# Patient Record
Sex: MALE | Race: WHITE | NOT HISPANIC OR LATINO | Employment: PART TIME | URBAN - METROPOLITAN AREA
[De-identification: names, ages, dates, MRNs, and addresses within clinical notes are randomized per-mention and may not be internally consistent; named-entity substitution may affect disease eponyms.]

---

## 2017-01-06 ENCOUNTER — OFFICE VISIT (OUTPATIENT)
Dept: FAMILY MEDICINE CLINIC | Facility: CLINIC | Age: 67
End: 2017-01-06

## 2017-01-06 ENCOUNTER — TELEPHONE (OUTPATIENT)
Dept: FAMILY MEDICINE CLINIC | Facility: CLINIC | Age: 67
End: 2017-01-06

## 2017-01-06 ENCOUNTER — OFFICE VISIT (OUTPATIENT)
Dept: CARDIOLOGY | Facility: CLINIC | Age: 67
End: 2017-01-06

## 2017-01-06 VITALS
OXYGEN SATURATION: 98 % | HEART RATE: 66 BPM | HEIGHT: 76 IN | SYSTOLIC BLOOD PRESSURE: 132 MMHG | BODY MASS INDEX: 32.27 KG/M2 | DIASTOLIC BLOOD PRESSURE: 72 MMHG | TEMPERATURE: 97.5 F | WEIGHT: 265 LBS

## 2017-01-06 VITALS
BODY MASS INDEX: 31.53 KG/M2 | WEIGHT: 267 LBS | HEART RATE: 58 BPM | HEIGHT: 77 IN | SYSTOLIC BLOOD PRESSURE: 130 MMHG | DIASTOLIC BLOOD PRESSURE: 72 MMHG

## 2017-01-06 DIAGNOSIS — I11.9 HYPERTENSIVE HEART DISEASE WITHOUT HEART FAILURE: ICD-10-CM

## 2017-01-06 DIAGNOSIS — E05.00 GRAVES DISEASE: ICD-10-CM

## 2017-01-06 DIAGNOSIS — R79.89 ELEVATED TSH: ICD-10-CM

## 2017-01-06 DIAGNOSIS — R42 DIZZINESS: Primary | ICD-10-CM

## 2017-01-06 DIAGNOSIS — I47.1 SUPRAVENTRICULAR TACHYCARDIA (HCC): ICD-10-CM

## 2017-01-06 DIAGNOSIS — R19.7 DIARRHEA, UNSPECIFIED TYPE: ICD-10-CM

## 2017-01-06 DIAGNOSIS — I49.3 PVC (PREMATURE VENTRICULAR CONTRACTION): ICD-10-CM

## 2017-01-06 DIAGNOSIS — I10 ESSENTIAL HYPERTENSION: ICD-10-CM

## 2017-01-06 DIAGNOSIS — R53.83 FATIGUE, UNSPECIFIED TYPE: ICD-10-CM

## 2017-01-06 DIAGNOSIS — I49.9 IRREGULAR HEART BEAT: ICD-10-CM

## 2017-01-06 DIAGNOSIS — R79.89 ELEVATED LFTS: Primary | ICD-10-CM

## 2017-01-06 LAB
ALBUMIN SERPL-MCNC: 4.1 G/DL (ref 3.5–5.2)
ALBUMIN/GLOB SERPL: 1.2 G/DL
ALP SERPL-CCNC: 58 U/L (ref 39–117)
ALT SERPL W P-5'-P-CCNC: 42 U/L (ref 1–41)
ANION GAP SERPL CALCULATED.3IONS-SCNC: 12.3 MMOL/L
AST SERPL-CCNC: 31 U/L (ref 1–40)
BILIRUB SERPL-MCNC: 0.4 MG/DL (ref 0.1–1.2)
BUN BLD-MCNC: 20 MG/DL (ref 8–23)
BUN/CREAT SERPL: 17.9 (ref 7–25)
CALCIUM SPEC-SCNC: 9.9 MG/DL (ref 8.6–10.5)
CHLORIDE SERPL-SCNC: 101 MMOL/L (ref 98–107)
CO2 SERPL-SCNC: 23.7 MMOL/L (ref 22–29)
CREAT BLD-MCNC: 1.12 MG/DL (ref 0.76–1.27)
ERYTHROCYTE [DISTWIDTH] IN BLOOD BY AUTOMATED COUNT: 14.4 % (ref 4.5–15)
GFR SERPL CREATININE-BSD FRML MDRD: 66 ML/MIN/1.73
GLOBULIN UR ELPH-MCNC: 3.5 GM/DL
GLUCOSE BLD-MCNC: 85 MG/DL (ref 65–99)
HCT VFR BLD AUTO: 52.9 % (ref 35–60)
HGB BLD-MCNC: 16.6 G/DL (ref 13.5–18)
LYMPHOCYTES # BLD AUTO: 1.3 10*3/MM3 (ref 1.2–3.4)
LYMPHOCYTES NFR BLD AUTO: 18.7 % (ref 21–51)
MCH RBC QN AUTO: 26.7 PG (ref 26.1–33.1)
MCHC RBC AUTO-ENTMCNC: 31.4 G/DL (ref 33–37)
MCV RBC AUTO: 85.1 FL (ref 80–99)
MONOCYTES # BLD AUTO: 0.3 10*3/MM3 (ref 0.1–0.6)
MONOCYTES NFR BLD AUTO: 4.5 % (ref 2–9)
NEUTROPHILS # BLD AUTO: 5.3 10*3/MM3 (ref 1.4–6.5)
NEUTROPHILS NFR BLD AUTO: 76.8 % (ref 42–75)
PLATELET # BLD AUTO: 197 10*3/MM3 (ref 150–450)
PMV BLD AUTO: 7.9 FL (ref 7.1–10.5)
POTASSIUM BLD-SCNC: 5.4 MMOL/L (ref 3.5–5.2)
PROT SERPL-MCNC: 7.6 G/DL (ref 6–8.5)
RBC # BLD AUTO: 6.21 10*6/MM3 (ref 4–6)
SODIUM BLD-SCNC: 137 MMOL/L (ref 136–145)
WBC NRBC COR # BLD: 6.9 10*3/MM3 (ref 4.5–10)

## 2017-01-06 PROCEDURE — 80053 COMPREHEN METABOLIC PANEL: CPT | Performed by: NURSE PRACTITIONER

## 2017-01-06 PROCEDURE — 99214 OFFICE O/P EST MOD 30 MIN: CPT | Performed by: INTERNAL MEDICINE

## 2017-01-06 PROCEDURE — 93000 ELECTROCARDIOGRAM COMPLETE: CPT | Performed by: INTERNAL MEDICINE

## 2017-01-06 PROCEDURE — 99213 OFFICE O/P EST LOW 20 MIN: CPT | Performed by: NURSE PRACTITIONER

## 2017-01-06 PROCEDURE — 85025 COMPLETE CBC W/AUTO DIFF WBC: CPT | Performed by: NURSE PRACTITIONER

## 2017-01-06 RX ORDER — LEVOTHYROXINE SODIUM 0.07 MG/1
75 TABLET ORAL DAILY
Qty: 30 TABLET | Refills: 1 | Status: SHIPPED | OUTPATIENT
Start: 2017-01-06 | End: 2017-02-14

## 2017-01-06 NOTE — PROGRESS NOTES
"Subjective   Joaquin Peña is a 66 y.o. male.     History of Present Illness   C/o dizzy and fatigue, fluctuations in his pulse rate, mid 50s-90s. He visited his cardiologist Dr. Muna Haque this morning. He is dizzy all the time and fatigued.    He was seen on 12/22 for sinusitis and placed on amoxil 875mg BID for 7 days.  He states his sinuses are since resolved.  He states he has been dizzy since he changed his [id number] of levothyroxine, he was supposed to increase his dose to 88mcg per Ciera oquendo's note on 9/22/16 but he states this made him dizzy, per note in 12/22/16.  He was on 75mcg of synthroid.  He was supposed to have his TSH rechecked after the increase dose change 6 weeks later in 9/16 but did not f/u because he did not increase his dose. He states after that visit he increased his dose back up to 88mcg per Dr. Erwin Escobar's order on 12/22/16 and has been taking that dose since.  He has a history of Grave's disease.  He denies palpitations, CP.  He states he has SOA on occasion, not necessarily with exertion.  He states his cardiologist did an EKG today.  She also ordered a 24 hour holter monitor. Dr. Haque told him to come here today and made this appt for him. He feels dizzy and lightheaded currently.    He states he had a headache 2 days ago but has since resolved.  He states he slipped and fell in Feb 2014.  He states he tore his scalp open, and he had a CT with some \"bleeding on the brain.\" his CT head from 02/14 showed a subdural hematoma. He states he was monitored for 4 days, he states he had headaches at that time.  He denies any recent confusion.    He states his cardiologist made the appt today because she did not think these symptoms were heart related and wanted his to f/u with his pcp today.  He denies syncope. He states he has had diarrhea that started yesterday morning. He states it is watery diarrhea.  He denies melena and hematochezia, he denies any change in diet.  He denies abd " "pain, N/V.  He still has diarrhea today.  He states his last colonoscopy was last year and normal, Dr. Schulte. He is unsure about diverticulosis dx. He does not see an endocrinologist but he states he was exposed to radioactive iodine in 1990s and \"it shrunk his thyroid.\"  The following portions of the patient's history were reviewed and updated as appropriate: allergies, current medications, past family history, past medical history, past social history, past surgical history and problem list.    Review of Systems   Constitutional: Positive for fatigue. Negative for appetite change, chills and fever.   HENT: Negative for congestion, ear discharge, ear pain and tinnitus.    Eyes: Negative for photophobia, pain and visual disturbance.   Respiratory: Positive for shortness of breath (intermittent). Negative for cough, chest tightness and wheezing.    Cardiovascular: Negative for chest pain, palpitations and leg swelling.   Gastrointestinal: Positive for diarrhea. Negative for abdominal distention, abdominal pain, blood in stool, constipation, nausea, rectal pain and vomiting.   Musculoskeletal: Negative for arthralgias, myalgias, neck pain and neck stiffness.   Skin: Negative for pallor.   Allergic/Immunologic: Positive for environmental allergies.   Neurological: Positive for dizziness and light-headedness. Negative for syncope, weakness and headaches.   Psychiatric/Behavioral: Negative for agitation, confusion and sleep disturbance. The patient is not nervous/anxious.    All other systems reviewed and are negative.      Objective   Physical Exam   Constitutional: He is oriented to person, place, and time. He appears well-developed and well-nourished.   HENT:   Head: Normocephalic.   Right Ear: Hearing, tympanic membrane and ear canal normal.   Left Ear: Hearing, tympanic membrane and ear canal normal.   Nose: Mucosal edema present. Right sinus exhibits no maxillary sinus tenderness and no frontal sinus " tenderness. Left sinus exhibits no maxillary sinus tenderness and no frontal sinus tenderness.   Mouth/Throat: Uvula is midline, oropharynx is clear and moist and mucous membranes are normal.   Eyes: EOM are normal. Pupils are equal, round, and reactive to light.   Neck: Trachea normal and normal range of motion. Neck supple. Carotid bruit is not present. No thyromegaly present.   Cardiovascular: Normal rate, regular rhythm, normal heart sounds and intact distal pulses.    Pulmonary/Chest: Effort normal and breath sounds normal. No respiratory distress.   Abdominal: Soft. Bowel sounds are normal. He exhibits no distension. There is no hepatosplenomegaly. There is no tenderness.   Musculoskeletal: Normal range of motion. He exhibits no edema.   Lymphadenopathy:     He has no cervical adenopathy.   Neurological: He is alert and oriented to person, place, and time. He has normal strength. No cranial nerve deficit.   Skin: Skin is warm and dry.   Psychiatric: His behavior is normal. Judgment and thought content normal. His affect is blunt.   Flat affect.   Nursing note and vitals reviewed.      Assessment/Plan   Joaquin was seen today for follow-up.    Diagnoses and all orders for this visit:    Dizziness  -     CBC & Differential  -     Comprehensive Metabolic Panel  -     Cancel: Ambulatory Referral to Endocrinology  -     Ambulatory Referral to Endocrinology  -     CBC Auto Differential    Fatigue, unspecified type  -     CBC & Differential  -     Comprehensive Metabolic Panel  -     Cancel: Ambulatory Referral to Endocrinology  -     Ambulatory Referral to Endocrinology  -     CBC Auto Differential    Elevated TSH  -     Cancel: Ambulatory Referral to Endocrinology  -     Ambulatory Referral to Endocrinology    Other orders  -     levothyroxine (SYNTHROID) 75 MCG tablet; Take 1 tablet by mouth Daily.    Cbc, Cmp today, will call with results.  Changed dose synthroid back to 75mcg daily.   Refer to endocrinologist  for elevated TSH, and med dose change not tolerated, will call with referral.   Cont scheduled f/u with cardiologist and for holter monitor.  Encourage low CHOL diet.  If diarrhea is not resolved, within 2 days call office.   Increase fluid intake, get plenty of rest.   If dizziness, CP, palpitations, change in mental status, or any worsening symptoms patient informed to go to ER.  F/u in office with Dr. Escobar in about 3 weeks.   Patient agrees with plan of care and understands instructions. Call if worsening symptoms or any problems or concerns.

## 2017-01-06 NOTE — PROGRESS NOTES
Date of Office Visit: 2017  Encounter Provider: Muna Haque MD  Place of Service: [facility] CARDIOLOGY  Patient Name: Joaquin Peña  :1950    Chief complaint  Follow-up of paroxysmal supraventricular tachycardia, hypertension    History of Present Illness  Patient is a 66-year-old gentleman with Graves' disease, dyslipidemia, renal calculi who in  developed brief episodes of supraventricular tachycardia in the setting of hypertension.  This was treated medically.  In 2014 had a stress echocardiogram that showed normal left ventricular size and function with ejection fraction 59% grade 1 diastolic dysfunction mild left ventricular hypertrophy with no significant valvular heart disease and no ischemia in May 2015 he had mild bradycardia in the 30s.  A follow-up Holter revealed frequent PVCs and brief bursts of atrial tachycardia for which metoprolol was increased and he has not had any recurrent bradycardia arrhythmia.      Since last visit he denies any palpitations or chest pain.  Has dyspnea on exertions unchanged.  Blood pressures been as it is today.  He was suffering from an upper respiratory infection with pharyngitis and cough.  He was treated with antibiotics.  At this time he had some mild dizziness that quite sporadic without other focal motor sensory deficits        Past Medical History   Diagnosis Date   • Acquired spondylolisthesis    • Acute low back pain    • Acute right lumbar radiculopathy    • Bulging lumbar disc    • Concussion with no loss of consciousness    • DDD (degenerative disc disease)    • Fatigue    • Graves disease    • H/O disorder of nervous system and sense organs    • Headache    • Hyperlipidemia    • Hypertension    • Hypertensive heart disease    • Insomnia    • Irregular heart beat    • Lumbar radiculopathy    • Nephrolithiasis    • PVC (premature ventricular contraction)    • Severe head trauma    • SOB (shortness of  breath)    • Spondylolisthesis    • Subdural hematoma    • Supraventricular tachycardia        Past Surgical History   Procedure Laterality Date   • Knee surgery       2008 and 2009   • Lithotripsy     • Other surgical history       ear surgery       Current Outpatient Prescriptions on File Prior to Visit   Medication Sig Dispense Refill   • ketotifen (ZADITOR) 0.025 % ophthalmic solution Apply  to eye As Needed.     • levothyroxine (SYNTHROID, LEVOTHROID) 88 MCG tablet Take 88 mcg by mouth Daily.     • metoprolol succinate XL (TOPROL-XL) 25 MG 24 hr tablet Take 1/2 tablet po bid (Patient taking differently: 25 mg 2 (two) times a day. Take 1/2 tablet po bid) 30 tablet 11   • naproxen (NAPROSYN) 500 MG tablet Take 1 tablet by mouth 2 (Two) Times a Day With Meals. 60 tablet 3   • valsartan (DIOVAN) 40 MG tablet Take 1 tablet by mouth 2 (two) times a day. 30 tablet 11   • vardenafil (LEVITRA) 20 MG tablet Take 1 tablet by mouth Daily As Needed for erectile dysfunction. 10 tablet 3     No current facility-administered medications on file prior to visit.      Allergies as of 01/06/2017 - William as Reviewed 01/06/2017   Allergen Reaction Noted   • Latex Rash 03/15/2016     Social History     Social History   • Marital status:      Spouse name: N/A   • Number of children: N/A   • Years of education: N/A     Occupational History   • Not on file.     Social History Main Topics   • Smoking status: Former Smoker   • Smokeless tobacco: Not on file   • Alcohol use Yes      Comment: social use   • Drug use: No   • Sexual activity: Not on file     Other Topics Concern   • Not on file     Social History Narrative     Family History   Problem Relation Age of Onset   • Cancer Father      malignant neoplasm   • No Known Problems Mother    • No Known Problems Sister    • No Known Problems Brother    • No Known Problems Sister    • No Known Problems Brother    • No Known Problems Brother      Review of Systems   Constitution:  "Negative for fever, malaise/fatigue, weight gain and weight loss.   HENT: Negative for ear pain, hearing loss, nosebleeds and sore throat.    Eyes: Negative for double vision, pain, vision loss in left eye and vision loss in right eye.   Cardiovascular:        See history of present illness.   Respiratory: Positive for shortness of breath. Negative for cough, sleep disturbances due to breathing, snoring and wheezing.    Endocrine: Negative for cold intolerance, heat intolerance and polyuria.   Skin: Negative for itching, poor wound healing and rash.   Musculoskeletal: Negative for joint pain, joint swelling and myalgias.   Gastrointestinal: Negative for abdominal pain, diarrhea, hematochezia, nausea and vomiting.   Genitourinary: Negative for hematuria and hesitancy.   Neurological: Positive for excessive daytime sleepiness. Negative for numbness, paresthesias and seizures.   Psychiatric/Behavioral: Negative for depression. The patient is not nervous/anxious.      Objective:     Vitals:    01/06/17 0803   BP: 130/72   Pulse: 58   Weight: 267 lb (121 kg)   Height: 77\" (195.6 cm)     Body mass index is 31.66 kg/(m^2).    Physical Exam   Constitutional: He is oriented to person, place, and time. He appears well-developed and well-nourished.   Overweight   HENT:   Head: Normocephalic.   Nose: Nose normal.   Mouth/Throat: Oropharynx is clear and moist.   Eyes: Conjunctivae and EOM are normal. Pupils are equal, round, and reactive to light. Right eye exhibits no discharge. No scleral icterus.   Neck: Normal range of motion. Neck supple. No JVD present. No thyromegaly present.   Cardiovascular: Normal rate, regular rhythm, normal heart sounds and intact distal pulses.  Exam reveals no gallop and no friction rub.    No murmur heard.  Pulses:       Carotid pulses are 2+ on the right side, and 2+ on the left side.       Radial pulses are 2+ on the right side, and 2+ on the left side.        Femoral pulses are 2+ on the right " side, and 2+ on the left side.       Popliteal pulses are 2+ on the right side, and 2+ on the left side.        Dorsalis pedis pulses are 2+ on the right side, and 2+ on the left side.        Posterior tibial pulses are 2+ on the right side, and 2+ on the left side.   Pulmonary/Chest: Effort normal and breath sounds normal. No respiratory distress. He has no wheezes. He has no rales.   Abdominal: Soft. Bowel sounds are normal. He exhibits no distension. There is no hepatosplenomegaly. There is no tenderness. There is no rebound.   Musculoskeletal: Normal range of motion. He exhibits no edema or tenderness.   Neurological: He is alert and oriented to person, place, and time.   Skin: Skin is warm and dry. No rash noted. No erythema.   Psychiatric: He has a normal mood and affect. His behavior is normal. Judgment and thought content normal.   Vitals reviewed.    Lab Review:     ECG 12 Lead  Date/Time: 1/6/2017 11:16 PM  Performed by: JOHNY HART  Authorized by: JOHNY HART   Comparison: compared with previous ECG   Similar to previous ECG  Rhythm: sinus rhythm  Conduction: 1st degree and non-specific intraventricular conduction delay  Conduction comments: QTc = 413 msec  Clinical impression: abnormal ECG          Assessment:       Diagnosis Plan   1. Dizziness  Holter Monitor - 24 Hour   2. Supraventricular tachycardia     3. Hypertensive heart disease without heart failure     4. PVC (premature ventricular contraction)     5. Essential hypertension     6. Irregular heart beat  Holter Monitor - 24 Hour     Plan:       1.  Dizziness, we'll check a 24-hour Holter.  He will also see Dr. Escobar later today to reassess for otitis  1.  Paroxysmal supraventricular tachycardia.   2.  Hypertension.    3.  Dyslipidemia.  4.  Graves' disease.  Managed by Dr. Escobar and recently dose increased  5.  Recent upper respiratory infection  6.  Chronic dyspnea on exertion     Joaquin Peña   Home Medication Instructions MINDI:     Printed on:01/09/17 5492   Medication Information                      diclofenac (VOLTAREN) 3 % gel gel  Apply  topically 2 (Two) Times a Day. for 60 days.             diphenoxylate-atropine (LOMOTIL) 2.5-0.025 MG per tablet  Take 2 tablets by mouth 4 (Four) Times a Day As Needed for diarrhea.             ketotifen (ZADITOR) 0.025 % ophthalmic solution  Apply  to eye As Needed.             levothyroxine (SYNTHROID) 75 MCG tablet  Take 1 tablet by mouth Daily.             metoprolol succinate XL (TOPROL-XL) 25 MG 24 hr tablet  Take 1/2 tablet po bid             naproxen (NAPROSYN) 500 MG tablet  Take 1 tablet by mouth 2 (Two) Times a Day With Meals.             valsartan (DIOVAN) 40 MG tablet  Take 1 tablet by mouth 2 (two) times a day.             vardenafil (LEVITRA) 20 MG tablet  Take 1 tablet by mouth Daily As Needed for erectile dysfunction.               EMR Dragon/Transcription disclaimer:   Much of this encounter note is an electronic transcription/translation of spoken language to printed text. The electronic translation of spoken language may permit erroneous, or at times, nonsensical words or phrases to be inadvertently transcribed; Although I have reviewed the note for such errors, some may still exist.

## 2017-01-06 NOTE — MR AVS SNAPSHOT
Joaquin Peña   1/6/2017 8:20 AM   Office Visit    Dept Phone:  [phone]   Encounter #:  [id number]    Provider:  Muna Haque MD   Department:  [facility] CARDIOLOGY                Your Full Care Plan              Today's Medication Changes          These changes are accurate as of: 1/6/17  9:20 AM.  If you have any questions, ask your nurse or doctor.               Stop taking medication(s)listed here:     fish oil 1200 MG capsule capsule   Stopped by:  Muna Haque MD           fluticasone 50 MCG/ACT nasal spray   Commonly known as:  FLONASE   Stopped by:  Muna Haque MD           MULTIVITAMINS PO   Stopped by:  Muna Haque MD                      Your Updated Medication List          This list is accurate as of: 1/6/17  9:20 AM.  Always use your most recent med list.                diclofenac 3 % gel gel   Commonly known as:  VOLTAREN       levothyroxine 88 MCG tablet   Commonly known as:  SYNTHROID, LEVOTHROID       metoprolol succinate XL 25 MG 24 hr tablet   Commonly known as:  TOPROL-XL   Take 1/2 tablet po bid       naproxen 500 MG tablet   Commonly known as:  NAPROSYN   Take 1 tablet by mouth 2 (Two) Times a Day With Meals.       valsartan 40 MG tablet   Commonly known as:  DIOVAN   Take 1 tablet by mouth 2 (two) times a day.       vardenafil 20 MG tablet   Commonly known as:  LEVITRA   Take 1 tablet by mouth Daily As Needed for erectile dysfunction.       ZADITOR 0.025 % ophthalmic solution   Generic drug:  ketotifen               You Were Diagnosed With        Codes Comments    Dizziness    -  Primary ICD-10-CM: R42  ICD-9-CM: 780.4     Supraventricular tachycardia     ICD-10-CM: I47.1  ICD-9-CM: 427.89     Hypertensive heart disease without heart failure     ICD-10-CM: I11.9  ICD-9-CM: 402.90     PVC (premature ventricular contraction)     ICD-10-CM: I49.3  ICD-9-CM: 427.69     Essential hypertension     ICD-10-CM: I10  ICD-9-CM: 401.9     Irregular heart beat     ICD-10-CM: I49.9  ICD-9-CM: 427.9       Instructions     None    Patient Instructions History      Upcoming Appointments     Visit Type Date Time Department    FOLLOW UP 1/6/2017  8:20 AM [facility] [place]    OFFICE VISIT 1/6/2017 11:40 AM [facility] [place]     WESLEY HOLTER MONITOR 24 HOUR VT 1/13/2017  9:00 AM [facility] [place]    FOLLOW UP 3/20/2017  9:00 AM [facility] [place]    FOLLOW UP 7/6/2017  8:00 AM [facility] [place]      MyChart Signup     Our records indicate that you have an active [employer] account.    You can view your After Visit Summary by going to Generate and logging in with your [employer] username and password.  If you don't have a [employer] username and password but a parent or guardian has access to your record, the parent or guardian should login with their own [employer] username and password and access your record to view the After Visit Summary.    If you have questions, you can email [email or web address] or call [phone] to talk to our [employer] staff.  Remember, [employer] is NOT to be used for urgent needs.  For medical emergencies, dial 911.               Other Info from Your Visit           Your Appointments     Jan 06, 2017 11:40 AM EST   Office Visit with QIAN Smith   [facility] (--)    [address] [facility] [address] [phone]           Arrive 15 minutes prior to appointment.            Jan 13, 2017  9:00 AM EST   HOLTER MONITOR - 24 HOUR VISIT with WESLEY ESTEEG HOLTER   [facility]IOLOG[facility] ([facility])    [address] [facility] [address]               Mar 20, 2017  9:00 AM EDT   Follow Up with Muna Haque MD   [facility] CARDIOLOG[facility] (--)    [address]  [facility] [address] [phone]           Arrive 15 minutes prior to appointment.            Jul 06, 2017  8:00 AM EDT   Follow Up with  "Muna Haque MD   [facility] CARDIOLOGY (--)    [address]  [facility] [address] [phone]           Arrive 15 minutes prior to appointment.              Allergies     Latex Allergy Rash      Reason for Visit     Rapid Heart Rate early followup    Hypertension     PVC     Dizziness           Vital Signs     Blood Pressure Pulse Height Weight Body Mass Index Smoking Status    130/72 58 77\" (195.6 cm) 267 lb (121 kg) 31.66 kg/m2 Former Smoker      Problems and Diagnoses Noted     Dizziness    High blood pressure    Heart disease due to high blood pressure    Irregular heartbeat    Irregular heartbeat    Irregular heartbeat        "

## 2017-01-06 NOTE — PATIENT INSTRUCTIONS
Cbc, Cmp today, will call with results.  Changed dose synthroid back to 75mcg daily.   Refer to endocrinologist for elevated TSH, and med dose change not tolerated, will call with referral.   Cont scheduled f/u with cardiologist and for holter monitor.  Encourage low CHOL diet.  If diarrhea is not resolved, within 2 days call office.   Increase fluid intake, get plenty of rest.   If dizziness, CP, palpitations, change in mental status, or any worsening symptoms patient informed to go to ER.  F/u in office with Dr. Escobar in about 3 weeks.   Patient agrees with plan of care and understands instructions. Call if worsening symptoms or any problems or concerns.     Food Choices to Help Relieve Diarrhea, Adult  When you have diarrhea, the foods you eat and your eating habits are very important. Choosing the right foods and drinks can help relieve diarrhea. Also, because diarrhea can last up to 7 days, you need to replace lost fluids and electrolytes (such as sodium, potassium, and chloride) in order to help prevent dehydration.   WHAT GENERAL GUIDELINES DO I NEED TO FOLLOW?  · Slowly drink 1 cup (8 oz) of fluid for each episode of diarrhea. If you are getting enough fluid, your urine will be clear or pale yellow.  · Eat starchy foods. Some good choices include white rice, white toast, pasta, low-fiber cereal, baked potatoes (without the skin), saltine crackers, and bagels.  · Avoid large servings of any cooked vegetables.  · Limit fruit to two servings per day. A serving is ½ cup or 1 small piece.  · Choose foods with less than 2 g of fiber per serving.  · Limit fats to less than 8 tsp (38 g) per day.  · Avoid fried foods.  · Eat foods that have probiotics in them. Probiotics can be found in certain dairy products.  · Avoid foods and beverages that may increase the speed at which food moves through the stomach and intestines (gastrointestinal tract). Things to avoid include:  ¨ High-fiber foods, such as dried fruit, raw  fruits and vegetables, nuts, seeds, and whole grain foods.  ¨ Spicy foods and high-fat foods.  ¨ Foods and beverages sweetened with high-fructose corn syrup, honey, or sugar alcohols such as xylitol, sorbitol, and mannitol.  WHAT FOODS ARE RECOMMENDED?  Grains  White rice. White, French, or jose breads (fresh or toasted), including plain rolls, buns, or bagels. White pasta. Saltine, soda, or rae crackers. Pretzels. Low-fiber cereal. Cooked cereals made with water (such as cornmeal, farina, or cream cereals). Plain muffins. Matzo. [place] toast. Zwieback.   Vegetables  Potatoes (without the skin). Strained tomato and vegetable juices. Most well-cooked and canned vegetables without seeds. Tender lettuce.  Fruits  Cooked or canned applesauce, apricots, cherries, fruit cocktail, grapefruit, peaches, pears, or plums. Fresh bananas, apples without skin, cherries, grapes, cantaloupe, grapefruit, peaches, oranges, or plums.   Meat and Other Protein Products  Baked or boiled chicken. Eggs. Tofu. Fish. Seafood. Smooth peanut butter. Ground or well-cooked tender beef, ham, veal, lamb, pork, or poultry.   Dairy  Plain yogurt, kefir, and unsweetened liquid yogurt. Lactose-free milk, buttermilk, or soy milk. Plain hard cheese.  Beverages  Sport drinks. Clear broths. Diluted fruit juices (except prune). Regular, caffeine-free sodas such as ginger ale. Water. Decaffeinated teas. Oral rehydration solutions. Sugar-free beverages not sweetened with sugar alcohols.  Other  Bouillon, broth, or soups made from recommended foods.   The items listed above may not be a complete list of recommended foods or beverages. Contact your dietitian for more options.  WHAT FOODS ARE NOT RECOMMENDED?  Grains  Whole grain, whole wheat, bran, or rye breads, rolls, pastas, crackers, and cereals. Wild or brown rice. Cereals that contain more than 2 g of fiber per serving. Corn tortillas or taco shells. Cooked or dry oatmeal. Granola.  Popcorn.  Vegetables  Raw vegetables. Cabbage, broccoli, [place] sprouts, artichokes, baked beans, beet greens, corn, kale, legumes, peas, sweet potatoes, and yams. Potato skins. Cooked spinach and cabbage.  Fruits  Dried fruit, including raisins and dates. Raw fruits. Stewed or dried prunes. Fresh apples with skin, apricots, mangoes, pears, raspberries, and strawberries.   Meat and Other Protein Products  [place] peanut butter. Nuts and seeds. Beans and lentils. Duarte.   Dairy  High-fat cheeses. Milk, chocolate milk, and beverages made with milk, such as milk shakes. Cream. Ice cream.  Sweets and Desserts  Sweet rolls, doughnuts, and sweet breads. Pancakes and waffles.  Fats and Oils  Butter. Cream sauces. Margarine. Salad oils. Plain salad dressings. Olives. Avocados.   Beverages  Caffeinated beverages (such as coffee, tea, soda, or energy drinks). Alcoholic beverages. Fruit juices with pulp. Prune juice. Soft drinks sweetened with high-fructose corn syrup or sugar alcohols.  Other  Coconut. Hot sauce. Chili powder. Mayonnaise. Gravy. Cream-based or milk-based soups.   The items listed above may not be a complete list of foods and beverages to avoid. Contact your dietitian for more information.  WHAT SHOULD I DO IF I BECOME DEHYDRATED?  Diarrhea can sometimes lead to dehydration. Signs of dehydration include dark urine and dry mouth and skin. If you think you are dehydrated, you should rehydrate with an oral rehydration solution. These solutions can be purchased at pharmacies, retail stores, or online.   Drink ½-1 cup (120-240 mL) of oral rehydration solution each time you have an episode of diarrhea. If drinking this amount makes your diarrhea worse, try drinking smaller amounts more often. For example, drink 1-3 tsp (5-15 mL) every 5-10 minutes.   A general rule for staying hydrated is to drink 1½-2 L of fluid per day. Talk to your health care provider about the specific amount you should be drinking each day.  Drink enough fluids to keep your urine clear or pale yellow.     This information is not intended to replace advice given to you by your health care provider. Make sure you discuss any questions you have with your health care provider.     Document Released: 03/09/2005 Document Revised: 01/08/2016 Document Reviewed: 11/10/2014  Elsevier Interactive Patient Education ©2016 Elsevier Inc.

## 2017-01-06 NOTE — MR AVS SNAPSHOT
Joaquin Peña   1/6/2017 11:40 AM   Office Visit    Dept Phone:  [phone]   Encounter #:  [id number]    Provider:  QIAN Smith   Department:  [facility]                Your Full Care Plan              Today's Medication Changes          These changes are accurate as of: 1/6/17  1:03 PM.  If you have any questions, ask your nurse or doctor.               Medication(s)that have changed:     levothyroxine 75 MCG tablet   Commonly known as:  SYNTHROID   Take 1 tablet by mouth Daily.   What changed:    - medication strength  - how much to take   Changed by:  QIAN Smith         Stop taking medication(s)listed here:     fish oil 1200 MG capsule capsule   Stopped by:  Muna Haque MD           fluticasone 50 MCG/ACT nasal spray   Commonly known as:  FLONASE   Stopped by:  Muna Haque MD           MULTIVITAMINS PO   Stopped by:  Muna Haque MD                Where to Get Your Medications      These medications were sent to [facility] #[facility] [address] [place] [address] [phone] [place] [id number] [id number]     Phone:  [phone]     levothyroxine 75 MCG tablet                  Your Updated Medication List          This list is accurate as of: 1/6/17  1:03 PM.  Always use your most recent med list.                diclofenac 3 % gel gel   Commonly known as:  VOLTAREN       levothyroxine 75 MCG tablet   Commonly known as:  SYNTHROID   Take 1 tablet by mouth Daily.       metoprolol succinate XL 25 MG 24 hr tablet   Commonly known as:  TOPROL-XL   Take 1/2 tablet po bid       naproxen 500 MG tablet   Commonly known as:  NAPROSYN   Take 1 tablet by mouth 2 (Two) Times a Day With Meals.       valsartan 40 MG tablet   Commonly known as:  DIOVAN   Take 1 tablet by mouth 2 (two) times a day.       vardenafil 20 MG tablet   Commonly known as:  LEVITRA   Take 1 tablet by mouth Daily As Needed for  erectile dysfunction.       ZADITOR 0.025 % ophthalmic solution   Generic drug:  ketotifen               We Performed the Following     Ambulatory Referral to Endocrinology     CBC & Differential     CBC Auto Differential     Comprehensive Metabolic Panel       You Were Diagnosed With        Codes Comments    Dizziness    -  Primary ICD-10-CM: R42  ICD-9-CM: 780.4     Fatigue, unspecified type     ICD-10-CM: R53.83  ICD-9-CM: 780.79     Elevated TSH     ICD-10-CM: R94.6  ICD-9-CM: 794.5     Diarrhea, unspecified type     ICD-10-CM: R19.7  ICD-9-CM: 787.91     Graves disease     ICD-10-CM: E05.00  ICD-9-CM: 242.00       Instructions    Cbc, Cmp today, will call with results.  Changed dose synthroid back to 75mcg daily.   Refer to endocrinologist for elevated TSH, and med dose change not tolerated, will call with referral.   Cont scheduled f/u with cardiologist and for holter monitor.  Encourage low CHOL diet.  If diarrhea is not resolved, within 2 days call office.   Increase fluid intake, get plenty of rest.   If dizziness, CP, palpitations, change in mental status, or any worsening symptoms patient informed to go to ER.  F/u in office with Dr. Escobar in about 3 weeks.   Patient agrees with plan of care and understands instructions. Call if worsening symptoms or any problems or concerns.     Food Choices to Help Relieve Diarrhea, Adult  When you have diarrhea, the foods you eat and your eating habits are very important. Choosing the right foods and drinks can help relieve diarrhea. Also, because diarrhea can last up to 7 days, you need to replace lost fluids and electrolytes (such as sodium, potassium, and chloride) in order to help prevent dehydration.   WHAT GENERAL GUIDELINES DO I NEED TO FOLLOW?  · Slowly drink 1 cup (8 oz) of fluid for each episode of diarrhea. If you are getting enough fluid, your urine will be clear or pale yellow.  · Eat starchy foods. Some good choices include white rice, white toast,  pasta, low-fiber cereal, baked potatoes (without the skin), saltine crackers, and bagels.  · Avoid large servings of any cooked vegetables.  · Limit fruit to two servings per day. A serving is ½ cup or 1 small piece.  · Choose foods with less than 2 g of fiber per serving.  · Limit fats to less than 8 tsp (38 g) per day.  · Avoid fried foods.  · Eat foods that have probiotics in them. Probiotics can be found in certain dairy products.  · Avoid foods and beverages that may increase the speed at which food moves through the stomach and intestines (gastrointestinal tract). Things to avoid include:  ¨ High-fiber foods, such as dried fruit, raw fruits and vegetables, nuts, seeds, and whole grain foods.  ¨ Spicy foods and high-fat foods.  ¨ Foods and beverages sweetened with high-fructose corn syrup, honey, or sugar alcohols such as xylitol, sorbitol, and mannitol.  WHAT FOODS ARE RECOMMENDED?  Grains  White rice. White, [place], or jose breads (fresh or toasted), including plain rolls, buns, or bagels. White pasta. Saltine, soda, or rae crackers. Pretzels. Low-fiber cereal. Cooked cereals made with water (such as cornmeal, farina, or cream cereals). Plain muffins. Matzo. [place] toast. Zwieback.   Vegetables  Potatoes (without the skin). Strained tomato and vegetable juices. Most well-cooked and canned vegetables without seeds. Tender lettuce.  Fruits  Cooked or canned applesauce, apricots, cherries, fruit cocktail, grapefruit, peaches, pears, or plums. Fresh bananas, apples without skin, cherries, grapes, cantaloupe, grapefruit, peaches, oranges, or plums.   Meat and Other Protein Products  Baked or boiled chicken. Eggs. Tofu. Fish. Seafood. Smooth peanut butter. Ground or well-cooked tender beef, ham, veal, lamb, pork, or poultry.   Dairy  Plain yogurt, kefir, and unsweetened liquid yogurt. Lactose-free milk, buttermilk, or soy milk. Plain hard cheese.  Beverages  Sport drinks. Clear broths. Diluted fruit juices  (except prune). Regular, caffeine-free sodas such as ginger ale. Water. Decaffeinated teas. Oral rehydration solutions. Sugar-free beverages not sweetened with sugar alcohols.  Other  Bouillon, broth, or soups made from recommended foods.   The items listed above may not be a complete list of recommended foods or beverages. Contact your dietitian for more options.  WHAT FOODS ARE NOT RECOMMENDED?  Grains  Whole grain, whole wheat, bran, or rye breads, rolls, pastas, crackers, and cereals. Wild or brown rice. Cereals that contain more than 2 g of fiber per serving. Corn tortillas or taco shells. Cooked or dry oatmeal. Granola. Popcorn.  Vegetables  Raw vegetables. Cabbage, broccoli, [place] sprouts, artichokes, baked beans, beet greens, corn, kale, legumes, peas, sweet potatoes, and yams. Potato skins. Cooked spinach and cabbage.  Fruits  Dried fruit, including raisins and dates. Raw fruits. Stewed or dried prunes. Fresh apples with skin, apricots, mangoes, pears, raspberries, and strawberries.   Meat and Other Protein Products  [place] peanut butter. Nuts and seeds. Beans and lentils. Duarte.   Dairy  High-fat cheeses. Milk, chocolate milk, and beverages made with milk, such as milk shakes. Cream. Ice cream.  Sweets and Desserts  Sweet rolls, doughnuts, and sweet breads. Pancakes and waffles.  Fats and Oils  Butter. Cream sauces. Margarine. Salad oils. Plain salad dressings. Olives. Avocados.   Beverages  Caffeinated beverages (such as coffee, tea, soda, or energy drinks). Alcoholic beverages. Fruit juices with pulp. Prune juice. Soft drinks sweetened with high-fructose corn syrup or sugar alcohols.  Other  Coconut. Hot sauce. Chili powder. Mayonnaise. Gravy. Cream-based or milk-based soups.   The items listed above may not be a complete list of foods and beverages to avoid. Contact your dietitian for more information.  WHAT SHOULD I DO IF I BECOME DEHYDRATED?  Diarrhea can sometimes lead to dehydration. Signs of  dehydration include dark urine and dry mouth and skin. If you think you are dehydrated, you should rehydrate with an oral rehydration solution. These solutions can be purchased at pharmacies, retail stores, or online.   Drink ½-1 cup (120-240 mL) of oral rehydration solution each time you have an episode of diarrhea. If drinking this amount makes your diarrhea worse, try drinking smaller amounts more often. For example, drink 1-3 tsp (5-15 mL) every 5-10 minutes.   A general rule for staying hydrated is to drink 1½-2 L of fluid per day. Talk to your health care provider about the specific amount you should be drinking each day. Drink enough fluids to keep your urine clear or pale yellow.     This information is not intended to replace advice given to you by your health care provider. Make sure you discuss any questions you have with your health care provider.     Document Released: 03/09/2005 Document Revised: 01/08/2016 Document Reviewed: 11/10/2014  [employer] Interactive Patient Education ©2016 Elsevier Inc.       Patient Instructions History      Upcoming Appointments     Visit Type Date Time Department    FOLLOW UP 1/6/2017  8:20 AM [facility]    OFFICE VISIT 1/6/2017 11:40 AM [facility]     WESLEY HOLTER MONITOR 24 HOUR VT 1/6/2017 12:00 PM [facility]    FOLLOW UP 3/20/2017  9:00 AM [facility]    FOLLOW UP 7/6/2017  8:00 AM [facility]      MyChart Signup     Our records indicate that you have an active Myze account.    You can view your After Visit Summary by going to OurHouse and logging in with your [employer] username and password.  If you don't have a [employer] username and password but a parent or guardian has access to your record, the parent or guardian should login with their own [employer] username and password and access your record to view the After Visit Summary.    If you have questions, you can email Good [email or web address] or  "call [phone] to talk to our MyChart staff.  Remember, [employer] is NOT to be used for urgent needs.  For medical emergencies, dial 911.               Other Info from Your Visit           Your Appointments     Mar 20, 2017  9:00 AM EDT   Follow Up with Muna Haque MD   [facility] CARDIOLOGY (--)    [address]  [facility] [address] [phone]           Arrive 15 minutes prior to appointment.            Jul 06, 2017  8:00 AM EDT   Follow Up with Muna Haque MD   [facility] CARDIOLOGY (--)    [address]  [facility] [address] [phone]           Arrive 15 minutes prior to appointment.              Allergies     Latex Allergy Rash      Reason for Visit     Follow-up cardiologist, dizzy, fatigue      Vital Signs     Blood Pressure Pulse Temperature Height Weight Oxygen Saturation    132/72 (BP Location: Right arm, Patient Position: Sitting, Cuff Size: Large Adult) 66 97.5 °F (36.4 °C) (Oral) 76\" (193 cm) 265 lb (120 kg) 98%    Body Mass Index Smoking Status                32.26 kg/m2 Former Smoker          Problems and Diagnoses Noted     Dizziness    Graves disease    Tiredness        TSH excess        Diarrhea          Results     CBC & Differential           CBC Auto Differential      Component Value Standard Range & Units    WBC 6.90 4.50 - 10.00 10*3/mm3    RBC 6.21 4.00 - 6.00 10*6/mm3    Hemoglobin 16.6 13.5 - 18.0 g/dL    Hematocrit 52.9 35.0 - 60.0 %    RDW 14.4 4.5 - 15.0 %    MCV 85.1 80.0 - 99.0 fL    MCH 26.7 26.1 - 33.1 pg    MCHC 31.4 33.0 - 37.0 g/dL    MPV 7.9 7.1 - 10.5 fL    Platelets 197 150 - 450 10*3/mm3    Neutrophil % 76.8 42.0 - 75.0 %    Lymphocyte % 18.7 21.0 - 51.0 %    Monocyte % 4.5 2.0 - 9.0 %    Neutrophils, Absolute 5.30 1.40 - 6.50 10*3/mm3    Lymphocytes, Absolute 1.30 1.20 - 3.40 10*3/mm3    Monocytes, Absolute 0.30 0.10 - 0.60 10*3/mm3                    "

## 2017-01-08 ENCOUNTER — HOSPITAL ENCOUNTER (EMERGENCY)
Facility: HOSPITAL | Age: 67
Discharge: HOME | End: 2017-01-08
Attending: EMERGENCY MEDICINE | Admitting: EMERGENCY MEDICINE

## 2017-01-08 VITALS
HEART RATE: 58 BPM | HEIGHT: 76 IN | SYSTOLIC BLOOD PRESSURE: 129 MMHG | OXYGEN SATURATION: 96 % | RESPIRATION RATE: 18 BRPM | WEIGHT: 260 LBS | DIASTOLIC BLOOD PRESSURE: 92 MMHG | BODY MASS INDEX: 31.66 KG/M2 | TEMPERATURE: 97 F

## 2017-01-08 DIAGNOSIS — R42 DIZZINESS: ICD-10-CM

## 2017-01-08 DIAGNOSIS — R19.7 DIARRHEA, UNSPECIFIED TYPE: Primary | ICD-10-CM

## 2017-01-08 LAB
ALBUMIN SERPL-MCNC: 3.9 G/DL (ref 3.5–5.2)
ALBUMIN/GLOB SERPL: 1.2 G/DL
ALP SERPL-CCNC: 55 U/L (ref 39–117)
ALT SERPL W P-5'-P-CCNC: 62 U/L (ref 1–41)
ANION GAP SERPL CALCULATED.3IONS-SCNC: 11.9 MMOL/L
AST SERPL-CCNC: 37 U/L (ref 1–40)
BASOPHILS # BLD AUTO: 0.01 10*3/MM3 (ref 0–0.2)
BASOPHILS NFR BLD AUTO: 0.3 % (ref 0–1.5)
BILIRUB SERPL-MCNC: 0.5 MG/DL (ref 0.1–1.2)
BUN BLD-MCNC: 18 MG/DL (ref 8–23)
BUN/CREAT SERPL: 15.3 (ref 7–25)
CALCIUM SPEC-SCNC: 9.3 MG/DL (ref 8.6–10.5)
CHLORIDE SERPL-SCNC: 103 MMOL/L (ref 98–107)
CO2 SERPL-SCNC: 23.1 MMOL/L (ref 22–29)
CREAT BLD-MCNC: 1.18 MG/DL (ref 0.76–1.27)
DEPRECATED RDW RBC AUTO: 47.3 FL (ref 37–54)
EOSINOPHIL # BLD AUTO: 0.16 10*3/MM3 (ref 0–0.7)
EOSINOPHIL NFR BLD AUTO: 4.1 % (ref 0.3–6.2)
ERYTHROCYTE [DISTWIDTH] IN BLOOD BY AUTOMATED COUNT: 14.7 % (ref 11.5–14.5)
GFR SERPL CREATININE-BSD FRML MDRD: 62 ML/MIN/1.73
GLOBULIN UR ELPH-MCNC: 3.2 GM/DL
GLUCOSE BLD-MCNC: 103 MG/DL (ref 65–99)
HCT VFR BLD AUTO: 47.9 % (ref 40.4–52.2)
HGB BLD-MCNC: 15.9 G/DL (ref 13.7–17.6)
IMM GRANULOCYTES # BLD: 0 10*3/MM3 (ref 0–0.03)
IMM GRANULOCYTES NFR BLD: 0 % (ref 0–0.5)
LYMPHOCYTES # BLD AUTO: 1.42 10*3/MM3 (ref 0.9–4.8)
LYMPHOCYTES NFR BLD AUTO: 35.9 % (ref 19.6–45.3)
MCH RBC QN AUTO: 29 PG (ref 27–32.7)
MCHC RBC AUTO-ENTMCNC: 33.2 G/DL (ref 32.6–36.4)
MCV RBC AUTO: 87.2 FL (ref 79.8–96.2)
MONOCYTES # BLD AUTO: 0.71 10*3/MM3 (ref 0.2–1.2)
MONOCYTES NFR BLD AUTO: 18 % (ref 5–12)
NEUTROPHILS # BLD AUTO: 1.65 10*3/MM3 (ref 1.9–8.1)
NEUTROPHILS NFR BLD AUTO: 41.7 % (ref 42.7–76)
PLATELET # BLD AUTO: 185 10*3/MM3 (ref 140–500)
PMV BLD AUTO: 10.6 FL (ref 6–12)
POTASSIUM BLD-SCNC: 4.4 MMOL/L (ref 3.5–5.2)
PROT SERPL-MCNC: 7.1 G/DL (ref 6–8.5)
RBC # BLD AUTO: 5.49 10*6/MM3 (ref 4.6–6)
SODIUM BLD-SCNC: 138 MMOL/L (ref 136–145)
T4 FREE SERPL-MCNC: 0.98 NG/DL (ref 0.93–1.7)
TSH SERPL DL<=0.05 MIU/L-ACNC: 6.55 MIU/ML (ref 0.27–4.2)
WBC NRBC COR # BLD: 3.95 10*3/MM3 (ref 4.5–10.7)

## 2017-01-08 PROCEDURE — 80053 COMPREHEN METABOLIC PANEL: CPT | Performed by: EMERGENCY MEDICINE

## 2017-01-08 PROCEDURE — 84439 ASSAY OF FREE THYROXINE: CPT | Performed by: EMERGENCY MEDICINE

## 2017-01-08 PROCEDURE — 85025 COMPLETE CBC W/AUTO DIFF WBC: CPT | Performed by: EMERGENCY MEDICINE

## 2017-01-08 PROCEDURE — 96361 HYDRATE IV INFUSION ADD-ON: CPT

## 2017-01-08 PROCEDURE — 84443 ASSAY THYROID STIM HORMONE: CPT | Performed by: EMERGENCY MEDICINE

## 2017-01-08 PROCEDURE — 96360 HYDRATION IV INFUSION INIT: CPT

## 2017-01-08 PROCEDURE — 99283 EMERGENCY DEPT VISIT LOW MDM: CPT

## 2017-01-08 RX ORDER — DIPHENOXYLATE HYDROCHLORIDE AND ATROPINE SULFATE 2.5; .025 MG/1; MG/1
2 TABLET ORAL 4 TIMES DAILY PRN
Qty: 16 TABLET | Refills: 0 | Status: SHIPPED | OUTPATIENT
Start: 2017-01-08 | End: 2017-02-02

## 2017-01-08 RX ORDER — SODIUM CHLORIDE 0.9 % (FLUSH) 0.9 %
10 SYRINGE (ML) INJECTION AS NEEDED
Status: DISCONTINUED | OUTPATIENT
Start: 2017-01-08 | End: 2017-01-08 | Stop reason: HOSPADM

## 2017-01-08 RX ORDER — SODIUM CHLORIDE 9 MG/ML
125 INJECTION, SOLUTION INTRAVENOUS CONTINUOUS
Status: DISCONTINUED | OUTPATIENT
Start: 2017-01-08 | End: 2017-01-08 | Stop reason: HOSPADM

## 2017-01-08 RX ADMIN — SODIUM CHLORIDE 125 ML/HR: 9 INJECTION, SOLUTION INTRAVENOUS at 09:51

## 2017-01-08 RX ADMIN — SODIUM CHLORIDE 1000 ML: 9 INJECTION, SOLUTION INTRAVENOUS at 09:51

## 2017-01-08 NOTE — DISCHARGE INSTRUCTIONS
Follow-up with your doctor later this week if symptoms have not resolved.  Take medication as prescribed.  Drink plenty of fluids.  Return to the emergency department for abdominal pain, fever, blood in your stool, worsening symptoms, or other concern.

## 2017-01-08 NOTE — ED NOTES
Patient was seen at PCP last week, patient states lab work abnormal at that time, pt states dizziness also an issue     Toshia Kellogg RN  01/08/17 0954

## 2017-01-08 NOTE — ED PROVIDER NOTES
EMERGENCY DEPARTMENT ENCOUNTER    CHIEF COMPLAINT  Chief Complaint: Diarrhea  History given by: Patient  History limited by: Nothing  Room Number: 31/31  PMD: Erwin Escobar MD      HPI:  Pt is a 66 y.o. male who presents complaining of diarrhea onset 2 days ago. The pt states he took Imodium and it improved his diarrhea yesterday. The pt states  The pt describes the stool as watery. Pt denies nausea and vomiting. Pt reports abd cramping and lightheadedness. The pt states that nothing makes the lightheadedness better or worse. Pt states he saw his PCP 2 days ago for dizziness and lightheadedness. The pt states his PCP is referring him to an endocrinologist, but he does not have an appointment yet.    Duration: 2 days  Onset: Gradual  Timing: Constant  Radiation: None  Quality: Watery  Intensity/Severity: Moderate  Progression: Improving  Associated Symptoms: Abd cramping and lightheadedness  Aggravating Factors: Nothing  Alleviating Factors: Imodium  Previous Episodes: None  Treatment before arrival: Imodium    PAST MEDICAL HISTORY  Active Ambulatory Problems     Diagnosis Date Noted   • Graves disease    • Supraventricular tachycardia 03/15/2016   • Hypertensive heart disease 03/15/2016   • PVC (premature ventricular contraction) 03/15/2016   • Hypertension 03/15/2016   • Hyperlipidemia 03/15/2016   • Acute bilateral thoracic back pain 11/08/2016   • Dizziness 01/06/2017   • Irregular heart beat 01/06/2017     Resolved Ambulatory Problems     Diagnosis Date Noted   • No Resolved Ambulatory Problems     Past Medical History   Diagnosis Date   • Acquired spondylolisthesis    • Acute low back pain    • Acute right lumbar radiculopathy    • Bulging lumbar disc    • Concussion with no loss of consciousness    • DDD (degenerative disc disease)    • Fatigue    • H/O disorder of nervous system and sense organs    • Headache    • Insomnia    • Lumbar radiculopathy    • Nephrolithiasis    • Severe head trauma    • SOB  (shortness of breath)    • Spondylolisthesis    • Subdural hematoma        PAST SURGICAL HISTORY  Past Surgical History   Procedure Laterality Date   • Knee surgery       2008 and 2009   • Lithotripsy     • Other surgical history       ear surgery       FAMILY HISTORY  Family History   Problem Relation Age of Onset   • Cancer Father      malignant neoplasm   • No Known Problems Mother    • No Known Problems Sister    • No Known Problems Brother    • No Known Problems Sister    • No Known Problems Brother    • No Known Problems Brother        SOCIAL HISTORY  Social History     Social History   • Marital status:      Spouse name: N/A   • Number of children: N/A   • Years of education: N/A     Occupational History   • Not on file.     Social History Main Topics   • Smoking status: Former Smoker   • Smokeless tobacco: Not on file   • Alcohol use Yes      Comment: social use   • Drug use: No   • Sexual activity: Not on file     Other Topics Concern   • Not on file     Social History Narrative       ALLERGIES  Latex    REVIEW OF SYSTEMS  Review of Systems   Constitutional: Negative for chills and fever.   HENT: Negative for sore throat and trouble swallowing.    Eyes: Negative for visual disturbance.   Respiratory: Negative for cough and shortness of breath.    Cardiovascular: Negative for chest pain and leg swelling.   Gastrointestinal: Positive for abdominal pain (Cramping) and diarrhea (Improving). Negative for vomiting.   Endocrine: Negative.    Genitourinary: Negative for decreased urine volume and frequency.   Musculoskeletal: Negative for neck pain.   Skin: Negative for rash.   Allergic/Immunologic: Negative.    Neurological: Positive for light-headedness. Negative for weakness and numbness.   Hematological: Negative.    Psychiatric/Behavioral: Negative.    All other systems reviewed and are negative.      PHYSICAL EXAM  ED Triage Vitals   Temp Heart Rate Resp BP SpO2   01/08/17 0918 01/08/17 0918 01/08/17  0918 01/08/17 0927 01/08/17 0918   96.6 °F (35.9 °C) 63 18 130/77 97 %      Temp src Heart Rate Source Patient Position BP Location FiO2 (%)   01/08/17 0918 01/08/17 0918 01/08/17 0927 01/08/17 0927 --   Tympanic Monitor Sitting Right arm        Physical Exam   Constitutional: He is oriented to person, place, and time and well-developed, well-nourished, and in no distress.   HENT:   Head: Normocephalic and atraumatic.   Eyes: EOM are normal. Pupils are equal, round, and reactive to light.   The pt has no nystagmus.   Neck: Normal range of motion. Neck supple.   Cardiovascular: Normal rate, regular rhythm and normal heart sounds.    Pulmonary/Chest: Effort normal and breath sounds normal. No respiratory distress.   Abdominal: Soft. There is no tenderness. There is no rebound and no guarding.   Musculoskeletal: Normal range of motion. He exhibits no edema.   Neurological: He is alert and oriented to person, place, and time. He has normal sensation and normal strength. No cranial nerve deficit. Coordination normal.   Skin: Skin is warm and dry.   Psychiatric: Mood and affect normal.   Nursing note and vitals reviewed.      LAB RESULTS  Lab Results (last 24 hours)     Procedure Component Value Units Date/Time    CBC & Differential [[id number]] Collected:  01/08/17 0950    Specimen:  Blood Updated:  01/08/17 1018    Narrative:       The following orders were created for panel order CBC & Differential.  Procedure                               Abnormality         Status                     ---------                               -----------         ------                     CBC Auto Differential[[id number]]         Abnormal            Final result                 Please view results for these tests on the individual orders.    Comprehensive Metabolic Panel [[id number]]  (Abnormal) Collected:  01/08/17 0950    Specimen:  Blood Updated:  01/08/17 1027     Glucose 103 (H) mg/dL      BUN 18 mg/dL      Creatinine 1.18 mg/dL       Sodium 138 mmol/L      Potassium 4.4 mmol/L      Chloride 103 mmol/L      CO2 23.1 mmol/L      Calcium 9.3 mg/dL      Total Protein 7.1 g/dL      Albumin 3.90 g/dL      ALT (SGPT) 62 (H) U/L      AST (SGOT) 37 U/L      Alkaline Phosphatase 55 U/L      Total Bilirubin 0.5 mg/dL      eGFR Non African Amer 62 mL/min/1.73      Globulin 3.2 gm/dL      A/G Ratio 1.2 g/dL      BUN/Creatinine Ratio 15.3      Anion Gap 11.9 mmol/L     TSH [[id number]]  (Abnormal) Collected:  01/08/17 0950    Specimen:  Blood Updated:  01/08/17 1033     TSH 6.550 (H) mIU/mL     T4, Free [[id number]]  (Normal) Collected:  01/08/17 0950    Specimen:  Blood Updated:  01/08/17 1034     Free T4 0.98 ng/dL     CBC Auto Differential [[id number]]  (Abnormal) Collected:  01/08/17 0950    Specimen:  Blood Updated:  01/08/17 1018     WBC 3.95 (L) 10*3/mm3      RBC 5.49 10*6/mm3      Hemoglobin 15.9 g/dL      Hematocrit 47.9 %      MCV 87.2 fL      MCH 29.0 pg      MCHC 33.2 g/dL      RDW 14.7 (H) %      RDW-SD 47.3 fl      MPV 10.6 fL      Platelets 185 10*3/mm3      Neutrophil % 41.7 (L) %      Lymphocyte % 35.9 %      Monocyte % 18.0 (H) %      Eosinophil % 4.1 %      Basophil % 0.3 %      Immature Grans % 0.0 %      Neutrophils, Absolute 1.65 (L) 10*3/mm3      Lymphocytes, Absolute 1.42 10*3/mm3      Monocytes, Absolute 0.71 10*3/mm3      Eosinophils, Absolute 0.16 10*3/mm3      Basophils, Absolute 0.01 10*3/mm3      Immature Grans, Absolute 0.00 10*3/mm3           I ordered the above labs and reviewed the results    RADIOLOGY  No orders to display        I ordered the above noted radiological studies. Interpreted by radiologist. Reviewed by me in PACS.       PROCEDURES  Procedures      PROGRESS AND CONSULTS  ED Course     0942  Labs ordered for further evaluation. IVFs ordered.    1137  Rechecked pt, he is resting comfortably. The pt has had no episodes of diarrhea in the ED. Discussed the negative lab results with the pt. Discussed the plan to discharge  the pt with medication for his diarrhea. I advised the pt to drink plenty of fluids. I advised the pt to f/u with his PCP for further evaluation. The pt understands and agrees with the plan.    MEDICAL DECISION MAKING  Results were reviewed/discussed with the patient and they were also made aware of online access. Pt also made aware that some labs, such as cultures, will not be resulted during ER visit and follow up with PMD is necessary.     MDM  Number of Diagnoses or Management Options  Diarrhea, unspecified type:   Dizziness:   Diagnosis management comments: Patient presented to the ER complaining of diarrhea for the past 2 days.  He also reports having some abdominal cramping and lightheadedness.  He denies fever or vomiting.  On exam, his abdomen was soft and nontender.  Patient's TSH was elevated but his free T4 was low end of normal.  Electrolytes were unremarkable.  Patient was given IV fluids.  He was recently on antibiotics but I do not suspect he has C. difficile colitis as he is afebrile with a normal white blood cell count and a nontender abdomen.  He states his diarrhea has actually improved today.  Patient will be discharged with a prescription for Lomotil.  His primary care doctor has referred him to an endocrinologist.       Amount and/or Complexity of Data Reviewed  Clinical lab tests: ordered and reviewed  Decide to obtain previous medical records or to obtain history from someone other than the patient: yes  Review and summarize past medical records: yes (The pt saw Dr. Haque 2 days ago for a f/u for PST and HTN. The pt had a holter placed. The pt had a normal CBC. The CMP was normal except for a potassium of 5.4 and an ALT of 42.     The pt was treated for sinusitis over 2 weeks ago with Amoxil.     The pt saw his PCP Friday complaining of dizziness and fatigue. )    Patient Progress  Patient progress: stable         DIAGNOSIS  Final diagnoses:   Diarrhea, unspecified type   Dizziness        DISPOSITION  DISCHARGE    Patient discharged in stable condition.    Reviewed implications of results, diagnosis, meds, responsibility to follow up, warning signs and symptoms of possible worsening, potential complications and reasons to return to ER, including increased diarrhea or onset of fever.    Patient/Family voiced understanding of above instructions.    Discussed plan for discharge, as there is no emergent indication for admission.  Pt/family is agreeable and understands need for follow up and repeat testing.  Pt is aware that discharge does not mean that nothing is wrong but it indicates no emergency is present that requires admission and they must continue care with follow-up as given below or physician of their choice.     FOLLOW-UP  Erwin Escobar MD  [address] [id number] [phone]    In 1 week           Medication List      New Prescriptions          diphenoxylate-atropine 2.5-0.025 MG per tablet   Commonly known as:  LOMOTIL   Take 2 tablets by mouth 4 (Four) Times a Day As Needed for diarrhea.         Changed          metoprolol succinate XL 25 MG 24 hr tablet   Commonly known as:  TOPROL-XL   Take 1/2 tablet po bid   What changed:    - how much to take  - when to take this  - additional instructions               Latest Documented Vital Signs:  As of 1:40 PM  BP- 129/92 HR- 58 Temp- 97 °F (36.1 °C) (Tympanic) O2 sat- 96%    --  Documentation assistance provided by thomas Jean for Dr. Barriga.  Information recorded by the scrline was done at my direction and has been verified and validated by me.       Isiah Jean  01/08/17 1884       Joaquin Barriga MD  01/08/17 4931

## 2017-01-08 NOTE — ED NOTES
Patient states diarrhea for several day; abd pain, gas and bloating last night     Toshia Kellogg RN  01/08/17 0931

## 2017-01-10 ENCOUNTER — TELEPHONE (OUTPATIENT)
Dept: SOCIAL WORK | Facility: HOSPITAL | Age: 67
End: 2017-01-10

## 2017-01-17 ENCOUNTER — TELEPHONE (OUTPATIENT)
Dept: CARDIOLOGY | Facility: CLINIC | Age: 67
End: 2017-01-17

## 2017-01-17 ENCOUNTER — TELEPHONE (OUTPATIENT)
Dept: FAMILY MEDICINE CLINIC | Facility: CLINIC | Age: 67
End: 2017-01-17

## 2017-01-17 RX ORDER — VARDENAFIL HYDROCHLORIDE 20 MG/1
20 TABLET ORAL DAILY PRN
Qty: 10 TABLET | Refills: 3 | Status: SHIPPED | OUTPATIENT
Start: 2017-01-17 | End: 2017-05-03

## 2017-01-17 NOTE — TELEPHONE ENCOUNTER
Left message meds sent to pharmacy 1-17      ----- Message from Erwin Escobar MD sent at 1/17/2017  5:11 PM EST -----  Contact: Patient [phone]  Meds sent to pharmacy  ----- Message -----     From: Anali Ramesh MA     Sent: 1/17/2017   4:07 PM       To: Erwin Escobar MD        ----- Message -----     From: Muna Vivian     Sent: 1/17/2017   3:33 PM       To: Jordana Weathers MA    Last time he was seen Dr. Escobar gave him a written script for Levitra 20mg, he lost it and would like it called in to his pharmacy.  [facility]  Patient had an appt for 1-25-17 but Dr. Escobar has taken the day off.  I have cancelled this appt and the patient states it was for a followup to the flu, but he is better.  He does not believe he has to be seen before getting this script so I did not reschedule him.  Please advise.

## 2017-01-18 ENCOUNTER — TELEPHONE (OUTPATIENT)
Dept: FAMILY MEDICINE CLINIC | Facility: CLINIC | Age: 67
End: 2017-01-18

## 2017-01-18 NOTE — TELEPHONE ENCOUNTER
I talked to patient regarding test results.  He does not have very many symptoms for sleep apnea.  He states his dizziness is resolved after his diarrheal episode has terminated.  He is also to get his thyroid assessed.  We'll continue his current regimen and observe for further episodes of palpitations or dizziness.. adrienne

## 2017-01-19 ENCOUNTER — TELEPHONE (OUTPATIENT)
Dept: FAMILY MEDICINE CLINIC | Facility: CLINIC | Age: 67
End: 2017-01-19

## 2017-01-19 NOTE — TELEPHONE ENCOUNTER
----- Message from Erwin Escobar MD sent at 1/18/2017  6:18 PM EST -----  Contact: Patient #[phone]  Below medication okay okay  ----- Message -----     From: Jordana Weathers MA     Sent: 1/18/2017   3:46 PM       To: Erwin Escobar MD    Can we give him samples of stendra ?  ----- Message -----     From: Erwin Escobar MD     Sent: 1/18/2017   2:15 PM       To: Jordana Weathers MA    They're all about the same  ----- Message -----     From: Jordana Weathers MA     Sent: 1/18/2017   2:11 PM       To: Erwin Escobar MD        ----- Message -----     From: Muna Park     Sent: 1/18/2017   1:30 PM       To: Jordana Weathers MA    Went to  the script for the Levitra 20mg, it is now $150 dollars when it used to be about $35, is there anything else he can take or anything that will make it cheaper?  Please advise.  Holli thompson [facility] states will try samples not on any nitro or prostate meds.

## 2017-02-02 ENCOUNTER — OFFICE VISIT (OUTPATIENT)
Dept: FAMILY MEDICINE CLINIC | Facility: CLINIC | Age: 67
End: 2017-02-02

## 2017-02-02 VITALS
RESPIRATION RATE: 20 BRPM | SYSTOLIC BLOOD PRESSURE: 146 MMHG | TEMPERATURE: 98.1 F | BODY MASS INDEX: 32.54 KG/M2 | OXYGEN SATURATION: 99 % | HEIGHT: 76 IN | HEART RATE: 67 BPM | WEIGHT: 267.2 LBS | DIASTOLIC BLOOD PRESSURE: 80 MMHG

## 2017-02-02 DIAGNOSIS — I10 ESSENTIAL HYPERTENSION: ICD-10-CM

## 2017-02-02 DIAGNOSIS — E78.5 HYPERLIPIDEMIA, UNSPECIFIED HYPERLIPIDEMIA TYPE: ICD-10-CM

## 2017-02-02 DIAGNOSIS — J06.9 ACUTE URI: Primary | ICD-10-CM

## 2017-02-02 PROBLEM — J30.1 SEASONAL ALLERGIC RHINITIS DUE TO POLLEN: Status: ACTIVE | Noted: 2017-02-02

## 2017-02-02 PROCEDURE — 99213 OFFICE O/P EST LOW 20 MIN: CPT | Performed by: INTERNAL MEDICINE

## 2017-02-02 RX ORDER — AZITHROMYCIN 250 MG/1
TABLET, FILM COATED ORAL
Qty: 6 TABLET | Refills: 0 | Status: SHIPPED | OUTPATIENT
Start: 2017-02-02 | End: 2017-02-07

## 2017-02-02 RX ORDER — MULTIVITAMIN
1 TABLET ORAL
COMMUNITY
End: 2017-06-09

## 2017-02-02 NOTE — PROGRESS NOTES
Subjective   Joaquin Peña is a 66 y.o. male.     History of Present Illness   Patient being seen with a yellow productive cough.  He does not have any wheezing and is seeing his endocrinologist for thyroid medication adjustment.    Much of this encounter note is an electronic transcription/translation of spoken language to printed text.  The electronic translation of spoken language may permit erroneous, or at times, nonsensical words or phrases to be inadvertently transcribed.  Although I have reviewed the note for such errors, some may still exist.  The following portions of the patient's history were reviewed and updated as appropriate: allergies, current medications, past family history, past medical history, past social history, past surgical history and problem list.    Review of Systems   HENT: Positive for sinus pressure.        Objective   Physical Exam   Constitutional: He is oriented to person, place, and time. He appears well-developed and well-nourished.   HENT:   Head: Normocephalic.   Bilateral maxillary tenderness   Eyes: Conjunctivae are normal. Pupils are equal, round, and reactive to light.   Neck: Normal range of motion. Neck supple.   Cardiovascular: Normal rate, regular rhythm and normal heart sounds.    Pulmonary/Chest: Effort normal and breath sounds normal.   Abdominal: Soft. Bowel sounds are normal.   Musculoskeletal: Normal range of motion.   Neurological: He is alert and oriented to person, place, and time.   Skin: Skin is warm and dry.   Psychiatric: He has a normal mood and affect. His behavior is normal. Judgment and thought content normal.   Nursing note and vitals reviewed.      Assessment/Plan   Problems Addressed this Visit        Cardiovascular and Mediastinum    Hypertension    Relevant Orders    CBC & Differential    Comprehensive Metabolic Panel    Lipid Panel    Vitamin D 25 Hydroxy    PSA    Hyperlipidemia    Relevant Orders    CBC & Differential    Comprehensive Metabolic  Panel    Lipid Panel    Vitamin D 25 Hydroxy    PSA      Other Visit Diagnoses     Acute URI    -  Primary    Relevant Medications    azithromycin (ZITHROMAX Z-KATHRINE) 250 MG tablet    Other Relevant Orders    CBC & Differential    Comprehensive Metabolic Panel    Lipid Panel    Vitamin D 25 Hydroxy    PSA

## 2017-02-07 ENCOUNTER — OFFICE VISIT (OUTPATIENT)
Dept: ENDOCRINOLOGY | Age: 67
End: 2017-02-07

## 2017-02-07 VITALS
BODY MASS INDEX: 32.44 KG/M2 | DIASTOLIC BLOOD PRESSURE: 84 MMHG | HEIGHT: 76 IN | SYSTOLIC BLOOD PRESSURE: 122 MMHG | WEIGHT: 266.4 LBS | RESPIRATION RATE: 16 BRPM

## 2017-02-07 DIAGNOSIS — R73.9 HYPERGLYCEMIA: ICD-10-CM

## 2017-02-07 DIAGNOSIS — E78.5 HYPERLIPIDEMIA, UNSPECIFIED HYPERLIPIDEMIA TYPE: ICD-10-CM

## 2017-02-07 DIAGNOSIS — E05.00 GRAVES DISEASE: Primary | ICD-10-CM

## 2017-02-07 DIAGNOSIS — N52.9 ERECTILE DYSFUNCTION, UNSPECIFIED ERECTILE DYSFUNCTION TYPE: ICD-10-CM

## 2017-02-07 DIAGNOSIS — E55.9 VITAMIN D DEFICIENCY: Primary | ICD-10-CM

## 2017-02-07 DIAGNOSIS — I10 ESSENTIAL HYPERTENSION: ICD-10-CM

## 2017-02-07 DIAGNOSIS — E89.0 POSTABLATIVE HYPOTHYROIDISM: ICD-10-CM

## 2017-02-07 DIAGNOSIS — E55.9 VITAMIN D DEFICIENCY: ICD-10-CM

## 2017-02-07 PROCEDURE — 99204 OFFICE O/P NEW MOD 45 MIN: CPT | Performed by: INTERNAL MEDICINE

## 2017-02-07 NOTE — PROGRESS NOTES
Subjective   Joaquin Peña is a 66 y.o. male seen as a new patient for elevated TSH. He is having fatigue but does not know if this is related to thyroid or another problem.     History of Present Illness's is a 66-year-old gentleman  who works for [employer] in [facility] and known patient with history of the Graves' disease in the 1980s, is status post radioactive iodine therapy as well as hypertension and dyslipidemia and erectile dysfunction.  He is a latest laboratory evaluation of 01/08/2016 showed that despite the fact that he is taking Synthroid 75 µg daily his sed TSH is is still elevated.  Therefore he received a call and his dose was raised to 88 µg daily.  He just happened and to have an upper respiratory infection and was not feeling good and therefore taking 88 µg not on the did not make him feel better but also made him feel bad to the point of quitting that dose and resuming Synthroid 75 µg daily.    The following portions of the patient's history were reviewed and updated as appropriate: allergies, current medications, past family history, past medical history, past social history, past surgical history and problem list.    Review of Systems   Constitutional: Positive for fatigue.   HENT: Negative.    Eyes: Negative.    Respiratory: Negative.    Cardiovascular: Negative.    Gastrointestinal: Negative.    Endocrine: Negative.    Genitourinary: Negative.    Musculoskeletal: Negative.    Skin: Negative.    Allergic/Immunologic: Negative.    Neurological: Negative.    Hematological: Negative.    Psychiatric/Behavioral: Positive for sleep disturbance.       Objective   Physical Exam   Constitutional: He is oriented to person, place, and time. He appears well-developed and well-nourished. No distress.   HENT:   Head: Normocephalic and atraumatic.   Right Ear: External ear normal.   Left Ear: External ear normal.   Nose: Nose normal.   Mouth/Throat: Oropharynx is clear and moist.    Eyes: Conjunctivae and EOM are normal. Pupils are equal, round, and reactive to light. Right eye exhibits no discharge. Left eye exhibits no discharge. No scleral icterus.   Neck: Normal range of motion. Neck supple. No JVD present. No tracheal deviation present. No thyromegaly present.   Cardiovascular: Normal rate, regular rhythm, normal heart sounds and intact distal pulses.  Exam reveals no gallop and no friction rub.    No murmur heard.  Pulmonary/Chest: Effort normal and breath sounds normal. No stridor. No respiratory distress. He has no wheezes. He has no rales. He exhibits no tenderness.   Abdominal: Soft. Bowel sounds are normal. He exhibits no distension and no mass. There is no tenderness. There is no rebound and no guarding. No hernia.   Musculoskeletal: Normal range of motion. He exhibits no edema, tenderness or deformity.   Lymphadenopathy:     He has no cervical adenopathy.   Neurological: He is alert and oriented to person, place, and time. He has normal reflexes. He displays normal reflexes. No cranial nerve deficit. He exhibits normal muscle tone. Coordination normal.   Skin: Skin is warm and dry. No rash noted. No erythema. No pallor.   Psychiatric: He has a normal mood and affect. His behavior is normal. Judgment and thought content normal.   Nursing note and vitals reviewed.        Assessment/Plan   Diagnoses and all orders for this visit:    Graves disease  -     T3, Free  -     T4 & TSH ([facility])  -     T4, Free  -     Thyroglobulin With Anti-TG  -     Uric Acid  -     Vitamin D 25 Hydroxy  -     TestT+TestF+SHBG  -     Comprehensive Metabolic Panel  -     C-Peptide  -     Hemoglobin A1c  -     Lipid Panel  -     PSA  -     Thyroid Stimulating Immunoglobulin  -     Hemoglobin & Hematocrit, Blood    Postablative hypothyroidism  -     T3, Free  -     T4 & TSH ([facility])  -     T4, Free  -     Thyroglobulin With Anti-TG  -     Uric Acid  -     Vitamin D 25 Hydroxy  -     TestT+TestF+SHBG  -      Comprehensive Metabolic Panel  -     C-Peptide  -     Hemoglobin A1c  -     Lipid Panel  -     PSA  -     Thyroid Stimulating Immunoglobulin  -     Hemoglobin & Hematocrit, Blood    Hyperlipidemia, unspecified hyperlipidemia type  -     T3, Free  -     T4 & TSH ([facility])  -     T4, Free  -     Thyroglobulin With Anti-TG  -     Uric Acid  -     Vitamin D 25 Hydroxy  -     TestT+TestF+SHBG  -     Comprehensive Metabolic Panel  -     C-Peptide  -     Hemoglobin A1c  -     Lipid Panel  -     PSA  -     Thyroid Stimulating Immunoglobulin  -     Hemoglobin & Hematocrit, Blood    Essential hypertension  -     T3, Free  -     T4 & TSH ([facility])  -     T4, Free  -     Thyroglobulin With Anti-TG  -     Uric Acid  -     Vitamin D 25 Hydroxy  -     TestT+TestF+SHBG  -     Comprehensive Metabolic Panel  -     C-Peptide  -     Hemoglobin A1c  -     Lipid Panel  -     PSA  -     Thyroid Stimulating Immunoglobulin  -     Hemoglobin & Hematocrit, Blood    Erectile dysfunction, unspecified erectile dysfunction type  -     T3, Free  -     T4 & TSH ([facility])  -     T4, Free  -     Thyroglobulin With Anti-TG  -     Uric Acid  -     Vitamin D 25 Hydroxy  -     TestT+TestF+SHBG  -     Comprehensive Metabolic Panel  -     C-Peptide  -     Hemoglobin A1c  -     Lipid Panel  -     PSA  -     Thyroid Stimulating Immunoglobulin  -     Hemoglobin & Hematocrit, Blood    Hyperglycemia  -     T3, Free  -     T4 & TSH ([facility])  -     T4, Free  -     Thyroglobulin With Anti-TG  -     Uric Acid  -     Vitamin D 25 Hydroxy  -     TestT+TestF+SHBG  -     Comprehensive Metabolic Panel  -     C-Peptide  -     Hemoglobin A1c  -     Lipid Panel  -     PSA  -     Thyroid Stimulating Immunoglobulin  -     Hemoglobin & Hematocrit, Blood               His summary I saw and examined this the 66-year-old gentleman for above-mentioned problems.  I reviewed his laboratory evaluation of 01/06/2017 as well as 01/08/2017 and provided him with a hard copy of it.  At  this point in order to have a full evaluation of his metabolic system on going to go ahead and order a more extensive laboratory evaluation and once the results come back we will go ahead and call for any possible modifications.  He will see Ms. Aimee Shane in 4 months and myself in 8 months or sooner if needed with laboratory evaluation prior to each office visit.

## 2017-02-08 LAB — 25(OH)D3+25(OH)D2 SERPL-MCNC: 22.8 NG/ML (ref 30–100)

## 2017-02-08 RX ORDER — ERGOCALCIFEROL 1.25 MG/1
50000 CAPSULE ORAL WEEKLY
Qty: 13 CAPSULE | Refills: 3 | Status: SHIPPED | OUTPATIENT
Start: 2017-02-08 | End: 2018-02-08 | Stop reason: SDUPTHER

## 2017-02-14 LAB
ALBUMIN SERPL-MCNC: 4.4 G/DL (ref 3.5–5.2)
ALBUMIN/GLOB SERPL: 1.6 G/DL
ALP SERPL-CCNC: 57 U/L (ref 39–117)
ALT SERPL-CCNC: 26 U/L (ref 1–41)
AST SERPL-CCNC: 21 U/L (ref 1–40)
BILIRUB SERPL-MCNC: 0.5 MG/DL (ref 0.1–1.2)
BUN SERPL-MCNC: 17 MG/DL (ref 8–23)
BUN/CREAT SERPL: 15.7 (ref 7–25)
C PEPTIDE SERPL-MCNC: 4.1 NG/ML (ref 1.1–4.4)
CALCIUM SERPL-MCNC: 10.1 MG/DL (ref 8.6–10.5)
CHLORIDE SERPL-SCNC: 103 MMOL/L (ref 98–107)
CHOLEST SERPL-MCNC: 182 MG/DL (ref 0–200)
CO2 SERPL-SCNC: 26 MMOL/L (ref 22–29)
CONV COMMENT: ABNORMAL
CREAT SERPL-MCNC: 1.08 MG/DL (ref 0.76–1.27)
GLOBULIN SER CALC-MCNC: 2.8 GM/DL
GLUCOSE SERPL-MCNC: 95 MG/DL (ref 65–99)
HBA1C MFR BLD: 5.57 % (ref 4.8–5.6)
HCT VFR BLD AUTO: 51 % (ref 40.4–52.2)
HDLC SERPL-MCNC: 30 MG/DL (ref 40–60)
HGB BLD-MCNC: 16.4 G/DL (ref 13.7–17.6)
LDLC SERPL CALC-MCNC: 111 MG/DL (ref 0–100)
POTASSIUM SERPL-SCNC: 5.1 MMOL/L (ref 3.5–5.2)
PROT SERPL-MCNC: 7.2 G/DL (ref 6–8.5)
PSA SERPL-MCNC: 0.74 NG/ML (ref 0–4)
SHBG SERPL-SCNC: 22.4 NMOL/L (ref 19.3–76.4)
SODIUM SERPL-SCNC: 141 MMOL/L (ref 136–145)
T3FREE SERPL-MCNC: 2.5 PG/ML (ref 2–4.4)
T4 FREE SERPL-MCNC: 0.94 NG/DL (ref 0.93–1.7)
T4 SERPL-MCNC: 5.57 MCG/DL (ref 4.5–11.7)
TESTOST FREE SERPL-MCNC: 6 PG/ML (ref 6.6–18.1)
TESTOST SERPL-MCNC: 232 NG/DL (ref 348–1197)
THYROGLOB AB SERPL-ACNC: 5.9 IU/ML (ref 0–0.9)
THYROGLOB SERPL-MCNC: 31 NG/ML
TRIGL SERPL-MCNC: 203 MG/DL (ref 0–150)
TSH SERPL DL<=0.005 MIU/L-ACNC: 5.06 MIU/ML (ref 0.27–4.2)
TSI ACT/NOR SER: 57 % (ref 0–139)
URATE SERPL-MCNC: 6.8 MG/DL (ref 3.4–7)
VLDLC SERPL CALC-MCNC: 40.6 MG/DL (ref 5–40)

## 2017-02-14 RX ORDER — LEVOTHYROXINE SODIUM 112 UG/1
112 TABLET ORAL DAILY
Qty: 30 TABLET | Refills: 11 | Status: SHIPPED | OUTPATIENT
Start: 2017-02-14 | End: 2017-06-07

## 2017-02-14 RX ORDER — ATORVASTATIN CALCIUM 10 MG/1
10 TABLET, FILM COATED ORAL DAILY
Qty: 30 TABLET | Refills: 11 | Status: SHIPPED | OUTPATIENT
Start: 2017-02-14 | End: 2018-01-23 | Stop reason: SDUPTHER

## 2017-02-14 RX ORDER — TESTOSTERONE 16.2 MG/G
GEL TRANSDERMAL
Qty: 75 G | Refills: 5
Start: 2017-02-14 | End: 2017-05-03

## 2017-02-23 ENCOUNTER — TELEPHONE (OUTPATIENT)
Dept: CARDIOLOGY | Facility: CLINIC | Age: 67
End: 2017-02-23

## 2017-02-23 NOTE — TELEPHONE ENCOUNTER
Recently visited Dr. Charlene Smith for thyroid issues.  He has increased my levothyroxine to 112 mcg daily.  He has also decided to put me on atorvastatin 10 mg tablets and testosterone 20.25 mg/1.62% gel.  Saw a report on [employer] yesterday concerning the risks vs the benefits of testosterone for older men and decided to stop using it.  Am also concerned about the atorvastatin, but am presently still taking it.  Do you have any concerns with the newly prescribed medications?     Pt sent this from his [employer] account, please advise.

## 2017-02-27 NOTE — TELEPHONE ENCOUNTER
I am ok with the levothyroxine and atrovastatin. I have some concern re the testosterone as it may cause/aggravate hypertension and rarely cause CP. adrienne

## 2017-03-17 RX ORDER — VALSARTAN 40 MG/1
40 TABLET ORAL 2 TIMES DAILY
Qty: 60 TABLET | Refills: 5 | Status: SHIPPED | OUTPATIENT
Start: 2017-03-17 | End: 2017-06-09 | Stop reason: SDUPTHER

## 2017-03-17 RX ORDER — METOPROLOL SUCCINATE 25 MG/1
TABLET, EXTENDED RELEASE ORAL
Qty: 30 TABLET | Refills: 5 | Status: SHIPPED | OUTPATIENT
Start: 2017-03-17 | End: 2017-06-09 | Stop reason: SDUPTHER

## 2017-04-04 RX ORDER — NAPROXEN 500 MG/1
TABLET ORAL
Qty: 60 TABLET | Refills: 1 | Status: SHIPPED | OUTPATIENT
Start: 2017-04-04 | End: 2019-01-17 | Stop reason: SDUPTHER

## 2017-05-03 ENCOUNTER — OFFICE VISIT (OUTPATIENT)
Dept: FAMILY MEDICINE CLINIC | Facility: CLINIC | Age: 67
End: 2017-05-03

## 2017-05-03 VITALS
BODY MASS INDEX: 32 KG/M2 | WEIGHT: 262.8 LBS | HEART RATE: 76 BPM | DIASTOLIC BLOOD PRESSURE: 76 MMHG | HEIGHT: 76 IN | SYSTOLIC BLOOD PRESSURE: 112 MMHG | TEMPERATURE: 97.7 F | OXYGEN SATURATION: 97 %

## 2017-05-03 DIAGNOSIS — I10 ESSENTIAL HYPERTENSION: ICD-10-CM

## 2017-05-03 DIAGNOSIS — M54.2 NECK PAIN: Primary | ICD-10-CM

## 2017-05-03 DIAGNOSIS — M19.90 ARTHRITIS: ICD-10-CM

## 2017-05-03 DIAGNOSIS — M79.645 FINGER PAIN, LEFT: ICD-10-CM

## 2017-05-03 DIAGNOSIS — E78.2 MIXED HYPERLIPIDEMIA: ICD-10-CM

## 2017-05-03 PROCEDURE — 99214 OFFICE O/P EST MOD 30 MIN: CPT | Performed by: INTERNAL MEDICINE

## 2017-05-03 PROCEDURE — 73120 X-RAY EXAM OF HAND: CPT | Performed by: INTERNAL MEDICINE

## 2017-05-03 PROCEDURE — 72050 X-RAY EXAM NECK SPINE 4/5VWS: CPT | Performed by: INTERNAL MEDICINE

## 2017-05-03 RX ORDER — SILDENAFIL 100 MG/1
100 TABLET, FILM COATED ORAL DAILY PRN
Qty: 10 TABLET | Refills: 3 | Status: SHIPPED | OUTPATIENT
Start: 2017-05-03 | End: 2017-06-07 | Stop reason: ALTCHOICE

## 2017-05-09 ENCOUNTER — LAB (OUTPATIENT)
Dept: FAMILY MEDICINE CLINIC | Facility: CLINIC | Age: 67
End: 2017-05-09

## 2017-05-09 DIAGNOSIS — M19.90 ARTHRITIS: ICD-10-CM

## 2017-05-09 DIAGNOSIS — M54.2 NECK PAIN: ICD-10-CM

## 2017-05-09 DIAGNOSIS — I10 ESSENTIAL HYPERTENSION: ICD-10-CM

## 2017-05-09 DIAGNOSIS — E78.2 MIXED HYPERLIPIDEMIA: ICD-10-CM

## 2017-05-09 LAB
ALBUMIN SERPL-MCNC: 3.8 G/DL (ref 3.5–5.2)
ALBUMIN/GLOB SERPL: 1.1 G/DL
ALP SERPL-CCNC: 56 U/L (ref 39–117)
ALT SERPL W P-5'-P-CCNC: 25 U/L (ref 1–41)
ANION GAP SERPL CALCULATED.3IONS-SCNC: 9.6 MMOL/L
AST SERPL-CCNC: 22 U/L (ref 1–40)
BILIRUB SERPL-MCNC: 0.6 MG/DL (ref 0.1–1.2)
BUN BLD-MCNC: 18 MG/DL (ref 8–23)
BUN/CREAT SERPL: 17.3 (ref 7–25)
CALCIUM SPEC-SCNC: 10.1 MG/DL (ref 8.6–10.5)
CHLORIDE SERPL-SCNC: 105 MMOL/L (ref 98–107)
CHOLEST SERPL-MCNC: 132 MG/DL (ref 0–200)
CHROMATIN AB SERPL-ACNC: <10 IU/ML (ref 0–14)
CO2 SERPL-SCNC: 25.4 MMOL/L (ref 22–29)
CREAT BLD-MCNC: 1.04 MG/DL (ref 0.76–1.27)
CRP SERPL-MCNC: 0.06 MG/DL (ref 0–0.5)
ERYTHROCYTE [DISTWIDTH] IN BLOOD BY AUTOMATED COUNT: 14.6 % (ref 4.5–15)
GFR SERPL CREATININE-BSD FRML MDRD: 72 ML/MIN/1.73
GLOBULIN UR ELPH-MCNC: 3.6 GM/DL
GLUCOSE BLD-MCNC: 95 MG/DL (ref 65–99)
HBA1C MFR BLD: 5.4 % (ref 4.8–5.6)
HCT VFR BLD AUTO: 47.3 % (ref 35–60)
HDLC SERPL-MCNC: 32 MG/DL (ref 40–60)
HGB BLD-MCNC: 15.7 G/DL (ref 13.5–18)
LDLC SERPL CALC-MCNC: 73 MG/DL (ref 0–100)
LDLC/HDLC SERPL: 2.28 {RATIO}
LYMPHOCYTES # BLD AUTO: 2.1 10*3/MM3 (ref 1.2–3.4)
LYMPHOCYTES NFR BLD AUTO: 29.9 % (ref 21–51)
MCH RBC QN AUTO: 28.1 PG (ref 26.1–33.1)
MCHC RBC AUTO-ENTMCNC: 33.1 G/DL (ref 33–37)
MCV RBC AUTO: 84.8 FL (ref 80–99)
MONOCYTES # BLD AUTO: 0.3 10*3/MM3 (ref 0.1–0.6)
MONOCYTES NFR BLD AUTO: 4.7 % (ref 2–9)
NEUTROPHILS # BLD AUTO: 4.6 10*3/MM3 (ref 1.4–6.5)
NEUTROPHILS NFR BLD AUTO: 65.4 % (ref 42–75)
PLATELET # BLD AUTO: 216 10*3/MM3 (ref 150–450)
PMV BLD AUTO: 8.1 FL (ref 7.1–10.5)
POTASSIUM BLD-SCNC: 4.8 MMOL/L (ref 3.5–5.2)
PROT SERPL-MCNC: 7.4 G/DL (ref 6–8.5)
RBC # BLD AUTO: 5.59 10*6/MM3 (ref 4–6)
SODIUM BLD-SCNC: 140 MMOL/L (ref 136–145)
T-UPTAKE NFR SERPL: 1.09 TBI (ref 0.8–1.3)
T4 SERPL-MCNC: 6.58 MCG/DL (ref 4.5–11.7)
TRIGL SERPL-MCNC: 136 MG/DL (ref 0–150)
TSH SERPL DL<=0.05 MIU/L-ACNC: 2.81 MIU/ML (ref 0.27–4.2)
VLDLC SERPL-MCNC: 27.2 MG/DL (ref 5–40)
WBC NRBC COR # BLD: 7.1 10*3/MM3 (ref 4.5–10)

## 2017-05-09 PROCEDURE — 80061 LIPID PANEL: CPT | Performed by: INTERNAL MEDICINE

## 2017-05-09 PROCEDURE — 84479 ASSAY OF THYROID (T3 OR T4): CPT | Performed by: INTERNAL MEDICINE

## 2017-05-09 PROCEDURE — 86038 ANTINUCLEAR ANTIBODIES: CPT | Performed by: INTERNAL MEDICINE

## 2017-05-09 PROCEDURE — 83036 HEMOGLOBIN GLYCOSYLATED A1C: CPT | Performed by: INTERNAL MEDICINE

## 2017-05-09 PROCEDURE — 90471 IMMUNIZATION ADMIN: CPT | Performed by: INTERNAL MEDICINE

## 2017-05-09 PROCEDURE — 85025 COMPLETE CBC W/AUTO DIFF WBC: CPT | Performed by: INTERNAL MEDICINE

## 2017-05-09 PROCEDURE — 84443 ASSAY THYROID STIM HORMONE: CPT | Performed by: INTERNAL MEDICINE

## 2017-05-09 PROCEDURE — 84436 ASSAY OF TOTAL THYROXINE: CPT | Performed by: INTERNAL MEDICINE

## 2017-05-09 PROCEDURE — 90670 PCV13 VACCINE IM: CPT | Performed by: INTERNAL MEDICINE

## 2017-05-09 PROCEDURE — 86140 C-REACTIVE PROTEIN: CPT | Performed by: INTERNAL MEDICINE

## 2017-05-09 PROCEDURE — 80053 COMPREHEN METABOLIC PANEL: CPT | Performed by: INTERNAL MEDICINE

## 2017-05-09 PROCEDURE — 86431 RHEUMATOID FACTOR QUANT: CPT | Performed by: INTERNAL MEDICINE

## 2017-05-10 LAB — ANA SER QL: NEGATIVE

## 2017-05-31 ENCOUNTER — LAB (OUTPATIENT)
Dept: ENDOCRINOLOGY | Age: 67
End: 2017-05-31

## 2017-05-31 DIAGNOSIS — I10 ESSENTIAL HYPERTENSION: Primary | ICD-10-CM

## 2017-05-31 DIAGNOSIS — R42 DIZZINESS: ICD-10-CM

## 2017-05-31 DIAGNOSIS — I10 ESSENTIAL HYPERTENSION: ICD-10-CM

## 2017-05-31 DIAGNOSIS — E78.2 MIXED HYPERLIPIDEMIA: ICD-10-CM

## 2017-05-31 DIAGNOSIS — E89.0 POSTABLATIVE HYPOTHYROIDISM: ICD-10-CM

## 2017-05-31 DIAGNOSIS — E05.00 GRAVES DISEASE: ICD-10-CM

## 2017-05-31 DIAGNOSIS — R73.9 HYPERGLYCEMIA: ICD-10-CM

## 2017-06-01 ENCOUNTER — TELEPHONE (OUTPATIENT)
Dept: CARDIOLOGY | Facility: CLINIC | Age: 67
End: 2017-06-01

## 2017-06-01 LAB — 25(OH)D3+25(OH)D2 SERPL-MCNC: 37.7 NG/ML (ref 30–100)

## 2017-06-01 NOTE — TELEPHONE ENCOUNTER
SOLITARIOI:     VINICIUS re: pt should call us with these kinds of problems.  Left message for him to call and go over some further sx (CP, SOA, HA, Weakness/Numbness, Palpitations, ect)  Told him to ask for Triage nurse or me.

## 2017-06-01 NOTE — TELEPHONE ENCOUNTER
"----- Message from Joaquin Peña sent at 6/1/2017  2:03 PM EDT -----  Regarding: Non-Urgent Medical Question  Contact: [phone]  Awoke last night around midnight and wasn't feeling \"normal\". Checked my BP and it was 156/88. Decided to take an extra dose of 40mg valsartan. No matter what I did, I couldn't return to sleep. Got back up after 45 minutes to and hour and checked BP again. This time it was more elevated.  178/102. Was getting concerned and not certain that my anxiety over the matter was causing some of the problems. It's now nearing 4 AM, a time when I normally take my morning medication. I took the levothyroxine, the metoprolol, and another dose of the valsartan. I waited 45 minutes and checked BP again. 135/85. Not certain of the accuracy of this BP cuff. have appointment on July 6th at 8 AM.  Should I wait until then to see you?  "

## 2017-06-02 NOTE — TELEPHONE ENCOUNTER
Pt is feeling fine.  He said it was anxiety.  He said he woke up out of better almost gasping for air.  He said he has had a sleep study and does not have sleep apnea.  No CP, No SOA, No HA, No Weakness, No palpitations.  Pt thinks he may have also ate too much salty food that could have got his BP up.  Pt took his BP today and it was 116/70.  Do you want me to move his appt up?

## 2017-06-05 LAB
ALBUMIN SERPL-MCNC: 4.1 G/DL (ref 3.5–5.2)
ALBUMIN/GLOB SERPL: 1.3 G/DL
ALP SERPL-CCNC: 61 U/L (ref 39–117)
ALT SERPL-CCNC: 26 U/L (ref 1–41)
AST SERPL-CCNC: 20 U/L (ref 1–40)
BILIRUB SERPL-MCNC: 0.4 MG/DL (ref 0.1–1.2)
BUN SERPL-MCNC: 19 MG/DL (ref 8–23)
BUN/CREAT SERPL: 17 (ref 7–25)
C PEPTIDE SERPL-MCNC: 4.6 NG/ML (ref 1.1–4.4)
CALCIUM SERPL-MCNC: 9.8 MG/DL (ref 8.6–10.5)
CHLORIDE SERPL-SCNC: 102 MMOL/L (ref 98–107)
CHOLEST SERPL-MCNC: 147 MG/DL (ref 0–200)
CO2 SERPL-SCNC: 22.9 MMOL/L (ref 22–29)
CREAT SERPL-MCNC: 1.12 MG/DL (ref 0.76–1.27)
FT4I SERPL CALC-MCNC: 1.8 (ref 1.2–4.9)
GLOBULIN SER CALC-MCNC: 3.2 GM/DL
GLUCOSE SERPL-MCNC: 90 MG/DL (ref 65–99)
HBA1C MFR BLD: 5.77 % (ref 4.8–5.6)
HDLC SERPL-MCNC: 34 MG/DL (ref 40–60)
LDLC SERPL CALC-MCNC: 85 MG/DL (ref 0–100)
POTASSIUM SERPL-SCNC: 5 MMOL/L (ref 3.5–5.2)
PROT SERPL-MCNC: 7.3 G/DL (ref 6–8.5)
SODIUM SERPL-SCNC: 139 MMOL/L (ref 136–145)
T3FREE SERPL-MCNC: 2.5 PG/ML (ref 2–4.4)
T3RU NFR SERPL: 26 % (ref 24–39)
T4 FREE SERPL-MCNC: 1.1 NG/DL (ref 0.93–1.7)
T4 SERPL-MCNC: 7 UG/DL (ref 4.5–12)
THYROGLOB AB SERPL-ACNC: <1 IU/ML (ref 0–0.9)
THYROGLOB SERPL-MCNC: 26 NG/ML
TRIGL SERPL-MCNC: 139 MG/DL (ref 0–150)
TSH SERPL DL<=0.005 MIU/L-ACNC: 3.94 UIU/ML (ref 0.45–4.5)
VLDLC SERPL CALC-MCNC: 27.8 MG/DL (ref 5–40)

## 2017-06-07 ENCOUNTER — OFFICE VISIT (OUTPATIENT)
Dept: ENDOCRINOLOGY | Age: 67
End: 2017-06-07

## 2017-06-07 VITALS
WEIGHT: 264 LBS | SYSTOLIC BLOOD PRESSURE: 120 MMHG | DIASTOLIC BLOOD PRESSURE: 78 MMHG | BODY MASS INDEX: 32.15 KG/M2 | HEIGHT: 76 IN

## 2017-06-07 DIAGNOSIS — E88.81 INSULIN RESISTANCE: ICD-10-CM

## 2017-06-07 DIAGNOSIS — E89.0 POSTABLATIVE HYPOTHYROIDISM: Primary | ICD-10-CM

## 2017-06-07 PROBLEM — E88.819 INSULIN RESISTANCE: Status: ACTIVE | Noted: 2017-06-07

## 2017-06-07 PROCEDURE — 99214 OFFICE O/P EST MOD 30 MIN: CPT | Performed by: NURSE PRACTITIONER

## 2017-06-07 RX ORDER — LEVOTHYROXINE SODIUM 125 MCG
125 TABLET ORAL DAILY
Qty: 30 TABLET | Refills: 3 | Status: SHIPPED | OUTPATIENT
Start: 2017-06-07 | End: 2017-10-18 | Stop reason: SDUPTHER

## 2017-06-07 RX ORDER — VARDENAFIL HYDROCHLORIDE 10 MG/1
10 TABLET ORAL AS NEEDED
COMMUNITY
End: 2019-08-14 | Stop reason: ALTCHOICE

## 2017-06-07 NOTE — PROGRESS NOTES
Yonatan Peña is a 67 y.o. male is here today for follow-up.    Hypothyroidism   This is a chronic problem. The current episode started more than 1 month ago. Pertinent negatives include no coughing, fatigue, headaches, myalgias or rash. Nothing aggravates the symptoms. He has tried nothing for the symptoms. The treatment provided moderate relief.       The following portions of the patient's history were reviewed and updated as appropriate: current medications, past family history, past medical history, past social history, past surgical history and problem list.       Current Outpatient Prescriptions:   •  atorvastatin (LIPITOR) 10 MG tablet, Take 1 tablet by mouth Daily., Disp: 30 tablet, Rfl: 11  •  Cyanocobalamin (B-12 PO), Take  by mouth., Disp: , Rfl:   •  diclofenac (VOLTAREN) 3 % gel gel, Apply  topically 2 (Two) Times a Day. for 60 days., Disp: , Rfl:   •  ergocalciferol (DRISDOL) 71414 UNITS capsule, Take 1 capsule by mouth 1 (One) Time Per Week., Disp: 13 capsule, Rfl: 3  •  ketotifen (ZADITOR) 0.025 % ophthalmic solution, Apply  to eye As Needed., Disp: , Rfl:   •  metoprolol succinate XL (TOPROL-XL) 25 MG 24 hr tablet, Take 1/2 tablet po bid, Disp: 30 tablet, Rfl: 5  •  naproxen (NAPROSYN) 500 MG tablet, TAKE 1 TABLET BY MOUTH TWO TIMES A DAY , Disp: 60 tablet, Rfl: 1  •  valsartan (DIOVAN) 40 MG tablet, Take 1 tablet by mouth 2 (Two) Times a Day., Disp: 60 tablet, Rfl: 5  •  vardenafil (LEVITRA) 10 MG tablet, Take 10 mg by mouth As Needed for erectile dysfunction., Disp: , Rfl:   •  Multiple Vitamin (MULTIVITAMIN) tablet, Take 1 tablet by mouth., Disp: , Rfl:   •  SYNTHROID 125 MCG tablet, Take 1 tablet by mouth Daily., Disp: 30 tablet, Rfl: 3     Patient Active Problem List   Diagnosis   • Graves disease   • Supraventricular tachycardia   • PVC (premature ventricular contraction)   • Hypertension   • Hyperlipidemia   • Acute bilateral thoracic back pain   • Dizziness   • Irregular  heart beat   • Seasonal allergic rhinitis due to pollen   • Hyperglycemia   • Erectile dysfunction   • Postablative hypothyroidism   • Insulin resistance       Review of Systems   Constitutional: Negative for fatigue.        Does report feeling more tired at the end of the day, not classified as fatigue   HENT: Negative for trouble swallowing.    Eyes: Negative for visual disturbance.   Respiratory: Negative for cough.    Cardiovascular: Negative for palpitations.   Gastrointestinal: Negative for constipation.   Endocrine: Negative for cold intolerance.   Genitourinary: Negative for difficulty urinating.   Musculoskeletal: Negative for myalgias.   Skin: Negative for rash.   Allergic/Immunologic: Negative.    Neurological: Negative for headaches.   Hematological: Does not bruise/bleed easily.   Psychiatric/Behavioral: The patient is not nervous/anxious.      Lab on 05/31/2017   Component Date Value Ref Range Status   • 25 Hydroxy, Vitamin D 05/31/2017 37.7  30.0 - 100.0 ng/mL Final    Comment: Vitamin D deficiency has been defined by the [place] [employer] and an Endocrine Society practice guideline as a  level of serum 25-OH vitamin D less than 20 ng/mL (1,2).  The Endocrine Society went on to further define vitamin D  insufficiency as a level between 21 and 29 ng/mL (2).  1. IOM ([place] [employer]). 2010. Dietary reference     intakes for calcium and D. [place]: The     National Academies Press.  2. Martine MF, Timoteo BRYAN, Reina LUTZ, et al.     Evaluation, treatment, and prevention of vitamin D     deficiency: an Endocrine Society clinical practice     guideline. JCEM. 2011 Jul; 96(7):1911-30.     • Free T4 05/31/2017 1.10  0.93 - 1.70 ng/dL Final   • T3, Free 05/31/2017 2.5  2.0 - 4.4 pg/mL Final   • TSH 05/31/2017 3.940  0.450 - 4.500 uIU/mL Final   • T4, Total 05/31/2017 7.0  4.5 - 12.0 ug/dL Final   • T3 Uptake 05/31/2017 26  24 - 39 % Final   • Free Thyroxine Index 05/31/2017 1.8  1.2 -  4.9 Final   • Thyroglobulin (TG-CARMENCITA) 05/31/2017 26  ng/mL Final    Comment: Reference Range:  Pubertal Children  and Adults: <40  According to the National Academy of Clinical Biochemistry,  the reference interval for Thyroglobulin (TG) should be  related to euthyroid patients and not for patients who  underwent thyroidectomy.  TG reference intervals for these  patients depend on the residual mass of the thyroid tissue  left after surgery.  Establishing a post-operative baseline  is recommended.  The assay quantitation limit is 2.0 ng/mL.     • Thyroglobulin Ab 05/31/2017 <1.0  0.0 - 0.9 IU/mL Final    Thyroglobulin Antibody measured by [facility] Methodology   • Total Cholesterol 05/31/2017 147  0 - 200 mg/dL Final   • Triglycerides 05/31/2017 139  0 - 150 mg/dL Final   • HDL Cholesterol 05/31/2017 34* 40 - 60 mg/dL Final   • VLDL Cholesterol 05/31/2017 27.8  5 - 40 mg/dL Final   • LDL Cholesterol  05/31/2017 85  0 - 100 mg/dL Final   • Glucose 05/31/2017 90  65 - 99 mg/dL Final   • BUN 05/31/2017 19  8 - 23 mg/dL Final   • Creatinine 05/31/2017 1.12  0.76 - 1.27 mg/dL Final   • eGFR Non  Am 05/31/2017 66  >60 mL/min/1.73 Final   • eGFR African Am 05/31/2017 80  >60 mL/min/1.73 Final   • BUN/Creatinine Ratio 05/31/2017 17.0  7.0 - 25.0 Final   • Sodium 05/31/2017 139  136 - 145 mmol/L Final   • Potassium 05/31/2017 5.0  3.5 - 5.2 mmol/L Final   • Chloride 05/31/2017 102  98 - 107 mmol/L Final   • Total CO2 05/31/2017 22.9  22.0 - 29.0 mmol/L Final   • Calcium 05/31/2017 9.8  8.6 - 10.5 mg/dL Final   • Total Protein 05/31/2017 7.3  6.0 - 8.5 g/dL Final   • Albumin 05/31/2017 4.10  3.50 - 5.20 g/dL Final   • Globulin 05/31/2017 3.2  gm/dL Final   • A/G Ratio 05/31/2017 1.3  g/dL Final   • Total Bilirubin 05/31/2017 0.4  0.1 - 1.2 mg/dL Final   • Alkaline Phosphatase 05/31/2017 61  39 - 117 U/L Final   • AST (SGOT) 05/31/2017 20  1 - 40 U/L Final   • ALT (SGPT) 05/31/2017 26  1 - 41 U/L Final   •  Hemoglobin A1C 05/31/2017 5.77* 4.80 - 5.60 % Final    Comment: Hemoglobin A1C Ranges:  Increased Risk for Diabetes  5.7% to 6.4%  Diabetes                     >= 6.5%  Diabetic Goal                < 7.0%     • C-Peptide 05/31/2017 4.6* 1.1 - 4.4 ng/mL Final    C-Peptide reference interval is for fasting patients.       Wt Readings from Last 3 Encounters:   06/07/17 264 lb (120 kg)   05/03/17 262 lb 12.8 oz (119 kg)   02/07/17 266 lb 6.4 oz (121 kg)     Temp Readings from Last 3 Encounters:   05/03/17 97.7 °F (36.5 °C)   02/02/17 98.1 °F (36.7 °C)   01/08/17 97 °F (36.1 °C) (Tympanic)     BP Readings from Last 3 Encounters:   06/07/17 120/78   05/03/17 112/76   02/07/17 122/84     Pulse Readings from Last 3 Encounters:   05/03/17 76   02/02/17 67   01/08/17 58     Objective   Physical Exam   Constitutional: He is oriented to person, place, and time. Vital signs are normal. He appears well-developed and well-nourished. He is sleeping, active and cooperative.   HENT:   Head: Atraumatic.   Eyes: EOM are normal.   Neck: Normal range of motion. Neck supple. No tracheal tenderness present. Carotid bruit is not present. No tracheal deviation present. No thyroid mass and no thyromegaly present.   Cardiovascular: Normal rate, regular rhythm, S1 normal, S2 normal and normal heart sounds.  Exam reveals no gallop.    No murmur heard.  Pulmonary/Chest: Effort normal and breath sounds normal. No accessory muscle usage. No respiratory distress.   Abdominal: Soft. Normal appearance and bowel sounds are normal. There is no tenderness.   Musculoskeletal: Normal range of motion.   Neurological: He is alert and oriented to person, place, and time. He has normal strength and normal reflexes. Gait normal.   Skin: Skin is warm, dry and intact.   Psychiatric: He has a normal mood and affect. His speech is normal and behavior is normal. Judgment and thought content normal. His mood appears not anxious. His affect is not blunt. Cognition and  memory are normal. He does not exhibit a depressed mood. He is attentive.   Nursing note and vitals reviewed.        Assessment/Plan   Joaquin was seen today for follow-up.    Diagnoses and all orders for this visit:    Postablative hypothyroidism  -     SYNTHROID 125 MCG tablet; Take 1 tablet by mouth Daily.  -     TSH; Future  -     T4, Free; Future  -     T4; Future  -     T3, Free; Future  -     T3; Future    Insulin resistance        In summary/medication changes:  We will at this time increase synthroid to 125mcg to help with the tired feeling within the day and increase the metabolism. Recheck labs in 6 weeks.       Face to face instruction 13 minutes of 24 minutes visit. On nutrition with IRS, balancing exercise and carb, dx of insulin resistance/ pre-diabetes      Return in about 4 months (around 10/7/2017). 4 months with Aimee- 1 week prior for labs, 8 months with Dr. Smith - 1 week prior for labs

## 2017-06-09 ENCOUNTER — OFFICE VISIT (OUTPATIENT)
Dept: CARDIOLOGY | Facility: CLINIC | Age: 67
End: 2017-06-09

## 2017-06-09 VITALS
HEIGHT: 76 IN | HEART RATE: 86 BPM | WEIGHT: 262 LBS | DIASTOLIC BLOOD PRESSURE: 72 MMHG | BODY MASS INDEX: 31.9 KG/M2 | SYSTOLIC BLOOD PRESSURE: 108 MMHG

## 2017-06-09 DIAGNOSIS — I49.3 PVC (PREMATURE VENTRICULAR CONTRACTION): ICD-10-CM

## 2017-06-09 DIAGNOSIS — I10 ESSENTIAL HYPERTENSION: ICD-10-CM

## 2017-06-09 DIAGNOSIS — I47.1 SUPRAVENTRICULAR TACHYCARDIA (HCC): Primary | ICD-10-CM

## 2017-06-09 PROCEDURE — 93000 ELECTROCARDIOGRAM COMPLETE: CPT | Performed by: INTERNAL MEDICINE

## 2017-06-09 PROCEDURE — 99214 OFFICE O/P EST MOD 30 MIN: CPT | Performed by: INTERNAL MEDICINE

## 2017-06-09 RX ORDER — VALSARTAN 40 MG/1
40 TABLET ORAL EVERY EVENING
Qty: 30 TABLET | Refills: 5 | Status: SHIPPED | OUTPATIENT
Start: 2017-06-09 | End: 2017-11-15

## 2017-06-09 RX ORDER — METOPROLOL SUCCINATE 25 MG/1
25 TABLET, EXTENDED RELEASE ORAL 2 TIMES DAILY
Qty: 60 TABLET | Refills: 5 | Status: SHIPPED | OUTPATIENT
Start: 2017-06-09 | End: 2017-11-15

## 2017-06-09 NOTE — PROGRESS NOTES
Date of Office Visit: 2017  Encounter Provider: Muna Haque MD  Place of Service: [facility] CARDIOLOGY  Patient Name: Joaquin Peña  :1950    Chief complaint  Follow-up of paroxysmal supraventricular tachycardia, hypertension    History of Present Illness  Patient is a 67-year-old gentleman with Graves' disease, dyslipidemia, renal calculi who in  developed brief episodes of supraventricular tachycardia in the setting of hypertension.  This was treated medically.  In 2014 had a stress echocardiogram that showed normal left ventricular size and function with ejection fraction 59% grade 1 diastolic dysfunction mild left ventricular hypertrophy with no significant valvular heart disease and no ischemia in May 2015 he had mild bradycardia in the 30s.  A follow-up Holter revealed frequent PVCs and brief bursts of atrial tachycardia for which metoprolol was increased and he has not had any recurrent bradycardia arrhythmia.      Since last visit he is modestly active.  He developed plantar fasciitis on the left foot and for the past 2 weeks has only been riding his bicycle.  He occasionally has dyspnea when he does strenuous activity she denies any chest pain palpitations syncope near syncope.  He had episodes of hypertension for which she has taken extra valsartan and blood pressure subsequently improved to 1:30 systolic.  He had increased his dietary intake of salt at the time    Past Medical History:   Diagnosis Date   • Acquired spondylolisthesis    • Acute low back pain    • Acute right lumbar radiculopathy    • Bulging lumbar disc    • Concussion with no loss of consciousness    • DDD (degenerative disc disease)    • Fatigue    • Graves disease    • H/O disorder of nervous system and sense organs    • Headache    • Hyperlipidemia    • Hypertension    • Hypertensive heart disease    • Insomnia    • Irregular heart beat    • Lumbar radiculopathy    •  Nephrolithiasis    • PVC (premature ventricular contraction)    • Severe head trauma    • SOB (shortness of breath)    • Spondylolisthesis    • Subdural hematoma    • Supraventricular tachycardia      Past Surgical History:   Procedure Laterality Date   • KNEE SURGERY      2008 and 2009   • LITHOTRIPSY     • OTHER SURGICAL HISTORY      ear surgery     Outpatient Medications Prior to Visit   Medication Sig Dispense Refill   • atorvastatin (LIPITOR) 10 MG tablet Take 1 tablet by mouth Daily. 30 tablet 11   • Cyanocobalamin (B-12 PO) Take  by mouth.     • diclofenac (VOLTAREN) 3 % gel gel Apply  topically 2 (Two) Times a Day. for 60 days.     • ergocalciferol (DRISDOL) 38397 UNITS capsule Take 1 capsule by mouth 1 (One) Time Per Week. 13 capsule 3   • ketotifen (ZADITOR) 0.025 % ophthalmic solution Apply  to eye As Needed.     • naproxen (NAPROSYN) 500 MG tablet TAKE 1 TABLET BY MOUTH TWO TIMES A DAY  60 tablet 1   • SYNTHROID 125 MCG tablet Take 1 tablet by mouth Daily. 30 tablet 3   • vardenafil (LEVITRA) 10 MG tablet Take 10 mg by mouth As Needed for erectile dysfunction.     • metoprolol succinate XL (TOPROL-XL) 25 MG 24 hr tablet Take 1/2 tablet po bid 30 tablet 5   • valsartan (DIOVAN) 40 MG tablet Take 1 tablet by mouth 2 (Two) Times a Day. 60 tablet 5   • Multiple Vitamin (MULTIVITAMIN) tablet Take 1 tablet by mouth.       No facility-administered medications prior to visit.      Allergies as of 06/09/2017 - William as Reviewed 06/09/2017   Allergen Reaction Noted   • Latex Rash 03/15/2016     Social History     Social History   • Marital status:      Spouse name: N/A   • Number of children: N/A   • Years of education: N/A     Occupational History   • Not on file.     Social History Main Topics   • Smoking status: Former Smoker   • Smokeless tobacco: Not on file   • Alcohol use Yes      Comment: social use   • Drug use: No   • Sexual activity: Not on file     Other Topics Concern   • Not on file     Social  "History Narrative     Family History   Problem Relation Age of Onset   • Cancer Father      malignant neoplasm   • No Known Problems Mother    • No Known Problems Sister    • No Known Problems Brother    • No Known Problems Sister    • No Known Problems Brother    • No Known Problems Brother      Review of Systems   Constitution: Negative for fever, malaise/fatigue, weight gain and weight loss.   HENT: Negative for ear pain, hearing loss, nosebleeds and sore throat.    Eyes: Negative for double vision, pain, vision loss in left eye and vision loss in right eye.   Cardiovascular:        See history of present illness.   Respiratory: Negative for cough, shortness of breath, sleep disturbances due to breathing, snoring and wheezing.    Endocrine: Negative for cold intolerance, heat intolerance and polyuria.   Skin: Negative for itching, poor wound healing and rash.   Musculoskeletal: Negative for joint pain, joint swelling and myalgias.   Gastrointestinal: Negative for abdominal pain, diarrhea, hematochezia, nausea and vomiting.   Genitourinary: Negative for hematuria and hesitancy.   Neurological: Negative for numbness, paresthesias and seizures.   Psychiatric/Behavioral: Negative for depression. The patient is not nervous/anxious.      Objective:     Vitals:    06/09/17 1150   BP: 108/72   BP Location: Right arm   Patient Position: Sitting   Pulse: 86   Weight: 262 lb (119 kg)   Height: 76\" (193 cm)     Body mass index is 31.89 kg/(m^2).    Physical Exam   Constitutional: He is oriented to person, place, and time. He appears well-developed and well-nourished.   HENT:   Head: Normocephalic.   Nose: Nose normal.   Mouth/Throat: Oropharynx is clear and moist.   Eyes: Conjunctivae and EOM are normal. Pupils are equal, round, and reactive to light. Right eye exhibits no discharge. No scleral icterus.   Neck: Normal range of motion. Neck supple. No JVD present. No thyromegaly present.   Cardiovascular: Normal rate, regular " rhythm, normal heart sounds and intact distal pulses.  Frequent extrasystoles are present. Exam reveals no gallop and no friction rub.    No murmur heard.  Pulses:       Carotid pulses are 2+ on the right side, and 2+ on the left side.       Radial pulses are 2+ on the right side, and 2+ on the left side.        Femoral pulses are 2+ on the right side, and 2+ on the left side.       Popliteal pulses are 2+ on the right side, and 2+ on the left side.        Dorsalis pedis pulses are 2+ on the right side, and 2+ on the left side.        Posterior tibial pulses are 2+ on the right side, and 2+ on the left side.   Pulmonary/Chest: Effort normal and breath sounds normal. No respiratory distress. He has no wheezes. He has no rales.   Abdominal: Soft. Bowel sounds are normal. He exhibits no distension. There is no hepatosplenomegaly. There is no tenderness. There is no rebound.   Musculoskeletal: Normal range of motion. He exhibits no edema or tenderness.   Neurological: He is alert and oriented to person, place, and time.   Skin: Skin is warm and dry. No rash noted. No erythema.   Psychiatric: He has a normal mood and affect. His behavior is normal. Judgment and thought content normal.   Vitals reviewed.    Lab Review:     ECG 12 Lead  Date/Time: 6/9/2017 11:11 PM  Performed by: JOHNY HART  Authorized by: JOHNY HART   Comparison: compared with previous ECG   Comparison to previous ECG: Ventricular bigeminy  Rhythm: sinus rhythm  Ectopy: frequent PVCs  Conduction: non-specific intraventricular conduction delay  Conduction comments: QTc = 509msec  Clinical impression: abnormal ECG          Assessment:       Diagnosis Plan   1. Supraventricular tachycardia  ECG 12 Lead   2. PVC (premature ventricular contraction)  ECG 12 Lead   3. Essential hypertension  ECG 12 Lead     Plan:           1.  Frequent PVC's. Recent thyroid and K normal. Recent holter without complex ectopy. No ischemia in 2014 and no anginal symptoms.  Decrease stimulants and increase Toprol XL to 25 mg BID.  Diovan to 40 mg a day  1.  Paroxysmal supraventricular tachycardia.   2.  Hypertension.    3.  Dyslipidemia.  4.  Graves' disease.  Managed by Dr. Escobar   5.  Recent upper respiratory infection  6.  Chronic dyspnea on exertion     Joaquin Peña   Home Medication Instructions MINDI:    Printed on:17 [id number]   Medication Information                      atorvastatin (LIPITOR) 10 MG tablet  Take 1 tablet by mouth Daily.             Cyanocobalamin (B-12 PO)  Take  by mouth.             diclofenac (VOLTAREN) 3 % gel gel  Apply  topically 2 (Two) Times a Day. for 60 days.             ergocalciferol (DRISDOL) 31724 UNITS capsule  Take 1 capsule by mouth 1 (One) Time Per Week.             ketotifen (ZADITOR) 0.025 % ophthalmic solution  Apply  to eye As Needed.             metoprolol succinate XL (TOPROL-XL) 25 MG 24 hr tablet  Take 1 tablet by mouth 2 (Two) Times a Day. Take 1/2 tablet po bid             naproxen (NAPROSYN) 500 MG tablet  TAKE 1 TABLET BY MOUTH TWO TIMES A DAY              SYNTHROID 125 MCG tablet  Take 1 tablet by mouth Daily.             valsartan (DIOVAN) 40 MG tablet  Take 1 tablet by mouth Every Evening.             vardenafil (LEVITRA) 10 MG tablet  Take 10 mg by mouth As Needed for erectile dysfunction.                 New Medications Ordered This Visit   Medications   • metoprolol succinate XL (TOPROL-XL) 25 MG 24 hr tablet     Sig: Take 1 tablet by mouth 2 (Two) Times a Day. Take 1/2 tablet po bid     Dispense:  60 tablet     Refill:  5   • valsartan (DIOVAN) 40 MG tablet     Sig: Take 1 tablet by mouth Every Evening.     Dispense:  30 tablet     Refill:  5       Dictated utilizing Dragon dictation

## 2017-07-07 DIAGNOSIS — E89.0 POSTABLATIVE HYPOTHYROIDISM: ICD-10-CM

## 2017-07-07 DIAGNOSIS — N52.9 ERECTILE DYSFUNCTION, UNSPECIFIED ERECTILE DYSFUNCTION TYPE: ICD-10-CM

## 2017-07-07 DIAGNOSIS — E88.81 INSULIN RESISTANCE: ICD-10-CM

## 2017-07-07 DIAGNOSIS — E05.00 GRAVES DISEASE: ICD-10-CM

## 2017-07-07 DIAGNOSIS — E78.2 MIXED HYPERLIPIDEMIA: Primary | ICD-10-CM

## 2017-07-12 ENCOUNTER — TELEPHONE (OUTPATIENT)
Dept: FAMILY MEDICINE CLINIC | Facility: CLINIC | Age: 67
End: 2017-07-12

## 2017-07-12 NOTE — TELEPHONE ENCOUNTER
Made an appt for this Friday at 1:15 told pt if pain gets to bad or he don't want to wait till Friday then he can go to immediate care center.

## 2017-07-14 ENCOUNTER — OFFICE VISIT (OUTPATIENT)
Dept: FAMILY MEDICINE CLINIC | Facility: CLINIC | Age: 67
End: 2017-07-14

## 2017-07-14 VITALS
OXYGEN SATURATION: 98 % | RESPIRATION RATE: 16 BRPM | WEIGHT: 263 LBS | HEART RATE: 44 BPM | DIASTOLIC BLOOD PRESSURE: 88 MMHG | BODY MASS INDEX: 32.03 KG/M2 | TEMPERATURE: 98 F | HEIGHT: 76 IN | SYSTOLIC BLOOD PRESSURE: 120 MMHG

## 2017-07-14 DIAGNOSIS — I10 ESSENTIAL HYPERTENSION: ICD-10-CM

## 2017-07-14 DIAGNOSIS — E78.2 MIXED HYPERLIPIDEMIA: ICD-10-CM

## 2017-07-14 DIAGNOSIS — M54.6 ACUTE BILATERAL THORACIC BACK PAIN: Primary | ICD-10-CM

## 2017-07-14 PROCEDURE — 99214 OFFICE O/P EST MOD 30 MIN: CPT | Performed by: INTERNAL MEDICINE

## 2017-07-14 PROCEDURE — 72070 X-RAY EXAM THORAC SPINE 2VWS: CPT | Performed by: INTERNAL MEDICINE

## 2017-07-14 RX ORDER — CYCLOBENZAPRINE HCL 10 MG
10 TABLET ORAL 3 TIMES DAILY PRN
Qty: 30 TABLET | Refills: 1 | Status: SHIPPED | OUTPATIENT
Start: 2017-07-14 | End: 2017-09-12

## 2017-07-14 NOTE — PROGRESS NOTES
Subjective   Joaquin Peña is a 67 y.o. male.     History of Present Illness   Patient's had seen for thoracic back pain.  He was playing golf approximately 2 months ago he strained his mid back.  It is mild to moderate pain constant.  It is worth with flexion extension of the mid back and tenderness to palpation.  He was taking Naprosyn with partial relief of pain.  His thoracic spine indicated multiple levels with osteophytes and degenerative changes.  He was prescribed a muscle relaxer with his anti-inflammatory physical therapy.  He'll follow-up in one month.    X-Ray  Interpretation report in house X-rays that I personally viewed    Relevant Clinical Issues/Diagnoses/Indications:  Thoracic pain thoracic x-ray        Clinical Findings:  #1 osteophytes along the entire spine 2 degenerative changes along the spine          Comparative Data:  No previous x-ray          Date of Previous X-ray:  Change on current X-ray    Much of this encounter note is an electronic transcription/translation of spoken language to printed text.  The electronic translation of spoken language may permit erroneous, or at times, nonsensical words or phrases to be inadvertently transcribed.  Although I have reviewed the note for such errors, some may still exist.    The following portions of the patient's history were reviewed and updated as appropriate: allergies, current medications, past family history, past medical history, past social history, past surgical history and problem list.    Review of Systems   Constitutional: Negative for fever.   Cardiovascular: Negative for chest pain.   Gastrointestinal: Negative for abdominal pain.   Genitourinary: Negative for dysuria.   Musculoskeletal: Positive for back pain.        Thoracic back pain   Neurological: Negative for weakness, numbness and headaches.       Objective   Physical Exam   Constitutional: He is oriented to person, place, and time. He appears well-developed and  well-nourished.   HENT:   Head: Normocephalic.   Eyes: Conjunctivae are normal. Pupils are equal, round, and reactive to light.   Neck: Normal range of motion. Neck supple.   Cardiovascular: Normal rate, regular rhythm and normal heart sounds.    Pulmonary/Chest: Effort normal and breath sounds normal.   Abdominal: Soft. Bowel sounds are normal.   Musculoskeletal: He exhibits edema, tenderness and deformity.   Pain to flexion extension thoracic spine   Neurological: He is alert and oriented to person, place, and time.   Skin: Skin is warm and dry.   Psychiatric: He has a normal mood and affect. His behavior is normal. Judgment and thought content normal.   Nursing note and vitals reviewed.      Assessment/Plan   Problems Addressed this Visit        Cardiovascular and Mediastinum    Hypertension    Hyperlipidemia       Other    Acute bilateral thoracic back pain - Primary    Relevant Orders    XR Spine Thoracic 2 View (Completed)    Ambulatory Referral to Physical Therapy Evaluate and treat, Ortho (Completed)    Ambulatory Referral to Physical Therapy Evaluate and treat, Ortho (Completed)

## 2017-07-19 ENCOUNTER — RESULTS ENCOUNTER (OUTPATIENT)
Dept: ENDOCRINOLOGY | Age: 67
End: 2017-07-19

## 2017-07-19 DIAGNOSIS — E89.0 POSTABLATIVE HYPOTHYROIDISM: ICD-10-CM

## 2017-08-08 ENCOUNTER — RESULTS ENCOUNTER (OUTPATIENT)
Dept: ENDOCRINOLOGY | Age: 67
End: 2017-08-08

## 2017-08-08 DIAGNOSIS — E89.0 POSTABLATIVE HYPOTHYROIDISM: ICD-10-CM

## 2017-08-08 DIAGNOSIS — E78.2 MIXED HYPERLIPIDEMIA: ICD-10-CM

## 2017-08-08 DIAGNOSIS — E88.81 INSULIN RESISTANCE: ICD-10-CM

## 2017-08-08 DIAGNOSIS — E05.00 GRAVES DISEASE: ICD-10-CM

## 2017-08-08 DIAGNOSIS — N52.9 ERECTILE DYSFUNCTION, UNSPECIFIED ERECTILE DYSFUNCTION TYPE: ICD-10-CM

## 2017-09-12 ENCOUNTER — OFFICE VISIT (OUTPATIENT)
Dept: FAMILY MEDICINE CLINIC | Facility: CLINIC | Age: 67
End: 2017-09-12

## 2017-09-12 VITALS
TEMPERATURE: 97.8 F | BODY MASS INDEX: 31.29 KG/M2 | HEART RATE: 61 BPM | SYSTOLIC BLOOD PRESSURE: 118 MMHG | WEIGHT: 265 LBS | HEIGHT: 77 IN | OXYGEN SATURATION: 96 % | DIASTOLIC BLOOD PRESSURE: 80 MMHG

## 2017-09-12 DIAGNOSIS — J06.9 ACUTE URI: Primary | ICD-10-CM

## 2017-09-12 DIAGNOSIS — J30.2 SEASONAL ALLERGIC RHINITIS, UNSPECIFIED ALLERGIC RHINITIS TRIGGER: ICD-10-CM

## 2017-09-12 DIAGNOSIS — H61.22 IMPACTED CERUMEN OF LEFT EAR: ICD-10-CM

## 2017-09-12 PROBLEM — M54.16 LUMBAR RADICULOPATHY: Status: ACTIVE | Noted: 2017-09-12

## 2017-09-12 PROBLEM — M51.369 DEGENERATION OF INTERVERTEBRAL DISC OF LUMBAR REGION: Status: ACTIVE | Noted: 2017-09-12

## 2017-09-12 PROBLEM — M43.10 ACQUIRED SPONDYLOLISTHESIS: Status: ACTIVE | Noted: 2017-09-12

## 2017-09-12 PROBLEM — M51.36 DEGENERATION OF INTERVERTEBRAL DISC OF LUMBAR REGION: Status: ACTIVE | Noted: 2017-09-12

## 2017-09-12 PROCEDURE — 99213 OFFICE O/P EST LOW 20 MIN: CPT | Performed by: NURSE PRACTITIONER

## 2017-09-12 PROCEDURE — 69209 REMOVE IMPACTED EAR WAX UNI: CPT | Performed by: NURSE PRACTITIONER

## 2017-09-12 RX ORDER — AMOXICILLIN 875 MG/1
875 TABLET, COATED ORAL 2 TIMES DAILY
Qty: 20 TABLET | Refills: 0 | Status: SHIPPED | OUTPATIENT
Start: 2017-09-12 | End: 2017-10-25

## 2017-09-12 NOTE — PROGRESS NOTES
Subjective   Joaquin Peña is a 67 y.o. male.     History of Present Illness   C/o chest congestion and prod cough with nasal congestion x 4-5 days, no one sick at home, but co-workers sick works , no ear pain, no sinus congestion or tenderness, no sore throat, no fevers, last sick sinusitis 12/16 tx amox 875 BID x 1 wk tolerated well, allergic latex, with HTN on diovan and topral and no OTC meds, no CP dizziness HA LE edema    The following portions of the patient's history were reviewed and updated as appropriate: allergies, current medications, past family history, past medical history, past social history, past surgical history and problem list.    Review of Systems   Constitutional: Positive for fatigue. Negative for fever.   HENT: Positive for congestion, hearing loss and postnasal drip. Negative for dental problem, drooling, ear discharge, ear pain, facial swelling, mouth sores, nosebleeds, rhinorrhea, sinus pressure, sneezing, sore throat, trouble swallowing and voice change.    Respiratory: Positive for cough and chest tightness. Negative for shortness of breath and wheezing.    Cardiovascular: Negative for chest pain.   Allergic/Immunologic: Positive for environmental allergies.   Neurological: Negative for dizziness and headaches.   All other systems reviewed and are negative.      Objective   Physical Exam   Constitutional: He is oriented to person, place, and time. He appears well-developed and well-nourished.   HENT:   Head: Normocephalic and atraumatic.   Right Ear: A middle ear effusion is present. Decreased hearing is noted.   Left Ear: A foreign body (cerumen impaction) is present. Decreased hearing is noted.   Nose: Mucosal edema present. Right sinus exhibits no maxillary sinus tenderness and no frontal sinus tenderness. Left sinus exhibits no maxillary sinus tenderness and no frontal sinus tenderness.   Mouth/Throat: No oropharyngeal exudate, posterior oropharyngeal edema or  posterior oropharyngeal erythema.   Eyes: Conjunctivae and EOM are normal. Pupils are equal, round, and reactive to light.   Neck: Normal range of motion. Neck supple. No thyromegaly present.   Cardiovascular: Normal rate, regular rhythm and normal heart sounds.    Pulmonary/Chest: Effort normal and breath sounds normal.   Musculoskeletal: Normal range of motion.   Lymphadenopathy:     He has no cervical adenopathy.   Neurological: He is alert and oriented to person, place, and time.   Skin: Skin is warm and dry.   Psychiatric: He has a normal mood and affect. His behavior is normal. Judgment and thought content normal.   Vitals reviewed.   L ear irrigated successfully without complications, TM visualized and hearing improved    Assessment/Plan   Joaquin was seen today for uri.    Diagnoses and all orders for this visit:    Acute URI    Seasonal allergic rhinitis, unspecified allergic rhinitis trigger    Impacted cerumen of left ear    Other orders  -     amoxicillin (AMOXIL) 875 MG tablet; Take 1 tablet by mouth 2 (Two) Times a Day.    erx amox 875 BID x 10 days, increase H20 and rest, call or RTC if sx persist or worsen, L ear irrigated successfully without complications

## 2017-09-12 NOTE — PATIENT INSTRUCTIONS
erx amox 875 BID x 10 days, increase H20 and rest, call or RTC if sx persist or worsen, L ear irrigated successfully without complications

## 2017-10-06 ENCOUNTER — CLINICAL SUPPORT (OUTPATIENT)
Dept: FAMILY MEDICINE CLINIC | Facility: CLINIC | Age: 67
End: 2017-10-06

## 2017-10-06 PROCEDURE — 90662 IIV NO PRSV INCREASED AG IM: CPT | Performed by: INTERNAL MEDICINE

## 2017-10-06 PROCEDURE — 90471 IMMUNIZATION ADMIN: CPT | Performed by: INTERNAL MEDICINE

## 2017-10-18 DIAGNOSIS — E89.0 POSTABLATIVE HYPOTHYROIDISM: ICD-10-CM

## 2017-10-18 RX ORDER — LEVOTHYROXINE SODIUM 125 MCG
125 TABLET ORAL DAILY
Qty: 30 TABLET | Refills: 3 | Status: SHIPPED | OUTPATIENT
Start: 2017-10-18 | End: 2018-02-12 | Stop reason: SDUPTHER

## 2017-10-23 LAB
25(OH)D3+25(OH)D2 SERPL-MCNC: 32.3 NG/ML (ref 30–100)
ALBUMIN SERPL-MCNC: 4.2 G/DL (ref 3.5–5.2)
ALBUMIN/GLOB SERPL: 1.4 G/DL
ALP SERPL-CCNC: 64 U/L (ref 39–117)
ALT SERPL-CCNC: 31 U/L (ref 1–41)
AST SERPL-CCNC: 28 U/L (ref 1–40)
BILIRUB SERPL-MCNC: 0.6 MG/DL (ref 0.1–1.2)
BUN SERPL-MCNC: 18 MG/DL (ref 8–23)
BUN/CREAT SERPL: 18.8 (ref 7–25)
C PEPTIDE SERPL-MCNC: 3.8 NG/ML (ref 1.1–4.4)
CALCIUM SERPL-MCNC: 10.2 MG/DL (ref 8.6–10.5)
CHLORIDE SERPL-SCNC: 103 MMOL/L (ref 98–107)
CHOLEST SERPL-MCNC: 140 MG/DL (ref 0–200)
CO2 SERPL-SCNC: 27.2 MMOL/L (ref 22–29)
CREAT SERPL-MCNC: 0.96 MG/DL (ref 0.76–1.27)
ERYTHROCYTE [DISTWIDTH] IN BLOOD BY AUTOMATED COUNT: 15.1 % (ref 11.5–14.5)
GFR SERPLBLD CREATININE-BSD FMLA CKD-EPI: 79 ML/MIN/1.73
GFR SERPLBLD CREATININE-BSD FMLA CKD-EPI: 95 ML/MIN/1.73
GLOBULIN SER CALC-MCNC: 3.1 GM/DL
GLUCOSE SERPL-MCNC: 93 MG/DL (ref 65–99)
HBA1C MFR BLD: 5.63 % (ref 4.8–5.6)
HCT VFR BLD AUTO: 50.4 % (ref 40.4–52.2)
HDLC SERPL-MCNC: 35 MG/DL (ref 40–60)
HGB BLD-MCNC: 16.4 G/DL (ref 13.7–17.6)
LDLC SERPL CALC-MCNC: 75 MG/DL (ref 0–100)
MCH RBC QN AUTO: 28.9 PG (ref 27–32.7)
MCHC RBC AUTO-ENTMCNC: 32.5 G/DL (ref 32.6–36.4)
MCV RBC AUTO: 88.7 FL (ref 79.8–96.2)
PLATELET # BLD AUTO: 226 10*3/MM3 (ref 140–500)
POTASSIUM SERPL-SCNC: 5 MMOL/L (ref 3.5–5.2)
PROT SERPL-MCNC: 7.3 G/DL (ref 6–8.5)
RBC # BLD AUTO: 5.68 10*6/MM3 (ref 4.6–6)
SHBG SERPL-SCNC: 30.4 NMOL/L (ref 19.3–76.4)
SODIUM SERPL-SCNC: 140 MMOL/L (ref 136–145)
T3FREE SERPL-MCNC: 2.6 PG/ML (ref 2–4.4)
T4 FREE SERPL-MCNC: 1.01 NG/DL (ref 0.93–1.7)
T4 SERPL-MCNC: 6.16 MCG/DL (ref 4.5–11.7)
TESTOST FREE SERPL-MCNC: 6.7 PG/ML (ref 6.6–18.1)
TESTOST SERPL-MCNC: 328 NG/DL (ref 264–916)
THYROGLOB AB SERPL-ACNC: <1 IU/ML
THYROGLOB AB SERPL-ACNC: <1 IU/ML (ref 0–0.9)
THYROGLOB SERPL-MCNC: 18.4 NG/ML
THYROGLOB SERPL-MCNC: 18.6 NG/ML (ref 1.4–29.2)
THYROGLOB SERPL-MCNC: NORMAL NG/ML
TRIGL SERPL-MCNC: 151 MG/DL (ref 0–150)
TSH SERPL DL<=0.005 MIU/L-ACNC: 3.22 MIU/ML (ref 0.27–4.2)
TSI ACT/NOR SER: 53 % (ref 0–139)
URATE SERPL-MCNC: 6.3 MG/DL (ref 3.4–7)
VLDLC SERPL CALC-MCNC: 30.2 MG/DL (ref 5–40)
WBC # BLD AUTO: 7.59 10*3/MM3 (ref 4.5–10.7)

## 2017-10-25 ENCOUNTER — OFFICE VISIT (OUTPATIENT)
Dept: ENDOCRINOLOGY | Age: 67
End: 2017-10-25

## 2017-10-25 VITALS
WEIGHT: 265.4 LBS | SYSTOLIC BLOOD PRESSURE: 136 MMHG | HEIGHT: 76 IN | BODY MASS INDEX: 32.32 KG/M2 | DIASTOLIC BLOOD PRESSURE: 84 MMHG | RESPIRATION RATE: 16 BRPM

## 2017-10-25 DIAGNOSIS — E88.81 INSULIN RESISTANCE: ICD-10-CM

## 2017-10-25 DIAGNOSIS — E05.00 GRAVES DISEASE: ICD-10-CM

## 2017-10-25 DIAGNOSIS — N52.9 ERECTILE DYSFUNCTION, UNSPECIFIED ERECTILE DYSFUNCTION TYPE: ICD-10-CM

## 2017-10-25 DIAGNOSIS — I10 ESSENTIAL HYPERTENSION: ICD-10-CM

## 2017-10-25 DIAGNOSIS — R73.9 HYPERGLYCEMIA: ICD-10-CM

## 2017-10-25 DIAGNOSIS — E78.2 MIXED HYPERLIPIDEMIA: ICD-10-CM

## 2017-10-25 DIAGNOSIS — E89.0 POSTABLATIVE HYPOTHYROIDISM: Primary | ICD-10-CM

## 2017-10-25 PROCEDURE — 99214 OFFICE O/P EST MOD 30 MIN: CPT | Performed by: INTERNAL MEDICINE

## 2017-10-25 NOTE — PROGRESS NOTES
"Subjective   Joaquin Peña is a 67 y.o. male seen for follow up for goiter, graves, hyperlipidemia, HTN, hypothyroidism. He denies any problems or concerns.     History of Present Illness this is a 67-year-old gentleman known patient with history of Graves' disease is status post radioactive iodine therapy and hypothyroidism with hypertension and dyslipidemia as well as erectile dysfunction.  Over the course of last 6 months he has had no significant health problems for which to go to the emergency room or hospital.    /84  Resp 16  Ht 76\" (193 cm)  Wt 265 lb 6.4 oz (120 kg)  BMI 32.31 kg/m2     Allergies   Allergen Reactions   • Latex Rash       Current Outpatient Prescriptions:   •  atorvastatin (LIPITOR) 10 MG tablet, Take 1 tablet by mouth Daily., Disp: 30 tablet, Rfl: 11  •  Cyanocobalamin (B-12 PO), Take  by mouth., Disp: , Rfl:   •  ergocalciferol (DRISDOL) 89372 UNITS capsule, Take 1 capsule by mouth 1 (One) Time Per Week., Disp: 13 capsule, Rfl: 3  •  ketotifen (ZADITOR) 0.025 % ophthalmic solution, Apply  to eye As Needed., Disp: , Rfl:   •  metoprolol succinate XL (TOPROL-XL) 25 MG 24 hr tablet, Take 1 tablet by mouth 2 (Two) Times a Day. Take 1/2 tablet po bid (Patient taking differently: Take 25 mg by mouth 2 (Two) Times a Day.), Disp: 60 tablet, Rfl: 5  •  naproxen (NAPROSYN) 500 MG tablet, TAKE 1 TABLET BY MOUTH TWO TIMES A DAY  (Patient taking differently: TAKE 1 TABLET BY MOUTH TWO TIMES A DAY PRN), Disp: 60 tablet, Rfl: 1  •  SYNTHROID 125 MCG tablet, Take 1 tablet by mouth Daily., Disp: 30 tablet, Rfl: 3  •  valsartan (DIOVAN) 40 MG tablet, Take 1 tablet by mouth Every Evening., Disp: 30 tablet, Rfl: 5  •  vardenafil (LEVITRA) 10 MG tablet, Take 10 mg by mouth As Needed for erectile dysfunction., Disp: , Rfl:       The following portions of the patient's history were reviewed and updated as appropriate: allergies, current medications, past family history, past medical history, past " social history, past surgical history and problem list.    Review of Systems   Constitutional: Negative.    HENT: Negative.    Eyes: Negative.    Respiratory: Negative.    Cardiovascular: Negative.    Gastrointestinal: Negative.    Endocrine: Negative.    Genitourinary: Negative.    Musculoskeletal: Negative.    Skin: Negative.    Allergic/Immunologic: Negative.    Neurological: Negative.    Hematological: Negative.    Psychiatric/Behavioral: Negative.        Objective   Physical Exam   Constitutional: He is oriented to person, place, and time. He appears well-developed and well-nourished. No distress.   HENT:   Head: Normocephalic and atraumatic.   Right Ear: External ear normal.   Left Ear: External ear normal.   Nose: Nose normal.   Mouth/Throat: Oropharynx is clear and moist.   Eyes: Conjunctivae and EOM are normal. Pupils are equal, round, and reactive to light. Right eye exhibits no discharge. Left eye exhibits no discharge. No scleral icterus.   Neck: Normal range of motion. Neck supple. No JVD present. No tracheal deviation present. No thyromegaly present.   Cardiovascular: Normal rate, regular rhythm, normal heart sounds and intact distal pulses.  Exam reveals no gallop and no friction rub.    No murmur heard.  Pulmonary/Chest: Effort normal and breath sounds normal. No stridor. No respiratory distress. He has no wheezes. He has no rales. He exhibits no tenderness.   Abdominal: Soft. Bowel sounds are normal. He exhibits no distension and no mass. There is no tenderness. There is no rebound and no guarding. No hernia.   Musculoskeletal: Normal range of motion. He exhibits no edema, tenderness or deformity.   Lymphadenopathy:     He has no cervical adenopathy.   Neurological: He is alert and oriented to person, place, and time. He has normal reflexes. He displays normal reflexes. No cranial nerve deficit. He exhibits normal muscle tone. Coordination normal.   Skin: Skin is warm and dry. No rash noted. No  erythema. No pallor.   Psychiatric: He has a normal mood and affect. His behavior is normal. Judgment and thought content normal.   Nursing note and vitals reviewed.    Results for orders placed or performed in visit on 08/08/17   Comprehensive Metabolic Panel   Result Value Ref Range    Glucose 93 65 - 99 mg/dL    BUN 18 8 - 23 mg/dL    Creatinine 0.96 0.76 - 1.27 mg/dL    eGFR Non African Am 79 >60 mL/min/1.73    eGFR African Am 95 >60 mL/min/1.73    BUN/Creatinine Ratio 18.8 7.0 - 25.0    Sodium 140 136 - 145 mmol/L    Potassium 5.0 3.5 - 5.2 mmol/L    Chloride 103 98 - 107 mmol/L    Total CO2 27.2 22.0 - 29.0 mmol/L    Calcium 10.2 8.6 - 10.5 mg/dL    Total Protein 7.3 6.0 - 8.5 g/dL    Albumin 4.20 3.50 - 5.20 g/dL    Globulin 3.1 gm/dL    A/G Ratio 1.4 g/dL    Total Bilirubin 0.6 0.1 - 1.2 mg/dL    Alkaline Phosphatase 64 39 - 117 U/L    AST (SGOT) 28 1 - 40 U/L    ALT (SGPT) 31 1 - 41 U/L   Lipid Panel   Result Value Ref Range    Total Cholesterol 140 0 - 200 mg/dL    Triglycerides 151 (H) 0 - 150 mg/dL    HDL Cholesterol 35 (L) 40 - 60 mg/dL    VLDL Cholesterol 30.2 5 - 40 mg/dL    LDL Cholesterol  75 0 - 100 mg/dL   T3, Free   Result Value Ref Range    T3, Free 2.6 2.0 - 4.4 pg/mL   T4 & TSH ([facility])   Result Value Ref Range    TSH 3.220 0.270 - 4.200 mIU/mL    T4, Total 6.16 4.50 - 11.70 mcg/dL   T4, Free   Result Value Ref Range    Free T4 1.01 0.93 - 1.70 ng/dL   Vitamin D 25 Hydroxy   Result Value Ref Range    25 Hydroxy, Vitamin D 32.3 30.0 - 100.0 ng/mL   TestT+TestF+SHBG   Result Value Ref Range    Testosterone, Total 328 264 - 916 ng/dL    Testosterone, Free 6.7 6.6 - 18.1 pg/mL    Sex Hormone Binding Globulin 30.4 19.3 - 76.4 nmol/L   CBC (No Diff)   Result Value Ref Range    WBC 7.59 4.50 - 10.70 10*3/mm3    RBC 5.68 4.60 - 6.00 10*6/mm3    Hemoglobin 16.4 13.7 - 17.6 g/dL    Hematocrit 50.4 40.4 - 52.2 %    MCV 88.7 79.8 - 96.2 fL    MCH 28.9 27.0 - 32.7 pg    MCHC 32.5 (L) 32.6 - 36.4 g/dL     RDW 15.1 (H) 11.5 - 14.5 %    Platelets 226 140 - 500 10*3/mm3   C-Peptide   Result Value Ref Range    C-Peptide 3.8 1.1 - 4.4 ng/mL   Hemoglobin A1c   Result Value Ref Range    Hemoglobin A1C 5.63 (H) 4.80 - 5.60 %   Uric Acid   Result Value Ref Range    Uric Acid 6.3 3.4 - 7.0 mg/dL   Comprehensive Thyroglobulin   Result Value Ref Range    Thyroglobulin Ab <1.0 IU/mL    Thyroglobulin 18.4 ng/mL    Thyroglobulin (TG-CARMENCITA) Comment ng/mL   Thyroglobulin With Anti-TG   Result Value Ref Range    Thyroglobulin Ab <1.0 0.0 - 0.9 IU/mL   Thyroid Stimulating Immunoglobulin   Result Value Ref Range    Thyroid Stimulating Immunoglobulin 53 0 - 139 %   Thyroglobulin By MATTHIAS   Result Value Ref Range    THYROGLOBULIN BY MATTHIAS 18.6 1.4 - 29.2 ng/mL         Assessment/Plan   Diagnoses and all orders for this visit:    Postablative hypothyroidism  -     T3, Free; Future  -     T4 & TSH ([facility]); Future  -     T4, Free; Future  -     Uric Acid; Future  -     Vitamin D 25 Hydroxy; Future  -     Comprehensive Metabolic Panel; Future  -     C-Peptide; Future  -     Hemoglobin A1c; Future  -     Lipid Panel; Future    Graves disease  -     T3, Free; Future  -     T4 & TSH ([facility]); Future  -     T4, Free; Future  -     Uric Acid; Future  -     Vitamin D 25 Hydroxy; Future  -     Comprehensive Metabolic Panel; Future  -     C-Peptide; Future  -     Hemoglobin A1c; Future  -     Lipid Panel; Future    Essential hypertension  -     T3, Free; Future  -     T4 & TSH ([facility]); Future  -     T4, Free; Future  -     Uric Acid; Future  -     Vitamin D 25 Hydroxy; Future  -     Comprehensive Metabolic Panel; Future  -     C-Peptide; Future  -     Hemoglobin A1c; Future  -     Lipid Panel; Future    Mixed hyperlipidemia  -     T3, Free; Future  -     T4 & TSH ([facility]); Future  -     T4, Free; Future  -     Uric Acid; Future  -     Vitamin D 25 Hydroxy; Future  -     Comprehensive Metabolic Panel; Future  -     C-Peptide; Future  -     Hemoglobin  A1c; Future  -     Lipid Panel; Future    Erectile dysfunction, unspecified erectile dysfunction type  -     T3, Free; Future  -     T4 & TSH ([facility]); Future  -     T4, Free; Future  -     Uric Acid; Future  -     Vitamin D 25 Hydroxy; Future  -     Comprehensive Metabolic Panel; Future  -     C-Peptide; Future  -     Hemoglobin A1c; Future  -     Lipid Panel; Future    Hyperglycemia  -     T3, Free; Future  -     T4 & TSH ([facility]); Future  -     T4, Free; Future  -     Uric Acid; Future  -     Vitamin D 25 Hydroxy; Future  -     Comprehensive Metabolic Panel; Future  -     C-Peptide; Future  -     Hemoglobin A1c; Future  -     Lipid Panel; Future    Insulin resistance  -     T3, Free; Future  -     T4 & TSH ([facility]); Future  -     T4, Free; Future  -     Uric Acid; Future  -     Vitamin D 25 Hydroxy; Future  -     Comprehensive Metabolic Panel; Future  -     C-Peptide; Future  -     Hemoglobin A1c; Future  -     Lipid Panel; Future               His summary I saw and examined this 67-year-old gentleman for above-mentioned problems.  I reviewed his laboratory evaluation of 10/19/2017 and provided him with a hard copy of it.  Overall he is clinically and metabolically stable and therefore we will go ahead and continue all his current prescriptions.  He will see Ms. Aimee Shane in 6 months or sooner if needed with laboratory evaluation prior to each office visit.

## 2017-10-31 ENCOUNTER — TELEPHONE (OUTPATIENT)
Dept: CARDIOLOGY | Facility: CLINIC | Age: 67
End: 2017-10-31

## 2017-10-31 NOTE — TELEPHONE ENCOUNTER
"----- Message from Joaquin Peña sent at 10/30/2017  7:43 PM EDT -----  Regarding: Test Results Question  Contact: [phone]  Recently had [facility] Screening tests done. Results are back and am being told that the peripheral arterial disease result is \"abnormal\" on both the left (1.33) and right (1.39) sides via the ankle brachial index. Do I have a concern?     Joaquin Peña  [phone]  "

## 2017-10-31 NOTE — TELEPHONE ENCOUNTER
"----- Message from Joaquin Peña sent at 10/30/2017  7:43 PM EDT -----  Regarding: Test Results Question  Contact: [phone]  Recently had [facility] Screening tests done. Results are back and am being told that the peripheral arterial disease result is \"abnormal\" on both the left (1.33) and right (1.39) sides via the ankle brachial index. Do I have a concern?     Joaquin Peña  [phone]  "

## 2017-11-15 RX ORDER — VALSARTAN 320 MG/1
320 TABLET ORAL DAILY
Qty: 30 TABLET | Refills: 11 | Status: SHIPPED | OUTPATIENT
Start: 2017-11-15 | End: 2017-11-21 | Stop reason: ALTCHOICE

## 2017-11-15 RX ORDER — METOPROLOL TARTRATE 50 MG/1
50 TABLET, FILM COATED ORAL 2 TIMES DAILY
Qty: 60 TABLET | Refills: 11 | Status: SHIPPED | OUTPATIENT
Start: 2017-11-15 | End: 2018-06-07

## 2017-11-16 ENCOUNTER — TELEPHONE (OUTPATIENT)
Dept: CARDIOLOGY | Facility: CLINIC | Age: 67
End: 2017-11-16

## 2017-11-16 NOTE — TELEPHONE ENCOUNTER
His find out how his his blood pressure after valsartan and metoprolol increased (verify this was the case) sees.  Also I do not see the copy of the vascular screening study that he was to have left.  (See patient email). adrienne

## 2017-11-16 NOTE — TELEPHONE ENCOUNTER
This was sent through [employer].  I did advise him this was not appropriate to send BP that were high through [employer].  It does look like the pt sent this to Dr Smith, Dr Escobar, and you.  Dr Smith addressed thyroid and BP issues.

## 2017-11-16 NOTE — TELEPHONE ENCOUNTER
----- Message from Joaquin Peña sent at 11/14/2017  6:28 PM EST -----  Regarding: Non-Urgent Medical Question  Contact: [phone]  Blood pressure is elevated. Tonight it is around 160/100 consistently. I am presently taking 125 mcg of synthroid, 40 mg of valsartan, and 25 mg of metoprolol in the morning. In the evening I am taking 40 mg of valsartan, 25 mg of metoprolol, and 10 mg of atorvastatin. What do I do?

## 2017-11-21 NOTE — TELEPHONE ENCOUNTER
BP was 125/73 with a Pulse of 59.  He is taking Diovan 40mg sig: 3 pills BID.  He thought Dr mSith was giving him too much on the the BP meds.

## 2017-11-21 NOTE — TELEPHONE ENCOUNTER
----- Message from Charlene Smith MD sent at 11/20/2017  4:26 PM EST -----  It is immediate release.  ----- Message -----     From: Nya Mcgraw MA     Sent: 11/20/2017   1:47 PM       To: Charlene Smith MD    Pharmacy would like to know if this patient's metoprolol is immediate release or extended release    Spoke to pharmacy. 11/21/2017

## 2017-11-22 ENCOUNTER — OFFICE VISIT (OUTPATIENT)
Dept: FAMILY MEDICINE CLINIC | Facility: CLINIC | Age: 67
End: 2017-11-22

## 2017-11-22 VITALS
DIASTOLIC BLOOD PRESSURE: 72 MMHG | HEART RATE: 66 BPM | TEMPERATURE: 98.1 F | BODY MASS INDEX: 32.73 KG/M2 | SYSTOLIC BLOOD PRESSURE: 118 MMHG | HEIGHT: 76 IN | WEIGHT: 268.8 LBS | OXYGEN SATURATION: 98 %

## 2017-11-22 DIAGNOSIS — J06.9 ACUTE URI: Primary | ICD-10-CM

## 2017-11-22 PROCEDURE — 99213 OFFICE O/P EST LOW 20 MIN: CPT | Performed by: INTERNAL MEDICINE

## 2017-11-22 RX ORDER — VALSARTAN 40 MG/1
TABLET ORAL
Qty: 180 TABLET | Refills: 3 | Status: SHIPPED | OUTPATIENT
Start: 2017-11-22 | End: 2018-02-16

## 2017-11-22 RX ORDER — AZITHROMYCIN 250 MG/1
TABLET, FILM COATED ORAL
Qty: 6 TABLET | Refills: 0 | Status: SHIPPED | OUTPATIENT
Start: 2017-11-22 | End: 2018-02-16 | Stop reason: ALTCHOICE

## 2017-11-22 RX ORDER — VALSARTAN 40 MG/1
40 TABLET ORAL 3 TIMES DAILY
COMMUNITY
End: 2017-11-22 | Stop reason: SDUPTHER

## 2017-11-22 RX ORDER — VALSARTAN 40 MG/1
40 TABLET ORAL 3 TIMES DAILY
Qty: 90 TABLET | Refills: 1 | Status: SHIPPED | OUTPATIENT
Start: 2017-11-22 | End: 2017-11-22 | Stop reason: SDUPTHER

## 2017-11-22 NOTE — PROGRESS NOTES
Subjective   Joaquin Peña is a 67 y.o. male.     History of Present Illness   She was seen for a URI and given antibiotics.  He will follow-up in 4 days if not better.    Much of this encounter note is an electronic transcription/translation of spoken language to printed text.  The electronic translation of spoken language may permit erroneous, or at times, nonsensical words or phrases to be inadvertently transcribed.  Although I have reviewed the note for such errors, some may still exist.  The following portions of the patient's history were reviewed and updated as appropriate: allergies, current medications, past family history, past medical history, past social history, past surgical history and problem list.    Review of Systems   Constitutional: Negative for fatigue and fever.   HENT: Positive for congestion. Negative for trouble swallowing.    Eyes: Negative for discharge and visual disturbance.   Respiratory: Negative for choking and shortness of breath.    Cardiovascular: Negative for chest pain and palpitations.   Gastrointestinal: Negative for abdominal pain and blood in stool.   Endocrine: Negative.    Genitourinary: Negative for genital sores and hematuria.   Musculoskeletal: Negative for gait problem and joint swelling.   Skin: Negative for color change, pallor, rash and wound.   Allergic/Immunologic: Positive for environmental allergies. Negative for immunocompromised state.   Neurological: Negative for facial asymmetry and speech difficulty.   Psychiatric/Behavioral: Negative for hallucinations and suicidal ideas.       Objective   Physical Exam   Constitutional: He is oriented to person, place, and time. He appears well-developed and well-nourished.   HENT:   Head: Normocephalic.   Maxillary tenderness   Eyes: Conjunctivae are normal. Pupils are equal, round, and reactive to light.   Neck: Normal range of motion. Neck supple.   Cardiovascular: Normal rate, regular rhythm and normal heart sounds.     Pulmonary/Chest: Effort normal and breath sounds normal.   Abdominal: Soft. Bowel sounds are normal.   Musculoskeletal: Normal range of motion.   Neurological: He is alert and oriented to person, place, and time.   Skin: Skin is warm and dry.   Psychiatric: He has a normal mood and affect. His behavior is normal. Judgment and thought content normal.   Nursing note and vitals reviewed.      Assessment/Plan   Problems Addressed this Visit     None      Visit Diagnoses     Acute URI    -  Primary    Relevant Medications    azithromycin (ZITHROMAX Z-KATHRINE) 250 MG tablet

## 2018-01-23 RX ORDER — ATORVASTATIN CALCIUM 10 MG/1
TABLET, FILM COATED ORAL
Qty: 90 TABLET | Refills: 0 | Status: SHIPPED | OUTPATIENT
Start: 2018-01-23 | End: 2018-02-28 | Stop reason: SDUPTHER

## 2018-02-08 RX ORDER — ERGOCALCIFEROL 1.25 MG/1
50000 CAPSULE ORAL WEEKLY
Qty: 13 CAPSULE | Refills: 3 | Status: SHIPPED | OUTPATIENT
Start: 2018-02-08 | End: 2018-05-15

## 2018-02-12 DIAGNOSIS — E89.0 POSTABLATIVE HYPOTHYROIDISM: ICD-10-CM

## 2018-02-12 RX ORDER — LEVOTHYROXINE SODIUM 125 MCG
125 TABLET ORAL DAILY
Qty: 30 TABLET | Refills: 3 | Status: SHIPPED | OUTPATIENT
Start: 2018-02-12 | End: 2018-04-27 | Stop reason: SDUPTHER

## 2018-02-16 ENCOUNTER — TELEPHONE (OUTPATIENT)
Dept: CARDIOLOGY | Facility: CLINIC | Age: 68
End: 2018-02-16

## 2018-02-16 ENCOUNTER — APPOINTMENT (OUTPATIENT)
Dept: LAB | Facility: HOSPITAL | Age: 68
End: 2018-02-16

## 2018-02-16 ENCOUNTER — OFFICE VISIT (OUTPATIENT)
Dept: CARDIOLOGY | Facility: CLINIC | Age: 68
End: 2018-02-16

## 2018-02-16 VITALS
WEIGHT: 273 LBS | HEART RATE: 52 BPM | HEIGHT: 76 IN | BODY MASS INDEX: 33.24 KG/M2 | SYSTOLIC BLOOD PRESSURE: 142 MMHG | DIASTOLIC BLOOD PRESSURE: 86 MMHG

## 2018-02-16 DIAGNOSIS — E65 ABDOMINAL OBESITY: ICD-10-CM

## 2018-02-16 DIAGNOSIS — I47.1 SUPRAVENTRICULAR TACHYCARDIA (HCC): ICD-10-CM

## 2018-02-16 DIAGNOSIS — I49.3 PVC (PREMATURE VENTRICULAR CONTRACTION): ICD-10-CM

## 2018-02-16 DIAGNOSIS — R29.818 SUSPECTED SLEEP APNEA: ICD-10-CM

## 2018-02-16 DIAGNOSIS — I10 ESSENTIAL HYPERTENSION: Primary | ICD-10-CM

## 2018-02-16 DIAGNOSIS — R06.00 PND (PAROXYSMAL NOCTURNAL DYSPNEA): ICD-10-CM

## 2018-02-16 LAB
ANION GAP SERPL CALCULATED.3IONS-SCNC: 10.8 MMOL/L
BUN BLD-MCNC: 20 MG/DL (ref 8–23)
BUN/CREAT SERPL: 18.2 (ref 7–25)
CALCIUM SPEC-SCNC: 10.1 MG/DL (ref 8.6–10.5)
CHLORIDE SERPL-SCNC: 100 MMOL/L (ref 98–107)
CO2 SERPL-SCNC: 27.2 MMOL/L (ref 22–29)
CREAT BLD-MCNC: 1.1 MG/DL (ref 0.76–1.27)
GFR SERPL CREATININE-BSD FRML MDRD: 67 ML/MIN/1.73
GLUCOSE BLD-MCNC: 96 MG/DL (ref 65–99)
POTASSIUM BLD-SCNC: 4.9 MMOL/L (ref 3.5–5.2)
SODIUM BLD-SCNC: 138 MMOL/L (ref 136–145)

## 2018-02-16 PROCEDURE — 80048 BASIC METABOLIC PNL TOTAL CA: CPT | Performed by: NURSE PRACTITIONER

## 2018-02-16 PROCEDURE — 93000 ELECTROCARDIOGRAM COMPLETE: CPT | Performed by: NURSE PRACTITIONER

## 2018-02-16 PROCEDURE — 99214 OFFICE O/P EST MOD 30 MIN: CPT | Performed by: NURSE PRACTITIONER

## 2018-02-16 RX ORDER — VALSARTAN 160 MG/1
160 TABLET ORAL 2 TIMES DAILY
Qty: 60 TABLET | Refills: 0 | Status: SHIPPED | OUTPATIENT
Start: 2018-02-16 | End: 2018-03-16 | Stop reason: SDUPTHER

## 2018-02-16 NOTE — TELEPHONE ENCOUNTER
02/16/18  11:06 AM  Meghann agrees to see patient today at 1pm. I called and scheduled patient - tmm.

## 2018-02-16 NOTE — PATIENT INSTRUCTIONS
Check BMP today at the hospital & I will call you with results   Increase valsartan to 160 mg 1 tablet twice daily     Refer for sleep apnea evaluation & they will call you for appointment    Follow up in our office with Dr. Haque  Same day you will need a repeat BMP to check kidney and potassium   Bring readings and machine with you

## 2018-02-16 NOTE — TELEPHONE ENCOUNTER
02/16/18  9:26 AM  Joaquin Peña  1950    Home Phone [phone]   Work Phone [phone]   Mobile [phone]     Mr. Peña states his BP has been elevated for the past two weeks. The past few days, he states he has been awakened by high BP. Wednesday night it was 185/105. Last night, it was 205/110, HR low 50s. He called the on call physician who advised him to take an extra 120mg valsartan and to call this morning for possible appt with NP today. He denies chest pain, headache, or vision changes. He denies increased caffeine or sodium intake.    Would you be able to see him today? This is a patient of Dr. Haque.    Cynthia GREGG RN

## 2018-02-16 NOTE — PROGRESS NOTES
Date of Office Visit: 2018  Encounter Provider: QIAN Olivera  Place of Service: [facility] CARDIOLOGY  Patient Name: Joaquin Peña  :1950    Chief Complaint   Patient presents with   • Hypertension   :     HPI: Joaquin Peña is a 67 y.o. male who presents today for evaluation of hypertension. He is a new patient to me and his previous records have been requested and reviewed. He has a past medical history of Graves disease, back issues, hypertension, hyperlipidemia, and PVCs. He had a stress echocardiogram in 2014 which showed normal ejection fraction of 59%, grade 1 diastolic dysfunction, mild left ventricular hypertrophy, no significant valvular heart disease and no ischemia. In 2015 he was noted to have mild bradycardia with heart rates in the 30s. A followup Holter revealed frequent PVCs and brief bursts of atrial tachycardia. His metoprolol was increased.     He was last seen by Dr. Muna Haque in 2017. At that time his blood pressures were averaging 130 systolic and was 108/72 in the office. She recommended decreasing his Diovan to 40 mg a day.    He presents today with a chief concern of elevated blood pressure readings. He is actually taking Diovan 120 mg twice daily. He has noticed since the beginning of 2018 that he is waking up in the middle of the night with paroxysmal nocturnal dyspnea. He then checks his blood pressures which have been elevated in the 170s to 200 systolic. He is unsure if he snores at nighttime. The other possible contributing factors of elevated blood pressures are he has had left knee pain and plantar fascitis pain. He has gained about 5 pounds but he does feel that he avoids sodium rich foods. He says he is active when he is working as an  but on his days off he is not. He does experience dyspnea upon exertion which is unchanged. He denies chest pain, orthopnea, cough, edema, palpitations, dizziness, or  syncope.     The following portion of the patient's history were reviewed and updated as appropriate: past medical history, past surgical history, past social history, past family history, allergies, current medications, and problem list.    Past Medical History:   Diagnosis Date   • Acquired spondylolisthesis    • Acute low back pain    • Acute right lumbar radiculopathy    • Bulging lumbar disc    • Concussion with no loss of consciousness    • DDD (degenerative disc disease)    • Fatigue    • Graves disease    • H/O disorder of nervous system and sense organs    • Headache    • Hyperlipidemia    • Hypertension    • Hypertensive heart disease    • Hypothyroidism    • Insomnia    • Irregular heart beat    • Lumbar radiculopathy    • Nephrolithiasis    • PVC (premature ventricular contraction)    • Severe head trauma    • SOB (shortness of breath)    • Spondylolisthesis    • Subdural hematoma    • Supraventricular tachycardia        Past Surgical History:   Procedure Laterality Date   • KNEE SURGERY      2008 and 2009   • LITHOTRIPSY     • OTHER SURGICAL HISTORY      ear surgery       Social History     Social History   • Marital status:      Spouse name: N/A   • Number of children: N/A   • Years of education: N/A     Occupational History   • Not on file.     Social History Main Topics   • Smoking status: Former Smoker   • Smokeless tobacco: Not on file   • Alcohol use Yes      Comment: social use   • Drug use: No   • Sexual activity: Not on file     Other Topics Concern   • Not on file     Social History Narrative       Family History   Problem Relation Age of Onset   • Cancer Father      malignant neoplasm   • No Known Problems Mother    • No Known Problems Sister    • No Known Problems Brother    • No Known Problems Sister    • No Known Problems Brother    • No Known Problems Brother        Review of Systems   Constitution: Negative for chills, diaphoresis, fever, malaise/fatigue, night sweats, weight gain  and weight loss.   HENT: Negative for hearing loss, nosebleeds, sore throat and tinnitus.    Eyes: Negative for blurred vision, double vision, pain and visual disturbance.   Cardiovascular: Positive for dyspnea on exertion and paroxysmal nocturnal dyspnea. Negative for chest pain, claudication, cyanosis, irregular heartbeat, leg swelling, near-syncope, orthopnea, palpitations and syncope.   Respiratory: Negative for cough, hemoptysis, shortness of breath, snoring and wheezing.    Endocrine: Negative for cold intolerance, heat intolerance and polyuria.   Hematologic/Lymphatic: Negative for bleeding problem. Does not bruise/bleed easily.   Skin: Negative for color change, dry skin, flushing and itching.   Musculoskeletal: Positive for joint pain. Negative for falls, joint swelling, muscle cramps, muscle weakness and myalgias.   Gastrointestinal: Negative for abdominal pain, constipation, heartburn, melena, nausea and vomiting.   Genitourinary: Negative for dysuria and hematuria.   Neurological: Positive for excessive daytime sleepiness. Negative for dizziness, light-headedness, loss of balance, numbness, paresthesias, seizures and vertigo.   Psychiatric/Behavioral: Negative for altered mental status, depression, memory loss and substance abuse. The patient does not have insomnia and is not nervous/anxious.    Allergic/Immunologic: Negative for environmental allergies.       Allergies   Allergen Reactions   • Latex Rash         Current Outpatient Prescriptions:   •  atorvastatin (LIPITOR) 10 MG tablet, TAKE ONE TABLET BY MOUTH DAILY, Disp: 90 tablet, Rfl: 0  •  Cyanocobalamin (B-12 PO), Take  by mouth., Disp: , Rfl:   •  ergocalciferol (DRISDOL) 33428 units capsule, Take 1 capsule by mouth 1 (One) Time Per Week., Disp: 13 capsule, Rfl: 3  •  ketotifen (ZADITOR) 0.025 % ophthalmic solution, Apply  to eye As Needed., Disp: , Rfl:   •  metoprolol tartrate (LOPRESSOR) 50 MG tablet, Take 1 tablet by mouth 2 (Two) Times a  "Day., Disp: 60 tablet, Rfl: 11  •  naproxen (NAPROSYN) 500 MG tablet, TAKE 1 TABLET BY MOUTH TWO TIMES A DAY  (Patient taking differently: TAKE 1 TABLET BY MOUTH TWO TIMES A DAY PRN), Disp: 60 tablet, Rfl: 1  •  SYNTHROID 125 MCG tablet, Take 1 tablet by mouth Daily., Disp: 30 tablet, Rfl: 3  •  vardenafil (LEVITRA) 10 MG tablet, Take 10 mg by mouth As Needed for erectile dysfunction., Disp: , Rfl:   •  valsartan (DIOVAN) 160 MG tablet, Take 1 tablet by mouth 2 (Two) Times a Day., Disp: 60 tablet, Rfl: 0     Objective:     Vitals:    02/16/18 1300   BP: 142/86   BP Location: Right arm   Patient Position: Sitting   Pulse: 52   Weight: 124 kg (273 lb)   Height: 193 cm (76\")     Body mass index is 33.23 kg/(m^2).    PHYSICAL EXAM:    Vitals Reviewed.   General Appearance: No acute distress, well developed and well nourished. These.  Eyes: Conjunctiva and lids: No erythema, swelling, or discharge. Sclera non-icteric.   HENT: Atraumatic, normocephalic. External eyes, ears, and nose normal. No hearing loss noted. Mucous membranes normal. Lips not cyanotic. Neck supple with no tenderness.  Respiratory: No signs of respiratory distress. Respiration rhythm and depth normal.   Clear to auscultation. No rales, crackles, rhonchi, or wheezing auscultated.   Cardiovascular:  Jugular Venous Pressure: Normal  Heart Rate and Rhythm: Normal, Heart Sounds: Normal S1 and S2. No S3 or S4 noted.  Murmurs: No murmurs noted. No rubs, thrills, or gallops.   Arterial Pulses: Carotid pulses normal. No carotid bruit noted. Posterior tibialis and dorsalis pedis pulses normal.   Lower Extremities: No edema noted.  Gastrointestinal:  Abdomen soft, obese, distended, non-tender. Normal bowel sounds. No hepatomegaly.   Musculoskeletal: Normal movement of extremities  Skin and Nails: General appearance normal. No pallor, cyanosis, diaphoresis. Skin temperature normal. No clubbing of fingernails.   Psychiatric: Patient alert and oriented to person, " place, and time. Speech and behavior appropriate. Normal mood and affect.       ECG 12 Lead  Date/Time: 2/16/2018 1:01 PM  Performed by: MOSES LOMBARDI  Authorized by: MOSES LOMBARDI   Comparison: compared with previous ECG from 6/9/2017  Similar to previous ECG  Comparison to previous ECG: Normal sinus rhythm with ventricular bigeminy, IVCD  Rhythm: sinus bradycardia  Rate: bradycardic  BPM: 52  Conduction: non-specific intraventricular conduction delay  ST Segments: ST segments normal  T Waves: T waves normal  Other: no other findings  Clinical impression: abnormal ECG              Assessment:       Diagnosis Plan   1. Essential hypertension  Ambulatory Referral to Sleep Medicine    Basic Metabolic Panel   2. PND (paroxysmal nocturnal dyspnea)  Ambulatory Referral to Sleep Medicine    Basic Metabolic Panel   3. Suspected sleep apnea  Ambulatory Referral to Sleep Medicine    Basic Metabolic Panel   4. PVC (premature ventricular contraction)     5. Supraventricular tachycardia     6. Abdominal obesity            Plan:       1. Essential hypertension: The patient presents today with a chief concern of elevated blood pressure readings, mostly at nighttime, which is waking him up. He is very anxious about his blood pressure and checks it multiple times per day. I did explain to him that this could contribute to higher blood pressure readings. His blood pressure is well controlled today. I have asked him to bring his blood pressure machine on his next followup visit so we can double check it for accuracy. I have recommended increasing his Diovan to 160 mg twice daily. I will arrange for a basic metabolic panel to be completed today to assess his kidney function and potassium level.     2. Suspected sleep apnea: I am concerned that he is experiencing paroxysmal nocturnal dyspnea that is waking him up at nighttime and then he is checking his blood pressure and it is high. I am suspicious that he has sleep apnea and I  have recommended a referral to Sleep Medicine. They will contact him for an appointment.    3. PVCs: He has a history of previous PVCs which are not present on today’s EKG and he denies palpitations. Will continue with his current dose of beta blocker. PVCs can also be related to untreated sleep apnea.    4. SVT: No further episodes of supraventricular tachycardia.     5. Abdominal obesity: I do feel that his abdominal obesity could be contributing to shortness of breath and elevated blood pressure. I have recommended a regular walking program, weight loss, and a low-sodium diet.     6. Followup: He already has a scheduled followup appointment with Dr. Haque at the end of February and he will keep that appointment. I have recommended that he have his blood pressure machine double checked for accuracy during that visit and have a repeat basic metabolic panel with the increase of his ARB therapy. I have asked him to call with any concerns in the interim.    As always, it has been a pleasure to participate in your patient's care.      Sincerely,         QIAN Arevalo

## 2018-02-27 ENCOUNTER — OFFICE VISIT (OUTPATIENT)
Dept: CARDIOLOGY | Facility: CLINIC | Age: 68
End: 2018-02-27

## 2018-02-27 VITALS
HEIGHT: 76 IN | WEIGHT: 271 LBS | SYSTOLIC BLOOD PRESSURE: 136 MMHG | DIASTOLIC BLOOD PRESSURE: 74 MMHG | BODY MASS INDEX: 33 KG/M2 | HEART RATE: 44 BPM

## 2018-02-27 DIAGNOSIS — I10 ESSENTIAL HYPERTENSION: ICD-10-CM

## 2018-02-27 DIAGNOSIS — I49.9 IRREGULAR HEART BEAT: ICD-10-CM

## 2018-02-27 DIAGNOSIS — M79.605 PAIN IN BOTH LOWER EXTREMITIES: ICD-10-CM

## 2018-02-27 DIAGNOSIS — I47.1 SUPRAVENTRICULAR TACHYCARDIA (HCC): ICD-10-CM

## 2018-02-27 DIAGNOSIS — M79.671 RIGHT FOOT PAIN: Primary | ICD-10-CM

## 2018-02-27 DIAGNOSIS — M79.604 PAIN IN BOTH LOWER EXTREMITIES: ICD-10-CM

## 2018-02-27 DIAGNOSIS — I49.3 PVC (PREMATURE VENTRICULAR CONTRACTION): ICD-10-CM

## 2018-02-27 PROCEDURE — 99214 OFFICE O/P EST MOD 30 MIN: CPT | Performed by: INTERNAL MEDICINE

## 2018-02-27 PROCEDURE — 93000 ELECTROCARDIOGRAM COMPLETE: CPT | Performed by: INTERNAL MEDICINE

## 2018-02-27 NOTE — PROGRESS NOTES
Date of Office Visit: 2018  Encounter Provider: Muna Haque MD  Place of Service: [facility] CARDIOLOGY  Patient Name: Joaquin Peña  :1950    Chief complaint  Followup of SVT and evaluation of foot pain    History of Present Illness  Patient is a 67-year-old gentleman with Graves' disease, dyslipidemia, renal calculi who in  developed brief episodes of supraventricular tachycardia in the setting of hypertension.  This was treated medically.  In 2014 had a stress echocardiogram that showed normal left ventricular size and function with ejection fraction 59% grade 1 diastolic dysfunction mild left ventricular hypertrophy with no significant valvular heart disease and no ischemia in May 2015 he had mild bradycardia in the 30s.  A follow-up Holter revealed frequent PVCs and brief bursts of atrial tachycardia for which metoprolol was increased and he has not had any recurrent bradycardia arrhythmia.     Since last visit he is on a low-salt diet he is walking and bicycling 20 minutes 3 times a week.  He believes had a negative sleep study 5 years ago.  His weight has increased 9 pounds.  He is not able to do as much exercise due to foot pain and knee pain.  He denies any chest pain, shortness of breath, palpitations, syncope near syncope.  He brings with her multiple blood pressure readings all of which are most of which are elevated however his device is not functioning well and is at least 20 points off of the manual device    Past Medical History:   Diagnosis Date   • Abdominal obesity    • Acquired spondylolisthesis    • Acute low back pain    • Acute right lumbar radiculopathy    • Bulging lumbar disc    • Concussion with no loss of consciousness    • DDD (degenerative disc disease)    • Essential hypertension    • Fatigue    • Graves disease    • H/O disorder of nervous system and sense organs    • Headache    • Hyperlipidemia    • Hypertension    •  Hypertensive heart disease    • Hypothyroidism    • Insomnia    • Irregular heart beat    • Joint pain    • Lumbar radiculopathy    • Nephrolithiasis    • Paroxysmal nocturnal dyspnea    • PVC (premature ventricular contraction)    • Severe head trauma    • SOB (shortness of breath)    • Soemmering's ring    • Spondylolisthesis    • Subdural hematoma    • Supraventricular tachycardia      Past Surgical History:   Procedure Laterality Date   • KNEE SURGERY      2008 and 2009   • LITHOTRIPSY     • OTHER SURGICAL HISTORY      ear surgery     Outpatient Medications Prior to Visit   Medication Sig Dispense Refill   • ergocalciferol (DRISDOL) 38601 units capsule Take 1 capsule by mouth 1 (One) Time Per Week. 13 capsule 3   • ketotifen (ZADITOR) 0.025 % ophthalmic solution Apply  to eye As Needed.     • metoprolol tartrate (LOPRESSOR) 50 MG tablet Take 1 tablet by mouth 2 (Two) Times a Day. 60 tablet 11   • naproxen (NAPROSYN) 500 MG tablet TAKE 1 TABLET BY MOUTH TWO TIMES A DAY  (Patient taking differently: TAKE 1 TABLET BY MOUTH TWO TIMES A DAY PRN) 60 tablet 1   • SYNTHROID 125 MCG tablet Take 1 tablet by mouth Daily. 30 tablet 3   • valsartan (DIOVAN) 160 MG tablet Take 1 tablet by mouth 2 (Two) Times a Day. 60 tablet 0   • vardenafil (LEVITRA) 10 MG tablet Take 10 mg by mouth As Needed for erectile dysfunction.     • atorvastatin (LIPITOR) 10 MG tablet TAKE ONE TABLET BY MOUTH DAILY 90 tablet 0   • Cyanocobalamin (B-12 PO) Take  by mouth.       No facility-administered medications prior to visit.      Allergies as of 02/27/2018 - William as Reviewed 02/27/2018   Allergen Reaction Noted   • Latex Rash 03/15/2016     Social History     Social History   • Marital status:      Spouse name: N/A   • Number of children: N/A   • Years of education: N/A     Occupational History   • Not on file.     Social History Main Topics   • Smoking status: Former Smoker   • Smokeless tobacco: Not on file   • Alcohol use Yes       "Comment: social use   • Drug use: No   • Sexual activity: Not on file     Other Topics Concern   • Not on file     Social History Narrative     Family History   Problem Relation Age of Onset   • Cancer Father      malignant neoplasm   • No Known Problems Mother    • No Known Problems Sister    • No Known Problems Brother    • No Known Problems Sister    • No Known Problems Brother    • No Known Problems Brother      Review of Systems   Constitution: Negative for fever, malaise/fatigue, weight gain and weight loss.   HENT: Negative for ear pain, hearing loss, nosebleeds and sore throat.    Eyes: Negative for double vision, pain, vision loss in left eye and vision loss in right eye.   Cardiovascular:        See history of present illness.   Respiratory: Negative for cough, shortness of breath, sleep disturbances due to breathing, snoring and wheezing.    Endocrine: Negative for cold intolerance, heat intolerance and polyuria.   Skin: Negative for itching, poor wound healing and rash.   Musculoskeletal: Positive for joint pain. Negative for joint swelling and myalgias.   Gastrointestinal: Negative for abdominal pain, diarrhea, hematochezia, nausea and vomiting.   Genitourinary: Negative for hematuria and hesitancy.   Neurological: Negative for numbness, paresthesias and seizures.   Psychiatric/Behavioral: Negative for depression. The patient is nervous/anxious.      Objective:     Vitals:    02/27/18 1038   BP: 136/74   Pulse: (!) 44   Weight: 123 kg (271 lb)   Height: 193 cm (76\")     Body mass index is 32.99 kg/(m^2).    Physical Exam   Constitutional: He is oriented to person, place, and time. He appears well-developed and well-nourished.   Obese   HENT:   Head: Normocephalic.   Nose: Nose normal.   Mouth/Throat: Oropharynx is clear and moist.   Eyes: Conjunctivae and EOM are normal. Pupils are equal, round, and reactive to light. Right eye exhibits no discharge. No scleral icterus.   Neck: Normal range of motion. " Neck supple. No JVD present. No thyromegaly present.   Cardiovascular: Regular rhythm, normal heart sounds and intact distal pulses.  Bradycardia present.  Exam reveals no gallop and no friction rub.    No murmur heard.  Pulses:       Carotid pulses are 2+ on the right side, and 2+ on the left side.       Radial pulses are 2+ on the right side, and 2+ on the left side.        Femoral pulses are 2+ on the right side, and 2+ on the left side.       Popliteal pulses are 2+ on the right side, and 2+ on the left side.        Dorsalis pedis pulses are 2+ on the right side, and 2+ on the left side.        Posterior tibial pulses are 2+ on the right side, and 2+ on the left side.   Pulmonary/Chest: Effort normal and breath sounds normal. No respiratory distress. He has no wheezes. He has no rales.   Abdominal: Soft. Bowel sounds are normal. He exhibits no distension. There is no hepatosplenomegaly. There is no tenderness. There is no rebound.   Musculoskeletal: Normal range of motion. He exhibits no edema or tenderness.   Neurological: He is alert and oriented to person, place, and time.   Skin: Skin is warm and dry. No rash noted. No erythema.   Psychiatric: He has a normal mood and affect. His behavior is normal. Judgment and thought content normal.   Vitals reviewed.    Lab Review:     ECG 12 Lead  Date/Time: 2/27/2018 11:06 AM  Performed by: JOHNY HART  Authorized by: JOHNY HART   Comparison: compared with previous ECG   Comparison to previous ECG: PVC's resolved  Rhythm: sinus bradycardia  Conduction: 1st degree and non-specific intraventricular conduction delay  Clinical impression: abnormal ECG          Assessment:       Diagnosis Plan   1. Right foot pain  ECG 12 Lead    Doppler Arterial Multi Level Lower Extremity - Bilateral CAR   2. Pain in both lower extremities  ECG 12 Lead    Doppler Arterial Multi Level Lower Extremity - Bilateral CAR   3. Supraventricular tachycardia     4. Irregular heart beat     5.  Essential hypertension     6. PVC (premature ventricular contraction)       Plan:       1.  Frequent PVC's.  Improved.  1.  Paroxysmal supraventricular tachycardia.  Told with no known recurrence  2.  Hypertension.  We'll get a new device and start checking his blood pressure more consistently  3.  Dyslipidemia.  4.  Graves' disease.  Managed by Dr. Escobar   5.  Abnormal arterial Doppler study.  He brings with him and asked for screening that shows his lower extremity atrial Doppler study is abnormal for pain.  We'll this in mind we'll check a full arterial Doppler study.  6. carotid artery plaque.  We'll need aggressive lifestyle and risk factor modification         Joaquin Peña   Home Medication Instructions MINDI:    Printed on:02/28/18 4216   Medication Information                      atorvastatin (LIPITOR) 10 MG tablet  Take one tablet by mouth daily             ergocalciferol (DRISDOL) 07417 units capsule  Take 1 capsule by mouth 1 (One) Time Per Week.             ketotifen (ZADITOR) 0.025 % ophthalmic solution  Apply  to eye As Needed.             metoprolol tartrate (LOPRESSOR) 50 MG tablet  Take 1 tablet by mouth 2 (Two) Times a Day.             naproxen (NAPROSYN) 500 MG tablet  TAKE 1 TABLET BY MOUTH TWO TIMES A DAY              SYNTHROID 125 MCG tablet  Take 1 tablet by mouth Daily.             valsartan (DIOVAN) 160 MG tablet  Take 1 tablet by mouth 2 (Two) Times a Day.             vardenafil (LEVITRA) 10 MG tablet  Take 10 mg by mouth As Needed for erectile dysfunction.                 Dictated utilizing Dragon dictation

## 2018-02-28 ENCOUNTER — HOSPITAL ENCOUNTER (OUTPATIENT)
Dept: CARDIOLOGY | Facility: HOSPITAL | Age: 68
Discharge: HOME | End: 2018-02-28
Attending: INTERNAL MEDICINE | Admitting: INTERNAL MEDICINE

## 2018-02-28 DIAGNOSIS — M79.605 PAIN IN BOTH LOWER EXTREMITIES: ICD-10-CM

## 2018-02-28 DIAGNOSIS — M79.604 PAIN IN BOTH LOWER EXTREMITIES: ICD-10-CM

## 2018-02-28 DIAGNOSIS — M79.671 RIGHT FOOT PAIN: ICD-10-CM

## 2018-02-28 PROCEDURE — 93923 UPR/LXTR ART STDY 3+ LVLS: CPT | Performed by: INTERNAL MEDICINE

## 2018-02-28 PROCEDURE — 93923 UPR/LXTR ART STDY 3+ LVLS: CPT

## 2018-02-28 RX ORDER — ATORVASTATIN CALCIUM 10 MG/1
TABLET, FILM COATED ORAL
Qty: 90 TABLET | Refills: 0 | Status: SHIPPED | OUTPATIENT
Start: 2018-02-28 | End: 2018-08-28 | Stop reason: SDUPTHER

## 2018-03-01 LAB
BH CV LOWER ARTERIAL LEFT ABI RATIO: 1.18
BH CV LOWER ARTERIAL LEFT HIGH THIGH SYS MAX: 186 MMHG
BH CV LOWER ARTERIAL LEFT POPLITEAL SYS MAX: 172 MMHG
BH CV LOWER ARTERIAL LEFT POST TIBIAL SYS MAX: 184 MMHG
BH CV LOWER ARTERIAL RIGHT ABI RATIO: 1.06
BH CV LOWER ARTERIAL RIGHT HIGH THIGH SYS MAX: 175 MMHG
BH CV LOWER ARTERIAL RIGHT POPLITEAL SYS MAX: 166 MMHG
BH CV LOWER ARTERIAL RIGHT POST TIBIAL SYS MAX: 165 MMHG
UPPER ARTERIAL LEFT ARM BRACHIAL SYS MAX: 150 MMHG
UPPER ARTERIAL RIGHT ARM BRACHIAL SYS MAX: 156 MMHG

## 2018-03-02 ENCOUNTER — TELEPHONE (OUTPATIENT)
Dept: CARDIOLOGY | Facility: CLINIC | Age: 68
End: 2018-03-02

## 2018-03-06 ENCOUNTER — OFFICE VISIT (OUTPATIENT)
Dept: SLEEP MEDICINE | Facility: HOSPITAL | Age: 68
End: 2018-03-06
Attending: INTERNAL MEDICINE

## 2018-03-06 VITALS
HEIGHT: 76 IN | SYSTOLIC BLOOD PRESSURE: 131 MMHG | OXYGEN SATURATION: 95 % | BODY MASS INDEX: 33.31 KG/M2 | DIASTOLIC BLOOD PRESSURE: 75 MMHG | WEIGHT: 273.5 LBS | HEART RATE: 49 BPM

## 2018-03-06 DIAGNOSIS — R29.818 SUSPECTED SLEEP APNEA: ICD-10-CM

## 2018-03-06 DIAGNOSIS — I10 ESSENTIAL HYPERTENSION: ICD-10-CM

## 2018-03-06 DIAGNOSIS — R06.83 SNORING: ICD-10-CM

## 2018-03-06 DIAGNOSIS — R06.00 PND (PAROXYSMAL NOCTURNAL DYSPNEA): ICD-10-CM

## 2018-03-06 DIAGNOSIS — G47.33 OSA (OBSTRUCTIVE SLEEP APNEA): Primary | ICD-10-CM

## 2018-03-06 PROCEDURE — G0463 HOSPITAL OUTPT CLINIC VISIT: HCPCS

## 2018-03-06 NOTE — PROGRESS NOTES
[facility] SLEEP MEDICINE      Joaquin Peña  67 y.o.  male  1950    PCP:Erwin Escobar MD  Referring physician: Muna Haque MD      Type of service: Initial consult.    Chief complaint: Snoring,      History of present illness;  This is a 67 y.o. male being referred for evaluation of sleep apnea.  The patient reports symptoms of snoring, daytime excessive sleepiness and fatigue.  In addition patient also gives a history of waking up choking and gasping for breath.  Normally goes to bed around 8p.m. and wakes up around 6 a.m, still feels not rested well and tired.       Past Medical History:   Diagnosis Date   • Abdominal obesity    • Acquired spondylolisthesis    • Acute low back pain    • Acute right lumbar radiculopathy    • Bulging lumbar disc    • Concussion with no loss of consciousness    • DDD (degenerative disc disease)    • Essential hypertension    • Fatigue    • Graves disease    • H/O disorder of nervous system and sense organs    • Headache    • Hyperlipidemia    • Hypertension    • Hypertensive heart disease    • Hypothyroidism    • Insomnia    • Irregular heart beat    • Joint pain    • Lumbar radiculopathy    • Nephrolithiasis    • Paroxysmal nocturnal dyspnea    • PVC (premature ventricular contraction)    • Severe head trauma    • SOB (shortness of breath)    • Soemmering's ring    • Spondylolisthesis    • Subdural hematoma    • Supraventricular tachycardia        Medications:    Current Outpatient Prescriptions:   •  atorvastatin (LIPITOR) 10 MG tablet, Take one tablet by mouth daily, Disp: 90 tablet, Rfl: 0  •  ergocalciferol (DRISDOL) 79501 units capsule, Take 1 capsule by mouth 1 (One) Time Per Week., Disp: 13 capsule, Rfl: 3  •  ketotifen (ZADITOR) 0.025 % ophthalmic solution, Apply  to eye As Needed., Disp: , Rfl:   •  metoprolol tartrate (LOPRESSOR) 50 MG tablet, Take 1 tablet by mouth 2 (Two) Times a Day., Disp: 60 tablet, Rfl: 11  •  naproxen (NAPROSYN) 500 MG  "tablet, TAKE 1 TABLET BY MOUTH TWO TIMES A DAY  (Patient taking differently: TAKE 1 TABLET BY MOUTH TWO TIMES A DAY PRN), Disp: 60 tablet, Rfl: 1  •  SYNTHROID 125 MCG tablet, Take 1 tablet by mouth Daily., Disp: 30 tablet, Rfl: 3  •  valsartan (DIOVAN) 160 MG tablet, Take 1 tablet by mouth 2 (Two) Times a Day., Disp: 60 tablet, Rfl: 0  •  vardenafil (LEVITRA) 10 MG tablet, Take 10 mg by mouth As Needed for erectile dysfunction., Disp: , Rfl:     Social history:  Shift work: no  Tobacco use: no  Alcohol use: 2/week  Caffeinated drinks: 1/2 cup  Over-the-counter sleeping aid: melatonin  Narcotic medications: no    Review of systems:  [place] Sleepiness Scale: Total score: 1   Positive for snoring, witnessed apneas, fatigue and daytime excessive sleepiness,   Negative for shortness of breath, cough, wheezing, chest pain, nausea and vomiting, Swelling of feet  Morning symptoms  Dry mouth no  Moring headaches no  Nasal Congestion no  Leg movements no  Nocturia (how many times/night) no  Memory Problems no    Physical exam:  Vitals:    03/06/18 1417   BP: 131/75   Pulse: (!) 49   SpO2: 95%   Weight: 124 kg (273 lb 8 oz)   Height: 193 cm (76\")    Body mass index is 33.29 kg/(m^2). Neck Circumference: 17.5 inches  HEENT: Head is atraumatic, pupils are round equal and reacting to light,  no nasal septal defects or deviation and the nasal passages are clear, tonsils are not enlarged, oral airway Mallampati class III  NECK: No lymphadenopathy, trachea is in the midline  RESPIRATORY SYSTEM: Breath sounds are equal on both sides, there are no wheezes or crackles  CARDIOVASULAR SYSTEM: Heart sounds are regular and normal, there are no murmurs or thrills  ABDOMEN: Soft, no hepatosplenomegaly, no evidence of ascites  EXTREMITES: No cyanosis, clubbing or edema   NEUROLOGICAL SYSTEM: Oriented x 3, no gross neurological defects    Assessment and plan:  · Obstructive sleep apnea:  I strongly suspect the patient has sleep apnea as " suggested by the symptoms and physical examination.  I have talked to the patient about the signs and symptoms of sleep apnea and consequences of untreated sleep apnea.  I'm going to order a home sleep test.  Will have a follow-up after this sleep test is done  · Obesity: I have discussed the relationship between weight and sleep apnea. Weight reduction is encouraged.  · HTN      Adalid Marte MD, FCCP  Pulmonary, Critical Care and sleep Medicine

## 2018-03-16 ENCOUNTER — APPOINTMENT (OUTPATIENT)
Dept: SLEEP MEDICINE | Facility: HOSPITAL | Age: 68
End: 2018-03-16
Attending: INTERNAL MEDICINE

## 2018-03-16 ENCOUNTER — PATIENT MESSAGE (OUTPATIENT)
Dept: CARDIOLOGY | Facility: CLINIC | Age: 68
End: 2018-03-16

## 2018-03-16 RX ORDER — VALSARTAN 160 MG/1
160 TABLET ORAL 2 TIMES DAILY
Qty: 60 TABLET | Refills: 6 | Status: SHIPPED | OUTPATIENT
Start: 2018-03-16 | End: 2018-03-16 | Stop reason: SDUPTHER

## 2018-03-16 RX ORDER — VALSARTAN 160 MG/1
160 TABLET ORAL 2 TIMES DAILY
Qty: 60 TABLET | Refills: 6 | Status: SHIPPED | OUTPATIENT
Start: 2018-03-16 | End: 2018-07-16

## 2018-03-20 ENCOUNTER — HOSPITAL ENCOUNTER (OUTPATIENT)
Dept: SLEEP MEDICINE | Facility: HOSPITAL | Age: 68
Discharge: HOME | End: 2018-03-20
Attending: INTERNAL MEDICINE | Admitting: INTERNAL MEDICINE

## 2018-03-20 DIAGNOSIS — G47.33 OSA (OBSTRUCTIVE SLEEP APNEA): ICD-10-CM

## 2018-03-20 DIAGNOSIS — R06.83 SNORING: ICD-10-CM

## 2018-03-20 PROCEDURE — 95806 SLEEP STUDY UNATT&RESP EFFT: CPT

## 2018-03-29 ENCOUNTER — TELEPHONE (OUTPATIENT)
Dept: SLEEP MEDICINE | Facility: HOSPITAL | Age: 68
End: 2018-03-29

## 2018-04-17 ENCOUNTER — CLINICAL SUPPORT (OUTPATIENT)
Dept: FAMILY MEDICINE CLINIC | Facility: CLINIC | Age: 68
End: 2018-04-17

## 2018-04-17 PROCEDURE — 90471 IMMUNIZATION ADMIN: CPT | Performed by: INTERNAL MEDICINE

## 2018-04-17 PROCEDURE — 90632 HEPA VACCINE ADULT IM: CPT | Performed by: INTERNAL MEDICINE

## 2018-04-18 ENCOUNTER — RESULTS ENCOUNTER (OUTPATIENT)
Dept: ENDOCRINOLOGY | Age: 68
End: 2018-04-18

## 2018-04-18 DIAGNOSIS — E89.0 POSTABLATIVE HYPOTHYROIDISM: ICD-10-CM

## 2018-04-18 DIAGNOSIS — N52.9 ERECTILE DYSFUNCTION, UNSPECIFIED ERECTILE DYSFUNCTION TYPE: ICD-10-CM

## 2018-04-18 DIAGNOSIS — E88.81 INSULIN RESISTANCE: ICD-10-CM

## 2018-04-18 DIAGNOSIS — E78.2 MIXED HYPERLIPIDEMIA: ICD-10-CM

## 2018-04-18 DIAGNOSIS — R73.9 HYPERGLYCEMIA: ICD-10-CM

## 2018-04-18 DIAGNOSIS — I10 ESSENTIAL HYPERTENSION: ICD-10-CM

## 2018-04-18 DIAGNOSIS — E05.00 GRAVES DISEASE: ICD-10-CM

## 2018-04-19 LAB
25(OH)D3+25(OH)D2 SERPL-MCNC: 45.7 NG/ML (ref 30–100)
ALBUMIN SERPL-MCNC: 4 G/DL (ref 3.5–5.2)
ALBUMIN/GLOB SERPL: 1.4 G/DL
ALP SERPL-CCNC: 59 U/L (ref 39–117)
ALT SERPL-CCNC: 26 U/L (ref 1–41)
AST SERPL-CCNC: 21 U/L (ref 1–40)
BILIRUB SERPL-MCNC: 0.6 MG/DL (ref 0.1–1.2)
BUN SERPL-MCNC: 20 MG/DL (ref 8–23)
BUN/CREAT SERPL: 17.5 (ref 7–25)
C PEPTIDE SERPL-MCNC: 4.4 NG/ML (ref 1.1–4.4)
CALCIUM SERPL-MCNC: 10.1 MG/DL (ref 8.6–10.5)
CHLORIDE SERPL-SCNC: 102 MMOL/L (ref 98–107)
CHOLEST SERPL-MCNC: 124 MG/DL (ref 0–200)
CO2 SERPL-SCNC: 26.3 MMOL/L (ref 22–29)
CREAT SERPL-MCNC: 1.14 MG/DL (ref 0.76–1.27)
GFR SERPLBLD CREATININE-BSD FMLA CKD-EPI: 64 ML/MIN/1.73
GFR SERPLBLD CREATININE-BSD FMLA CKD-EPI: 78 ML/MIN/1.73
GLOBULIN SER CALC-MCNC: 2.9 GM/DL
GLUCOSE SERPL-MCNC: 92 MG/DL (ref 65–99)
HBA1C MFR BLD: 5.9 % (ref 4.8–5.6)
HDLC SERPL-MCNC: 30 MG/DL (ref 40–60)
INTERPRETATION: NORMAL
LDLC SERPL CALC-MCNC: 62 MG/DL (ref 0–100)
Lab: NORMAL
POTASSIUM SERPL-SCNC: 5.2 MMOL/L (ref 3.5–5.2)
PROT SERPL-MCNC: 6.9 G/DL (ref 6–8.5)
SODIUM SERPL-SCNC: 140 MMOL/L (ref 136–145)
T3FREE SERPL-MCNC: 2.9 PG/ML (ref 2–4.4)
T4 FREE SERPL-MCNC: 1.27 NG/DL (ref 0.93–1.7)
T4 SERPL-MCNC: 7.29 MCG/DL (ref 4.5–11.7)
TRIGL SERPL-MCNC: 158 MG/DL (ref 0–150)
TSH SERPL DL<=0.005 MIU/L-ACNC: 3.14 MIU/ML (ref 0.27–4.2)
URATE SERPL-MCNC: 5.9 MG/DL (ref 3.4–7)
VLDLC SERPL CALC-MCNC: 31.6 MG/DL (ref 5–40)

## 2018-04-27 ENCOUNTER — OFFICE VISIT (OUTPATIENT)
Dept: ENDOCRINOLOGY | Age: 68
End: 2018-04-27

## 2018-04-27 VITALS
DIASTOLIC BLOOD PRESSURE: 78 MMHG | HEIGHT: 76 IN | BODY MASS INDEX: 31.78 KG/M2 | WEIGHT: 261 LBS | SYSTOLIC BLOOD PRESSURE: 118 MMHG

## 2018-04-27 DIAGNOSIS — E89.0 POSTABLATIVE HYPOTHYROIDISM: Primary | ICD-10-CM

## 2018-04-27 DIAGNOSIS — I10 ESSENTIAL HYPERTENSION: ICD-10-CM

## 2018-04-27 DIAGNOSIS — E88.81 INSULIN RESISTANCE: ICD-10-CM

## 2018-04-27 DIAGNOSIS — E78.2 MIXED HYPERLIPIDEMIA: ICD-10-CM

## 2018-04-27 PROCEDURE — 99214 OFFICE O/P EST MOD 30 MIN: CPT | Performed by: NURSE PRACTITIONER

## 2018-04-27 RX ORDER — LEVOTHYROXINE SODIUM 125 MCG
125 TABLET ORAL DAILY
Qty: 45 TABLET | Refills: 3 | Status: SHIPPED | OUTPATIENT
Start: 2018-04-27 | End: 2018-05-31

## 2018-04-27 NOTE — PROGRESS NOTES
Subjective   Joaquin Peña is a 67 y.o. male.     Hypothyroid, Vitamin D def, and hyperlipidemia.      Hypothyroidism   This is a chronic problem. The current episode started more than 1 month ago. The problem occurs constantly. The problem has been waxing and waning. Associated symptoms include fatigue, headaches and joint swelling. Pertinent negatives include no rash. Nothing aggravates the symptoms. Treatments tried: meds and labs. The treatment provided no relief.   Hyperlipidemia   This is a chronic problem. The current episode started more than 1 year ago. The problem is uncontrolled. Exacerbating diseases include hypothyroidism. There are no known factors aggravating his hyperlipidemia. Current antihyperlipidemic treatment includes statins. The current treatment provides mild improvement of lipids. There are no compliance problems.  Risk factors for coronary artery disease include dyslipidemia, male sex and a sedentary lifestyle.        The following portions of the patient's history were reviewed and updated as appropriate: current medications, past family history, past medical history, past social history, past surgical history and problem list.       Current Outpatient Prescriptions:   •  atorvastatin (LIPITOR) 10 MG tablet, Take one tablet by mouth daily, Disp: 90 tablet, Rfl: 0  •  ergocalciferol (DRISDOL) 62486 units capsule, Take 1 capsule by mouth 1 (One) Time Per Week., Disp: 13 capsule, Rfl: 3  •  ketotifen (ZADITOR) 0.025 % ophthalmic solution, Apply  to eye As Needed., Disp: , Rfl:   •  metoprolol tartrate (LOPRESSOR) 50 MG tablet, Take 1 tablet by mouth 2 (Two) Times a Day., Disp: 60 tablet, Rfl: 11  •  naproxen (NAPROSYN) 500 MG tablet, TAKE 1 TABLET BY MOUTH TWO TIMES A DAY  (Patient taking differently: TAKE 1 TABLET BY MOUTH TWO TIMES A DAY PRN), Disp: 60 tablet, Rfl: 1  •  SYNTHROID 125 MCG tablet, Take 1 tablet by mouth Daily., Disp: 45 tablet, Rfl: 3  •  valsartan (DIOVAN) 160 MG tablet,  Take 1 tablet by mouth 2 (Two) Times a Day., Disp: 60 tablet, Rfl: 6  •  vardenafil (LEVITRA) 10 MG tablet, Take 10 mg by mouth As Needed for erectile dysfunction., Disp: , Rfl:      Patient Active Problem List   Diagnosis   • Graves disease   • Supraventricular tachycardia   • PVC (premature ventricular contraction)   • Hyperlipidemia   • Acute bilateral thoracic back pain   • Dizziness   • Irregular heart beat   • Seasonal allergic rhinitis due to pollen   • Hyperglycemia   • Erectile dysfunction   • Postablative hypothyroidism   • Insulin resistance   • Acquired spondylolisthesis   • Lumbar radiculopathy   • Degeneration of intervertebral disc of lumbar region   • Essential hypertension   • PND (paroxysmal nocturnal dyspnea)   • Right foot pain   • Pain in both lower extremities   • JACQUELINE (obstructive sleep apnea)   • Snoring       Review of Systems   Constitutional: Positive for fatigue.   HENT: Negative for trouble swallowing.    Eyes: Positive for visual disturbance (blurred).   Respiratory: Negative for choking.    Cardiovascular: Negative for palpitations.   Gastrointestinal: Negative for constipation.   Endocrine: Negative for cold intolerance.   Genitourinary: Negative for difficulty urinating.   Musculoskeletal: Positive for joint swelling.   Skin: Negative for rash.   Allergic/Immunologic: Negative.    Neurological: Positive for headaches.   Hematological: Does not bruise/bleed easily.   Psychiatric/Behavioral: Negative for sleep disturbance.     Results Encounter on 04/18/2018   Component Date Value Ref Range Status   • T3, Free 04/19/2018 2.9  2.0 - 4.4 pg/mL Final   • TSH 04/19/2018 3.140  0.270 - 4.200 mIU/mL Final   • T4, Total 04/19/2018 7.29  4.50 - 11.70 mcg/dL Final   • Free T4 04/19/2018 1.27  0.93 - 1.70 ng/dL Final   • Uric Acid 04/19/2018 5.9  3.4 - 7.0 mg/dL Final   • 25 Hydroxy, Vitamin D 04/19/2018 45.7  30.0 - 100.0 ng/mL Final    Comment: Reference Range for Total Vitamin D  25(OH)  Deficiency    <20.0 ng/mL  Insufficiency 21-29 ng/mL  Sufficiency    ng/mL  Toxicity      >100 ng/ml        • Glucose 04/19/2018 92  65 - 99 mg/dL Final   • BUN 04/19/2018 20  8 - 23 mg/dL Final   • Creatinine 04/19/2018 1.14  0.76 - 1.27 mg/dL Final   • eGFR Non  Am 04/19/2018 64  >60 mL/min/1.73 Final   • eGFR African Am 04/19/2018 78  >60 mL/min/1.73 Final   • BUN/Creatinine Ratio 04/19/2018 17.5  7.0 - 25.0 Final   • Sodium 04/19/2018 140  136 - 145 mmol/L Final   • Potassium 04/19/2018 5.2  3.5 - 5.2 mmol/L Final   • Chloride 04/19/2018 102  98 - 107 mmol/L Final   • Total CO2 04/19/2018 26.3  22.0 - 29.0 mmol/L Final   • Calcium 04/19/2018 10.1  8.6 - 10.5 mg/dL Final   • Total Protein 04/19/2018 6.9  6.0 - 8.5 g/dL Final   • Albumin 04/19/2018 4.00  3.50 - 5.20 g/dL Final   • Globulin 04/19/2018 2.9  gm/dL Final   • A/G Ratio 04/19/2018 1.4  g/dL Final   • Total Bilirubin 04/19/2018 0.6  0.1 - 1.2 mg/dL Final   • Alkaline Phosphatase 04/19/2018 59  39 - 117 U/L Final   • AST (SGOT) 04/19/2018 21  1 - 40 U/L Final   • ALT (SGPT) 04/19/2018 26  1 - 41 U/L Final   • C-Peptide 04/19/2018 4.4  1.1 - 4.4 ng/mL Final   • Hemoglobin A1C 04/19/2018 5.90* 4.80 - 5.60 % Final    Comment: Hemoglobin A1C Ranges:  Increased Risk for Diabetes  5.7% to 6.4%  Diabetes                     >= 6.5%  Diabetic Goal                < 7.0%     • Total Cholesterol 04/19/2018 124  0 - 200 mg/dL Final   • Triglycerides 04/19/2018 158* 0 - 150 mg/dL Final   • HDL Cholesterol 04/19/2018 30* 40 - 60 mg/dL Final   • VLDL Cholesterol 04/19/2018 31.6  5 - 40 mg/dL Final   • LDL Cholesterol  04/19/2018 62  0 - 100 mg/dL Final   • Interpretation 04/19/2018 Note   Final   • PDF Image 04/19/2018 Not applicable   Final       Wt Readings from Last 3 Encounters:   04/27/18 118 kg (261 lb)   03/06/18 124 kg (273 lb 8 oz)   02/27/18 123 kg (271 lb)     Temp Readings from Last 3 Encounters:   11/22/17 98.1 °F (36.7 °C)   09/12/17  97.8 °F (36.6 °C) (Oral)   07/14/17 98 °F (36.7 °C) (Oral)     BP Readings from Last 3 Encounters:   04/27/18 118/78   03/06/18 131/75   02/27/18 136/74     Pulse Readings from Last 3 Encounters:   03/06/18 (!) 49   02/27/18 (!) 44   02/16/18 52     Objective   Physical Exam   Constitutional: He appears well-nourished.   HENT:   Head: Atraumatic.   Eyes: EOM are normal.   Neck: Normal range of motion. Neck supple.   Cardiovascular: Normal rate, regular rhythm, normal heart sounds and intact distal pulses.    Pulmonary/Chest: Effort normal and breath sounds normal. No respiratory distress.   Abdominal: Soft. Bowel sounds are normal.   Musculoskeletal: Normal range of motion.   Skin: Skin is warm and dry. Capillary refill takes more than 3 seconds.   Psychiatric: He has a normal mood and affect. His behavior is normal. Judgment and thought content normal.   Nursing note and vitals reviewed.        Assessment/Plan   Joaquin was seen today for follow-up.    Diagnoses and all orders for this visit:    Postablative hypothyroidism  -     SYNTHROID 125 MCG tablet; Take 1 tablet by mouth Daily.  -     TSH; Future  -     T4, Free; Future  -     Thyroid Antibodies; Future  -     T4; Future  -     T3, Free; Future  -     T3; Future  -     Comprehensive Metabolic Panel; Future  -     C-Peptide; Future  -     Insulin, Total; Future  -     Lipid Panel; Future  -     Uric Acid; Future  -     Vitamin D 25 Hydroxy; Future  -     TSH; Future  -     Thyroid Antibodies; Future  -     T4, Free; Future  -     T3, Free; Future  -     T4; Future  -     T3; Future    Essential hypertension  -     Comprehensive Metabolic Panel; Future    Mixed hyperlipidemia  -     Comprehensive Metabolic Panel; Future  -     Lipid Panel; Future    Insulin resistance  -     Comprehensive Metabolic Panel; Future  -     C-Peptide; Future  -     Insulin, Total; Future  -     Lipid Panel; Future  -     Uric Acid; Future  -     Vitamin D 25 Hydroxy; Future  -      TSH; Future  -     Thyroid Antibodies; Future  -     T4, Free; Future  -     T3, Free; Future  -     T4; Future  -     T3; Future             in summary/medication changes:   I met with this patient who is metabolically stable and doing well.  Patient has self-regulating medical nutrition therapy and was lost pounds in one month.  He reports mostly because he was diagnosed with sleep apnea and given a CPAP machine and he is unable to wear the CPAP due to generalized anxiety and panic.  He is post ablative hypothyroid reviewed lab work prior obtained to this visit at this time we will change his medication 225 MCG daily for 4 days and 1.5 tablet for 3 days recheck labs in 6 weeks.  Advised him of the increased A1c and the high normal C-peptide patient does have a family history of diabetes.  But also with review of medical nutrition patient is snacking on fruit.  Advised him of the medical nutrition balancing fruit with protein and complex carbohydrate.  Instructions given for follow-up with labs 2 weeks prior.    Face to face 24 minutes and 14 minutes education- with medical nutrition therapy, weight loss, upcoming appointment with labs prior to appointments.        Return in about 3 months (around 7/27/2018), or if symptoms worsen or fail to improve, for Recheck. Labs only in 6 weeks, 4 months with Dr. Smith - 2 weeks prior for labs, 8 months with Aimee - 2 weeks prior for lab

## 2018-05-02 ENCOUNTER — TELEPHONE (OUTPATIENT)
Dept: CARDIOLOGY | Facility: CLINIC | Age: 68
End: 2018-05-02

## 2018-05-02 NOTE — TELEPHONE ENCOUNTER
Pt called stating that his blood pressure has been running low so he has cut his Valsartan 160mg in half.    He is currently taking Valsartan 80mg BID and Metoprolol 50mg BID    He wanted to make sure this was ok with you.

## 2018-05-03 ENCOUNTER — TELEPHONE (OUTPATIENT)
Dept: ENDOCRINOLOGY | Age: 68
End: 2018-05-03

## 2018-05-03 NOTE — TELEPHONE ENCOUNTER
----- Message from Jennifer Bella sent at 5/3/2018 11:23 AM EDT -----  Contact: SCOTTIE RUBIN WITH [facility] IS NEEDING PATIENTS LAST LAB RESULTS FAXED OVER TO THEM. PATIENT HAS APPT ON Monday.    [phone] PHONE [phone] FAX    THIS IS PATIENT OF ANTOINETTE

## 2018-05-03 NOTE — TELEPHONE ENCOUNTER
Spoke with pt and he is aware that he needs to continue checking his BP and heart rate.    He will give us a call sometime next week with naresh updated BP readings

## 2018-05-14 ENCOUNTER — TELEPHONE (OUTPATIENT)
Dept: CARDIOLOGY | Facility: CLINIC | Age: 68
End: 2018-05-14

## 2018-05-14 NOTE — TELEPHONE ENCOUNTER
Mario, decreased metoprolol tartrate 25 twice a day.  And follow blood pressure and heart rate. adrienne

## 2018-05-14 NOTE — TELEPHONE ENCOUNTER
Patient went today to [facility] to get knee surgery with Dr. Brooks Cardenas.  In pre op his HR was 37.  The anesthesiologist requested an EKG.  Patient said he assumed the EKG was ok because they gave him a medication to increase his heart rate during surgery and still did the surgery. Patient said his HR usually is in the mid 40's and patient has no symptoms when it drops.  Patient is currently taking Valsartan 160mg 1/2 po twice daily, and Metoprolol Tar 50mg twice daily.  Patient would like to know if he could cut his Metoprolol Tar in half and take 50mg 1/2 po twice daily.  Please advise.  Veronica

## 2018-05-14 NOTE — TELEPHONE ENCOUNTER
I called and informed pt.  He will keep up with his b/p and pulse and call back with readings. Veronica

## 2018-05-15 ENCOUNTER — HOSPITAL ENCOUNTER (EMERGENCY)
Facility: HOSPITAL | Age: 68
Discharge: HOME | End: 2018-05-15
Attending: EMERGENCY MEDICINE | Admitting: EMERGENCY MEDICINE

## 2018-05-15 VITALS
DIASTOLIC BLOOD PRESSURE: 72 MMHG | WEIGHT: 256 LBS | HEIGHT: 76 IN | BODY MASS INDEX: 31.17 KG/M2 | HEART RATE: 43 BPM | OXYGEN SATURATION: 97 % | SYSTOLIC BLOOD PRESSURE: 123 MMHG | TEMPERATURE: 97.8 F | RESPIRATION RATE: 14 BRPM

## 2018-05-15 DIAGNOSIS — G89.18 POST-OPERATIVE PAIN: Primary | ICD-10-CM

## 2018-05-15 PROCEDURE — 99283 EMERGENCY DEPT VISIT LOW MDM: CPT

## 2018-05-15 PROCEDURE — 96372 THER/PROPH/DIAG INJ SC/IM: CPT

## 2018-05-15 PROCEDURE — 25010000002 HYDROMORPHONE PER 4 MG: Performed by: EMERGENCY MEDICINE

## 2018-05-15 RX ORDER — HYDROCODONE BITARTRATE AND ACETAMINOPHEN 5; 325 MG/1; MG/1
1 TABLET ORAL EVERY 6 HOURS PRN
COMMUNITY
End: 2018-06-07

## 2018-05-15 RX ORDER — HYDROMORPHONE HCL 110MG/55ML
2 PATIENT CONTROLLED ANALGESIA SYRINGE INTRAVENOUS ONCE
Status: COMPLETED | OUTPATIENT
Start: 2018-05-15 | End: 2018-05-15

## 2018-05-15 RX ORDER — CYCLOBENZAPRINE HCL 10 MG
10 TABLET ORAL 3 TIMES DAILY PRN
COMMUNITY
End: 2018-08-28

## 2018-05-15 RX ORDER — HYDROMORPHONE HYDROCHLORIDE 2 MG/1
2 TABLET ORAL EVERY 4 HOURS PRN
Qty: 5 TABLET | Refills: 0 | Status: SHIPPED | OUTPATIENT
Start: 2018-05-15 | End: 2018-06-07

## 2018-05-15 RX ADMIN — HYDROMORPHONE HYDROCHLORIDE 2 MG: 2 INJECTION INTRAMUSCULAR; INTRAVENOUS; SUBCUTANEOUS at 05:01

## 2018-05-15 NOTE — ED PROVIDER NOTES
EMERGENCY DEPARTMENT ENCOUNTER    CHIEF COMPLAINT  Chief Complaint: Knee pain  History given by: Patient  History limited by: None  Room Number: 03/03  PMD: Erwin Escobar MD      HPI:  Pt is a 67 y.o. male who presents complaining of L knee pain since surgery yesterday at 1000. Pt states hydrocodone given after surgery has not provided relief of pain. Pt denies fever.    Duration:  Since surgery yesterday  Onset: Gradual  Timing: Constant  Location: L knee  Radiation: None stated  Intensity/Severity: Moderate  Progression: Unchanged  Associated Symptoms: None stated  Previous Episodes: None  Treatment before arrival: Hydrocodone given after surgery has not provided relief of pain.    PAST MEDICAL HISTORY  Active Ambulatory Problems     Diagnosis Date Noted   • Graves disease    • Supraventricular tachycardia 03/15/2016   • PVC (premature ventricular contraction) 03/15/2016   • Hyperlipidemia 03/15/2016   • Acute bilateral thoracic back pain 11/08/2016   • Dizziness 01/06/2017   • Irregular heart beat 01/06/2017   • Seasonal allergic rhinitis due to pollen 02/02/2017   • Hyperglycemia 02/07/2017   • Erectile dysfunction 02/07/2017   • Postablative hypothyroidism 02/07/2017   • Insulin resistance 06/07/2017   • Acquired spondylolisthesis 09/12/2017   • Lumbar radiculopathy 09/12/2017   • Degeneration of intervertebral disc of lumbar region 09/12/2017   • Essential hypertension 02/16/2018   • PND (paroxysmal nocturnal dyspnea) 02/16/2018   • Right foot pain 02/28/2018   • Pain in both lower extremities 02/28/2018   • JACQUELINE (obstructive sleep apnea) 03/06/2018   • Snoring 03/06/2018     Resolved Ambulatory Problems     Diagnosis Date Noted   • Hypertensive heart disease 03/15/2016   • Hypertension 03/15/2016     Past Medical History:   Diagnosis Date   • Abdominal obesity    • Acquired spondylolisthesis    • Acute low back pain    • Acute right lumbar radiculopathy    • Bulging lumbar disc    • Concussion with no  loss of consciousness    • DDD (degenerative disc disease)    • Essential hypertension    • Fatigue    • Graves disease    • H/O disorder of nervous system and sense organs    • Headache    • Hyperlipidemia    • Hypertension    • Hypertensive heart disease    • Hypothyroidism    • Insomnia    • Irregular heart beat    • Joint pain    • Lumbar radiculopathy    • Nephrolithiasis    • Paroxysmal nocturnal dyspnea    • PVC (premature ventricular contraction)    • Severe head trauma    • SOB (shortness of breath)    • Soemmering's ring    • Spondylolisthesis    • Subdural hematoma    • Supraventricular tachycardia        PAST SURGICAL HISTORY  Past Surgical History:   Procedure Laterality Date   • KNEE SURGERY      2008 and 2009   • LITHOTRIPSY     • OTHER SURGICAL HISTORY      ear surgery       FAMILY HISTORY  Family History   Problem Relation Age of Onset   • Cancer Father      malignant neoplasm   • No Known Problems Mother    • No Known Problems Sister    • No Known Problems Brother    • No Known Problems Sister    • No Known Problems Brother    • No Known Problems Brother    • Diabetes Maternal Grandmother        SOCIAL HISTORY  Social History     Social History   • Marital status:      Spouse name: N/A   • Number of children: N/A   • Years of education: N/A     Occupational History   • Not on file.     Social History Main Topics   • Smoking status: Former Smoker   • Smokeless tobacco: Never Used   • Alcohol use Yes      Comment: social use   • Drug use: No   • Sexual activity: Not on file     Other Topics Concern   • Not on file     Social History Narrative   • No narrative on file       ALLERGIES  Latex    REVIEW OF SYSTEMS  Review of Systems   Constitutional: Negative for activity change, appetite change and fever.   HENT: Negative for congestion and sore throat.    Eyes: Negative.    Respiratory: Negative for cough and shortness of breath.    Cardiovascular: Negative for chest pain and leg swelling.    Gastrointestinal: Negative for abdominal pain, diarrhea and vomiting.   Endocrine: Negative.    Genitourinary: Negative for decreased urine volume and dysuria.   Musculoskeletal: Positive for myalgias (L knee). Negative for neck pain.   Skin: Negative for rash and wound.   Allergic/Immunologic: Negative.    Neurological: Negative for weakness, numbness and headaches.   Hematological: Negative.    Psychiatric/Behavioral: Negative.    All other systems reviewed and are negative.      PHYSICAL EXAM  ED Triage Vitals   Temp Heart Rate Resp BP SpO2   05/15/18 0218 05/15/18 0217 05/15/18 0217 05/15/18 0229 05/15/18 0217   97.8 °F (36.6 °C) 62 18 123/81 95 %      Temp src Heart Rate Source Patient Position BP Location FiO2 (%)   05/15/18 0218 05/15/18 0217 05/15/18 0229 05/15/18 0229 --   Tympanic Monitor Sitting Right arm        Physical Exam   Constitutional: He is oriented to person, place, and time. No distress.   HENT:   Head: Normocephalic and atraumatic.   Eyes: EOM are normal. Pupils are equal, round, and reactive to light.   Neck: Normal range of motion.   Cardiovascular: Normal rate, regular rhythm, normal heart sounds and intact distal pulses.    Good capillary refill.   Pulmonary/Chest: No respiratory distress.   Abdominal: Soft. There is no tenderness.   Musculoskeletal: He exhibits tenderness (Mod to L knee). He exhibits no edema.   Neurological: He is alert and oriented to person, place, and time.   Skin: Skin is warm and dry.   Nursing note and vitals reviewed.    PROCEDURES  Procedures      PROGRESS AND CONSULTS  ED Course   0452 Ordered Dilaudid injection for pain.    0602 Rechecked pt who's pain has decreased. Plan to discharge and pt should continue with hydrocodone. F/u with surgeon is recommended. Pt understands and agrees with the plan, all questions answered.    MEDICAL DECISION MAKING  Results were reviewed/discussed with the patient and they were also made aware of online access. Pt also made  aware that some labs, such as cultures, will not be resulted during ER visit and follow up with PMD is necessary.     MDM  Number of Diagnoses or Management Options     Amount and/or Complexity of Data Reviewed  Decide to obtain previous medical records or to obtain history from someone other than the patient: yes  Review and summarize past medical records: yes           DIAGNOSIS  Final diagnoses:   Post-operative pain       DISPOSITION  DISCHARGE    Patient discharged in stable condition.    Reviewed implications of results, diagnosis, meds, responsibility to follow up, warning signs and symptoms of possible worsening, potential complications and reasons to return to ER, including new or worsening sxs.    Patient/Family voiced understanding of above instructions.    Discussed plan for discharge, as there is no emergent indication for admission. Patient referred to primary care provider for BP management due to today's BP. Pt/family is agreeable and understands need for follow up and repeat testing.  Pt is aware that discharge does not mean that nothing is wrong but it indicates no emergency is present that requires admission and they must continue care with follow-up as given below or physician of their choice.     FOLLOW-UP  No follow-up provider specified.       Medication List      New Prescriptions    HYDROmorphone 2 MG tablet  Commonly known as:  DILAUDID  Take 1 tablet by mouth Every 4 (Four) Hours As Needed for Severe Pain .        Changed    naproxen 500 MG tablet  Commonly known as:  NAPROSYN  TAKE 1 TABLET BY MOUTH TWO TIMES A DAY  What changed:  See the new instructions.        Stop    ergocalciferol 76420 units capsule  Commonly known as:  DRISDOL          Latest Documented Vital Signs:  As of 6:03 AM  BP- 123/81 HR- 62 Temp- 97.8 °F (36.6 °C) (Tympanic) O2 sat- 95%    --  Documentation assistance provided by thomas Aponte for Dr. England.  Information recorded by the thomas was done at my  direction and has been verified and validated by me.     Lisa Aponte  05/15/18 0604       William England MD  05/15/18 0786

## 2018-05-15 NOTE — ED TRIAGE NOTES
Pt reports he had left knee surgery yesterday and the pain medication is not working. Pt added a flexeril to his pain medication but this has not helped.

## 2018-05-17 ENCOUNTER — TELEPHONE (OUTPATIENT)
Dept: SOCIAL WORK | Facility: HOSPITAL | Age: 68
End: 2018-05-17

## 2018-05-30 ENCOUNTER — OFFICE VISIT (OUTPATIENT)
Dept: FAMILY MEDICINE CLINIC | Facility: CLINIC | Age: 68
End: 2018-05-30

## 2018-05-30 VITALS
WEIGHT: 257 LBS | RESPIRATION RATE: 16 BRPM | HEIGHT: 76 IN | BODY MASS INDEX: 31.29 KG/M2 | HEART RATE: 48 BPM | TEMPERATURE: 97.9 F | SYSTOLIC BLOOD PRESSURE: 130 MMHG | OXYGEN SATURATION: 97 % | DIASTOLIC BLOOD PRESSURE: 94 MMHG

## 2018-05-30 DIAGNOSIS — E78.2 MIXED HYPERLIPIDEMIA: ICD-10-CM

## 2018-05-30 DIAGNOSIS — I10 ESSENTIAL HYPERTENSION: ICD-10-CM

## 2018-05-30 DIAGNOSIS — F41.9 ANXIETY: Primary | ICD-10-CM

## 2018-05-30 DIAGNOSIS — N52.9 ERECTILE DYSFUNCTION, UNSPECIFIED ERECTILE DYSFUNCTION TYPE: ICD-10-CM

## 2018-05-30 DIAGNOSIS — E89.0 POSTABLATIVE HYPOTHYROIDISM: ICD-10-CM

## 2018-05-30 DIAGNOSIS — E88.81 INSULIN RESISTANCE: Primary | ICD-10-CM

## 2018-05-30 DIAGNOSIS — R73.9 HYPERGLYCEMIA: ICD-10-CM

## 2018-05-30 DIAGNOSIS — E05.00 GRAVES DISEASE: ICD-10-CM

## 2018-05-30 PROBLEM — Z87.820 H/O MULTIPLE CONCUSSIONS: Status: ACTIVE | Noted: 2018-05-30

## 2018-05-30 LAB
ALBUMIN SERPL-MCNC: 3.9 G/DL (ref 3.5–5.2)
ALBUMIN/GLOB SERPL: 1.2 G/DL
ALP SERPL-CCNC: 70 U/L (ref 39–117)
ALT SERPL W P-5'-P-CCNC: 18 U/L (ref 1–41)
ANION GAP SERPL CALCULATED.3IONS-SCNC: 11.4 MMOL/L
AST SERPL-CCNC: 14 U/L (ref 1–40)
BILIRUB SERPL-MCNC: 0.6 MG/DL (ref 0.1–1.2)
BUN BLD-MCNC: 17 MG/DL (ref 8–23)
BUN/CREAT SERPL: 17 (ref 7–25)
CALCIUM SPEC-SCNC: 10.1 MG/DL (ref 8.6–10.5)
CHLORIDE SERPL-SCNC: 102 MMOL/L (ref 98–107)
CHOLEST SERPL-MCNC: 126 MG/DL (ref 0–200)
CO2 SERPL-SCNC: 24.6 MMOL/L (ref 22–29)
CREAT BLD-MCNC: 1 MG/DL (ref 0.76–1.27)
ERYTHROCYTE [DISTWIDTH] IN BLOOD BY AUTOMATED COUNT: 14.8 % (ref 4.5–15)
GFR SERPL CREATININE-BSD FRML MDRD: 75 ML/MIN/1.73
GLOBULIN UR ELPH-MCNC: 3.3 GM/DL
GLUCOSE BLD-MCNC: 93 MG/DL (ref 65–99)
HCT VFR BLD AUTO: 49 % (ref 35–60)
HDLC SERPL-MCNC: 32 MG/DL (ref 40–60)
HGB BLD-MCNC: 16.3 G/DL (ref 13.5–18)
LDLC SERPL CALC-MCNC: 57 MG/DL (ref 0–100)
LDLC/HDLC SERPL: 1.79 {RATIO}
LYMPHOCYTES # BLD AUTO: 2.2 10*3/MM3 (ref 1.2–3.4)
LYMPHOCYTES NFR BLD AUTO: 22 % (ref 21–51)
MCH RBC QN AUTO: 28 PG (ref 26.1–33.1)
MCHC RBC AUTO-ENTMCNC: 33.3 G/DL (ref 33–37)
MCV RBC AUTO: 84 FL (ref 80–99)
MONOCYTES # BLD AUTO: 0.6 10*3/MM3 (ref 0.1–0.6)
MONOCYTES NFR BLD AUTO: 5.9 % (ref 2–9)
NEUTROPHILS # BLD AUTO: 7.1 10*3/MM3 (ref 1.4–6.5)
NEUTROPHILS NFR BLD AUTO: 72.1 % (ref 42–75)
PLATELET # BLD AUTO: 278 10*3/MM3 (ref 150–450)
PMV BLD AUTO: 8.2 FL (ref 7.1–10.5)
POTASSIUM BLD-SCNC: 5 MMOL/L (ref 3.5–5.2)
PROT SERPL-MCNC: 7.2 G/DL (ref 6–8.5)
PSA SERPL-MCNC: 0.93 NG/ML (ref 0–4)
RBC # BLD AUTO: 5.84 10*6/MM3 (ref 4–6)
SODIUM BLD-SCNC: 138 MMOL/L (ref 136–145)
T-UPTAKE NFR SERPL: 1.01 TBI (ref 0.8–1.3)
T4 SERPL-MCNC: 9.22 MCG/DL (ref 4.5–11.7)
TRIGL SERPL-MCNC: 183 MG/DL (ref 0–150)
TSH SERPL DL<=0.05 MIU/L-ACNC: 1.7 MIU/ML (ref 0.27–4.2)
VLDLC SERPL-MCNC: 36.6 MG/DL (ref 5–40)
WBC NRBC COR # BLD: 9.9 10*3/MM3 (ref 4.5–10)

## 2018-05-30 PROCEDURE — G0103 PSA SCREENING: HCPCS | Performed by: INTERNAL MEDICINE

## 2018-05-30 PROCEDURE — 99214 OFFICE O/P EST MOD 30 MIN: CPT | Performed by: INTERNAL MEDICINE

## 2018-05-30 PROCEDURE — 84436 ASSAY OF TOTAL THYROXINE: CPT | Performed by: INTERNAL MEDICINE

## 2018-05-30 PROCEDURE — 85025 COMPLETE CBC W/AUTO DIFF WBC: CPT | Performed by: INTERNAL MEDICINE

## 2018-05-30 PROCEDURE — 84443 ASSAY THYROID STIM HORMONE: CPT | Performed by: INTERNAL MEDICINE

## 2018-05-30 PROCEDURE — 84479 ASSAY OF THYROID (T3 OR T4): CPT | Performed by: INTERNAL MEDICINE

## 2018-05-30 PROCEDURE — 36415 COLL VENOUS BLD VENIPUNCTURE: CPT | Performed by: INTERNAL MEDICINE

## 2018-05-30 PROCEDURE — 80053 COMPREHEN METABOLIC PANEL: CPT | Performed by: INTERNAL MEDICINE

## 2018-05-30 PROCEDURE — 80061 LIPID PANEL: CPT | Performed by: INTERNAL MEDICINE

## 2018-05-30 RX ORDER — HYDROXYZINE HYDROCHLORIDE 25 MG/1
25 TABLET, FILM COATED ORAL EVERY 8 HOURS PRN
Qty: 30 TABLET | Refills: 3 | Status: SHIPPED | OUTPATIENT
Start: 2018-05-30 | End: 2018-08-28

## 2018-05-30 NOTE — PROGRESS NOTES
Subjective   Joaquin Peña is a 68 y.o. male.     History of Present Illness   Patient was seen for anxiety.  This is been going on for the past month.  Patient's blood pressure was slightly elevated and patient will begin to worry about it.  This of course a} his blood pressure and higher.  Pressures been a review 130/80 but occasionally would get to 140-150.  Patient was placed on Atarax and asked not to drive while taking medication.  She did not want daily medications.  Her lipid status been controlled with diet and exercise and statin.    Dictated utilizing Dragon dictation. If there are questions or for further clarification, please contact me.   The following portions of the patient's history were reviewed and updated as appropriate: allergies, current medications, past family history, past medical history, past social history, past surgical history and problem list.    Review of Systems   Constitutional: Negative for fatigue and fever.   HENT: Positive for congestion. Negative for trouble swallowing.    Eyes: Negative for discharge and visual disturbance.   Respiratory: Negative for choking and shortness of breath.    Cardiovascular: Negative for chest pain and palpitations.   Gastrointestinal: Negative for abdominal pain and blood in stool.   Endocrine: Negative.    Genitourinary: Negative for genital sores and hematuria.   Musculoskeletal: Negative for gait problem and joint swelling.   Skin: Negative for color change, pallor, rash and wound.   Allergic/Immunologic: Positive for environmental allergies. Negative for immunocompromised state.   Neurological: Negative for facial asymmetry and speech difficulty.   Psychiatric/Behavioral: Negative for hallucinations and suicidal ideas. The patient is nervous/anxious.        Objective   Physical Exam   Constitutional: He is oriented to person, place, and time. He appears well-developed and well-nourished.   HENT:   Head: Normocephalic.   Eyes: Conjunctivae are  normal. Pupils are equal, round, and reactive to light.   Neck: Normal range of motion. Neck supple.   Cardiovascular: Normal rate, regular rhythm and normal heart sounds.    Pulmonary/Chest: Effort normal and breath sounds normal.   Abdominal: Soft. Bowel sounds are normal.   Musculoskeletal: Normal range of motion.   Neurological: He is alert and oriented to person, place, and time.   Skin: Skin is warm and dry.   Psychiatric: His behavior is normal. Judgment and thought content normal.   Moderate anxiety   Nursing note and vitals reviewed.      Assessment/Plan   Problems Addressed this Visit        Cardiovascular and Mediastinum    Hyperlipidemia    Relevant Orders    CBC & Differential (Completed)    Comprehensive Metabolic Panel (Completed)    Lipid Panel (Completed)    Thyroid Panel With TSH (Completed)    PSA Screen (Completed)    CBC Auto Differential (Completed)    Essential hypertension    Relevant Orders    CBC & Differential (Completed)    Comprehensive Metabolic Panel (Completed)    Lipid Panel (Completed)    Thyroid Panel With TSH (Completed)    PSA Screen (Completed)    CBC Auto Differential (Completed)      Other Visit Diagnoses     Anxiety    -  Primary    Relevant Orders    CBC & Differential (Completed)    Comprehensive Metabolic Panel (Completed)    Lipid Panel (Completed)    Thyroid Panel With TSH (Completed)    PSA Screen (Completed)    CBC Auto Differential (Completed)

## 2018-05-31 DIAGNOSIS — E89.0 POSTABLATIVE HYPOTHYROIDISM: ICD-10-CM

## 2018-05-31 RX ORDER — LEVOTHYROXINE SODIUM 125 MCG
125 TABLET ORAL DAILY
Qty: 45 TABLET | Refills: 3
Start: 2018-05-31 | End: 2018-06-05

## 2018-06-01 ENCOUNTER — TELEPHONE (OUTPATIENT)
Dept: CARDIOLOGY | Facility: CLINIC | Age: 68
End: 2018-06-01

## 2018-06-01 DIAGNOSIS — I10 ESSENTIAL HYPERTENSION: Primary | ICD-10-CM

## 2018-06-01 NOTE — TELEPHONE ENCOUNTER
----- Message from Joaquin Peña sent at 5/31/2018  3:39 PM EDT -----  Regarding: Non-Urgent Medical Question  Contact: [phone]  Had knee surgery on May 14th. After that I have had elevated BP readings and have returned to the full dosage of valsartan 160 mg twice daily and metoprolol 50 mg twice daily and am still having elevated readings. Starting to get concerned. Advise please!

## 2018-06-01 NOTE — TELEPHONE ENCOUNTER
Info to pt.  Lab order placed.  Advised to call after lab with BP, HR, and let me know labs done.  (Reminder set)

## 2018-06-01 NOTE — TELEPHONE ENCOUNTER
Per Dr aHque- Stay on Valsartan 160mg BUT decrease meto Tart to 25mg BID.  Have a BMP in 1 week.  Check PCP in regards to Melatonin and Hydroxyzine.

## 2018-06-01 NOTE — TELEPHONE ENCOUNTER
Sent via [email or web address].    I called pt to find out what has been going on.  He had knee surgery about 3 weeks ago on May 14th.  He cut his meds in half at the beginning on the month.  Pt's BP was running 180/100 after surgery.  He restarted his higher dose on BP meds on the 24th.  BP is now running 120s/70s.  HR running 40-50.      Currently Taking  Metoprolol Tart 50mg BID  Valsartan 160mg BID    He has already took his first dose on both meds.  No fatigue.  No SOA.  No CP.      Pt also wants to know if he ok to take Melatonin and Hydroxyzine.  Only has anxiety when BP is elevated.

## 2018-06-05 ENCOUNTER — OFFICE VISIT (OUTPATIENT)
Dept: SLEEP MEDICINE | Facility: HOSPITAL | Age: 68
End: 2018-06-05
Attending: INTERNAL MEDICINE

## 2018-06-05 VITALS
SYSTOLIC BLOOD PRESSURE: 128 MMHG | HEART RATE: 53 BPM | HEIGHT: 76 IN | DIASTOLIC BLOOD PRESSURE: 62 MMHG | WEIGHT: 251 LBS | BODY MASS INDEX: 30.56 KG/M2

## 2018-06-05 DIAGNOSIS — R06.83 SNORING: Primary | ICD-10-CM

## 2018-06-05 DIAGNOSIS — G47.33 OSA (OBSTRUCTIVE SLEEP APNEA): ICD-10-CM

## 2018-06-05 DIAGNOSIS — E89.0 POSTABLATIVE HYPOTHYROIDISM: Primary | ICD-10-CM

## 2018-06-05 RX ORDER — LEVOTHYROXINE SODIUM 125 MCG
125 TABLET ORAL DAILY
Qty: 30 TABLET | Refills: 3 | Status: SHIPPED | OUTPATIENT
Start: 2018-06-05 | End: 2018-06-08 | Stop reason: SDUPTHER

## 2018-06-05 NOTE — PROGRESS NOTES
SLEEP CLINIC FOLLOW UP PROGRESS NOTE.    [facility] SLEEP MEDICINE    Joaquin Peña  68 y.o.  male  1950    PCP: Erwin Escobar MD      Date of visit: 6/5/2018    INTERM HISTORY:  This is a 68 y.o. years old patient who has a history of sleep apnea AHI 35/hr. patient feels that he is not able to use the CPAP and is here to discuss the test results.  If recently underwent knee surgery.  He wants to have a dental appliance for sleep apnea.  He does not have any dentures and has no jaw pain.    PAST MEDICAL HISTORY:  · Obstructive sleep apnea  Past Medical History:   Diagnosis Date   • Abdominal obesity    • Acquired spondylolisthesis    • Acute low back pain    • Acute right lumbar radiculopathy    • Bulging lumbar disc    • Concussion with no loss of consciousness    • DDD (degenerative disc disease)    • Essential hypertension    • Fatigue    • Graves disease    • H/O disorder of nervous system and sense organs    • Headache    • Hyperlipidemia    • Hypertension    • Hypertensive heart disease    • Hypothyroidism    • Insomnia    • Irregular heart beat    • Joint pain    • Lumbar radiculopathy    • Nephrolithiasis    • Paroxysmal nocturnal dyspnea    • PVC (premature ventricular contraction)    • Severe head trauma    • SOB (shortness of breath)    • Soemmering's ring    • Spondylolisthesis    • Subdural hematoma    • Supraventricular tachycardia        MEDICATIONS:    Current Outpatient Prescriptions:   •  atorvastatin (LIPITOR) 10 MG tablet, Take one tablet by mouth daily, Disp: 90 tablet, Rfl: 0  •  cyclobenzaprine (FLEXERIL) 10 MG tablet, Take 10 mg by mouth 3 (Three) Times a Day As Needed for Muscle Spasms., Disp: , Rfl:   •  HYDROcodone-acetaminophen (NORCO) 5-325 MG per tablet, Take 1 tablet by mouth Every 6 (Six) Hours As Needed., Disp: , Rfl:   •  HYDROmorphone (DILAUDID) 2 MG tablet, Take 1 tablet by mouth Every 4 (Four) Hours As Needed for Severe Pain ., Disp: 5 tablet, Rfl:  "0  •  hydrOXYzine (ATARAX) 25 MG tablet, Take 1 tablet by mouth Every 8 (Eight) Hours As Needed for Anxiety., Disp: 30 tablet, Rfl: 3  •  ketotifen (ZADITOR) 0.025 % ophthalmic solution, Apply  to eye As Needed., Disp: , Rfl:   •  metoprolol tartrate (LOPRESSOR) 50 MG tablet, Take 1 tablet by mouth 2 (Two) Times a Day., Disp: 60 tablet, Rfl: 11  •  naproxen (NAPROSYN) 500 MG tablet, TAKE 1 TABLET BY MOUTH TWO TIMES A DAY  (Patient taking differently: TAKE 1 TABLET BY MOUTH TWO TIMES A DAY PRN), Disp: 60 tablet, Rfl: 1  •  SYNTHROID 125 MCG tablet, Take 1 tablet by mouth Daily. Takes a whole one day then a whole and half the next day then repeat dosing, Disp: 45 tablet, Rfl: 3  •  valsartan (DIOVAN) 160 MG tablet, Take 1 tablet by mouth 2 (Two) Times a Day., Disp: 60 tablet, Rfl: 6  •  vardenafil (LEVITRA) 10 MG tablet, Take 10 mg by mouth As Needed for erectile dysfunction., Disp: , Rfl:     Allergies   Allergen Reactions   • Latex Rash       SOCIAL, FAMILY HISTORY: Medical records are reviewed and noted.    REVIEW OF SYSTEMS:   Rodman Sleepiness Scale :Total score: 1   Snoring yes  Feels refreshed after waking up: no  Morning headaches no  Nasal congestion no  Leg movements no    PHYSICAL EXAMINATION:  Vitals:    06/05/18 1441   BP: 128/62   Pulse: 53   Weight: 114 kg (251 lb)   Height: 193 cm (76\")    Body mass index is 30.55 kg/m².    HEENT: Unremarkable, pupils are round equal and reacting to light, nasal passage is clear   NECK: No lymphadenopathy, throat is clear, oral airway Mallampati class IV  RESPRATORY SYSTEM: Breath sounds are equal on both sides and are normal, no wheezes or crackles  CARDIOVASULAR SYSTEM: Heart rate is regular without murmur  ABDOMEN: Soft, no ascites, no hepatosplenomegaly.  EXTREMITIES: No cyanosis, clubbing or edema       ASSESSMENT AND PLAN:  · Obstructive sleep apnea, I'm going to send the patient to [facility], to evaluate for mandibular advancement device  · Obesity, I have " discussed weight reduction and the health benefits.  I have also discuss the relationship between the weight and the sleep apnea and encouraged the patient to lose weight  · Snoring      Adalid Marte MD, [facility]  Pulmonary, Critical Care and sleep Medicine     Addendum, 6/12/2018   Patient is unable to use the CPAP and he has severe sleep apnea with AHI of 35/hr. I have discussed the alternate treatments with the patient and he will benefit from MAD device (mandibular advancement device).  The MAD is medically necessary to treat sleep apnea    Adalid Marte MD

## 2018-06-07 ENCOUNTER — OFFICE VISIT (OUTPATIENT)
Dept: FAMILY MEDICINE CLINIC | Facility: CLINIC | Age: 68
End: 2018-06-07

## 2018-06-07 VITALS
OXYGEN SATURATION: 98 % | RESPIRATION RATE: 16 BRPM | WEIGHT: 254 LBS | TEMPERATURE: 98.4 F | BODY MASS INDEX: 30.93 KG/M2 | SYSTOLIC BLOOD PRESSURE: 122 MMHG | DIASTOLIC BLOOD PRESSURE: 60 MMHG | HEART RATE: 52 BPM | HEIGHT: 76 IN

## 2018-06-07 DIAGNOSIS — R73.9 HYPERGLYCEMIA: ICD-10-CM

## 2018-06-07 DIAGNOSIS — E78.2 MIXED HYPERLIPIDEMIA: ICD-10-CM

## 2018-06-07 DIAGNOSIS — I10 ESSENTIAL HYPERTENSION: Primary | ICD-10-CM

## 2018-06-07 PROCEDURE — 99214 OFFICE O/P EST MOD 30 MIN: CPT | Performed by: INTERNAL MEDICINE

## 2018-06-07 NOTE — PATIENT INSTRUCTIONS
"DASH Eating Plan  DASH stands for \"Dietary Approaches to Stop Hypertension.\" The DASH eating plan is a healthy eating plan that has been shown to reduce high blood pressure (hypertension). It may also reduce your risk for type 2 diabetes, heart disease, and stroke. The DASH eating plan may also help with weight loss.  What are tips for following this plan?  General guidelines   · Avoid eating more than 2,300 mg (milligrams) of salt (sodium) a day. If you have hypertension, you may need to reduce your sodium intake to 1,500 mg a day.  · Limit alcohol intake to no more than 1 drink a day for nonpregnant women and 2 drinks a day for men. One drink equals 12 oz of beer, 5 oz of wine, or 1½ oz of hard liquor.  · Work with your health care provider to maintain a healthy body weight or to lose weight. Ask what an ideal weight is for you.  · Get at least 30 minutes of exercise that causes your heart to beat faster (aerobic exercise) most days of the week. Activities may include walking, swimming, or biking.  · Work with your health care provider or diet and nutrition specialist (dietitian) to adjust your eating plan to your individual calorie needs.  Reading food labels   · Check food labels for the amount of sodium per serving. Choose foods with less than 5 percent of the Daily Value of sodium. Generally, foods with less than 300 mg of sodium per serving fit into this eating plan.  · To find whole grains, look for the word \"whole\" as the first word in the ingredient list.  Shopping   · Buy products labeled as \"low-sodium\" or \"no salt added.\"  · Buy fresh foods. Avoid canned foods and premade or frozen meals.  Cooking   · Avoid adding salt when cooking. Use salt-free seasonings or herbs instead of table salt or sea salt. Check with your health care provider or pharmacist before using salt substitutes.  · Do not [place] foods. Cook foods using healthy methods such as baking, boiling, grilling, and broiling instead.  · Cook with " heart-healthy oils, such as olive, canola, soybean, or sunflower oil.  Meal planning     · Eat a balanced diet that includes:  ¨ 5 or more servings of fruits and vegetables each day. At each meal, try to fill half of your plate with fruits and vegetables.  ¨ Up to 6-8 servings of whole grains each day.  ¨ Less than 6 oz of lean meat, poultry, or fish each day. A 3-oz serving of meat is about the same size as a deck of cards. One egg equals 1 oz.  ¨ 2 servings of low-fat dairy each day.  ¨ A serving of nuts, seeds, or beans 5 times each week.  ¨ Heart-healthy fats. Healthy fats called Omega-3 fatty acids are found in foods such as flaxseeds and coldwater fish, like sardines, salmon, and mackerel.  · Limit how much you eat of the following:  ¨ Canned or prepackaged foods.  ¨ Food that is high in trans fat, such as fried foods.  ¨ Food that is high in saturated fat, such as fatty meat.  ¨ Sweets, desserts, sugary drinks, and other foods with added sugar.  ¨ Full-fat dairy products.  · Do not salt foods before eating.  · Try to eat at least 2 vegetarian meals each week.  · Eat more home-cooked food and less restaurant, buffet, and fast food.  · When eating at a restaurant, ask that your food be prepared with less salt or no salt, if possible.  What foods are recommended?  The items listed may not be a complete list. Talk with your dietitian about what dietary choices are best for you.  Grains   Whole-grain or whole-wheat bread. Whole-grain or whole-wheat pasta. Brown rice. Oatmeal. Quinoa. Bulgur. Whole-grain and low-sodium cereals. Vandana bread. Low-fat, low-sodium crackers. Whole-wheat flour tortillas.  Vegetables   Fresh or frozen vegetables (raw, steamed, roasted, or grilled). Low-sodium or reduced-sodium tomato and vegetable juice. Low-sodium or reduced-sodium tomato sauce and tomato paste. Low-sodium or reduced-sodium canned vegetables.  Fruits   All fresh, dried, or frozen fruit. Canned fruit in natural juice  (without added sugar).  Meat and other protein foods   Skinless chicken or turkey. Ground chicken or turkey. Pork with fat trimmed off. Fish and seafood. Egg whites. Dried beans, peas, or lentils. Unsalted nuts, nut butters, and seeds. Unsalted canned beans. Lean cuts of beef with fat trimmed off. Low-sodium, lean deli meat.  Dairy   Low-fat (1%) or fat-free (skim) milk. Fat-free, low-fat, or reduced-fat cheeses. Nonfat, low-sodium ricotta or cottage cheese. Low-fat or nonfat yogurt. Low-fat, low-sodium cheese.  Fats and oils   Soft margarine without trans fats. Vegetable oil. Low-fat, reduced-fat, or light mayonnaise and salad dressings (reduced-sodium). Canola, safflower, olive, soybean, and sunflower oils. Avocado.  Seasoning and other foods   Herbs. Spices. Seasoning mixes without salt. Unsalted popcorn and pretzels. Fat-free sweets.  What foods are not recommended?  The items listed may not be a complete list. Talk with your dietitian about what dietary choices are best for you.  Grains   Baked goods made with fat, such as croissants, muffins, or some breads. Dry pasta or rice meal packs.  Vegetables   Creamed or fried vegetables. Vegetables in a cheese sauce. Regular canned vegetables (not low-sodium or reduced-sodium). Regular canned tomato sauce and paste (not low-sodium or reduced-sodium). Regular tomato and vegetable juice (not low-sodium or reduced-sodium). Pickles. Olives.  Fruits   Canned fruit in a light or heavy syrup. Fried fruit. Fruit in cream or butter sauce.  Meat and other protein foods   Fatty cuts of meat. Ribs. Fried meat. Duarte. Sausage. Bologna and other processed lunch meats. Salami. Fatback. Hotdogs. Bratwurst. Salted nuts and seeds. Canned beans with added salt. Canned or smoked fish. Whole eggs or egg yolks. Chicken or turkey with skin.  Dairy   Whole or 2% milk, cream, and half-and-half. Whole or full-fat cream cheese. Whole-fat or sweetened yogurt. Full-fat cheese. Nondairy creamers.  Whipped toppings. Processed cheese and cheese spreads.  Fats and oils   Butter. Stick margarine. Lard. Shortening. Ghee. Duarte fat. Tropical oils, such as coconut, palm kernel, or palm oil.  Seasoning and other foods   Salted popcorn and pretzels. Onion salt, garlic salt, seasoned salt, table salt, and sea salt. Worcestershire sauce. Tartar sauce. Barbecue sauce. Teriyaki sauce. Soy sauce, including reduced-sodium. Steak sauce. Canned and packaged gravies. Fish sauce. Oyster sauce. Cocktail sauce. Horseradish that you find on the shelf. Ketchup. Mustard. Meat flavorings and tenderizers. Bouillon cubes. Hot sauce and Tabasco sauce. Premade or packaged marinades. Premade or packaged taco seasonings. Relishes. Regular salad dressings.  Where to find more information:  · National Heart, Lung, and Blood [place]: www.nhlbi.nih.gov  · American Heart Association: www.heart.org  Summary  · The DASH eating plan is a healthy eating plan that has been shown to reduce high blood pressure (hypertension). It may also reduce your risk for type 2 diabetes, heart disease, and stroke.  · With the DASH eating plan, you should limit salt (sodium) intake to 2,300 mg a day. If you have hypertension, you may need to reduce your sodium intake to 1,500 mg a day.  · When on the DASH eating plan, aim to eat more fresh fruits and vegetables, whole grains, lean proteins, low-fat dairy, and heart-healthy fats.  · Work with your health care provider or diet and nutrition specialist (dietitian) to adjust your eating plan to your individual calorie needs.  This information is not intended to replace advice given to you by your health care provider. Make sure you discuss any questions you have with your health care provider.  Document Released: 12/06/2012 Document Revised: 12/11/2017 Document Reviewed: 12/11/2017  [employer] Interactive Patient Education © 2017 [employer]

## 2018-06-08 ENCOUNTER — TELEPHONE (OUTPATIENT)
Dept: ENDOCRINOLOGY | Age: 68
End: 2018-06-08

## 2018-06-08 ENCOUNTER — LAB (OUTPATIENT)
Dept: LAB | Facility: HOSPITAL | Age: 68
End: 2018-06-08

## 2018-06-08 ENCOUNTER — RESULTS ENCOUNTER (OUTPATIENT)
Dept: ENDOCRINOLOGY | Age: 68
End: 2018-06-08

## 2018-06-08 ENCOUNTER — LAB (OUTPATIENT)
Dept: ENDOCRINOLOGY | Age: 68
End: 2018-06-08

## 2018-06-08 DIAGNOSIS — I10 ESSENTIAL HYPERTENSION: ICD-10-CM

## 2018-06-08 DIAGNOSIS — E05.00 GRAVES DISEASE: ICD-10-CM

## 2018-06-08 DIAGNOSIS — E78.2 MIXED HYPERLIPIDEMIA: ICD-10-CM

## 2018-06-08 DIAGNOSIS — E88.81 INSULIN RESISTANCE: ICD-10-CM

## 2018-06-08 DIAGNOSIS — E89.0 POSTABLATIVE HYPOTHYROIDISM: ICD-10-CM

## 2018-06-08 DIAGNOSIS — N52.9 ERECTILE DYSFUNCTION, UNSPECIFIED ERECTILE DYSFUNCTION TYPE: ICD-10-CM

## 2018-06-08 DIAGNOSIS — R73.9 HYPERGLYCEMIA: ICD-10-CM

## 2018-06-08 LAB
ANION GAP SERPL CALCULATED.3IONS-SCNC: 12 MMOL/L
BUN BLD-MCNC: 17 MG/DL (ref 8–23)
BUN/CREAT SERPL: 17 (ref 7–25)
CALCIUM SPEC-SCNC: 9.7 MG/DL (ref 8.6–10.5)
CHLORIDE SERPL-SCNC: 106 MMOL/L (ref 98–107)
CO2 SERPL-SCNC: 24 MMOL/L (ref 22–29)
CREAT BLD-MCNC: 1 MG/DL (ref 0.76–1.27)
GFR SERPL CREATININE-BSD FRML MDRD: 75 ML/MIN/1.73
GLUCOSE BLD-MCNC: 74 MG/DL (ref 65–99)
POTASSIUM BLD-SCNC: 4.4 MMOL/L (ref 3.5–5.2)
SODIUM BLD-SCNC: 142 MMOL/L (ref 136–145)

## 2018-06-08 PROCEDURE — 80048 BASIC METABOLIC PNL TOTAL CA: CPT

## 2018-06-08 PROCEDURE — 36415 COLL VENOUS BLD VENIPUNCTURE: CPT

## 2018-06-08 RX ORDER — LEVOTHYROXINE SODIUM 125 MCG
125 TABLET ORAL DAILY
Qty: 30 TABLET | Refills: 3 | Status: SHIPPED | OUTPATIENT
Start: 2018-06-08 | End: 2018-07-05 | Stop reason: SDUPTHER

## 2018-06-08 NOTE — TELEPHONE ENCOUNTER
----- Message from Julia Connors sent at 6/8/2018  8:38 AM EDT -----  Regarding: SCRIPT  PT WAS HERE FOR LABS AND ASKED ABOUT HIS SYNTHROID 125MCG 1TAB 1XDAY, I LET PT KNOW THAT IT WAS SENT BUT, HIS PHARMACY HAS CHANGED AND NO LONGER USES THE [facility] IT WAS SENT TO.    WOULD YOU PLEASE RE-SEND SCRIPT TO [facility] (IN CHART)  THANKS!

## 2018-06-10 LAB
25(OH)D3+25(OH)D2 SERPL-MCNC: 38.9 NG/ML (ref 30–100)
ALBUMIN SERPL-MCNC: 3.8 G/DL (ref 3.5–5.2)
ALBUMIN/GLOB SERPL: 1.3 G/DL
ALP SERPL-CCNC: 71 U/L (ref 39–117)
ALT SERPL-CCNC: 17 U/L (ref 1–41)
AST SERPL-CCNC: 13 U/L (ref 1–40)
BILIRUB SERPL-MCNC: 0.6 MG/DL (ref 0.1–1.2)
BUN SERPL-MCNC: 17 MG/DL (ref 8–23)
BUN/CREAT SERPL: 16.3 (ref 7–25)
C PEPTIDE SERPL-MCNC: 12.9 NG/ML (ref 1.1–4.4)
CALCIUM SERPL-MCNC: 9.9 MG/DL (ref 8.6–10.5)
CHLORIDE SERPL-SCNC: 105 MMOL/L (ref 98–107)
CHOLEST SERPL-MCNC: 115 MG/DL (ref 0–200)
CO2 SERPL-SCNC: 24 MMOL/L (ref 22–29)
CREAT SERPL-MCNC: 1.04 MG/DL (ref 0.76–1.27)
GFR SERPLBLD CREATININE-BSD FMLA CKD-EPI: 71 ML/MIN/1.73
GFR SERPLBLD CREATININE-BSD FMLA CKD-EPI: 87 ML/MIN/1.73
GLOBULIN SER CALC-MCNC: 3 GM/DL
GLUCOSE SERPL-MCNC: 91 MG/DL (ref 65–99)
HBA1C MFR BLD: 5.6 % (ref 4.8–5.6)
HCT VFR BLD AUTO: 47.9 % (ref 40.4–52.2)
HDLC SERPL-MCNC: 29 MG/DL (ref 40–60)
HGB BLD-MCNC: 15.1 G/DL (ref 13.7–17.6)
INTERPRETATION: NORMAL
LDLC SERPL CALC-MCNC: 53 MG/DL (ref 0–100)
Lab: NORMAL
POTASSIUM SERPL-SCNC: 4.5 MMOL/L (ref 3.5–5.2)
PROT SERPL-MCNC: 6.8 G/DL (ref 6–8.5)
SHBG SERPL-SCNC: 29.5 NMOL/L (ref 19.3–76.4)
SODIUM SERPL-SCNC: 142 MMOL/L (ref 136–145)
T3FREE SERPL-MCNC: 2.9 PG/ML (ref 2–4.4)
T4 FREE SERPL-MCNC: 1.51 NG/DL (ref 0.93–1.7)
T4 SERPL-MCNC: 8.53 MCG/DL (ref 4.5–11.7)
TESTOST FREE SERPL-MCNC: 7.5 PG/ML (ref 6.6–18.1)
TESTOST SERPL-MCNC: 364 NG/DL (ref 264–916)
THYROGLOB AB SERPL-ACNC: <1 IU/ML
THYROGLOB SERPL-MCNC: 13.9 NG/ML
THYROGLOB SERPL-MCNC: NORMAL NG/ML
TRIGL SERPL-MCNC: 163 MG/DL (ref 0–150)
TSH SERPL DL<=0.005 MIU/L-ACNC: 1.75 MIU/ML (ref 0.27–4.2)
URATE SERPL-MCNC: 6.6 MG/DL (ref 3.4–7)
VLDLC SERPL CALC-MCNC: 32.6 MG/DL (ref 5–40)

## 2018-06-11 NOTE — TELEPHONE ENCOUNTER
Called pt to see how BP and HR was doing.  Per pt- good at 120s/60s-70s with HR at 55-60.  Pt also said he did the lab we asked about.

## 2018-06-13 NOTE — TELEPHONE ENCOUNTER
Let him know renal labs look good and these blood pressure readings and heart rate are just fine to stay on the same regimen. adrienne

## 2018-06-20 ENCOUNTER — OFFICE VISIT (OUTPATIENT)
Dept: FAMILY MEDICINE CLINIC | Facility: CLINIC | Age: 68
End: 2018-06-20

## 2018-06-20 VITALS
WEIGHT: 253 LBS | BODY MASS INDEX: 30.81 KG/M2 | SYSTOLIC BLOOD PRESSURE: 120 MMHG | RESPIRATION RATE: 15 BRPM | TEMPERATURE: 98.1 F | HEIGHT: 76 IN | DIASTOLIC BLOOD PRESSURE: 70 MMHG | OXYGEN SATURATION: 96 % | HEART RATE: 61 BPM

## 2018-06-20 DIAGNOSIS — E03.8 OTHER SPECIFIED HYPOTHYROIDISM: ICD-10-CM

## 2018-06-20 DIAGNOSIS — E78.2 MIXED HYPERLIPIDEMIA: ICD-10-CM

## 2018-06-20 DIAGNOSIS — I10 ESSENTIAL HYPERTENSION: Primary | ICD-10-CM

## 2018-06-20 PROBLEM — E89.0 POSTABLATIVE HYPOTHYROIDISM: Status: RESOLVED | Noted: 2017-02-07 | Resolved: 2018-06-20

## 2018-06-20 PROCEDURE — 99214 OFFICE O/P EST MOD 30 MIN: CPT | Performed by: INTERNAL MEDICINE

## 2018-06-20 NOTE — PROGRESS NOTES
Subjective   Joaquin Peña is a 68 y.o. male.     History of Present Illness   Patient was seen for hypertension.  Blood pressures been running 120s over 80s.  His lipid status been well-controlled with diet and a statin.  Patient's thyroid levels been well-controlled with his Synthroid.  Patient will continue to monitor blood pressure return to our clinic in 6 months.    Dictated utilizing Dragon dictation. If there are questions or for further clarification, please contact me.   The following portions of the patient's history were reviewed and updated as appropriate: allergies, current medications, past family history, past medical history, past social history, past surgical history and problem list.    Review of Systems   Constitutional: Negative for fatigue and fever.   HENT: Positive for congestion. Negative for trouble swallowing.    Eyes: Negative for discharge and visual disturbance.   Respiratory: Negative for choking and shortness of breath.    Cardiovascular: Negative for chest pain and palpitations.   Gastrointestinal: Negative for abdominal pain and blood in stool.   Endocrine: Negative.    Genitourinary: Negative for genital sores and hematuria.   Musculoskeletal: Negative for gait problem and joint swelling.   Skin: Negative for color change, pallor, rash and wound.   Allergic/Immunologic: Positive for environmental allergies. Negative for immunocompromised state.   Neurological: Negative for facial asymmetry and speech difficulty.   Psychiatric/Behavioral: Negative for hallucinations and suicidal ideas.       Objective   Physical Exam   Constitutional: He is oriented to person, place, and time. He appears well-developed and well-nourished.   HENT:   Head: Normocephalic.   Eyes: Conjunctivae are normal. Pupils are equal, round, and reactive to light.   Neck: Normal range of motion. Neck supple.   Cardiovascular: Normal rate, regular rhythm and normal heart sounds.    Pulmonary/Chest: Effort normal  and breath sounds normal.   Abdominal: Soft. Bowel sounds are normal.   Musculoskeletal: Normal range of motion.   Neurological: He is alert and oriented to person, place, and time.   Skin: Skin is warm and dry.   Psychiatric: He has a normal mood and affect. His behavior is normal. Judgment and thought content normal.   Nursing note and vitals reviewed.      Assessment/Plan   Problems Addressed this Visit        Cardiovascular and Mediastinum    Hyperlipidemia    Essential hypertension - Primary       Endocrine    Other specified hypothyroidism

## 2018-07-05 DIAGNOSIS — E89.0 POSTABLATIVE HYPOTHYROIDISM: ICD-10-CM

## 2018-07-05 RX ORDER — LEVOTHYROXINE SODIUM 125 MCG
187.5 TABLET ORAL DAILY
Qty: 45 TABLET | Refills: 2 | Status: SHIPPED | OUTPATIENT
Start: 2018-07-05 | End: 2018-08-28 | Stop reason: SDUPTHER

## 2018-07-16 RX ORDER — AZILSARTAN KAMEDOXOMIL 80 MG/1
80 TABLET ORAL DAILY
Qty: 30 TABLET | Refills: 5 | Status: SHIPPED | OUTPATIENT
Start: 2018-07-16 | End: 2018-07-25

## 2018-07-17 ENCOUNTER — RESULTS ENCOUNTER (OUTPATIENT)
Dept: ENDOCRINOLOGY | Age: 68
End: 2018-07-17

## 2018-07-17 DIAGNOSIS — E89.0 POSTABLATIVE HYPOTHYROIDISM: ICD-10-CM

## 2018-07-23 ENCOUNTER — TELEPHONE (OUTPATIENT)
Dept: CARDIOLOGY | Facility: CLINIC | Age: 68
End: 2018-07-23

## 2018-07-23 NOTE — TELEPHONE ENCOUNTER
Pt called and LMM re: pharmacy told him that all Valsartan is recalled per pharmacy.  Dr Smith started him on Edarbi 80mg BUT wanted to make sure you were ok with this before starting.    Called [facility] and pt was taking recalled medication BUT now there is a shortage on the other manufacturers of Valsartan.      Are you ok with the pt going on Edarbi 80mg sig: QD?

## 2018-07-25 RX ORDER — IRBESARTAN 300 MG/1
300 TABLET ORAL DAILY
Qty: 30 TABLET | Refills: 11 | Status: SHIPPED | OUTPATIENT
Start: 2018-07-25 | End: 2019-01-09 | Stop reason: SDUPTHER

## 2018-08-02 DIAGNOSIS — E88.81 INSULIN RESISTANCE: Primary | ICD-10-CM

## 2018-08-02 DIAGNOSIS — E05.00 GRAVES DISEASE: ICD-10-CM

## 2018-08-02 DIAGNOSIS — E03.8 OTHER SPECIFIED HYPOTHYROIDISM: ICD-10-CM

## 2018-08-02 DIAGNOSIS — R73.9 HYPERGLYCEMIA: ICD-10-CM

## 2018-08-02 DIAGNOSIS — N52.9 ERECTILE DYSFUNCTION, UNSPECIFIED ERECTILE DYSFUNCTION TYPE: ICD-10-CM

## 2018-08-07 ENCOUNTER — APPOINTMENT (OUTPATIENT)
Dept: GENERAL RADIOLOGY | Facility: HOSPITAL | Age: 68
End: 2018-08-07

## 2018-08-07 ENCOUNTER — HOSPITAL ENCOUNTER (EMERGENCY)
Facility: HOSPITAL | Age: 68
Discharge: HOME | End: 2018-08-07
Attending: EMERGENCY MEDICINE | Admitting: EMERGENCY MEDICINE

## 2018-08-07 VITALS
BODY MASS INDEX: 29.83 KG/M2 | HEIGHT: 76 IN | OXYGEN SATURATION: 97 % | HEART RATE: 42 BPM | TEMPERATURE: 96.5 F | RESPIRATION RATE: 18 BRPM | WEIGHT: 245 LBS | DIASTOLIC BLOOD PRESSURE: 84 MMHG | SYSTOLIC BLOOD PRESSURE: 124 MMHG

## 2018-08-07 DIAGNOSIS — M62.830 BACK SPASM: Primary | ICD-10-CM

## 2018-08-07 LAB
BILIRUB UR QL STRIP: NEGATIVE
CLARITY UR: CLEAR
COLOR UR: YELLOW
GLUCOSE UR STRIP-MCNC: NEGATIVE MG/DL
HGB UR QL STRIP.AUTO: NEGATIVE
HOLD SPECIMEN: NORMAL
HOLD SPECIMEN: NORMAL
KETONES UR QL STRIP: NEGATIVE
LEUKOCYTE ESTERASE UR QL STRIP.AUTO: NEGATIVE
NITRITE UR QL STRIP: NEGATIVE
PH UR STRIP.AUTO: <=5 [PH] (ref 5–8)
PROT UR QL STRIP: NEGATIVE
SP GR UR STRIP: 1.02 (ref 1–1.03)
UROBILINOGEN UR QL STRIP: NORMAL
WHOLE BLOOD HOLD SPECIMEN: NORMAL
WHOLE BLOOD HOLD SPECIMEN: NORMAL

## 2018-08-07 PROCEDURE — 81003 URINALYSIS AUTO W/O SCOPE: CPT | Performed by: PHYSICIAN ASSISTANT

## 2018-08-07 PROCEDURE — 72072 X-RAY EXAM THORAC SPINE 3VWS: CPT

## 2018-08-07 PROCEDURE — 99283 EMERGENCY DEPT VISIT LOW MDM: CPT

## 2018-08-07 PROCEDURE — 96374 THER/PROPH/DIAG INJ IV PUSH: CPT

## 2018-08-07 PROCEDURE — 25010000002 KETOROLAC TROMETHAMINE PER 15 MG: Performed by: EMERGENCY MEDICINE

## 2018-08-07 RX ORDER — ERGOCALCIFEROL 1.25 MG/1
50000 CAPSULE ORAL
COMMUNITY
End: 2018-08-28 | Stop reason: SDUPTHER

## 2018-08-07 RX ORDER — LEVOTHYROXINE SODIUM 0.12 MG/1
125 TABLET ORAL EVERY OTHER DAY
COMMUNITY
End: 2018-08-28 | Stop reason: ALTCHOICE

## 2018-08-07 RX ORDER — HYDROCODONE BITARTRATE AND ACETAMINOPHEN 5; 325 MG/1; MG/1
1 TABLET ORAL EVERY 6 HOURS PRN
Qty: 20 TABLET | Refills: 0 | Status: SHIPPED | OUTPATIENT
Start: 2018-08-07 | End: 2018-08-28

## 2018-08-07 RX ORDER — CYCLOBENZAPRINE HCL 10 MG
10 TABLET ORAL ONCE
Status: COMPLETED | OUTPATIENT
Start: 2018-08-07 | End: 2018-08-07

## 2018-08-07 RX ORDER — CYCLOBENZAPRINE HCL 10 MG
10 TABLET ORAL 3 TIMES DAILY PRN
Qty: 30 TABLET | Refills: 0 | Status: SHIPPED | OUTPATIENT
Start: 2018-08-07 | End: 2018-08-28

## 2018-08-07 RX ORDER — KETOROLAC TROMETHAMINE 30 MG/ML
15 INJECTION, SOLUTION INTRAMUSCULAR; INTRAVENOUS ONCE
Status: COMPLETED | OUTPATIENT
Start: 2018-08-07 | End: 2018-08-07

## 2018-08-07 RX ORDER — SODIUM CHLORIDE 0.9 % (FLUSH) 0.9 %
10 SYRINGE (ML) INJECTION AS NEEDED
Status: DISCONTINUED | OUTPATIENT
Start: 2018-08-07 | End: 2018-08-07 | Stop reason: HOSPADM

## 2018-08-07 RX ADMIN — CYCLOBENZAPRINE 10 MG: 10 TABLET, FILM COATED ORAL at 14:18

## 2018-08-07 RX ADMIN — KETOROLAC TROMETHAMINE 15 MG: 30 INJECTION, SOLUTION INTRAMUSCULAR at 14:18

## 2018-08-07 NOTE — ED NOTES
Pt reports right sided back and flank pain that is intermittent x2 weeks.  Pt denies NVD or  issues. He does have a history of Kidney stones.      Zulay Noe RN  08/07/18 7024

## 2018-08-07 NOTE — ED PROVIDER NOTES
EMERGENCY DEPARTMENT ENCOUNTER    CHIEF COMPLAINT  Chief Complaint: Back Pain  History given by: Patient   History limited by: none  Room Number: 37/37  PMD: Erwin Escobar MD      HPI:  Pt is a 68 y.o. male who presents complaining of R sided thoracic back pain for the past  2 weeks, without hx of back injury. Pt states he has been in physical therapy recently after knee surgery and meniscus repair on 5/14/18. Pt states pain has increased in physical therapy with increase in motion and exercise. Pt states he has hx of nephrolithiasis and presented to ED due to concern for stones.     Duration:  2 weeks  Onset: gradual  Timing: constant  Location: R sided thoracic back  Radiation: none  Quality: pain  Intensity/Severity: moderate  Progression: unchanged  Associated Symptoms: none  Aggravating Factors: motion and exercise  Alleviating Factors: none  Previous Episodes: none  Treatment before arrival: none    PAST MEDICAL HISTORY  Active Ambulatory Problems     Diagnosis Date Noted   • Graves disease    • Supraventricular tachycardia (CMS/HCC) 03/15/2016   • PVC (premature ventricular contraction) 03/15/2016   • Hyperlipidemia 03/15/2016   • Acute bilateral thoracic back pain 11/08/2016   • Dizziness 01/06/2017   • Irregular heart beat 01/06/2017   • Seasonal allergic rhinitis due to pollen 02/02/2017   • Hyperglycemia 02/07/2017   • Erectile dysfunction 02/07/2017   • Insulin resistance 06/07/2017   • Acquired spondylolisthesis 09/12/2017   • Lumbar radiculopathy 09/12/2017   • Degeneration of intervertebral disc of lumbar region 09/12/2017   • Essential hypertension 02/16/2018   • PND (paroxysmal nocturnal dyspnea) 02/16/2018   • Right foot pain 02/28/2018   • Pain in both lower extremities 02/28/2018   • JACQUELINE (obstructive sleep apnea) 03/06/2018   • Snoring 03/06/2018   • H/O multiple concussions 05/30/2018   • Other specified hypothyroidism 06/20/2018     Resolved Ambulatory Problems     Diagnosis Date Noted   •  Hypertensive heart disease 03/15/2016   • Hypertension 03/15/2016   • Postablative hypothyroidism 02/07/2017     Past Medical History:   Diagnosis Date   • Abdominal obesity    • Acquired spondylolisthesis    • Acute low back pain    • Acute right lumbar radiculopathy    • Bulging lumbar disc    • Concussion with no loss of consciousness    • DDD (degenerative disc disease)    • Essential hypertension    • Fatigue    • Graves disease    • H/O disorder of nervous system and sense organs    • Headache    • Hyperlipidemia    • Hypertension    • Hypertensive heart disease    • Hypothyroidism    • Insomnia    • Irregular heart beat    • Joint pain    • Lumbar radiculopathy    • Nephrolithiasis    • Paroxysmal nocturnal dyspnea    • PVC (premature ventricular contraction)    • Severe head trauma    • SOB (shortness of breath)    • Soemmering's ring    • Spondylolisthesis    • Subdural hematoma (CMS/HCC)    • Supraventricular tachycardia (CMS/HCC)        PAST SURGICAL HISTORY  Past Surgical History:   Procedure Laterality Date   • KNEE ARTHROSCOPY Left    • KNEE SURGERY      2008 and 2009   • LITHOTRIPSY     • OTHER SURGICAL HISTORY      ear surgery       FAMILY HISTORY  Family History   Problem Relation Age of Onset   • Cancer Father         malignant neoplasm   • No Known Problems Mother    • No Known Problems Sister    • No Known Problems Brother    • No Known Problems Sister    • No Known Problems Brother    • No Known Problems Brother    • Diabetes Maternal Grandmother        SOCIAL HISTORY  Social History     Social History   • Marital status:      Spouse name: N/A   • Number of children: N/A   • Years of education: N/A     Occupational History   • Not on file.     Social History Main Topics   • Smoking status: Former Smoker   • Smokeless tobacco: Never Used      Comment: quit 45 yrs ago   • Alcohol use Yes      Comment: occasional   • Drug use: No   • Sexual activity: Not on file     Other Topics Concern   •  Not on file     Social History Narrative   • No narrative on file       ALLERGIES  Latex    REVIEW OF SYSTEMS  Review of Systems   Constitutional: Negative for activity change, appetite change and fever.   HENT: Negative for congestion and sore throat.    Eyes: Negative.    Respiratory: Negative for cough and shortness of breath.    Cardiovascular: Negative for chest pain and leg swelling.   Gastrointestinal: Negative for abdominal pain, diarrhea and vomiting.   Endocrine: Negative.    Genitourinary: Negative for decreased urine volume and dysuria.   Musculoskeletal: Positive for back pain (R sided thoracic back). Negative for neck pain.   Skin: Negative for rash and wound.   Allergic/Immunologic: Negative.    Neurological: Negative for weakness, numbness and headaches.   Hematological: Negative.    Psychiatric/Behavioral: Negative.    All other systems reviewed and are negative.      PHYSICAL EXAM  ED Triage Vitals   Temp Heart Rate Resp BP SpO2   08/07/18 1209 08/07/18 1209 08/07/18 1209 08/07/18 1231 08/07/18 1209   97.9 °F (36.6 °C) 50 16 130/89 96 %      Temp src Heart Rate Source Patient Position BP Location FiO2 (%)   08/07/18 1209 -- 08/07/18 1231 08/07/18 1231 --   Tympanic  Sitting Right arm        Physical Exam   Constitutional: No distress.   HENT:   Head: Normocephalic and atraumatic.   Eyes: EOM are normal.   Neck: Normal range of motion.   Pulmonary/Chest: No respiratory distress.   Abdominal: There is no tenderness.   Musculoskeletal: He exhibits no edema.        Thoracic back: He exhibits decreased range of motion (due to pain with rotation, reproducible), tenderness and spasm (in R interscapular muscles).   Neurological: He is alert.   Skin: Skin is warm and dry.   Nursing note and vitals reviewed.      LAB RESULTS  Lab Results (last 24 hours)     Procedure Component Value Units Date/Time    Urinalysis With Microscopic If Indicated (No Culture) - Urine, Clean Catch [[id number]]  (Normal) Collected:   08/07/18 1306    Specimen:  Urine from Urine, Clean Catch Updated:  08/07/18 1332     Color, UA Yellow     Appearance, UA Clear     pH, UA <=5.0     Specific Gravity, UA 1.020     Glucose, UA Negative     Ketones, UA Negative     Bilirubin, UA Negative     Blood, UA Negative     Protein, UA Negative     Leuk Esterase, UA Negative     Nitrite, UA Negative     Urobilinogen, UA 0.2 E.U./dL    Narrative:       Urine microscopic not indicated.          I ordered the above labs and reviewed the results    RADIOLOGY  XR Spine Thoracic 3 View   There is mild disc space narrowing and spurring scattered in  the mid and lower thoracic spine and this appearance is quite similar to  the lateral chest x-ray dating back to 05/03/2010. There is no evidence  of acute compression fracture.           I ordered the above noted radiological studies. Interpreted by radiologist. Reviewed by me in PACS.     Procedures      PROGRESS AND CONSULTS  ED Course as of Aug 07 1415   Tue Aug 07, 2018   1245 Right thoracic non-injury back pain x 2 weeks, worse after having physical therapy this morning for recent left knee surgery. Increases with bending, twisting, certain positions. Denies urinary symptoms, bowel/bladder dysfunction, saddle paresthesia.  [KA]      ED Course User Index  [KA] Jessica Albert PA     1407: Upon pt exam, discussed negative results of labs and XR with symptoms due to muscle spasm. Discussed with pt the plan to give pt Toradol and muscle relaxant in ED and discharge with further muscle relaxants. Discussed with pt that symptoms were probably due to increased rigor of physical therapy. Pt understands and agrees with plan, all questions addressed.    1410: Toradol and Flexeril ordered for pain management.     MEDICAL DECISION MAKING  Results were reviewed/discussed with the patient and they were also made aware of online access. Pt also made aware that some labs, such as cultures, will not be resulted during ER visit and  follow up with PMD is necessary.     MDM  Number of Diagnoses or Management Options  Back spasm:      Amount and/or Complexity of Data Reviewed  Clinical lab tests: reviewed (Unremarkable labs)  Tests in the radiology section of CPT®: reviewed (XR - no acute findings)           DIAGNOSIS  Final diagnoses:   Back spasm       DISPOSITION  DISCHARGE    Patient discharged in stable condition.    Reviewed implications of results, diagnosis, meds, responsibility to follow up, warning signs and symptoms of possible worsening, potential complications and reasons to return to ER.    Patient/Family voiced understanding of above instructions.    Discussed plan for discharge, as there is no emergent indication for admission. Patient referred to primary care provider for BP management due to today's BP. Pt/family is agreeable and understands need for follow up and repeat testing.  Pt is aware that discharge does not mean that nothing is wrong but it indicates no emergency is present that requires admission and they must continue care with follow-up as given below or physician of their choice.     FOLLOW-UP  No follow-up provider specified.       Medication List      Changed    naproxen 500 MG tablet  Commonly known as:  NAPROSYN  TAKE 1 TABLET BY MOUTH TWO TIMES A DAY  What changed:  See the new instructions.     * levothyroxine 125 MCG tablet  Commonly known as:  SYNTHROID, LEVOTHROID  What changed:  Another medication with the same name was changed. Make   sure you understand how and when to take each.     * SYNTHROID 125 MCG tablet  Generic drug:  levothyroxine  Take 1.5 tablets by mouth Daily.  What changed:  when to take this  additional instructions        * This list has 2 medication(s) that are the same as other medications   prescribed for you. Read the directions carefully, and ask your doctor or   other care provider to review them with you.                  Latest Documented Vital Signs:  As of 2:15 PM  BP- 130/89  HR- 50 Temp- 97.9 °F (36.6 °C) (Tympanic) O2 sat- 96%    --  Documentation assistance provided by thomas Caldwell for Dr. Nieto.  Information recorded by the thomas was done at my direction and has been verified and validated by me.     Cynthia Caldwell  08/07/18 9828       Alber Nieto MD  08/07/18 0884

## 2018-08-08 ENCOUNTER — TELEPHONE (OUTPATIENT)
Dept: FAMILY MEDICINE CLINIC | Facility: CLINIC | Age: 68
End: 2018-08-08

## 2018-08-08 NOTE — TELEPHONE ENCOUNTER
PATIENT WENT TO [facility] ER YESTERDAY FOR MUSCLE SPASMS WAS GIVEN AN INJECTION AND MUSCLE RELAXERS

## 2018-08-14 ENCOUNTER — LAB (OUTPATIENT)
Dept: ENDOCRINOLOGY | Age: 68
End: 2018-08-14

## 2018-08-14 DIAGNOSIS — E03.8 OTHER SPECIFIED HYPOTHYROIDISM: ICD-10-CM

## 2018-08-14 DIAGNOSIS — N52.9 ERECTILE DYSFUNCTION, UNSPECIFIED ERECTILE DYSFUNCTION TYPE: ICD-10-CM

## 2018-08-14 DIAGNOSIS — E88.81 INSULIN RESISTANCE: ICD-10-CM

## 2018-08-14 DIAGNOSIS — R73.9 HYPERGLYCEMIA: ICD-10-CM

## 2018-08-14 DIAGNOSIS — E05.00 GRAVES DISEASE: ICD-10-CM

## 2018-08-16 LAB
25(OH)D3+25(OH)D2 SERPL-MCNC: 40.6 NG/ML (ref 30–100)
ALBUMIN SERPL-MCNC: 4 G/DL (ref 3.5–5.2)
ALBUMIN/GLOB SERPL: 1.3 G/DL
ALP SERPL-CCNC: 74 U/L (ref 39–117)
ALT SERPL-CCNC: 21 U/L (ref 1–41)
AST SERPL-CCNC: 18 U/L (ref 1–40)
BILIRUB SERPL-MCNC: 0.5 MG/DL (ref 0.1–1.2)
BUN SERPL-MCNC: 17 MG/DL (ref 8–23)
BUN/CREAT SERPL: 16.2 (ref 7–25)
C PEPTIDE SERPL-MCNC: 5.1 NG/ML (ref 1.1–4.4)
CALCIUM SERPL-MCNC: 10.1 MG/DL (ref 8.6–10.5)
CHLORIDE SERPL-SCNC: 103 MMOL/L (ref 98–107)
CHOLEST SERPL-MCNC: 144 MG/DL (ref 0–200)
CO2 SERPL-SCNC: 25.8 MMOL/L (ref 22–29)
CREAT SERPL-MCNC: 1.05 MG/DL (ref 0.76–1.27)
GLOBULIN SER CALC-MCNC: 3.2 GM/DL
GLUCOSE SERPL-MCNC: 95 MG/DL (ref 65–99)
HBA1C MFR BLD: 5.5 % (ref 4.8–5.6)
HCT VFR BLD AUTO: 50.2 % (ref 40.4–52.2)
HDLC SERPL-MCNC: 36 MG/DL (ref 40–60)
HGB BLD-MCNC: 16.3 G/DL (ref 13.7–17.6)
INTERPRETATION: NORMAL
LDLC SERPL CALC-MCNC: 77 MG/DL (ref 0–100)
Lab: NORMAL
POTASSIUM SERPL-SCNC: 4.9 MMOL/L (ref 3.5–5.2)
PROT SERPL-MCNC: 7.2 G/DL (ref 6–8.5)
SHBG SERPL-SCNC: 32.3 NMOL/L (ref 19.3–76.4)
SODIUM SERPL-SCNC: 141 MMOL/L (ref 136–145)
T3FREE SERPL-MCNC: 2.6 PG/ML (ref 2–4.4)
T4 FREE SERPL-MCNC: 1.35 NG/DL (ref 0.93–1.7)
T4 SERPL-MCNC: 7.45 MCG/DL (ref 4.5–11.7)
TESTOST FREE SERPL-MCNC: 6.2 PG/ML (ref 6.6–18.1)
TESTOST SERPL-MCNC: 363 NG/DL (ref 264–916)
THYROGLOB AB SERPL-ACNC: <1 IU/ML
THYROGLOB SERPL-MCNC: 17.7 NG/ML
THYROGLOB SERPL-MCNC: NORMAL NG/ML
TRIGL SERPL-MCNC: 153 MG/DL (ref 0–150)
TSH SERPL DL<=0.005 MIU/L-ACNC: 2.75 MIU/ML (ref 0.27–4.2)
URATE SERPL-MCNC: 7.2 MG/DL (ref 3.4–7)
VLDLC SERPL CALC-MCNC: 30.6 MG/DL (ref 5–40)

## 2018-08-27 ENCOUNTER — RESULTS ENCOUNTER (OUTPATIENT)
Dept: ENDOCRINOLOGY | Age: 68
End: 2018-08-27

## 2018-08-27 DIAGNOSIS — E89.0 POSTABLATIVE HYPOTHYROIDISM: ICD-10-CM

## 2018-08-27 DIAGNOSIS — E88.81 INSULIN RESISTANCE: ICD-10-CM

## 2018-08-28 ENCOUNTER — OFFICE VISIT (OUTPATIENT)
Dept: CARDIOLOGY | Facility: CLINIC | Age: 68
End: 2018-08-28

## 2018-08-28 ENCOUNTER — OFFICE VISIT (OUTPATIENT)
Dept: ENDOCRINOLOGY | Age: 68
End: 2018-08-28

## 2018-08-28 ENCOUNTER — TELEPHONE (OUTPATIENT)
Dept: CARDIOLOGY | Facility: CLINIC | Age: 68
End: 2018-08-28

## 2018-08-28 VITALS
DIASTOLIC BLOOD PRESSURE: 68 MMHG | BODY MASS INDEX: 30.08 KG/M2 | HEIGHT: 76 IN | HEART RATE: 48 BPM | WEIGHT: 247 LBS | SYSTOLIC BLOOD PRESSURE: 132 MMHG

## 2018-08-28 VITALS
HEIGHT: 76 IN | WEIGHT: 247.6 LBS | DIASTOLIC BLOOD PRESSURE: 66 MMHG | SYSTOLIC BLOOD PRESSURE: 114 MMHG | BODY MASS INDEX: 30.15 KG/M2 | RESPIRATION RATE: 16 BRPM

## 2018-08-28 DIAGNOSIS — I49.3 PVC (PREMATURE VENTRICULAR CONTRACTION): ICD-10-CM

## 2018-08-28 DIAGNOSIS — E78.2 MIXED HYPERLIPIDEMIA: ICD-10-CM

## 2018-08-28 DIAGNOSIS — I10 ESSENTIAL HYPERTENSION: ICD-10-CM

## 2018-08-28 DIAGNOSIS — E03.8 OTHER SPECIFIED HYPOTHYROIDISM: ICD-10-CM

## 2018-08-28 DIAGNOSIS — N52.9 ERECTILE DYSFUNCTION, UNSPECIFIED ERECTILE DYSFUNCTION TYPE: ICD-10-CM

## 2018-08-28 DIAGNOSIS — E88.81 INSULIN RESISTANCE: ICD-10-CM

## 2018-08-28 DIAGNOSIS — R79.89 LOW TESTOSTERONE: ICD-10-CM

## 2018-08-28 DIAGNOSIS — R73.9 HYPERGLYCEMIA: ICD-10-CM

## 2018-08-28 DIAGNOSIS — I47.1 SUPRAVENTRICULAR TACHYCARDIA (HCC): Primary | ICD-10-CM

## 2018-08-28 DIAGNOSIS — E05.00 GRAVES DISEASE: Primary | ICD-10-CM

## 2018-08-28 PROCEDURE — 99214 OFFICE O/P EST MOD 30 MIN: CPT | Performed by: INTERNAL MEDICINE

## 2018-08-28 PROCEDURE — 93000 ELECTROCARDIOGRAM COMPLETE: CPT | Performed by: INTERNAL MEDICINE

## 2018-08-28 RX ORDER — ATORVASTATIN CALCIUM 10 MG/1
TABLET, FILM COATED ORAL
Qty: 90 TABLET | Refills: 0 | Status: SHIPPED | OUTPATIENT
Start: 2018-08-28 | End: 2018-11-16 | Stop reason: SDUPTHER

## 2018-08-28 RX ORDER — ERGOCALCIFEROL 1.25 MG/1
50000 CAPSULE ORAL
Qty: 13 CAPSULE | Refills: 3 | Status: SHIPPED | OUTPATIENT
Start: 2018-08-28 | End: 2019-01-09 | Stop reason: SDUPTHER

## 2018-08-28 RX ORDER — ALLOPURINOL 100 MG/1
100 TABLET ORAL DAILY
Qty: 30 TABLET | Refills: 5 | Status: SHIPPED | OUTPATIENT
Start: 2018-08-28 | End: 2019-01-03

## 2018-08-28 RX ORDER — TESTOSTERONE 16.2 MG/G
GEL TRANSDERMAL
Qty: 75 G | Refills: 5 | Status: SHIPPED | OUTPATIENT
Start: 2018-08-28 | End: 2019-01-09 | Stop reason: SDUPTHER

## 2018-08-28 RX ORDER — LEVOTHYROXINE SODIUM 0.12 MG/1
125 TABLET ORAL DAILY
COMMUNITY
End: 2018-08-28

## 2018-08-28 RX ORDER — LEVOTHYROXINE SODIUM 175 MCG
175 TABLET ORAL DAILY
Qty: 30 TABLET | Refills: 5 | Status: SHIPPED | OUTPATIENT
Start: 2018-08-28 | End: 2018-11-07

## 2018-08-28 NOTE — PROGRESS NOTES
"Subjective   Joaquin Peña is a 68 y.o. male seen for follow up for hyperlipidemia, HTN, hypothyroidism, goiter, graves, lab review. Patient states that he would like to discuss his Synthroid dose. He has been taking his BP at home one hour after his medications and states that BP has been running mid to upper 130's and as low as 110.   History of Present Illness this is a 68-year-old gentleman known patient with essential hypertension and dyslipidemia as well as hypothyroidism and metabolic syndrome and erectile dysfunction with vitamin D deficiency.  Over the course of last 6 months he has had no significant health problems for which to go to the emergency room or hospital.    /68   Resp 16   Ht 193 cm (76\")   Wt 112 kg (247 lb 9.6 oz)   BMI 30.14 kg/m²      Allergies   Allergen Reactions   • Latex Rash       Current Outpatient Prescriptions:   •  atorvastatin (LIPITOR) 10 MG tablet, Take one tablet by mouth daily, Disp: 90 tablet, Rfl: 0  •  irbesartan (AVAPRO) 300 MG tablet, Take 1 tablet by mouth Daily., Disp: 30 tablet, Rfl: 11  •  ketotifen (ZADITOR) 0.025 % ophthalmic solution, Apply  to eye As Needed., Disp: , Rfl:   •  levothyroxine (SYNTHROID, LEVOTHROID) 125 MCG tablet, Take 125 mcg by mouth Daily. 1 tab daily and 1.5 tab daily. Alternating every other day., Disp: , Rfl:   •  metoprolol tartrate (LOPRESSOR) 25 MG tablet, Take 25 mg by mouth 2 (Two) Times a Day., Disp: , Rfl:   •  naproxen (NAPROSYN) 500 MG tablet, TAKE 1 TABLET BY MOUTH TWO TIMES A DAY  (Patient taking differently: TAKE 1 TABLET BY MOUTH TWO TIMES A DAY PRN), Disp: 60 tablet, Rfl: 1  •  vardenafil (LEVITRA) 10 MG tablet, Take 10 mg by mouth As Needed for erectile dysfunction., Disp: , Rfl:   •  vitamin D (ERGOCALCIFEROL) 01314 units capsule capsule, Take 50,000 Units by mouth Every 7 (Seven) Days. On thurs, Disp: , Rfl:       The following portions of the patient's history were reviewed and updated as appropriate: allergies, " current medications, past family history, past medical history, past social history, past surgical history and problem list.    Review of Systems   Constitutional: Negative.    HENT: Negative.    Eyes: Negative.    Respiratory: Negative.    Cardiovascular: Negative.    Gastrointestinal: Negative.    Endocrine: Negative.    Genitourinary: Negative.    Musculoskeletal: Negative.    Skin: Negative.    Allergic/Immunologic: Negative.    Neurological: Negative.    Hematological: Negative.    Psychiatric/Behavioral: Negative.        Objective   Physical Exam   Constitutional: He is oriented to person, place, and time. He appears well-developed and well-nourished. No distress.   HENT:   Head: Normocephalic and atraumatic.   Right Ear: External ear normal.   Left Ear: External ear normal.   Nose: Nose normal.   Mouth/Throat: Oropharynx is clear and moist. No oropharyngeal exudate.   Eyes: Pupils are equal, round, and reactive to light. Conjunctivae and EOM are normal. Right eye exhibits no discharge. Left eye exhibits no discharge. No scleral icterus.   Neck: Normal range of motion. Neck supple. No JVD present. No tracheal deviation present. No thyromegaly present.   Cardiovascular: Normal rate, regular rhythm, normal heart sounds and intact distal pulses.  Exam reveals no gallop and no friction rub.    No murmur heard.  Pulmonary/Chest: Effort normal and breath sounds normal. No stridor. No respiratory distress. He has no wheezes. He has no rales. He exhibits no tenderness.   Abdominal: Soft. Bowel sounds are normal. He exhibits no distension and no mass. There is no tenderness. There is no rebound and no guarding. No hernia.   Musculoskeletal: Normal range of motion. He exhibits no edema, tenderness or deformity.   Lymphadenopathy:     He has no cervical adenopathy.   Neurological: He is alert and oriented to person, place, and time. He has normal reflexes. He displays normal reflexes. No cranial nerve deficit or sensory  deficit. He exhibits normal muscle tone. Coordination normal.   Skin: Skin is warm and dry. No rash noted. No erythema. No pallor.   Psychiatric: He has a normal mood and affect. His behavior is normal. Judgment and thought content normal.   Nursing note and vitals reviewed.       Results for orders placed or performed in visit on 08/14/18   Comprehensive Metabolic Panel   Result Value Ref Range    Glucose 95 65 - 99 mg/dL    BUN 17 8 - 23 mg/dL    Creatinine 1.05 0.76 - 1.27 mg/dL    eGFR Non African Am 71 >60 mL/min/1.73    eGFR African Am 86 >60 mL/min/1.73    BUN/Creatinine Ratio 16.2 7.0 - 25.0    Sodium 141 136 - 145 mmol/L    Potassium 4.9 3.5 - 5.2 mmol/L    Chloride 103 98 - 107 mmol/L    Total CO2 25.8 22.0 - 29.0 mmol/L    Calcium 10.1 8.6 - 10.5 mg/dL    Total Protein 7.2 6.0 - 8.5 g/dL    Albumin 4.00 3.50 - 5.20 g/dL    Globulin 3.2 gm/dL    A/G Ratio 1.3 g/dL    Total Bilirubin 0.5 0.1 - 1.2 mg/dL    Alkaline Phosphatase 74 39 - 117 U/L    AST (SGOT) 18 1 - 40 U/L    ALT (SGPT) 21 1 - 41 U/L   C-Peptide   Result Value Ref Range    C-Peptide 5.1 (H) 1.1 - 4.4 ng/mL   Hemoglobin A1c   Result Value Ref Range    Hemoglobin A1C 5.50 4.80 - 5.60 %   Lipid Panel   Result Value Ref Range    Total Cholesterol 144 0 - 200 mg/dL    Triglycerides 153 (H) 0 - 150 mg/dL    HDL Cholesterol 36 (L) 40 - 60 mg/dL    VLDL Cholesterol 30.6 5 - 40 mg/dL    LDL Cholesterol  77 0 - 100 mg/dL   T3, Free   Result Value Ref Range    T3, Free 2.6 2.0 - 4.4 pg/mL   T4 & TSH ([facility])   Result Value Ref Range    TSH 2.750 0.270 - 4.200 mIU/mL    T4, Total 7.45 4.50 - 11.70 mcg/dL   T4, Free   Result Value Ref Range    Free T4 1.35 0.93 - 1.70 ng/dL   Comprehensive Thyroglobulin   Result Value Ref Range    Thyroglobulin Ab <1.0 IU/mL    Thyroglobulin 17.7 ng/mL    Thyroglobulin (TG-CARMENCITA) Comment ng/mL   Uric Acid   Result Value Ref Range    Uric Acid 7.2 (H) 3.4 - 7.0 mg/dL   Vitamin D 25 Hydroxy   Result Value Ref Range    25  Hydroxy, Vitamin D 40.6 30.0 - 100.0 ng/ml   TestT+TestF+SHBG   Result Value Ref Range    Testosterone, Total 363 264 - 916 ng/dL    Testosterone, Free 6.2 (L) 6.6 - 18.1 pg/mL    Sex Hormone Binding Globulin 32.3 19.3 - 76.4 nmol/L   Hemoglobin & Hematocrit, Blood   Result Value Ref Range    Hemoglobin 16.3 13.7 - 17.6 g/dL    Hematocrit 50.2 40.4 - 52.2 %   Cardiovascular Risk Assessment   Result Value Ref Range    Interpretation Note    Diabetes Patient Education   Result Value Ref Range    PDF Image Not applicable          Assessment/Plan   Diagnoses and all orders for this visit:    Graves disease  -     vitamin D (ERGOCALCIFEROL) 53390 units capsule capsule; Take 1 capsule by mouth Every 7 (Seven) Days. On thurs  -     T3, Free; Future  -     T4 & TSH ([facility]); Future  -     T4, Free; Future  -     TestT+TestF+SHBG; Future  -     Thyroglobulin With Anti-TG; Future  -     Uric Acid; Future  -     Vitamin D 25 Hydroxy; Future  -     Comprehensive Metabolic Panel; Future  -     C-Peptide; Future  -     Follicle Stimulating Hormone; Future  -     Hemoglobin A1c; Future  -     Lipid Panel; Future  -     Luteinizing Hormone; Future  -     PSA DIAGNOSTIC; Future  -     Hemoglobin & Hematocrit, Blood; Future    Other specified hypothyroidism  -     vitamin D (ERGOCALCIFEROL) 38298 units capsule capsule; Take 1 capsule by mouth Every 7 (Seven) Days. On thurs  -     T3, Free; Future  -     T4 & TSH ([facility]); Future  -     T4, Free; Future  -     TestT+TestF+SHBG; Future  -     Thyroglobulin With Anti-TG; Future  -     Uric Acid; Future  -     Vitamin D 25 Hydroxy; Future  -     Comprehensive Metabolic Panel; Future  -     C-Peptide; Future  -     Follicle Stimulating Hormone; Future  -     Hemoglobin A1c; Future  -     Lipid Panel; Future  -     Luteinizing Hormone; Future  -     PSA DIAGNOSTIC; Future  -     Hemoglobin & Hematocrit, Blood; Future    Essential hypertension  -     vitamin D (ERGOCALCIFEROL) 53838 units  capsule capsule; Take 1 capsule by mouth Every 7 (Seven) Days. On thurs  -     T3, Free; Future  -     T4 & TSH ([facility]); Future  -     T4, Free; Future  -     TestT+TestF+SHBG; Future  -     Thyroglobulin With Anti-TG; Future  -     Uric Acid; Future  -     Vitamin D 25 Hydroxy; Future  -     Comprehensive Metabolic Panel; Future  -     C-Peptide; Future  -     Follicle Stimulating Hormone; Future  -     Hemoglobin A1c; Future  -     Lipid Panel; Future  -     Luteinizing Hormone; Future  -     PSA DIAGNOSTIC; Future  -     Hemoglobin & Hematocrit, Blood; Future    Mixed hyperlipidemia  -     vitamin D (ERGOCALCIFEROL) 00076 units capsule capsule; Take 1 capsule by mouth Every 7 (Seven) Days. On thurs  -     T3, Free; Future  -     T4 & TSH ([facility]); Future  -     T4, Free; Future  -     TestT+TestF+SHBG; Future  -     Thyroglobulin With Anti-TG; Future  -     Uric Acid; Future  -     Vitamin D 25 Hydroxy; Future  -     Comprehensive Metabolic Panel; Future  -     C-Peptide; Future  -     Follicle Stimulating Hormone; Future  -     Hemoglobin A1c; Future  -     Lipid Panel; Future  -     Luteinizing Hormone; Future  -     PSA DIAGNOSTIC; Future  -     Hemoglobin & Hematocrit, Blood; Future    Erectile dysfunction, unspecified erectile dysfunction type  -     vitamin D (ERGOCALCIFEROL) 17840 units capsule capsule; Take 1 capsule by mouth Every 7 (Seven) Days. On thurs  -     T3, Free; Future  -     T4 & TSH ([facility]); Future  -     T4, Free; Future  -     TestT+TestF+SHBG; Future  -     Thyroglobulin With Anti-TG; Future  -     Uric Acid; Future  -     Vitamin D 25 Hydroxy; Future  -     Comprehensive Metabolic Panel; Future  -     C-Peptide; Future  -     Follicle Stimulating Hormone; Future  -     Hemoglobin A1c; Future  -     Lipid Panel; Future  -     Luteinizing Hormone; Future  -     PSA DIAGNOSTIC; Future  -     Hemoglobin & Hematocrit, Blood; Future    Hyperglycemia  -     vitamin D (ERGOCALCIFEROL) 48313  units capsule capsule; Take 1 capsule by mouth Every 7 (Seven) Days. On thurs  -     T3, Free; Future  -     T4 & TSH ([facility]); Future  -     T4, Free; Future  -     TestT+TestF+SHBG; Future  -     Thyroglobulin With Anti-TG; Future  -     Uric Acid; Future  -     Vitamin D 25 Hydroxy; Future  -     Comprehensive Metabolic Panel; Future  -     C-Peptide; Future  -     Follicle Stimulating Hormone; Future  -     Hemoglobin A1c; Future  -     Lipid Panel; Future  -     Luteinizing Hormone; Future  -     PSA DIAGNOSTIC; Future  -     Hemoglobin & Hematocrit, Blood; Future    Insulin resistance  -     vitamin D (ERGOCALCIFEROL) 50470 units capsule capsule; Take 1 capsule by mouth Every 7 (Seven) Days. On thurs  -     T3, Free; Future  -     T4 & TSH ([facility]); Future  -     T4, Free; Future  -     TestT+TestF+SHBG; Future  -     Thyroglobulin With Anti-TG; Future  -     Uric Acid; Future  -     Vitamin D 25 Hydroxy; Future  -     Comprehensive Metabolic Panel; Future  -     C-Peptide; Future  -     Follicle Stimulating Hormone; Future  -     Hemoglobin A1c; Future  -     Lipid Panel; Future  -     Luteinizing Hormone; Future  -     PSA DIAGNOSTIC; Future  -     Hemoglobin & Hematocrit, Blood; Future    Low testosterone  -     vitamin D (ERGOCALCIFEROL) 12608 units capsule capsule; Take 1 capsule by mouth Every 7 (Seven) Days. On thurs  -     T3, Free; Future  -     T4 & TSH ([facility]); Future  -     T4, Free; Future  -     TestT+TestF+SHBG; Future  -     Thyroglobulin With Anti-TG; Future  -     Uric Acid; Future  -     Vitamin D 25 Hydroxy; Future  -     Comprehensive Metabolic Panel; Future  -     C-Peptide; Future  -     Follicle Stimulating Hormone; Future  -     Hemoglobin A1c; Future  -     Lipid Panel; Future  -     Luteinizing Hormone; Future  -     PSA DIAGNOSTIC; Future  -     Hemoglobin & Hematocrit, Blood; Future    Other orders  -     ANDROGEL PUMP 20.25 MG/ACT (1.62%) gel; 1 pump actuation on each shoulder  daily.  -     allopurinol (ZYLOPRIM) 100 MG tablet; Take 1 tablet by mouth Daily.  -     SYNTHROID 175 MCG tablet; Take 1 tablet by mouth Daily.               In summary I saw and examined this 68-year-old gentleman for above-mentioned problems.  I reviewed his laboratory evaluation of 08/14/2018 and provided him with a hard copy of it.  Because of his low testosterone    I started him on AndroGel 1 pump actuation on each shoulder daily.  Also I am going to start him on Synthroid 175 µg daily and for his elevated uric acid will start him on allopurinol 100 mg daily.  He will continue rest of his prescriptions and will see Ms. Aimee Svitlana in 6 months or sooner if needed with laboratory evaluation prior to each office visit.

## 2018-10-10 NOTE — TELEPHONE ENCOUNTER
Spoke with pt and BP is running  125-148/61-74      He stated that if it is high he contributes this his salt intake.

## 2018-10-10 NOTE — TELEPHONE ENCOUNTER
See if he can senna better low-salt diet to avoid increasing meds further.  Also find out if he has symptoms of sleep apnea as noted on Soledad's note. adrienne

## 2018-10-15 NOTE — TELEPHONE ENCOUNTER
Bandar's have him follow-up with the [facility] sleep lab whom he has seen previously( dr Marte) re unreated JACQUELINE. adrienne

## 2018-10-15 NOTE — TELEPHONE ENCOUNTER
Spoke with pt.  He will watch the salt.  He is willing to do the testing BUT depending on cost and device if he will continue the therapy.  Ok with the pt.      Pt has positive for snoring, high BP, male, and over 50 years old.  He has denied apnea episode since he has lost 30lbs last year.  He stated that he had JACQUELINE testing over a year ago but lost the weight.

## 2018-10-16 NOTE — TELEPHONE ENCOUNTER
SAM, he would like to set his own appointment up.  He states he will call Dr Marte's office to make his appointment.    Thank you  Janelle IBRAHIM

## 2018-10-17 ENCOUNTER — CLINICAL SUPPORT (OUTPATIENT)
Dept: FAMILY MEDICINE CLINIC | Facility: CLINIC | Age: 68
End: 2018-10-17

## 2018-10-17 PROCEDURE — 90472 IMMUNIZATION ADMIN EACH ADD: CPT | Performed by: INTERNAL MEDICINE

## 2018-10-17 PROCEDURE — 90662 IIV NO PRSV INCREASED AG IM: CPT | Performed by: INTERNAL MEDICINE

## 2018-10-17 PROCEDURE — 90471 IMMUNIZATION ADMIN: CPT | Performed by: INTERNAL MEDICINE

## 2018-10-17 PROCEDURE — 90632 HEPA VACCINE ADULT IM: CPT | Performed by: INTERNAL MEDICINE

## 2018-10-29 ENCOUNTER — TELEPHONE (OUTPATIENT)
Dept: CARDIOLOGY | Facility: CLINIC | Age: 68
End: 2018-10-29

## 2018-10-29 NOTE — TELEPHONE ENCOUNTER
Per Pt, he has already notified Dr. Smith as well, he has been dealing with elevated BP as high as 180's/ 90's for approximately 2 weeks. Pt took it upon himself to decrease his synthroid from 175 mcg to 125 mcg. He states that within 36 hours his BP became normal at 113/72. Pt denies any symptoms during the two week time period and reports no symptoms since decrease his thyroid medication. Pt states his elevated BP was due to being over medicated.    JORDEN

## 2018-11-07 RX ORDER — LEVOTHYROXINE SODIUM 150 MCG
150 TABLET ORAL DAILY
Qty: 30 TABLET | Refills: 11 | Status: SHIPPED | OUTPATIENT
Start: 2018-11-07 | End: 2019-01-09 | Stop reason: SDUPTHER

## 2018-11-08 ENCOUNTER — OFFICE VISIT (OUTPATIENT)
Dept: SLEEP MEDICINE | Facility: HOSPITAL | Age: 68
End: 2018-11-08
Attending: INTERNAL MEDICINE

## 2018-11-08 VITALS
HEART RATE: 42 BPM | SYSTOLIC BLOOD PRESSURE: 149 MMHG | WEIGHT: 245 LBS | BODY MASS INDEX: 29.83 KG/M2 | DIASTOLIC BLOOD PRESSURE: 74 MMHG | HEIGHT: 76 IN

## 2018-11-08 DIAGNOSIS — G47.33 OSA (OBSTRUCTIVE SLEEP APNEA): Primary | ICD-10-CM

## 2018-11-08 PROCEDURE — G0463 HOSPITAL OUTPT CLINIC VISIT: HCPCS

## 2018-11-08 NOTE — PROGRESS NOTES
SLEEP CLINIC FOLLOW UP PROGRESS NOTE.    [facility] SLEEP MEDICINE    Joaquin Peña  68 y.o.  male  1950    PCP: Erwin Escobar MD     Referring doctor: michel Haque MD      Date of visit: 11/8/2018    INTERM HISTORY:  This is a 68 y.o. years old patient who has a history of sleep apnea who underwent home sleep test in the March 2018 and had severe sleep apnea.  He did not tolerate CPAP.  He was also sent for evaluation for a mandibular advancement device..  However since his last test he has lost almost 40 pounds and does not have any snoring at this time.  He is requesting evaluation to see whether he still needs CPAP.  I do feel that the patient requires a repeat home sleep test to reevaluate him.      PAST MEDICAL HISTORY:  · Obstructive sleep apnea  Past Medical History:   Diagnosis Date   • Abdominal obesity    • Acquired spondylolisthesis    • Acute low back pain    • Acute right lumbar radiculopathy    • Bulging lumbar disc    • Concussion with no loss of consciousness    • DDD (degenerative disc disease)    • Essential hypertension    • Fatigue    • Graves disease    • H/O disorder of nervous system and sense organs    • Headache    • Hyperlipidemia    • Hypertension    • Hypertensive heart disease    • Hypothyroidism    • Insomnia    • Irregular heart beat    • Joint pain    • Lumbar radiculopathy    • Nephrolithiasis    • Paroxysmal nocturnal dyspnea    • PVC (premature ventricular contraction)    • Severe head trauma    • SOB (shortness of breath)    • Soemmering's ring    • Spondylolisthesis    • Subdural hematoma (CMS/HCC)    • Supraventricular tachycardia (CMS/HCC)        MEDICATIONS:    Current Outpatient Prescriptions:   •  allopurinol (ZYLOPRIM) 100 MG tablet, Take 1 tablet by mouth Daily., Disp: 30 tablet, Rfl: 5  •  ANDROGEL PUMP 20.25 MG/ACT (1.62%) gel, 1 pump actuation on each shoulder daily., Disp: 75 g, Rfl: 5  •  atorvastatin (LIPITOR) 10 MG tablet, Take one tablet  "by mouth daily, Disp: 90 tablet, Rfl: 0  •  irbesartan (AVAPRO) 300 MG tablet, Take 1 tablet by mouth Daily., Disp: 30 tablet, Rfl: 11  •  ketotifen (ZADITOR) 0.025 % ophthalmic solution, Apply  to eye As Needed., Disp: , Rfl:   •  metoprolol tartrate (LOPRESSOR) 25 MG tablet, Take 25 mg by mouth 2 (Two) Times a Day., Disp: , Rfl:   •  naproxen (NAPROSYN) 500 MG tablet, TAKE 1 TABLET BY MOUTH TWO TIMES A DAY  (Patient taking differently: TAKE 1 TABLET BY MOUTH TWO TIMES A DAY PRN), Disp: 60 tablet, Rfl: 1  •  SYNTHROID 150 MCG tablet, Take 1 tablet by mouth Daily., Disp: 30 tablet, Rfl: 11  •  vardenafil (LEVITRA) 10 MG tablet, Take 10 mg by mouth As Needed for erectile dysfunction., Disp: , Rfl:   •  vitamin D (ERGOCALCIFEROL) 39422 units capsule capsule, Take 1 capsule by mouth Every 7 (Seven) Days. On thurs, Disp: 13 capsule, Rfl: 3    Allergies   Allergen Reactions   • Latex Rash       SOCIAL, FAMILY HISTORY: Medical records are reviewed and noted.    REVIEW OF SYSTEMS:   [place] Sleepiness Scale :Total score: 3   Snoring yes  Feels refreshed after waking up: yes  Morning headaches no  Nasal congestion no  Leg movements no    PHYSICAL EXAMINATION:  Vitals:    11/08/18 0900   BP: 149/74   Pulse: (!) 42   Weight: 111 kg (245 lb)   Height: 193 cm (76\")    Body mass index is 29.82 kg/m². Neck Circumference: 17 inches  HEENT: Unremarkable, pupils are round equal and reacting to light, nasal passage is clear   NECK: No lymphadenopathy, throat is clear, oral airway Mallampati class III  RESPRATORY SYSTEM: Breath sounds are equal on both sides and are normal, no wheezes or crackles  CARDIOVASULAR SYSTEM: Heart rate is regular without murmur  ABDOMEN: Soft, no ascites, no hepatosplenomegaly.  EXTREMITIES: No cyanosis, clubbing or edema       ASSESSMENT AND PLAN:  · Obstructive sleep apnea, patient has lost about 40 pounds in weight from his last home sleep test.  I'm going to repeat a home sleep test to reevaluate his " sleep apnea and see him after the test is done        Adalid Marte MD, [facility]  Pulmonary, Critical Care and sleep Medicine

## 2018-11-16 RX ORDER — ATORVASTATIN CALCIUM 10 MG/1
TABLET, FILM COATED ORAL
Qty: 90 TABLET | Refills: 1 | Status: SHIPPED | OUTPATIENT
Start: 2018-11-16 | End: 2019-01-09 | Stop reason: SDUPTHER

## 2018-11-20 ENCOUNTER — OFFICE VISIT (OUTPATIENT)
Dept: FAMILY MEDICINE CLINIC | Facility: CLINIC | Age: 68
End: 2018-11-20

## 2018-11-20 VITALS
RESPIRATION RATE: 16 BRPM | SYSTOLIC BLOOD PRESSURE: 100 MMHG | BODY MASS INDEX: 30.64 KG/M2 | DIASTOLIC BLOOD PRESSURE: 80 MMHG | TEMPERATURE: 97.8 F | WEIGHT: 251.6 LBS | HEIGHT: 76 IN | OXYGEN SATURATION: 97 % | HEART RATE: 55 BPM

## 2018-11-20 DIAGNOSIS — E78.2 MIXED HYPERLIPIDEMIA: ICD-10-CM

## 2018-11-20 DIAGNOSIS — I10 ESSENTIAL HYPERTENSION: Primary | ICD-10-CM

## 2018-11-20 DIAGNOSIS — Z11.59 ENCOUNTER FOR HEPATITIS C SCREENING TEST FOR LOW RISK PATIENT: ICD-10-CM

## 2018-11-20 DIAGNOSIS — R73.9 HYPERGLYCEMIA: ICD-10-CM

## 2018-11-20 LAB — HCV AB SER DONR QL: NORMAL

## 2018-11-20 PROCEDURE — 36415 COLL VENOUS BLD VENIPUNCTURE: CPT | Performed by: INTERNAL MEDICINE

## 2018-11-20 PROCEDURE — 86803 HEPATITIS C AB TEST: CPT | Performed by: INTERNAL MEDICINE

## 2018-11-20 PROCEDURE — 99214 OFFICE O/P EST MOD 30 MIN: CPT | Performed by: INTERNAL MEDICINE

## 2018-11-20 NOTE — PROGRESS NOTES
Subjective   Joaquin Peña is a 68 y.o. male.     History of Present Illness   Patient was seen for hypertension.  Blood pressures running 110s over 80s.  Blood sugars have been in the 120s.  His lipids are controlled with diet and exercise and statin.  She didn't want a hepatitis C screen was drawn today.    Dictated utilizing Dragon dictation. If there are questions or for further clarification, please contact me.   The following portions of the patient's history were reviewed and updated as appropriate: allergies, current medications, past family history, past medical history, past social history, past surgical history and problem list.    Review of Systems   Constitutional: Negative for fatigue and fever.   HENT: Positive for congestion. Negative for trouble swallowing.    Eyes: Negative for discharge and visual disturbance.   Respiratory: Negative for choking and shortness of breath.    Cardiovascular: Negative for chest pain and palpitations.   Gastrointestinal: Negative for abdominal pain and blood in stool.   Endocrine: Negative.    Genitourinary: Negative for genital sores and hematuria.   Musculoskeletal: Negative for gait problem and joint swelling.   Skin: Negative for color change, pallor, rash and wound.   Allergic/Immunologic: Positive for environmental allergies. Negative for immunocompromised state.   Neurological: Negative for facial asymmetry and speech difficulty.   Psychiatric/Behavioral: Negative for hallucinations and suicidal ideas.       Objective   Physical Exam   Constitutional: He is oriented to person, place, and time. He appears well-developed and well-nourished.   HENT:   Head: Normocephalic.   Eyes: Conjunctivae are normal. Pupils are equal, round, and reactive to light.   Neck: Normal range of motion. Neck supple.   Cardiovascular: Normal rate, regular rhythm and normal heart sounds.   Pulmonary/Chest: Effort normal and breath sounds normal.   Abdominal: Soft. Bowel sounds are  normal.   Musculoskeletal: Normal range of motion.   Neurological: He is alert and oriented to person, place, and time.   Skin: Skin is warm and dry.   Psychiatric: He has a normal mood and affect. His behavior is normal. Judgment and thought content normal.   Nursing note and vitals reviewed.      Assessment/Plan   Problems Addressed this Visit        Cardiovascular and Mediastinum    Hyperlipidemia    Essential hypertension - Primary       Other    Hyperglycemia      Other Visit Diagnoses     Encounter for hepatitis C screening test for low risk patient        Relevant Orders    Hepatitis C Antibody

## 2018-11-28 ENCOUNTER — TELEPHONE (OUTPATIENT)
Dept: CARDIOLOGY | Facility: CLINIC | Age: 68
End: 2018-11-28

## 2018-12-04 ENCOUNTER — HOSPITAL ENCOUNTER (OUTPATIENT)
Dept: SLEEP MEDICINE | Facility: HOSPITAL | Age: 68
Discharge: HOME | End: 2018-12-04
Attending: INTERNAL MEDICINE | Admitting: INTERNAL MEDICINE

## 2018-12-04 DIAGNOSIS — G47.33 OSA (OBSTRUCTIVE SLEEP APNEA): ICD-10-CM

## 2018-12-04 PROCEDURE — 95806 SLEEP STUDY UNATT&RESP EFFT: CPT

## 2018-12-12 LAB
ALBUMIN SERPL-MCNC: 4.3 G/DL (ref 3.5–5.2)
ALBUMIN/GLOB SERPL: 1.5 G/DL
ALP SERPL-CCNC: 77 U/L (ref 39–117)
ALT SERPL-CCNC: 17 U/L (ref 1–41)
AST SERPL-CCNC: 12 U/L (ref 1–40)
BASOPHILS # BLD AUTO: 0.03 10*3/MM3 (ref 0–0.2)
BASOPHILS NFR BLD AUTO: 0.4 % (ref 0–1.5)
BILIRUB SERPL-MCNC: 0.8 MG/DL (ref 0.1–1.2)
BUN SERPL-MCNC: 21 MG/DL (ref 8–23)
BUN/CREAT SERPL: 20 (ref 7–25)
C PEPTIDE SERPL-MCNC: 4.9 NG/ML (ref 1.1–4.4)
CALCIUM SERPL-MCNC: 10.2 MG/DL (ref 8.6–10.5)
CHLORIDE SERPL-SCNC: 102 MMOL/L (ref 98–107)
CHOLEST SERPL-MCNC: 138 MG/DL (ref 0–200)
CO2 SERPL-SCNC: 27.2 MMOL/L (ref 22–29)
CREAT SERPL-MCNC: 1.05 MG/DL (ref 0.76–1.27)
EOSINOPHIL # BLD AUTO: 0.17 10*3/MM3 (ref 0–0.7)
EOSINOPHIL NFR BLD AUTO: 2 % (ref 0.3–6.2)
ERYTHROCYTE [DISTWIDTH] IN BLOOD BY AUTOMATED COUNT: 15 % (ref 11.5–14.5)
GLOBULIN SER CALC-MCNC: 2.9 GM/DL
GLUCOSE SERPL-MCNC: 90 MG/DL (ref 65–99)
HBA1C MFR BLD: 5.67 % (ref 4.8–5.6)
HCT VFR BLD AUTO: 49.5 % (ref 40.4–52.2)
HDLC SERPL-MCNC: 35 MG/DL (ref 40–60)
HGB BLD-MCNC: 16.2 G/DL (ref 13.7–17.6)
IMM GRANULOCYTES # BLD: 0.02 10*3/MM3 (ref 0–0.03)
IMM GRANULOCYTES NFR BLD: 0.2 % (ref 0–0.5)
INSULIN SERPL-ACNC: 18.4 UIU/ML (ref 2.6–24.9)
INTERPRETATION: NORMAL
LDLC SERPL CALC-MCNC: 74 MG/DL (ref 0–100)
LYMPHOCYTES # BLD AUTO: 2.26 10*3/MM3 (ref 0.9–4.8)
LYMPHOCYTES NFR BLD AUTO: 26.4 % (ref 19.6–45.3)
Lab: NORMAL
MCH RBC QN AUTO: 28.7 PG (ref 27–32.7)
MCHC RBC AUTO-ENTMCNC: 32.7 G/DL (ref 32.6–36.4)
MCV RBC AUTO: 87.6 FL (ref 79.8–96.2)
MONOCYTES # BLD AUTO: 0.74 10*3/MM3 (ref 0.2–1.2)
MONOCYTES NFR BLD AUTO: 8.6 % (ref 5–12)
NEUTROPHILS # BLD AUTO: 5.37 10*3/MM3 (ref 1.9–8.1)
NEUTROPHILS NFR BLD AUTO: 62.6 % (ref 42.7–76)
PLATELET # BLD AUTO: 241 10*3/MM3 (ref 140–500)
POTASSIUM SERPL-SCNC: 4.8 MMOL/L (ref 3.5–5.2)
PROT SERPL-MCNC: 7.2 G/DL (ref 6–8.5)
RBC # BLD AUTO: 5.65 10*6/MM3 (ref 4.6–6)
SODIUM SERPL-SCNC: 140 MMOL/L (ref 136–145)
T3 SERPL-MCNC: 85.4 NG/DL (ref 80–200)
T4 SERPL-MCNC: 7.47 MCG/DL (ref 4.5–11.7)
TESTOST SERPL-MCNC: 330 NG/DL (ref 264–916)
THYROGLOB AB SERPL-ACNC: <1 IU/ML (ref 0–0.9)
THYROPEROXIDASE AB SERPL-ACNC: 9 IU/ML (ref 0–34)
TRIGL SERPL-MCNC: 144 MG/DL (ref 0–150)
TSH SERPL DL<=0.005 MIU/L-ACNC: 2.3 MIU/ML (ref 0.27–4.2)
VLDLC SERPL CALC-MCNC: 28.8 MG/DL (ref 5–40)
WBC # BLD AUTO: 8.57 10*3/MM3 (ref 4.5–10.7)

## 2018-12-14 ENCOUNTER — TELEPHONE (OUTPATIENT)
Dept: SLEEP MEDICINE | Facility: HOSPITAL | Age: 68
End: 2018-12-14

## 2018-12-14 NOTE — TELEPHONE ENCOUNTER
LM with patient about sleep study results, his AHI was 7.5 and O2 dropped to 87%. Faxed orders to [facility]

## 2019-01-03 ENCOUNTER — OFFICE VISIT (OUTPATIENT)
Dept: FAMILY MEDICINE CLINIC | Facility: CLINIC | Age: 69
End: 2019-01-03

## 2019-01-03 VITALS
DIASTOLIC BLOOD PRESSURE: 80 MMHG | RESPIRATION RATE: 16 BRPM | BODY MASS INDEX: 30.37 KG/M2 | OXYGEN SATURATION: 98 % | TEMPERATURE: 97.6 F | HEIGHT: 76 IN | HEART RATE: 51 BPM | WEIGHT: 249.4 LBS | SYSTOLIC BLOOD PRESSURE: 120 MMHG

## 2019-01-03 DIAGNOSIS — R05.9 COUGH: ICD-10-CM

## 2019-01-03 DIAGNOSIS — J01.00 ACUTE MAXILLARY SINUSITIS, RECURRENCE NOT SPECIFIED: Primary | ICD-10-CM

## 2019-01-03 PROCEDURE — 99213 OFFICE O/P EST LOW 20 MIN: CPT | Performed by: NURSE PRACTITIONER

## 2019-01-03 RX ORDER — AZITHROMYCIN 250 MG/1
TABLET, FILM COATED ORAL
Qty: 6 TABLET | Refills: 0 | Status: SHIPPED | OUTPATIENT
Start: 2019-01-03 | End: 2019-05-21

## 2019-01-03 NOTE — PROGRESS NOTES
Subjective   Joaquin Peña is a 68 y.o. male.     History of Present Illness   C/o sore throat, cough, congestion, sinus pressure, denies fever, he has congestion, sore throat resolved, started about 2 weeks ago. Productive cough yellow in color. tried mucinex OTC for cough. He has nasal drainage. He states he went [facility] clinic at [facility] 1 week ago, he was given flonase but had nosebleeds, no abx. He denies ear pain. Did try claritin but got too dry. He also tried coricidin HBP, he denies smoking. Does have seasonal allergies.     The following portions of the patient's history were reviewed and updated as appropriate: allergies, current medications, past family history, past medical history, past social history, past surgical history and problem list.    Review of Systems   Constitutional: Negative for chills, diaphoresis and fever.   HENT: Positive for congestion, rhinorrhea, sinus pressure and sore throat. Negative for ear pain.    Respiratory: Positive for cough. Negative for shortness of breath and wheezing.    Cardiovascular: Negative for chest pain.   Musculoskeletal: Negative for arthralgias and myalgias.   Skin: Negative for pallor.   Neurological: Negative for dizziness, light-headedness and headache.   All other systems reviewed and are negative.      Objective   Physical Exam   Constitutional: He is oriented to person, place, and time. He appears well-developed and well-nourished.   HENT:   Head: Normocephalic.   Right Ear: Tympanic membrane and ear canal normal.   Left Ear: Tympanic membrane and ear canal normal.   Nose: Right sinus exhibits maxillary sinus tenderness. Left sinus exhibits maxillary sinus tenderness.   Mouth/Throat: Uvula is midline, oropharynx is clear and moist and mucous membranes are normal.   Eyes: Pupils are equal, round, and reactive to light.   Neck: Normal range of motion.   Cardiovascular: Normal rate, regular rhythm and normal heart sounds.   Pulmonary/Chest: Effort normal  and breath sounds normal.   Musculoskeletal: Normal range of motion.   Lymphadenopathy:     He has no cervical adenopathy.   Neurological: He is alert and oriented to person, place, and time.   Skin: Skin is warm and dry.   Psychiatric: He has a normal mood and affect. His behavior is normal.   Nursing note and vitals reviewed.        Assessment/Plan   Joaquin was seen today for sinusitis.    Diagnoses and all orders for this visit:    Acute maxillary sinusitis, recurrence not specified    Cough    Other orders  -     azithromycin (ZITHROMAX) 250 MG tablet; Take 2 tablets the first day, then 1 tablet daily for 4 days.      Start zpack take as directed.   Cont mucinex OTC as needed.   If symptoms persist 5-7 days call office.   Increase fluid intake, get plenty of rest.   Patient agrees with plan of care and understands instructions. Call if worsening symptoms or any problems or concerns.

## 2019-01-03 NOTE — PATIENT INSTRUCTIONS
Start zpack take as directed.   Cont mucinex OTC as needed.   If symptoms persist 5-7 days call office.   Increase fluid intake, get plenty of rest.   Patient agrees with plan of care and understands instructions. Call if worsening symptoms or any problems or concerns.

## 2019-01-09 ENCOUNTER — OFFICE VISIT (OUTPATIENT)
Dept: ENDOCRINOLOGY | Age: 69
End: 2019-01-09

## 2019-01-09 VITALS
BODY MASS INDEX: 30.44 KG/M2 | SYSTOLIC BLOOD PRESSURE: 122 MMHG | WEIGHT: 250 LBS | DIASTOLIC BLOOD PRESSURE: 74 MMHG | HEIGHT: 76 IN

## 2019-01-09 DIAGNOSIS — E89.0 POSTABLATIVE HYPOTHYROIDISM: Primary | ICD-10-CM

## 2019-01-09 DIAGNOSIS — E88.81 INSULIN RESISTANCE: ICD-10-CM

## 2019-01-09 DIAGNOSIS — R79.89 LOW TESTOSTERONE: ICD-10-CM

## 2019-01-09 DIAGNOSIS — N52.9 ERECTILE DYSFUNCTION, UNSPECIFIED ERECTILE DYSFUNCTION TYPE: ICD-10-CM

## 2019-01-09 DIAGNOSIS — E78.2 MIXED HYPERLIPIDEMIA: ICD-10-CM

## 2019-01-09 DIAGNOSIS — R73.9 HYPERGLYCEMIA: ICD-10-CM

## 2019-01-09 DIAGNOSIS — E03.8 OTHER SPECIFIED HYPOTHYROIDISM: ICD-10-CM

## 2019-01-09 DIAGNOSIS — I10 ESSENTIAL HYPERTENSION: ICD-10-CM

## 2019-01-09 DIAGNOSIS — E05.00 GRAVES DISEASE: ICD-10-CM

## 2019-01-09 PROCEDURE — 99214 OFFICE O/P EST MOD 30 MIN: CPT | Performed by: NURSE PRACTITIONER

## 2019-01-09 RX ORDER — ATORVASTATIN CALCIUM 10 MG/1
TABLET, FILM COATED ORAL
Qty: 90 TABLET | Refills: 1 | Status: SHIPPED | OUTPATIENT
Start: 2019-01-09 | End: 2019-04-26 | Stop reason: SDUPTHER

## 2019-01-09 RX ORDER — IRBESARTAN 300 MG/1
300 TABLET ORAL DAILY
Qty: 90 TABLET | Refills: 1 | Status: SHIPPED | OUTPATIENT
Start: 2019-01-09 | End: 2019-01-24

## 2019-01-09 RX ORDER — ERGOCALCIFEROL 1.25 MG/1
50000 CAPSULE ORAL
Qty: 13 CAPSULE | Refills: 3 | Status: SHIPPED | OUTPATIENT
Start: 2019-01-09 | End: 2019-04-26 | Stop reason: SDUPTHER

## 2019-01-09 RX ORDER — LEVOTHYROXINE SODIUM 150 MCG
150 TABLET ORAL DAILY
Qty: 90 TABLET | Refills: 1 | Status: SHIPPED | OUTPATIENT
Start: 2019-01-09 | End: 2019-04-26 | Stop reason: SDUPTHER

## 2019-01-09 RX ORDER — TESTOSTERONE 16.2 MG/G
GEL TRANSDERMAL
Qty: 75 G | Refills: 0 | Status: SHIPPED | OUTPATIENT
Start: 2019-01-09 | End: 2019-04-26 | Stop reason: SDUPTHER

## 2019-01-09 NOTE — PROGRESS NOTES
Subjective    Joaquin Peña is a 68 y.o. male. he is here to be seen for hypothyroidism.     hypothyroid      Hypothyroidism   This is a chronic problem. The current episode started more than 1 month ago. The problem occurs constantly. The problem has been waxing and waning. Pertinent negatives include no coughing, fatigue, headaches, myalgias or rash. Nothing aggravates the symptoms. Treatments tried: meds and labs. The treatment provided mild relief.        Evaluation history:  TSH   Date Value Ref Range Status   12/11/2018 2.300 0.270 - 4.200 mIU/mL Final   05/30/2018 1.700 0.270 - 4.200 mIU/mL Final     Free T4   Date Value Ref Range Status   08/14/2018 1.35 0.93 - 1.70 ng/dL Final   01/08/2017 0.98 0.93 - 1.70 ng/dL Final     T3, Free   Date Value Ref Range Status   08/14/2018 2.6 2.0 - 4.4 pg/mL Final       Current medications:  Current Outpatient Medications   Medication Sig Dispense Refill   • ANDROGEL PUMP 20.25 MG/ACT (1.62%) gel 1 pump actuation on each shoulder daily. 75 g 0   • atorvastatin (LIPITOR) 10 MG tablet Take one tablet by mouth daily 90 tablet 1   • azithromycin (ZITHROMAX) 250 MG tablet Take 2 tablets the first day, then 1 tablet daily for 4 days. 6 tablet 0   • irbesartan (AVAPRO) 300 MG tablet Take 1 tablet by mouth Daily. 90 tablet 1   • ketotifen (ZADITOR) 0.025 % ophthalmic solution Apply  to eye As Needed.     • metoprolol tartrate (LOPRESSOR) 25 MG tablet Take 25 mg by mouth 2 (Two) Times a Day.     • naproxen (NAPROSYN) 500 MG tablet TAKE 1 TABLET BY MOUTH TWO TIMES A DAY  (Patient taking differently: TAKE 1 TABLET BY MOUTH TWO TIMES A DAY PRN) 60 tablet 1   • SYNTHROID 150 MCG tablet Take 1 tablet by mouth Daily. 90 tablet 1   • vardenafil (LEVITRA) 10 MG tablet Take 10 mg by mouth As Needed for erectile dysfunction.     • vitamin D (ERGOCALCIFEROL) 90936 units capsule capsule Take 1 capsule by mouth Every 7 (Seven) Days. On thurs 13 capsule 3     No current facility-administered  medications for this visit.        The following portions of the patient's history were reviewed and updated as appropriate:   Past Medical History:   Diagnosis Date   • Abdominal obesity    • Acquired spondylolisthesis    • Acute low back pain    • Acute right lumbar radiculopathy    • Bulging lumbar disc    • Concussion with no loss of consciousness    • DDD (degenerative disc disease)    • Essential hypertension    • Fatigue    • Graves disease    • H/O disorder of nervous system and sense organs    • Headache    • Hyperlipidemia    • Hypertension    • Hypertensive heart disease    • Hypothyroidism    • Insomnia    • Irregular heart beat    • Joint pain    • Lumbar radiculopathy    • Nephrolithiasis    • Paroxysmal nocturnal dyspnea    • PVC (premature ventricular contraction)    • Severe head trauma    • SOB (shortness of breath)    • Soemmering's ring    • Spondylolisthesis    • Subdural hematoma (CMS/HCC)    • Supraventricular tachycardia (CMS/HCC)      Past Surgical History:   Procedure Laterality Date   • KNEE ARTHROSCOPY Left    • KNEE SURGERY      2008 and 2009   • LITHOTRIPSY     • OTHER SURGICAL HISTORY      ear surgery     Family History   Problem Relation Age of Onset   • Cancer Father         malignant neoplasm   • No Known Problems Mother    • No Known Problems Sister    • No Known Problems Brother    • No Known Problems Sister    • No Known Problems Brother    • No Known Problems Brother    • Diabetes Maternal Grandmother        Allergies   Allergen Reactions   • Latex Rash     Social History     Socioeconomic History   • Marital status:      Spouse name: Not on file   • Number of children: Not on file   • Years of education: Not on file   • Highest education level: Not on file   Tobacco Use   • Smoking status: Former Smoker   • Smokeless tobacco: Never Used   • Tobacco comment: quit 45 yrs ago   Substance and Sexual Activity   • Alcohol use: Yes     Comment: occasional   • Drug use: No  "      Review of Systems  Review of Systems   Constitutional: Negative for fatigue.   HENT: Negative for trouble swallowing.    Eyes: Negative for visual disturbance.   Respiratory: Negative for cough.    Cardiovascular: Negative for palpitations.   Gastrointestinal: Negative for constipation.   Endocrine: Negative for cold intolerance.   Genitourinary: Negative for difficulty urinating.   Musculoskeletal: Negative for myalgias.   Skin: Negative for rash.   Allergic/Immunologic: Negative.    Neurological: Negative for headaches.   Hematological: Does not bruise/bleed easily.   Psychiatric/Behavioral: The patient is not nervous/anxious.    All other systems reviewed and are negative.       Objective    /74   Ht 193 cm (75.98\")   Wt 113 kg (250 lb)   BMI 30.44 kg/m²   Physical Exam   Constitutional: He is oriented to person, place, and time. He appears well-developed and well-nourished. No distress.   HENT:   Head: Normocephalic and atraumatic.   Eyes: EOM are normal. Pupils are equal, round, and reactive to light.   Neck: Normal range of motion. Neck supple.   Cardiovascular: Normal rate, regular rhythm, normal heart sounds and intact distal pulses.   No murmur heard.  Pulmonary/Chest: Effort normal and breath sounds normal.   Abdominal: Soft. Bowel sounds are normal.   Musculoskeletal: Normal range of motion.   Neurological: He is alert and oriented to person, place, and time.   Skin: Skin is warm and dry. Capillary refill takes 2 to 3 seconds. He is not diaphoretic. No pallor.   Psychiatric: He has a normal mood and affect. His behavior is normal. Judgment and thought content normal.   Nursing note and vitals reviewed.      Lab Review  Lab Results   Component Value Date    TSH 2.300 12/11/2018    TSH 1.700 05/30/2018     Lab Results   Component Value Date    FREET4 1.35 08/14/2018    FREET4 0.98 01/08/2017        Assessment/Plan      1. Postablative hypothyroidism    2. Essential hypertension    3. Mixed " hyperlipidemia    4. Insulin resistance    5. Graves disease    6. Other specified hypothyroidism    7. Erectile dysfunction, unspecified erectile dysfunction type    8. Hyperglycemia    9. Low testosterone    . This diagnosis was discussed and reviewed with the patient including the advantages of drug therapy.     1. Orders placed during this encounter include:  Orders Placed This Encounter   Procedures   • Hemoglobin & Hematocrit, Blood     Standing Status:   Future   • Comprehensive Metabolic Panel     Standing Status:   Future   • Hemoglobin A1c     Standing Status:   Future     Standing Expiration Date:   1/9/2020   • Lipid Panel     Standing Status:   Future     Standing Expiration Date:   1/9/2020     Order Specific Question:   [facility] Has the patient fasted?     Answer:   No   • Microalbumin / Creatinine Urine Ratio - Urine, Clean Catch     Standing Status:   Future   • Uric Acid     Standing Status:   Future   • Vitamin D 25 Hydroxy     Standing Status:   Future   • TSH     Standing Status:   Future   • T4     Standing Status:   Future   • T3     Standing Status:   Future   • Testosterone, Free, Total     Standing Status:   Future     Standing Expiration Date:   1/9/2020   • Testosterone     Standing Status:   Future     Standing Expiration Date:   1/9/2020       Medications prescribed:  Outpatient Encounter Medications as of 1/9/2019   Medication Sig Dispense Refill   • ANDROGEL PUMP 20.25 MG/ACT (1.62%) gel 1 pump actuation on each shoulder daily. 75 g 0   • atorvastatin (LIPITOR) 10 MG tablet Take one tablet by mouth daily 90 tablet 1   • azithromycin (ZITHROMAX) 250 MG tablet Take 2 tablets the first day, then 1 tablet daily for 4 days. 6 tablet 0   • irbesartan (AVAPRO) 300 MG tablet Take 1 tablet by mouth Daily. 90 tablet 1   • ketotifen (ZADITOR) 0.025 % ophthalmic solution Apply  to eye As Needed.     • metoprolol tartrate (LOPRESSOR) 25 MG tablet Take 25 mg by mouth 2 (Two) Times a Day.     •  naproxen (NAPROSYN) 500 MG tablet TAKE 1 TABLET BY MOUTH TWO TIMES A DAY  (Patient taking differently: TAKE 1 TABLET BY MOUTH TWO TIMES A DAY PRN) 60 tablet 1   • SYNTHROID 150 MCG tablet Take 1 tablet by mouth Daily. 90 tablet 1   • vardenafil (LEVITRA) 10 MG tablet Take 10 mg by mouth As Needed for erectile dysfunction.     • vitamin D (ERGOCALCIFEROL) 22983 units capsule capsule Take 1 capsule by mouth Every 7 (Seven) Days. On thurs 13 capsule 3   • [DISCONTINUED] ANDROGEL PUMP 20.25 MG/ACT (1.62%) gel 1 pump actuation on each shoulder daily. 75 g 5   • [DISCONTINUED] atorvastatin (LIPITOR) 10 MG tablet Take one tablet by mouth daily 90 tablet 1   • [DISCONTINUED] irbesartan (AVAPRO) 300 MG tablet Take 1 tablet by mouth Daily. 30 tablet 11   • [DISCONTINUED] SYNTHROID 150 MCG tablet Take 1 tablet by mouth Daily. 30 tablet 11   • [DISCONTINUED] vitamin D (ERGOCALCIFEROL) 08511 units capsule capsule Take 1 capsule by mouth Every 7 (Seven) Days. On thurs 13 capsule 3     No facility-administered encounter medications on file as of 1/9/2019.        2. Repeat s-TSH in before patient's next visit.    3. The risks and benefits of my recommendations, as well as other treatment options were discussed with the patient today. Questions were answered.     Postablative hypothyroidism - chronic, stable no medication changes at this time. Refills prescribed. Future labs ordered for upcoming appointment and assessment.      Essential hypertension- chronic, stable no medication changes at this time. Refills prescribed. Future labs ordered for upcoming appointment and assessment.     Mixed hyperlipidemia - chronic, stable no medication changes at this time. Refills prescribed. Future labs ordered for upcoming appointment and assessment.       Insulin resistance - chronic, stable, MNT counseling given. Will reassess next visit.        Erectile dysfunction, unspecified erectile dysfunction type- chronic, uncontrolled. Edu on  benefits and use of testosterone was given. Patient had not been using due to  recent  media reports on [employer]. Patient will re-start androgel. Refill given.             4. Return in about 3 months (around 4/9/2019), or if symptoms worsen or fail to improve, for Recheck. 3 months with Aimee-2 weeks prior for labs 6 months with Dr. Smith-2 weeks prior for labs

## 2019-01-17 RX ORDER — NAPROXEN 500 MG/1
500 TABLET ORAL 2 TIMES DAILY
Qty: 60 TABLET | Refills: 3 | Status: SHIPPED | OUTPATIENT
Start: 2019-01-17 | End: 2020-05-12 | Stop reason: SDUPTHER

## 2019-01-24 RX ORDER — TELMISARTAN 80 MG/1
80 TABLET ORAL DAILY
Qty: 90 TABLET | Refills: 3 | Status: SHIPPED | OUTPATIENT
Start: 2019-01-24 | End: 2019-01-30

## 2019-01-30 RX ORDER — IRBESARTAN 300 MG/1
300 TABLET ORAL DAILY
Qty: 30 TABLET | Refills: 11 | Status: SHIPPED | OUTPATIENT
Start: 2019-01-30 | End: 2019-04-30 | Stop reason: SDUPTHER

## 2019-02-15 ENCOUNTER — RESULTS ENCOUNTER (OUTPATIENT)
Dept: ENDOCRINOLOGY | Age: 69
End: 2019-02-15

## 2019-02-15 DIAGNOSIS — I10 ESSENTIAL HYPERTENSION: ICD-10-CM

## 2019-02-15 DIAGNOSIS — E78.2 MIXED HYPERLIPIDEMIA: ICD-10-CM

## 2019-02-15 DIAGNOSIS — R79.89 LOW TESTOSTERONE: ICD-10-CM

## 2019-02-15 DIAGNOSIS — E88.81 INSULIN RESISTANCE: ICD-10-CM

## 2019-02-15 DIAGNOSIS — N52.9 ERECTILE DYSFUNCTION, UNSPECIFIED ERECTILE DYSFUNCTION TYPE: ICD-10-CM

## 2019-02-15 DIAGNOSIS — E05.00 GRAVES DISEASE: ICD-10-CM

## 2019-02-15 DIAGNOSIS — R73.9 HYPERGLYCEMIA: ICD-10-CM

## 2019-02-15 DIAGNOSIS — E03.8 OTHER SPECIFIED HYPOTHYROIDISM: ICD-10-CM

## 2019-02-27 ENCOUNTER — OFFICE VISIT (OUTPATIENT)
Dept: FAMILY MEDICINE CLINIC | Facility: CLINIC | Age: 69
End: 2019-02-27

## 2019-02-27 VITALS
OXYGEN SATURATION: 94 % | BODY MASS INDEX: 31.08 KG/M2 | TEMPERATURE: 97.9 F | HEART RATE: 64 BPM | SYSTOLIC BLOOD PRESSURE: 128 MMHG | WEIGHT: 255.2 LBS | DIASTOLIC BLOOD PRESSURE: 86 MMHG | HEIGHT: 76 IN

## 2019-02-27 DIAGNOSIS — M79.641 RIGHT HAND PAIN: ICD-10-CM

## 2019-02-27 DIAGNOSIS — M25.431 SWELLING OF JOINT, WRIST, RIGHT: ICD-10-CM

## 2019-02-27 DIAGNOSIS — M25.531 RIGHT WRIST PAIN: Primary | ICD-10-CM

## 2019-02-27 LAB
ALBUMIN SERPL-MCNC: 4 G/DL (ref 3.5–5.2)
ALBUMIN/GLOB SERPL: 1.3 G/DL
ALP SERPL-CCNC: 62 U/L (ref 39–117)
ALT SERPL W P-5'-P-CCNC: 19 U/L (ref 1–41)
ANION GAP SERPL CALCULATED.3IONS-SCNC: 13.2 MMOL/L
AST SERPL-CCNC: 14 U/L (ref 1–40)
BILIRUB SERPL-MCNC: 0.3 MG/DL (ref 0.1–1.2)
BUN BLD-MCNC: 18 MG/DL (ref 8–23)
BUN/CREAT SERPL: 18 (ref 7–25)
CALCIUM SPEC-SCNC: 9.8 MG/DL (ref 8.6–10.5)
CHLORIDE SERPL-SCNC: 102 MMOL/L (ref 98–107)
CO2 SERPL-SCNC: 24.8 MMOL/L (ref 22–29)
CREAT BLD-MCNC: 1 MG/DL (ref 0.76–1.27)
GFR SERPL CREATININE-BSD FRML MDRD: 75 ML/MIN/1.73
GLOBULIN UR ELPH-MCNC: 3.1 GM/DL
GLUCOSE BLD-MCNC: 109 MG/DL (ref 65–99)
POTASSIUM BLD-SCNC: 4.4 MMOL/L (ref 3.5–5.2)
PROT SERPL-MCNC: 7.1 G/DL (ref 6–8.5)
SODIUM BLD-SCNC: 140 MMOL/L (ref 136–145)
URATE SERPL-MCNC: 5.9 MG/DL (ref 3.4–7)

## 2019-02-27 PROCEDURE — 99213 OFFICE O/P EST LOW 20 MIN: CPT | Performed by: NURSE PRACTITIONER

## 2019-02-27 PROCEDURE — 84550 ASSAY OF BLOOD/URIC ACID: CPT | Performed by: NURSE PRACTITIONER

## 2019-02-27 PROCEDURE — 80053 COMPREHEN METABOLIC PANEL: CPT | Performed by: NURSE PRACTITIONER

## 2019-02-27 PROCEDURE — 86140 C-REACTIVE PROTEIN: CPT | Performed by: NURSE PRACTITIONER

## 2019-02-27 PROCEDURE — 86431 RHEUMATOID FACTOR QUANT: CPT | Performed by: NURSE PRACTITIONER

## 2019-02-27 NOTE — PATIENT INSTRUCTIONS
DME for hand brace,   Encouraged ice, elevation, may cont aleve as needed.   Wrist xray today will call with results .  Uric acid, cmp today,   Pending results will consider hand surgery consult if needed.   Increase fluid intake, get plenty of rest.   Patient agrees with plan of care and understands instructions. Call if worsening symptoms or any problems or concerns.

## 2019-02-27 NOTE — PROGRESS NOTES
Subjective   Joaquin Peña is a 68 y.o. male.     History of Present Illness   C/o right wrist pain, was off work for 3 months, went back to work and worked 12 hours woke up with pain, tried aleve, pain resolved, states a couple of days ago, woke up and hand pain, swelling, hurt to open things, state pain medial right wrist, also has pain at base right thumb, he works as , he denies any injury. He denies hx of gout. He also tried icy hot OTC. He does do repetitive work, he denies numbness, had itching. Denies rash on hand. Denies fever. He has hx of cyst on right hand, saw jeovanny and catherine years ago for cyst removal.         The following portions of the patient's history were reviewed and updated as appropriate: allergies, current medications, past family history, past medical history, past social history, past surgical history and problem list.    Review of Systems   Constitutional: Negative for chills, diaphoresis and fever.   Respiratory: Negative for cough and shortness of breath.    Cardiovascular: Negative for chest pain.   Musculoskeletal: Positive for arthralgias. Negative for myalgias.   Neurological: Negative for dizziness, light-headedness and headache.   All other systems reviewed and are negative.      Objective   Physical Exam   Constitutional: He is oriented to person, place, and time. He appears well-developed and well-nourished.   HENT:   Head: Normocephalic.   Eyes: Pupils are equal, round, and reactive to light.   Cardiovascular: Normal rate, regular rhythm and normal heart sounds.   Pulmonary/Chest: Effort normal and breath sounds normal.   Musculoskeletal:        Right wrist: He exhibits decreased range of motion, tenderness and bony tenderness. He exhibits no swelling and no effusion.        Right hand: He exhibits decreased range of motion and tenderness. He exhibits normal capillary refill and no swelling. Normal sensation noted. Decreased strength noted.   Positive tinel and  phalen test.    Neurological: He is alert and oriented to person, place, and time.   Skin: Skin is warm and dry.   Psychiatric: He has a normal mood and affect. His behavior is normal.   Nursing note and vitals reviewed.    Right wrist xray today for pain, no comparison, shows NAD awaiting radiology over read.     Assessment/Plan   Joaquin was seen today for hand pain.    Diagnoses and all orders for this visit:    Right wrist pain  -     Comprehensive Metabolic Panel  -     Uric Acid  -     Miscellaneous DME  -     XR Wrist 3+ View Right (In Office)    Swelling of joint, wrist, right  -     Comprehensive Metabolic Panel  -     Uric Acid  -     Miscellaneous DME  -     XR Wrist 3+ View Right (In Office)    DME for hand brace,   Encouraged ice, elevation, may cont aleve as needed.   Wrist xray today will call with results .  Uric acid, cmp today,   Pending results will consider hand surgery consult if needed.   Increase fluid intake, get plenty of rest.   Patient agrees with plan of care and understands instructions. Call if worsening symptoms or any problems or concerns.

## 2019-03-01 DIAGNOSIS — M79.641 RIGHT HAND PAIN: Primary | ICD-10-CM

## 2019-03-01 LAB
CHROMATIN AB SERPL-ACNC: <10 IU/ML (ref 0–14)
CRP SERPL-MCNC: 0.2 MG/DL (ref 0–0.5)

## 2019-03-04 ENCOUNTER — LAB (OUTPATIENT)
Dept: FAMILY MEDICINE CLINIC | Facility: CLINIC | Age: 69
End: 2019-03-04

## 2019-03-04 DIAGNOSIS — M79.641 RIGHT HAND PAIN: ICD-10-CM

## 2019-03-04 LAB — ERYTHROCYTE [SEDIMENTATION RATE] IN BLOOD: 3 MM/HR (ref 0–20)

## 2019-03-04 PROCEDURE — 85652 RBC SED RATE AUTOMATED: CPT | Performed by: NURSE PRACTITIONER

## 2019-03-04 PROCEDURE — 36415 COLL VENOUS BLD VENIPUNCTURE: CPT | Performed by: NURSE PRACTITIONER

## 2019-03-05 LAB — ANA SER QL: NEGATIVE

## 2019-04-09 ENCOUNTER — RESULTS ENCOUNTER (OUTPATIENT)
Dept: ENDOCRINOLOGY | Age: 69
End: 2019-04-09

## 2019-04-09 DIAGNOSIS — I10 ESSENTIAL HYPERTENSION: ICD-10-CM

## 2019-04-09 DIAGNOSIS — N52.9 ERECTILE DYSFUNCTION, UNSPECIFIED ERECTILE DYSFUNCTION TYPE: ICD-10-CM

## 2019-04-09 DIAGNOSIS — E05.00 GRAVES DISEASE: ICD-10-CM

## 2019-04-09 DIAGNOSIS — E88.81 INSULIN RESISTANCE: ICD-10-CM

## 2019-04-09 DIAGNOSIS — R73.9 HYPERGLYCEMIA: ICD-10-CM

## 2019-04-09 DIAGNOSIS — E78.2 MIXED HYPERLIPIDEMIA: ICD-10-CM

## 2019-04-09 DIAGNOSIS — E89.0 POSTABLATIVE HYPOTHYROIDISM: ICD-10-CM

## 2019-04-09 DIAGNOSIS — E03.8 OTHER SPECIFIED HYPOTHYROIDISM: ICD-10-CM

## 2019-04-09 DIAGNOSIS — R79.89 LOW TESTOSTERONE: ICD-10-CM

## 2019-04-14 LAB
25(OH)D3+25(OH)D2 SERPL-MCNC: 42.9 NG/ML (ref 30–100)
ALBUMIN SERPL-MCNC: 4.2 G/DL (ref 3.5–5.2)
ALBUMIN/GLOB SERPL: 1.5 G/DL
ALP SERPL-CCNC: 63 U/L (ref 39–117)
ALT SERPL-CCNC: 29 U/L (ref 1–41)
AST SERPL-CCNC: 24 U/L (ref 1–40)
BILIRUB SERPL-MCNC: 0.6 MG/DL (ref 0.2–1.2)
BUN SERPL-MCNC: 15 MG/DL (ref 8–23)
BUN/CREAT SERPL: 14.3 (ref 7–25)
CALCIUM SERPL-MCNC: 10.3 MG/DL (ref 8.6–10.5)
CHLORIDE SERPL-SCNC: 105 MMOL/L (ref 98–107)
CHOLEST SERPL-MCNC: 134 MG/DL (ref 0–200)
CO2 SERPL-SCNC: 26 MMOL/L (ref 22–29)
CREAT SERPL-MCNC: 1.05 MG/DL (ref 0.76–1.27)
GLOBULIN SER CALC-MCNC: 2.8 GM/DL
GLUCOSE SERPL-MCNC: 92 MG/DL (ref 65–99)
HBA1C MFR BLD: 5.6 % (ref 4.8–5.6)
HCT VFR BLD AUTO: 53 % (ref 37.5–51)
HDLC SERPL-MCNC: 40 MG/DL (ref 40–60)
HGB BLD-MCNC: 16.1 G/DL (ref 13–17.7)
INTERPRETATION: NORMAL
LDLC SERPL CALC-MCNC: 73 MG/DL (ref 0–100)
Lab: NORMAL
POTASSIUM SERPL-SCNC: 5 MMOL/L (ref 3.5–5.2)
PROT SERPL-MCNC: 7 G/DL (ref 6–8.5)
SODIUM SERPL-SCNC: 141 MMOL/L (ref 136–145)
T3 SERPL-MCNC: 87.8 NG/DL (ref 80–200)
T4 SERPL-MCNC: 6.68 MCG/DL (ref 4.5–11.7)
TESTOST FREE SERPL-MCNC: 7.1 PG/ML (ref 6.6–18.1)
TESTOST SERPL-MCNC: 484 NG/DL (ref 264–916)
TRIGL SERPL-MCNC: 104 MG/DL (ref 0–150)
TSH SERPL DL<=0.005 MIU/L-ACNC: 2.35 MIU/ML (ref 0.27–4.2)
UNABLE TO VOID: NORMAL
URATE SERPL-MCNC: 5.8 MG/DL (ref 3.4–7)
VLDLC SERPL CALC-MCNC: 20.8 MG/DL

## 2019-04-26 ENCOUNTER — OFFICE VISIT (OUTPATIENT)
Dept: ENDOCRINOLOGY | Age: 69
End: 2019-04-26

## 2019-04-26 VITALS
WEIGHT: 248 LBS | DIASTOLIC BLOOD PRESSURE: 78 MMHG | SYSTOLIC BLOOD PRESSURE: 118 MMHG | BODY MASS INDEX: 30.2 KG/M2 | HEIGHT: 76 IN

## 2019-04-26 DIAGNOSIS — E88.81 INSULIN RESISTANCE: ICD-10-CM

## 2019-04-26 DIAGNOSIS — N52.9 ERECTILE DYSFUNCTION, UNSPECIFIED ERECTILE DYSFUNCTION TYPE: ICD-10-CM

## 2019-04-26 DIAGNOSIS — E89.0 POSTABLATIVE HYPOTHYROIDISM: ICD-10-CM

## 2019-04-26 DIAGNOSIS — R79.89 LOW TESTOSTERONE: ICD-10-CM

## 2019-04-26 DIAGNOSIS — E78.2 MIXED HYPERLIPIDEMIA: ICD-10-CM

## 2019-04-26 DIAGNOSIS — R73.9 HYPERGLYCEMIA: ICD-10-CM

## 2019-04-26 DIAGNOSIS — E03.8 OTHER SPECIFIED HYPOTHYROIDISM: ICD-10-CM

## 2019-04-26 DIAGNOSIS — E05.00 GRAVES DISEASE: ICD-10-CM

## 2019-04-26 PROCEDURE — 99214 OFFICE O/P EST MOD 30 MIN: CPT | Performed by: NURSE PRACTITIONER

## 2019-04-26 RX ORDER — TESTOSTERONE 16.2 MG/G
GEL TRANSDERMAL
Qty: 75 G | Refills: 0 | Status: SHIPPED | OUTPATIENT
Start: 2019-04-26 | End: 2019-08-14 | Stop reason: SDUPTHER

## 2019-04-26 RX ORDER — LEVOTHYROXINE SODIUM 150 MCG
150 TABLET ORAL DAILY
Qty: 90 TABLET | Refills: 1 | Status: SHIPPED | OUTPATIENT
Start: 2019-04-26 | End: 2019-08-14 | Stop reason: ALTCHOICE

## 2019-04-26 RX ORDER — ERGOCALCIFEROL 1.25 MG/1
50000 CAPSULE ORAL
Qty: 13 CAPSULE | Refills: 3 | Status: SHIPPED | OUTPATIENT
Start: 2019-04-26 | End: 2019-05-30 | Stop reason: DRUGHIGH

## 2019-04-26 RX ORDER — ATORVASTATIN CALCIUM 10 MG/1
TABLET, FILM COATED ORAL
Qty: 90 TABLET | Refills: 1 | Status: SHIPPED | OUTPATIENT
Start: 2019-04-26 | End: 2019-08-14 | Stop reason: SDUPTHER

## 2019-04-26 NOTE — PROGRESS NOTES
Subjective    Joaquin Peña is a 68 y.o. male. he is here to be seen for hypothyroidism.     Hypothyroid secondary to Graves      Hypothyroidism   This is a chronic problem. The current episode started more than 1 month ago. The problem occurs constantly. The problem has been waxing and waning. Pertinent negatives include no coughing, fatigue, headaches, myalgias or rash. Nothing aggravates the symptoms. Treatments tried: labs and meds. The treatment provided no relief.   Abnormal Lab   This is a chronic (male hypogandism) problem. The current episode started more than 1 year ago. The problem occurs constantly. The problem has been waxing and waning. Pertinent negatives include no coughing, fatigue, headaches, myalgias or rash. Nothing aggravates the symptoms. Treatments tried: meds and labs. The treatment provided mild relief.        Evaluation history:  TSH   Date Value Ref Range Status   04/12/2019 2.350 0.270 - 4.200 mIU/mL Final   05/30/2018 1.700 0.270 - 4.200 mIU/mL Final     Free T4   Date Value Ref Range Status   08/14/2018 1.35 0.93 - 1.70 ng/dL Final   01/08/2017 0.98 0.93 - 1.70 ng/dL Final     T3, Free   Date Value Ref Range Status   08/14/2018 2.6 2.0 - 4.4 pg/mL Final     Results Encounter on 04/09/2019   Component Date Value Ref Range Status   • Hemoglobin 04/12/2019 16.1  13.0 - 17.7 g/dL Final   • Hematocrit 04/12/2019 53.0* 37.5 - 51.0 % Final   • Glucose 04/12/2019 92  65 - 99 mg/dL Final   • BUN 04/12/2019 15  8 - 23 mg/dL Final   • Creatinine 04/12/2019 1.05  0.76 - 1.27 mg/dL Final   • eGFR Non African Am 04/12/2019 71  >60 mL/min/1.73 Final   • eGFR African Am 04/12/2019 86  >60 mL/min/1.73 Final   • BUN/Creatinine Ratio 04/12/2019 14.3  7.0 - 25.0 Final   • Sodium 04/12/2019 141  136 - 145 mmol/L Final   • Potassium 04/12/2019 5.0  3.5 - 5.2 mmol/L Final   • Chloride 04/12/2019 105  98 - 107 mmol/L Final   • Total CO2 04/12/2019 26.0  22.0 - 29.0 mmol/L Final   • Calcium 04/12/2019 10.3   8.6 - 10.5 mg/dL Final   • Total Protein 04/12/2019 7.0  6.0 - 8.5 g/dL Final   • Albumin 04/12/2019 4.20  3.50 - 5.20 g/dL Final   • Globulin 04/12/2019 2.8  gm/dL Final   • A/G Ratio 04/12/2019 1.5  g/dL Final   • Total Bilirubin 04/12/2019 0.6  0.2 - 1.2 mg/dL Final   • Alkaline Phosphatase 04/12/2019 63  39 - 117 U/L Final   • AST (SGOT) 04/12/2019 24  1 - 40 U/L Final   • ALT (SGPT) 04/12/2019 29  1 - 41 U/L Final   • Hemoglobin A1C 04/12/2019 5.60  4.80 - 5.60 % Final    Comment: Hemoglobin A1C Ranges:  Increased Risk for Diabetes  5.7% to 6.4%  Diabetes                     >= 6.5%  Diabetic Goal                < 7.0%     • Total Cholesterol 04/12/2019 134  0 - 200 mg/dL Final   • Triglycerides 04/12/2019 104  0 - 150 mg/dL Final   • HDL Cholesterol 04/12/2019 40  40 - 60 mg/dL Final   • VLDL Cholesterol 04/12/2019 20.8  mg/dL Final   • LDL Cholesterol  04/12/2019 73  0 - 100 mg/dL Final   • Uric Acid 04/12/2019 5.8  3.4 - 7.0 mg/dL Final   • 25 Hydroxy, Vitamin D 04/12/2019 42.9  30.0 - 100.0 ng/ml Final    Comment: Reference Range for Total Vitamin D 25(OH)  Deficiency <20.0 ng/mL  Insufficiency 21-29 ng/mL  Sufficiency  ng/mL  Toxicity >100 ng/ml     • TSH 04/12/2019 2.350  0.270 - 4.200 mIU/mL Final   • T4, Total 04/12/2019 6.68  4.50 - 11.70 mcg/dL Final   • T3, Total 04/12/2019 87.8  80.0 - 200.0 ng/dl Final   • Testosterone, Total 04/12/2019 484  264 - 916 ng/dL Final    Comment: Adult male reference interval is based on a population of  healthy nonobese males (BMI <30) between 19 and 39 years old.  [place].[employer]. JCEM 2017,102;4664-5363. PMID: 17033745.     • Testosterone, Free 04/12/2019 7.1  6.6 - 18.1 pg/mL Final   • Interpretation 04/12/2019 Note   Final    Supplemental report is available.   • PDF Image 04/12/2019 Not applicable   Final   • Unable to Void 04/12/2019 Comment   Final    Comment: The patient was not able to render a urine sample and has been  instructed to return for a urine  collection at their earliest  convenience.  The urine testing that you have requested has  been deleted from this report.  When the patient returns and  provides a urine specimen, the urine testing will be performed  and separately reported.         Current medications:  Current Outpatient Medications   Medication Sig Dispense Refill   • ANDROGEL PUMP 20.25 MG/ACT (1.62%) gel 1 pump actuation on each shoulder daily. 75 g 0   • atorvastatin (LIPITOR) 10 MG tablet Take one tablet by mouth daily 90 tablet 1   • azithromycin (ZITHROMAX) 250 MG tablet Take 2 tablets the first day, then 1 tablet daily for 4 days. 6 tablet 0   • irbesartan (AVAPRO) 300 MG tablet Take 1 tablet by mouth Daily. 30 tablet 11   • ketotifen (ZADITOR) 0.025 % ophthalmic solution Apply  to eye As Needed.     • metoprolol tartrate (LOPRESSOR) 25 MG tablet Take 25 mg by mouth 2 (Two) Times a Day.     • naproxen (NAPROSYN) 500 MG tablet Take 1 tablet by mouth 2 (Two) Times a Day. 60 tablet 3   • SYNTHROID 150 MCG tablet Take 1 tablet by mouth Daily. 90 tablet 1   • vardenafil (LEVITRA) 10 MG tablet Take 10 mg by mouth As Needed for erectile dysfunction.     • vitamin D (ERGOCALCIFEROL) 57055 units capsule capsule Take 1 capsule by mouth Every 7 (Seven) Days. On thurs 13 capsule 3     No current facility-administered medications for this visit.        The following portions of the patient's history were reviewed and updated as appropriate:   Past Medical History:   Diagnosis Date   • Abdominal obesity    • Acquired spondylolisthesis    • Acute low back pain    • Acute right lumbar radiculopathy    • Bulging lumbar disc    • Concussion with no loss of consciousness    • DDD (degenerative disc disease)    • Essential hypertension    • Fatigue    • Graves disease    • H/O disorder of nervous system and sense organs    • Headache    • Hyperlipidemia    • Hypertension    • Hypertensive heart disease    • Hypothyroidism    • Insomnia    • Irregular heart  beat    • Joint pain    • Lumbar radiculopathy    • Nephrolithiasis    • Paroxysmal nocturnal dyspnea    • PVC (premature ventricular contraction)    • Severe head trauma    • SOB (shortness of breath)    • Soemmering's ring    • Spondylolisthesis    • Subdural hematoma (CMS/HCC)    • Supraventricular tachycardia (CMS/HCC)      Past Surgical History:   Procedure Laterality Date   • KNEE ARTHROSCOPY Left    • KNEE SURGERY      2008 and 2009   • LITHOTRIPSY     • OTHER SURGICAL HISTORY      ear surgery     Family History   Problem Relation Age of Onset   • Cancer Father         malignant neoplasm   • No Known Problems Mother    • No Known Problems Sister    • No Known Problems Brother    • No Known Problems Sister    • No Known Problems Brother    • No Known Problems Brother    • Diabetes Maternal Grandmother        Allergies   Allergen Reactions   • Latex Rash     Social History     Socioeconomic History   • Marital status:      Spouse name: Not on file   • Number of children: Not on file   • Years of education: Not on file   • Highest education level: Not on file   Tobacco Use   • Smoking status: Former Smoker   • Smokeless tobacco: Never Used   • Tobacco comment: quit 45 yrs ago   Substance and Sexual Activity   • Alcohol use: Yes     Comment: occasional   • Drug use: No       Review of Systems  Review of Systems   Constitutional: Negative for fatigue.   HENT: Negative for trouble swallowing.    Eyes: Negative for visual disturbance.   Respiratory: Negative for cough.    Cardiovascular: Negative for palpitations.   Gastrointestinal: Negative for constipation.   Endocrine: Negative for cold intolerance.   Genitourinary: Positive for frequency (at night) and urgency (at night).   Musculoskeletal: Negative for myalgias.   Skin: Negative for rash.   Neurological: Negative for headaches.   Hematological: Does not bruise/bleed easily.   Psychiatric/Behavioral: The patient is not nervous/anxious.    All other  "systems reviewed and are negative.       Objective    /78   Ht 193 cm (75.98\")   Wt 112 kg (248 lb)   BMI 30.20 kg/m²   Physical Exam   Constitutional: He is oriented to person, place, and time. He appears well-developed and well-nourished. No distress.   HENT:   Head: Normocephalic and atraumatic.   Eyes: EOM are normal. Pupils are equal, round, and reactive to light.   Neck: Normal range of motion. Neck supple.   Cardiovascular: Normal rate, regular rhythm, normal heart sounds and intact distal pulses.   No murmur heard.  Pulmonary/Chest: Effort normal and breath sounds normal.   Abdominal: Soft. Bowel sounds are normal.   Musculoskeletal: Normal range of motion.   Neurological: He is alert and oriented to person, place, and time.   Skin: Skin is warm and dry. Capillary refill takes 2 to 3 seconds. He is not diaphoretic. No pallor.   Psychiatric: He has a normal mood and affect. His behavior is normal. Judgment and thought content normal.   Nursing note and vitals reviewed.      Lab Review  Lab Results   Component Value Date    TSH 2.350 04/12/2019    TSH 1.700 05/30/2018     Lab Results   Component Value Date    FREET4 1.35 08/14/2018    FREET4 0.98 01/08/2017        Assessment/Plan      1. Postablative hypothyroidism    2. Graves disease    3. Other specified hypothyroidism    4. Mixed hyperlipidemia    5. Erectile dysfunction, unspecified erectile dysfunction type    6. Hyperglycemia    7. Insulin resistance    8. Low testosterone    . This diagnosis was discussed and reviewed with the patient including the advantages of drug therapy.     1. Orders placed during this encounter include:  No orders of the defined types were placed in this encounter.      Medications prescribed:  Outpatient Encounter Medications as of 4/26/2019   Medication Sig Dispense Refill   • ANDROGEL PUMP 20.25 MG/ACT (1.62%) gel 1 pump actuation on each shoulder daily. 75 g 0   • atorvastatin (LIPITOR) 10 MG tablet Take one tablet " by mouth daily 90 tablet 1   • azithromycin (ZITHROMAX) 250 MG tablet Take 2 tablets the first day, then 1 tablet daily for 4 days. 6 tablet 0   • irbesartan (AVAPRO) 300 MG tablet Take 1 tablet by mouth Daily. 30 tablet 11   • ketotifen (ZADITOR) 0.025 % ophthalmic solution Apply  to eye As Needed.     • metoprolol tartrate (LOPRESSOR) 25 MG tablet Take 25 mg by mouth 2 (Two) Times a Day.     • naproxen (NAPROSYN) 500 MG tablet Take 1 tablet by mouth 2 (Two) Times a Day. 60 tablet 3   • SYNTHROID 150 MCG tablet Take 1 tablet by mouth Daily. 90 tablet 1   • vardenafil (LEVITRA) 10 MG tablet Take 10 mg by mouth As Needed for erectile dysfunction.     • vitamin D (ERGOCALCIFEROL) 02130 units capsule capsule Take 1 capsule by mouth Every 7 (Seven) Days. On thurs 13 capsule 3   • [DISCONTINUED] ANDROGEL PUMP 20.25 MG/ACT (1.62%) gel 1 pump actuation on each shoulder daily. 75 g 0   • [DISCONTINUED] atorvastatin (LIPITOR) 10 MG tablet Take one tablet by mouth daily 90 tablet 1   • [DISCONTINUED] SYNTHROID 150 MCG tablet Take 1 tablet by mouth Daily. 90 tablet 1   • [DISCONTINUED] vitamin D (ERGOCALCIFEROL) 47754 units capsule capsule Take 1 capsule by mouth Every 7 (Seven) Days. On thurs 13 capsule 3     No facility-administered encounter medications on file as of 4/26/2019.        2. Repeat s-TSH in before patient's next visit.    3. The risks and benefits of my recommendations, as well as other treatment options were discussed with the patient today. Questions were answered.        Postablative hypothyroidism- chronic, stable no medication changes at this time. Refills prescribed. Future labs ordered for upcoming appointment and assessment.          Mixed hyperlipidemia- chronic, stable no medication changes at this time. Refills prescribed. Future labs ordered for upcoming appointment and assessment.           Insulin resistance-chronic, stable no medication changes at this time. Refills prescribed. Future labs  ordered for upcoming appointment and assessment.       Low testosterone/ male hypogandism- chronic, stable no medication changes at this time. Refills prescribed. Future labs ordered for upcoming appointment and assessment.            4. Return if symptoms worsen or fail to improve, for Recheck. Keep appt with Dr. Smith - 2 weeks prior for labs

## 2019-04-30 RX ORDER — IRBESARTAN 300 MG/1
300 TABLET ORAL DAILY
Qty: 90 TABLET | Refills: 3 | Status: SHIPPED | OUTPATIENT
Start: 2019-04-30 | End: 2019-05-01 | Stop reason: SDUPTHER

## 2019-05-01 RX ORDER — IRBESARTAN 300 MG/1
300 TABLET ORAL DAILY
Qty: 90 TABLET | Refills: 3 | Status: SHIPPED | OUTPATIENT
Start: 2019-05-01 | End: 2019-08-14 | Stop reason: SDUPTHER

## 2019-05-21 ENCOUNTER — OFFICE VISIT (OUTPATIENT)
Dept: FAMILY MEDICINE CLINIC | Facility: CLINIC | Age: 69
End: 2019-05-21

## 2019-05-21 VITALS
DIASTOLIC BLOOD PRESSURE: 66 MMHG | WEIGHT: 250.6 LBS | TEMPERATURE: 97.9 F | OXYGEN SATURATION: 97 % | BODY MASS INDEX: 30.52 KG/M2 | HEIGHT: 76 IN | SYSTOLIC BLOOD PRESSURE: 112 MMHG | HEART RATE: 68 BPM

## 2019-05-21 DIAGNOSIS — I10 ESSENTIAL HYPERTENSION: Primary | ICD-10-CM

## 2019-05-21 DIAGNOSIS — E78.2 MIXED HYPERLIPIDEMIA: ICD-10-CM

## 2019-05-21 DIAGNOSIS — R73.9 HYPERGLYCEMIA: ICD-10-CM

## 2019-05-21 PROCEDURE — 99213 OFFICE O/P EST LOW 20 MIN: CPT | Performed by: INTERNAL MEDICINE

## 2019-05-21 NOTE — PROGRESS NOTES
Subjective   Joaquin Peña is a 68 y.o. male.     History of Present Illness   Patient was seen today for hypertension.  Blood pressures been running 120s over 80s.  His hemoglobin A1c was 5.4%.  Lipids controlled with diet exercise and a statin.    Dictated utilizing Dragon dictation. If there are questions or for further clarification, please contact me.  The following portions of the patient's history were reviewed and updated as appropriate: allergies, current medications, past family history, past medical history, past social history, past surgical history and problem list.    Review of Systems   Constitutional: Negative for fatigue and fever.   HENT: Positive for congestion. Negative for trouble swallowing.    Eyes: Negative for discharge and visual disturbance.   Respiratory: Negative for choking and shortness of breath.    Cardiovascular: Negative for chest pain and palpitations.   Gastrointestinal: Negative for abdominal pain and blood in stool.   Endocrine: Negative.    Genitourinary: Negative for genital sores and hematuria.   Musculoskeletal: Negative for gait problem and joint swelling.   Skin: Negative for color change, pallor, rash and wound.   Allergic/Immunologic: Positive for environmental allergies. Negative for immunocompromised state.   Neurological: Negative for facial asymmetry and speech difficulty.   Psychiatric/Behavioral: Negative for hallucinations and suicidal ideas.       Objective   Physical Exam   Constitutional: He is oriented to person, place, and time. He appears well-developed and well-nourished.   HENT:   Head: Normocephalic.   Eyes: Conjunctivae are normal. Pupils are equal, round, and reactive to light.   Neck: Normal range of motion. Neck supple.   Cardiovascular: Normal rate, regular rhythm and normal heart sounds.   Pulmonary/Chest: Effort normal and breath sounds normal.   Abdominal: Soft. Bowel sounds are normal.   Musculoskeletal: Normal range of motion.   Neurological:  He is alert and oriented to person, place, and time.   Skin: Skin is warm and dry.   Psychiatric: He has a normal mood and affect. His behavior is normal. Judgment and thought content normal.   Nursing note and vitals reviewed.      Assessment/Plan   Problems Addressed this Visit        Cardiovascular and Mediastinum    Hyperlipidemia    Essential hypertension - Primary       Other    Hyperglycemia

## 2019-05-24 ENCOUNTER — OFFICE VISIT (OUTPATIENT)
Dept: FAMILY MEDICINE CLINIC | Facility: CLINIC | Age: 69
End: 2019-05-24

## 2019-05-24 VITALS
TEMPERATURE: 97.9 F | WEIGHT: 249.6 LBS | DIASTOLIC BLOOD PRESSURE: 80 MMHG | SYSTOLIC BLOOD PRESSURE: 134 MMHG | BODY MASS INDEX: 30.4 KG/M2 | HEIGHT: 76 IN | HEART RATE: 57 BPM | OXYGEN SATURATION: 97 %

## 2019-05-24 DIAGNOSIS — R07.81 RIB PAIN ON RIGHT SIDE: Primary | ICD-10-CM

## 2019-05-24 PROCEDURE — 71101 X-RAY EXAM UNILAT RIBS/CHEST: CPT | Performed by: INTERNAL MEDICINE

## 2019-05-24 PROCEDURE — 99213 OFFICE O/P EST LOW 20 MIN: CPT | Performed by: INTERNAL MEDICINE

## 2019-05-24 NOTE — PROGRESS NOTES
Subjective   Joaquin Peña is a 68 y.o. male.     History of Present Illness   Patient had right-sided rib pain.  It was painful to sleep on his right side at night.  His ribs x-ray showed possible fracture of T8 and was sent to radiology for overread.    X-Ray  Interpretation report in house X-rays that I personally viewed    Relevant Clinical Issues/Diagnoses/Indications: Rib pain rib x-ray        Clinical Findings: Possible fracture T8          Comparative Data: No previous x-ray          Date of Previous X-ray:    Change on current X-ray:    Dictated utilizing Dragon dictation. If there are questions or for further clarification, please contact me.    The following portions of the patient's history were reviewed and updated as appropriate: allergies, current medications, past family history, past medical history, past social history, past surgical history and problem list.    Review of Systems   Constitutional: Negative for fatigue and fever.   HENT: Positive for congestion. Negative for trouble swallowing.    Eyes: Negative for discharge and visual disturbance.   Respiratory: Negative for choking and shortness of breath.    Cardiovascular: Negative for chest pain and palpitations.   Gastrointestinal: Negative for abdominal pain and blood in stool.   Endocrine: Negative.    Genitourinary: Negative for genital sores and hematuria.   Musculoskeletal: Negative for gait problem and joint swelling.        Rib pain   Skin: Negative for color change, pallor, rash and wound.   Allergic/Immunologic: Positive for environmental allergies. Negative for immunocompromised state.   Neurological: Negative for facial asymmetry and speech difficulty.   Psychiatric/Behavioral: Negative for hallucinations and suicidal ideas.       Objective   Physical Exam   Constitutional: He is oriented to person, place, and time. He appears well-developed and well-nourished.   HENT:   Head: Normocephalic.   Eyes: Conjunctivae are normal.  Pupils are equal, round, and reactive to light.   Neck: Normal range of motion. Neck supple.   Cardiovascular: Normal rate, regular rhythm and normal heart sounds.   Pulmonary/Chest: Effort normal and breath sounds normal. No stridor. No respiratory distress. He has no wheezes. He has no rales. He exhibits tenderness.   Abdominal: Soft. Bowel sounds are normal.   Musculoskeletal: Normal range of motion.   Neurological: He is alert and oriented to person, place, and time.   Skin: Skin is warm and dry.   Psychiatric: He has a normal mood and affect. His behavior is normal. Judgment and thought content normal.   Nursing note and vitals reviewed.      Assessment/Plan   Problems Addressed this Visit     None      Visit Diagnoses     Rib pain on right side    -  Primary    Relevant Orders    XR Ribs Right With PA Chest (Completed)

## 2019-05-29 ENCOUNTER — TELEPHONE (OUTPATIENT)
Dept: FAMILY MEDICINE CLINIC | Facility: CLINIC | Age: 69
End: 2019-05-29

## 2019-05-30 RX ORDER — CHOLECALCIFEROL (VITAMIN D3) 50 MCG
2000 TABLET ORAL DAILY
Qty: 30 TABLET | Refills: 3 | Status: SHIPPED | OUTPATIENT
Start: 2019-05-30 | End: 2019-06-27

## 2019-05-30 NOTE — TELEPHONE ENCOUNTER
Pt wants to know if ara d 50,000 helping or hurting,also wants copy of report and wants to know if you think he should have scan to check see if has osteoporosis or something?

## 2019-05-30 NOTE — TELEPHONE ENCOUNTER
Vitamin D level was 42 recommend to stop 50,000 units weekly and take 2000 units vitamin D3 daily sent to pharmacy

## 2019-05-31 DIAGNOSIS — R93.7 ABNORMAL FINDINGS ON DIAGNOSTIC IMAGING OF OTHER PARTS OF MUSCULOSKELETAL SYSTEM: ICD-10-CM

## 2019-05-31 DIAGNOSIS — S22.31XA CLOSED FRACTURE OF ONE RIB OF RIGHT SIDE, INITIAL ENCOUNTER: Primary | ICD-10-CM

## 2019-05-31 NOTE — TELEPHONE ENCOUNTER
Did he need to have a scan to test bones and can he have copy of xray interpretation? (rest of message pt asked)

## 2019-06-20 ENCOUNTER — HOSPITAL ENCOUNTER (OUTPATIENT)
Dept: BONE DENSITY | Facility: HOSPITAL | Age: 69
Discharge: HOME | End: 2019-06-20
Admitting: INTERNAL MEDICINE

## 2019-06-20 DIAGNOSIS — R93.7 ABNORMAL FINDINGS ON DIAGNOSTIC IMAGING OF OTHER PARTS OF MUSCULOSKELETAL SYSTEM: ICD-10-CM

## 2019-06-20 DIAGNOSIS — S22.31XA CLOSED FRACTURE OF ONE RIB OF RIGHT SIDE, INITIAL ENCOUNTER: ICD-10-CM

## 2019-06-20 PROCEDURE — 77080 DXA BONE DENSITY AXIAL: CPT

## 2019-06-26 ENCOUNTER — TELEPHONE (OUTPATIENT)
Dept: FAMILY MEDICINE CLINIC | Facility: CLINIC | Age: 69
End: 2019-06-26

## 2019-08-04 LAB
25(OH)D3+25(OH)D2 SERPL-MCNC: 37.3 NG/ML (ref 30–100)
ALBUMIN SERPL-MCNC: 4.1 G/DL (ref 3.5–5.2)
ALBUMIN/GLOB SERPL: 1.6 G/DL
ALP SERPL-CCNC: 62 U/L (ref 39–117)
ALT SERPL-CCNC: 20 U/L (ref 1–41)
AST SERPL-CCNC: 18 U/L (ref 1–40)
BILIRUB SERPL-MCNC: 0.6 MG/DL (ref 0.2–1.2)
BUN SERPL-MCNC: 15 MG/DL (ref 8–23)
BUN/CREAT SERPL: 13.2 (ref 7–25)
C PEPTIDE SERPL-MCNC: 4.2 NG/ML (ref 1.1–4.4)
CALCIUM SERPL-MCNC: 10 MG/DL (ref 8.6–10.5)
CHLORIDE SERPL-SCNC: 103 MMOL/L (ref 98–107)
CHOLEST SERPL-MCNC: 122 MG/DL (ref 0–200)
CO2 SERPL-SCNC: 25.9 MMOL/L (ref 22–29)
CREAT SERPL-MCNC: 1.14 MG/DL (ref 0.76–1.27)
FSH SERPL-ACNC: 5.2 MIU/ML (ref 1.5–12.4)
GLOBULIN SER CALC-MCNC: 2.6 GM/DL
GLUCOSE SERPL-MCNC: 85 MG/DL (ref 65–99)
HBA1C MFR BLD: 5.9 % (ref 4.8–5.6)
HCT VFR BLD AUTO: 51.4 % (ref 37.5–51)
HDLC SERPL-MCNC: 38 MG/DL (ref 40–60)
HGB BLD-MCNC: 15.6 G/DL (ref 13–17.7)
INTERPRETATION: NORMAL
LDLC SERPL CALC-MCNC: 65 MG/DL (ref 0–100)
LH SERPL-ACNC: 6.9 MIU/ML (ref 1.7–8.6)
Lab: NORMAL
POTASSIUM SERPL-SCNC: 4.9 MMOL/L (ref 3.5–5.2)
PROT SERPL-MCNC: 6.7 G/DL (ref 6–8.5)
PSA SERPL-MCNC: 0.77 NG/ML (ref 0–4)
SHBG SERPL-SCNC: 37.2 NMOL/L (ref 19.3–76.4)
SODIUM SERPL-SCNC: 142 MMOL/L (ref 136–145)
T3FREE SERPL-MCNC: 2.5 PG/ML (ref 2–4.4)
T4 FREE SERPL-MCNC: 1.32 NG/DL (ref 0.93–1.7)
T4 SERPL-MCNC: 7.48 MCG/DL (ref 4.5–11.7)
TESTOST FREE SERPL-MCNC: 7.6 PG/ML (ref 6.6–18.1)
TESTOST SERPL-MCNC: 410 NG/DL (ref 264–916)
THYROGLOB AB SERPL-ACNC: <1 IU/ML (ref 0–0.9)
THYROGLOB SERPL-MCNC: 13.9 NG/ML (ref 1.4–29.2)
TRIGL SERPL-MCNC: 93 MG/DL (ref 0–150)
TSH SERPL DL<=0.005 MIU/L-ACNC: 2.41 MIU/ML (ref 0.27–4.2)
URATE SERPL-MCNC: 6.8 MG/DL (ref 3.4–7)
VLDLC SERPL CALC-MCNC: 18.6 MG/DL (ref 5–40)

## 2019-08-14 ENCOUNTER — OFFICE VISIT (OUTPATIENT)
Dept: ENDOCRINOLOGY | Age: 69
End: 2019-08-14

## 2019-08-14 VITALS
HEIGHT: 76 IN | BODY MASS INDEX: 30.13 KG/M2 | WEIGHT: 247.4 LBS | RESPIRATION RATE: 16 BRPM | DIASTOLIC BLOOD PRESSURE: 72 MMHG | SYSTOLIC BLOOD PRESSURE: 118 MMHG

## 2019-08-14 DIAGNOSIS — E88.81 INSULIN RESISTANCE: ICD-10-CM

## 2019-08-14 DIAGNOSIS — I10 ESSENTIAL HYPERTENSION: ICD-10-CM

## 2019-08-14 DIAGNOSIS — R79.89 LOW TESTOSTERONE: ICD-10-CM

## 2019-08-14 DIAGNOSIS — E78.2 MIXED HYPERLIPIDEMIA: ICD-10-CM

## 2019-08-14 DIAGNOSIS — E55.9 VITAMIN D DEFICIENCY: ICD-10-CM

## 2019-08-14 DIAGNOSIS — E05.00 GRAVES DISEASE: Primary | ICD-10-CM

## 2019-08-14 DIAGNOSIS — M43.10 ACQUIRED SPONDYLOLISTHESIS: ICD-10-CM

## 2019-08-14 DIAGNOSIS — R73.9 HYPERGLYCEMIA: ICD-10-CM

## 2019-08-14 PROCEDURE — 99214 OFFICE O/P EST MOD 30 MIN: CPT | Performed by: INTERNAL MEDICINE

## 2019-08-14 RX ORDER — ATORVASTATIN CALCIUM 10 MG/1
TABLET, FILM COATED ORAL
Qty: 90 TABLET | Refills: 3 | Status: SHIPPED | OUTPATIENT
Start: 2019-08-14 | End: 2019-09-11 | Stop reason: SDUPTHER

## 2019-08-14 RX ORDER — IRBESARTAN 300 MG/1
300 TABLET ORAL DAILY
Qty: 90 TABLET | Refills: 3 | Status: SHIPPED | OUTPATIENT
Start: 2019-08-14 | End: 2020-02-28

## 2019-08-14 RX ORDER — LEVOTHYROXINE SODIUM 0.15 MG/1
150 TABLET ORAL DAILY
Qty: 90 TABLET | Refills: 3 | Status: SHIPPED | OUTPATIENT
Start: 2019-08-14 | End: 2020-03-23 | Stop reason: SDUPTHER

## 2019-08-14 RX ORDER — TESTOSTERONE 16.2 MG/G
GEL TRANSDERMAL
Qty: 75 G | Refills: 5 | Status: SHIPPED | OUTPATIENT
Start: 2019-08-14 | End: 2019-08-14 | Stop reason: SDUPTHER

## 2019-08-14 RX ORDER — LEVOTHYROXINE SODIUM 0.15 MG/1
TABLET ORAL
COMMUNITY
End: 2019-08-14 | Stop reason: SDUPTHER

## 2019-08-14 RX ORDER — TESTOSTERONE 16.2 MG/G
GEL TRANSDERMAL
Qty: 75 G | Refills: 5 | Status: SHIPPED | OUTPATIENT
Start: 2019-08-14 | End: 2020-02-28 | Stop reason: SDUPTHER

## 2019-08-15 ENCOUNTER — PRIOR AUTHORIZATION (OUTPATIENT)
Dept: FAMILY MEDICINE CLINIC | Facility: CLINIC | Age: 69
End: 2019-08-15

## 2019-08-15 NOTE — TELEPHONE ENCOUNTER
Testosterone 20.25 MG/ACT(1.62%) gel  CaseId:[id number];Status:Approved;Review Type:Prior Auth;Coverage Start Date:07/16/2019;Coverage End Date:08/14/2020;

## 2019-09-03 ENCOUNTER — OFFICE VISIT (OUTPATIENT)
Dept: CARDIOLOGY | Facility: CLINIC | Age: 69
End: 2019-09-03

## 2019-09-03 VITALS
HEART RATE: 45 BPM | HEIGHT: 76 IN | WEIGHT: 248 LBS | DIASTOLIC BLOOD PRESSURE: 80 MMHG | SYSTOLIC BLOOD PRESSURE: 120 MMHG | BODY MASS INDEX: 30.2 KG/M2

## 2019-09-03 DIAGNOSIS — Z01.810 PREOP CARDIOVASCULAR EXAM: ICD-10-CM

## 2019-09-03 DIAGNOSIS — Z13.6 ENCOUNTER FOR SCREENING FOR VASCULAR DISEASE: ICD-10-CM

## 2019-09-03 DIAGNOSIS — G47.33 OSA (OBSTRUCTIVE SLEEP APNEA): ICD-10-CM

## 2019-09-03 DIAGNOSIS — I10 ESSENTIAL HYPERTENSION: ICD-10-CM

## 2019-09-03 DIAGNOSIS — R42 DIZZINESS: Primary | ICD-10-CM

## 2019-09-03 DIAGNOSIS — I47.1 SUPRAVENTRICULAR TACHYCARDIA (HCC): ICD-10-CM

## 2019-09-03 PROCEDURE — 99213 OFFICE O/P EST LOW 20 MIN: CPT | Performed by: INTERNAL MEDICINE

## 2019-09-03 PROCEDURE — 93000 ELECTROCARDIOGRAM COMPLETE: CPT | Performed by: INTERNAL MEDICINE

## 2019-09-03 NOTE — PROGRESS NOTES
Date of Office Visit: 2019  Encounter Provider: Muna Haque MD  Place of Service: [facility] CARDIOLOGY  Patient Name: Joaquin Peña  :1950    Chief complaint  Sick sinus syndrome with atrial tachycardia and bradycardia, PVCs    History of Present Illness  Patient is a 69-year-old gentleman with Graves' disease, dyslipidemia, renal calculi who in  developed brief episodes of supraventricular tachycardia in the setting of hypertension.  This was treated medically.  In 2014 had a stress echocardiogram that showed normal left ventricular size and function with ejection fraction 59% grade 1 diastolic dysfunction mild left ventricular hypertrophy with no significant valvular heart disease and no ischemia in May 2015 he had mild bradycardia in the 30s.  A follow-up Holter revealed frequent PVCs and brief bursts of atrial tachycardia for which metoprolol was increased and he has not had any recurrent bradycardia arrhythmia.     Since the last visit he is not using his CPAP but has lost 25 to 30 pounds and was told he did not need to use this.  He denies any chest pain shortness of breath or palpitations.  He had edema and may of this year after he went on a cruise that resolved within 24 hours.  Had occasional episodes of dizziness that occurs when he stands suddenly though had been outside for several hours doing yard work.  His blood pressure at times is lower than it is today typically in the 1 teens over 60s when checked.    Past Medical History:   Diagnosis Date   • Abdominal obesity    • Acquired spondylolisthesis    • Acute low back pain    • Acute right lumbar radiculopathy    • Bulging lumbar disc    • Concussion with no loss of consciousness    • DDD (degenerative disc disease)    • Essential hypertension    • Fatigue    • Graves disease    • H/O disorder of nervous system and sense organs    • Headache    • Hyperlipidemia    • Hypertension    •  Hypertensive heart disease    • Hypothyroidism    • Insomnia    • Irregular heart beat    • Joint pain    • Lumbar radiculopathy    • Nephrolithiasis    • Paroxysmal nocturnal dyspnea    • PVC (premature ventricular contraction)    • Severe head trauma    • SOB (shortness of breath)    • Soemmering's ring    • Spondylolisthesis    • Subdural hematoma (CMS/HCC)    • Supraventricular tachycardia (CMS/HCC)      Past Surgical History:   Procedure Laterality Date   • KNEE ARTHROSCOPY Left    • KNEE SURGERY      2008 and 2009   • LITHOTRIPSY     • OTHER SURGICAL HISTORY      ear surgery     Outpatient Medications Prior to Visit   Medication Sig Dispense Refill   • atorvastatin (LIPITOR) 10 MG tablet Take one tablet by mouth daily 90 tablet 3   • Cholecalciferol (VITAMIN D3) 5000 units tablet tablet Take 1 tablet by mouth Daily. 30 tablet 3   • irbesartan (AVAPRO) 300 MG tablet Take 1 tablet by mouth Daily. 90 tablet 3   • ketotifen (ZADITOR) 0.025 % ophthalmic solution Apply  to eye As Needed.     • levothyroxine (SYNTHROID, LEVOTHROID) 150 MCG tablet Take 1 tablet by mouth Daily. 90 tablet 3   • naproxen (NAPROSYN) 500 MG tablet Take 1 tablet by mouth 2 (Two) Times a Day. (Patient taking differently: Take 500 mg by mouth 2 (Two) Times a Day. prn) 60 tablet 3   • Testosterone (ANDROGEL PUMP) 20.25 MG/ACT (1.62%) gel 1 pump actuation on each shoulder daily. 75 g 5   • metoprolol tartrate (LOPRESSOR) 25 MG tablet Take 1 tablet by mouth 2 (Two) Times a Day. 180 tablet 3     No facility-administered medications prior to visit.        Allergies as of 09/03/2019 - Reviewed 09/03/2019   Allergen Reaction Noted   • Latex Rash 03/15/2016     Social History     Socioeconomic History   • Marital status:      Spouse name: Not on file   • Number of children: Not on file   • Years of education: Not on file   • Highest education level: Not on file   Tobacco Use   • Smoking status: Former Smoker   • Smokeless tobacco: Never Used  "  • Tobacco comment: quit 45 yrs ago   Substance and Sexual Activity   • Alcohol use: Yes     Comment: occasional/  occ caffeine use   • Drug use: No     Family History   Problem Relation Age of Onset   • Cancer Father         malignant neoplasm   • No Known Problems Mother    • No Known Problems Sister    • No Known Problems Brother    • No Known Problems Sister    • No Known Problems Brother    • No Known Problems Brother    • Diabetes Maternal Grandmother      Review of Systems   Constitution: Negative for fever, malaise/fatigue, weight gain and weight loss.   HENT: Negative for ear pain, hearing loss, nosebleeds and sore throat.    Eyes: Negative for double vision, pain, vision loss in left eye and vision loss in right eye.   Cardiovascular:        See history of present illness.   Respiratory: Negative for cough, shortness of breath, sleep disturbances due to breathing, snoring and wheezing.    Endocrine: Negative for cold intolerance, heat intolerance and polyuria.   Skin: Negative for itching, poor wound healing and rash.   Musculoskeletal: Negative for joint pain, joint swelling and myalgias.   Gastrointestinal: Negative for abdominal pain, diarrhea, hematochezia, nausea and vomiting.   Genitourinary: Negative for hematuria and hesitancy.   Neurological: Positive for dizziness. Negative for numbness, paresthesias and seizures.   Psychiatric/Behavioral: Negative for depression. The patient is not nervous/anxious.         Objective:     Vitals:    09/03/19 0934   BP: 120/80   Pulse: (!) 45   Weight: 112 kg (248 lb)   Height: 193 cm (76\")     Body mass index is 30.19 kg/m².    Physical Exam   Constitutional: He is oriented to person, place, and time. He appears well-developed and well-nourished.   Obese   HENT:   Head: Normocephalic.   Nose: Nose normal.   Mouth/Throat: Oropharynx is clear and moist.   Eyes: Conjunctivae and EOM are normal. Pupils are equal, round, and reactive to light. Right eye exhibits no " discharge. No scleral icterus.   Neck: Normal range of motion. Neck supple. No JVD present. No thyromegaly present.   Cardiovascular: Normal rate, regular rhythm, normal heart sounds and intact distal pulses. Exam reveals no gallop and no friction rub.   No murmur heard.  Pulses:       Carotid pulses are 2+ on the right side, and 2+ on the left side.       Radial pulses are 2+ on the right side, and 2+ on the left side.        Femoral pulses are 2+ on the right side, and 2+ on the left side.       Popliteal pulses are 2+ on the right side, and 2+ on the left side.        Dorsalis pedis pulses are 2+ on the right side, and 2+ on the left side.        Posterior tibial pulses are 2+ on the right side, and 2+ on the left side.   Pulmonary/Chest: Effort normal and breath sounds normal. No respiratory distress. He has no wheezes. He has no rales.   Abdominal: Soft. Bowel sounds are normal. He exhibits no distension. There is no hepatosplenomegaly. There is no tenderness. There is no rebound.   Musculoskeletal: Normal range of motion. He exhibits no edema or tenderness.   Neurological: He is alert and oriented to person, place, and time.   Skin: Skin is warm and dry. No rash noted. No erythema.   Psychiatric: He has a normal mood and affect. His behavior is normal. Judgment and thought content normal.   Vitals reviewed.    Lab Review:     ECG 12 Lead  Date/Time: 9/3/2019 9:34 AM  Performed by: Muna Haque MD  Authorized by: Muna Haque MD   Comparison: compared with previous ECG   Similar to previous ECG  Rhythm: sinus bradycardia  Conduction: non-specific intraventricular conduction delay    Clinical impression: abnormal EKG          Assessment:       Diagnosis Plan   1. Dizziness  ECG 12 Lead    Treadmill Stress Test   2. Encounter for screening for vascular disease  Vascular Screening   3. Preop cardiovascular exam  Treadmill Stress Test   4. Supraventricular tachycardia (CMS/HCC)     5. Essential hypertension     6.  JACQUELINE (obstructive sleep apnea)       Plan:       1.  Frequent PVC's.  Improved on low-dose metoprolol.  However remains slightly bradycardic and will decrease Lopressor to 12.5 mg twice daily  1.  Paroxysmal supraventricular tachycardia.  Asymptomatic.  Medication changes as above  2.  Hypertension.  Blood pressure at home is often lower than it is today typically in the 1 teens over 60s.  Will decrease metoprolol as above given concomitant symptoms of dizziness that sounds more due to hypovolemia  3.  Dyslipidemia..  I am asked him to increase his omega-3 intake  4.  Graves' disease.  Managed by Dr. Escobar   5.  Carotid artery plaque.  Will order vascular screening study  6.  Dizziness.  As above this occurred after hot day outside and likely represents hypovolemia.  His heart rate did not change.  He remains bradycardic in the 40s to 50s most of the time.  For now we will observe with hydration and decrease metoprolol as above but if his symptoms recur, place a Holter  7.  Upcoming left knee replacement.  We will check a treadmill exercise stress test  8.  Edema.  This was in May and resolved likely due to salt intake with his cruise       Your medication list           Accurate as of 9/3/19 11:59 PM. If you have any questions, ask your nurse or doctor.               CHANGE how you take these medications      Instructions Last Dose Given Next Dose Due   metoprolol tartrate 25 MG tablet  Commonly known as:  LOPRESSOR  What changed:  how much to take  Changed by:  Muna Haque MD      Take 0.5 tablets by mouth 2 (Two) Times a Day.       naproxen 500 MG tablet  Commonly known as:  NAPROSYN  What changed:  additional instructions      Take 1 tablet by mouth 2 (Two) Times a Day.          CONTINUE taking these medications      Instructions Last Dose Given Next Dose Due   atorvastatin 10 MG tablet  Commonly known as:  LIPITOR      Take one tablet by mouth daily       irbesartan 300 MG tablet  Commonly known as:  AVAPRO       Take 1 tablet by mouth Daily.       levothyroxine 150 MCG tablet  Commonly known as:  SYNTHROID, LEVOTHROID      Take 1 tablet by mouth Daily.       Testosterone 20.25 MG/ACT (1.62%) gel  Commonly known as:  ANDROGEL PUMP      1 pump actuation on each shoulder daily.       vitamin D3 5000 units tablet tablet      Take 1 tablet by mouth Daily.       ZADITOR 0.025 % ophthalmic solution  Generic drug:  ketotifen      Apply  to eye As Needed.             Where to Get Your Medications      You can get these medications from any pharmacy    Bring a paper prescription for each of these medications  · metoprolol tartrate 25 MG tablet       Dictated utilizing Dragon dictation

## 2019-09-08 PROBLEM — Z01.810 PREOP CARDIOVASCULAR EXAM: Status: ACTIVE | Noted: 2019-09-08

## 2019-09-08 PROBLEM — Z13.6 ENCOUNTER FOR SCREENING FOR VASCULAR DISEASE: Status: ACTIVE | Noted: 2019-09-08

## 2019-09-09 ENCOUNTER — TRANSCRIBE ORDERS (OUTPATIENT)
Dept: ADMINISTRATIVE | Facility: HOSPITAL | Age: 69
End: 2019-09-09

## 2019-09-09 ENCOUNTER — TELEPHONE (OUTPATIENT)
Dept: CARDIOLOGY | Facility: CLINIC | Age: 69
End: 2019-09-09

## 2019-09-09 DIAGNOSIS — Z13.6 ENCOUNTER FOR SCREENING FOR VASCULAR DISEASE: Primary | ICD-10-CM

## 2019-09-09 NOTE — TELEPHONE ENCOUNTER
Regarding: Visit Follow-Up Question  Contact: [phone]  ----- Message from Roomisht, Generic sent at 9/9/2019  8:03 AM EDT -----    Tried cutting the metoprolol from 25 mg twice a day to 12.5 mg twice a day for six days. My pulse rate started to increase from upper 40's and low 50's to over 100 beats for short walking distances. Decided to return too the 25 mg dose. Not sure if they make a dose somewhere in between?

## 2019-09-10 NOTE — TELEPHONE ENCOUNTER
No dose in between. AS long as he did not feel bad with the slightly higher HR, take lopressor 25 mg q am and 12.5 mg q pm. adrienne

## 2019-09-11 DIAGNOSIS — E78.2 MIXED HYPERLIPIDEMIA: ICD-10-CM

## 2019-09-11 RX ORDER — ATORVASTATIN CALCIUM 10 MG/1
TABLET, FILM COATED ORAL
Qty: 90 TABLET | Refills: 3 | Status: SHIPPED | OUTPATIENT
Start: 2019-09-11 | End: 2019-11-06 | Stop reason: SDUPTHER

## 2019-09-13 ENCOUNTER — APPOINTMENT (OUTPATIENT)
Dept: CARDIOLOGY | Facility: HOSPITAL | Age: 69
End: 2019-09-13

## 2019-09-25 ENCOUNTER — HOSPITAL ENCOUNTER (OUTPATIENT)
Dept: CARDIOLOGY | Facility: HOSPITAL | Age: 69
Discharge: HOME | End: 2019-09-25
Admitting: INTERNAL MEDICINE

## 2019-09-25 VITALS
HEART RATE: 46 BPM | WEIGHT: 246 LBS | HEIGHT: 75 IN | BODY MASS INDEX: 30.59 KG/M2 | SYSTOLIC BLOOD PRESSURE: 133 MMHG | DIASTOLIC BLOOD PRESSURE: 70 MMHG

## 2019-09-25 DIAGNOSIS — Z13.6 ENCOUNTER FOR SCREENING FOR VASCULAR DISEASE: ICD-10-CM

## 2019-09-25 LAB
BH CV VAS BP LEFT ARM: NORMAL MMHG
BH CV VAS BP RIGHT ARM: NORMAL MMHG
BH CV XLRA MEAS LEFT ICA/CCA RATIO: 1.22
BH CV XLRA MEAS LEFT MID CCA PSV: NORMAL CM/SEC
BH CV XLRA MEAS LEFT MID ICA PSV: NORMAL CM/SEC
BH CV XLRA MEAS LEFT PROX ECA PSV: NORMAL CM/SEC
BH CV XLRA MEAS RIGHT ICA/CCA RATIO: 1.24
BH CV XLRA MEAS RIGHT MID CCA PSV: NORMAL CM/SEC
BH CV XLRA MEAS RIGHT MID ICA PSV: NORMAL CM/SEC
BH CV XLRA MEAS RIGHT PROX ECA PSV: NORMAL CM/SEC

## 2019-09-25 PROCEDURE — 93799 UNLISTED CV SVC/PROCEDURE: CPT

## 2019-09-27 ENCOUNTER — CLINICAL SUPPORT (OUTPATIENT)
Dept: FAMILY MEDICINE CLINIC | Facility: CLINIC | Age: 69
End: 2019-09-27

## 2019-09-27 PROCEDURE — G0008 ADMIN INFLUENZA VIRUS VAC: HCPCS | Performed by: INTERNAL MEDICINE

## 2019-09-27 PROCEDURE — 90732 PPSV23 VACC 2 YRS+ SUBQ/IM: CPT | Performed by: INTERNAL MEDICINE

## 2019-09-27 PROCEDURE — G0009 ADMIN PNEUMOCOCCAL VACCINE: HCPCS | Performed by: INTERNAL MEDICINE

## 2019-09-27 PROCEDURE — 90653 IIV ADJUVANT VACCINE IM: CPT | Performed by: INTERNAL MEDICINE

## 2019-09-29 ENCOUNTER — TELEPHONE (OUTPATIENT)
Dept: CARDIOLOGY | Facility: CLINIC | Age: 69
End: 2019-09-29

## 2019-09-30 ENCOUNTER — TELEPHONE (OUTPATIENT)
Dept: CARDIOLOGY | Facility: CLINIC | Age: 69
End: 2019-09-30

## 2019-10-01 NOTE — TELEPHONE ENCOUNTER
Patient returned call and verbalized understanding of results and follow-up. He wanted Dr Haque to know that he is not soledad to walk well let alone run on a treadmill for stress test. It is understanding that Dr Haque was wanting to schedule one,    Juany Navarro RN  [facility] Triage Nurse'

## 2019-10-01 NOTE — TELEPHONE ENCOUNTER
Called and left a VM. Will go over results when pt calls us back.    Soledad Pratt, RN  Triage RN [facility]

## 2019-10-02 DIAGNOSIS — Z01.810 PREOP CARDIOVASCULAR EXAM: ICD-10-CM

## 2019-10-02 DIAGNOSIS — I49.3 PVC (PREMATURE VENTRICULAR CONTRACTION): Primary | ICD-10-CM

## 2019-10-02 NOTE — TELEPHONE ENCOUNTER
Order placed for Lexiscan Cardiolite stress test.  Please let the patient know this is a non-walking stress test.. adrienne

## 2019-10-28 ENCOUNTER — HOSPITAL ENCOUNTER (OUTPATIENT)
Dept: CARDIOLOGY | Facility: HOSPITAL | Age: 69
Discharge: HOME | End: 2019-10-28
Admitting: INTERNAL MEDICINE

## 2019-10-28 VITALS — HEIGHT: 74 IN | BODY MASS INDEX: 30.8 KG/M2 | WEIGHT: 240 LBS

## 2019-10-28 DIAGNOSIS — I49.3 PVC (PREMATURE VENTRICULAR CONTRACTION): ICD-10-CM

## 2019-10-28 DIAGNOSIS — Z01.810 PREOP CARDIOVASCULAR EXAM: ICD-10-CM

## 2019-10-28 LAB
BH CV NUCLEAR PRIOR STUDY: 2
BH CV STRESS BP STAGE 1: NORMAL
BH CV STRESS COMMENTS STAGE 1: NORMAL
BH CV STRESS DOSE REGADENOSON STAGE 1: 0.4
BH CV STRESS DURATION MIN STAGE 1: 0
BH CV STRESS DURATION SEC STAGE 1: 10
BH CV STRESS HR STAGE 1: 88
BH CV STRESS PROTOCOL 1: NORMAL
BH CV STRESS RECOVERY BP: NORMAL MMHG
BH CV STRESS RECOVERY HR: 65 BPM
BH CV STRESS STAGE 1: 1
LV EF NUC BP: 55 %
MAXIMAL PREDICTED HEART RATE: 153 BPM
PERCENT MAX PREDICTED HR: 57.52 %
STRESS BASELINE BP: NORMAL MMHG
STRESS BASELINE HR: 57 BPM
STRESS PERCENT HR: 68 %
STRESS POST EXERCISE DUR SEC: 10 SEC
STRESS POST PEAK BP: NORMAL MMHG
STRESS POST PEAK HR: 88 BPM
STRESS TARGET HR: 130 BPM

## 2019-10-28 PROCEDURE — 78452 HT MUSCLE IMAGE SPECT MULT: CPT

## 2019-10-28 PROCEDURE — 93016 CV STRESS TEST SUPVJ ONLY: CPT | Performed by: INTERNAL MEDICINE

## 2019-10-28 PROCEDURE — A9502 TC99M TETROFOSMIN: HCPCS | Performed by: INTERNAL MEDICINE

## 2019-10-28 PROCEDURE — 93018 CV STRESS TEST I&R ONLY: CPT | Performed by: INTERNAL MEDICINE

## 2019-10-28 PROCEDURE — 0 TECHNETIUM TETROFOSMIN KIT: Performed by: INTERNAL MEDICINE

## 2019-10-28 PROCEDURE — 25010000002 REGADENOSON 0.4 MG/5ML SOLUTION: Performed by: INTERNAL MEDICINE

## 2019-10-28 PROCEDURE — 78452 HT MUSCLE IMAGE SPECT MULT: CPT | Performed by: INTERNAL MEDICINE

## 2019-10-28 PROCEDURE — 93017 CV STRESS TEST TRACING ONLY: CPT

## 2019-10-28 RX ADMIN — TETROFOSMIN 1 DOSE: 1.38 INJECTION, POWDER, LYOPHILIZED, FOR SOLUTION INTRAVENOUS at 08:00

## 2019-10-28 RX ADMIN — TETROFOSMIN 1 DOSE: 1.38 INJECTION, POWDER, LYOPHILIZED, FOR SOLUTION INTRAVENOUS at 08:51

## 2019-10-28 RX ADMIN — REGADENOSON 0.4 MG: 0.08 INJECTION, SOLUTION INTRAVENOUS at 08:51

## 2019-10-30 ENCOUNTER — TELEPHONE (OUTPATIENT)
Dept: CARDIOLOGY | Facility: CLINIC | Age: 69
End: 2019-10-30

## 2019-10-31 DIAGNOSIS — I49.3 PVC (PREMATURE VENTRICULAR CONTRACTION): Primary | ICD-10-CM

## 2019-10-31 DIAGNOSIS — R94.39 ABNORMAL STRESS TEST: ICD-10-CM

## 2019-11-01 ENCOUNTER — PATIENT MESSAGE (OUTPATIENT)
Dept: CARDIOLOGY | Facility: CLINIC | Age: 69
End: 2019-11-01

## 2019-11-01 ENCOUNTER — TRANSCRIBE ORDERS (OUTPATIENT)
Dept: CARDIOLOGY | Facility: CLINIC | Age: 69
End: 2019-11-01

## 2019-11-01 ENCOUNTER — LAB (OUTPATIENT)
Dept: LAB | Facility: HOSPITAL | Age: 69
End: 2019-11-01

## 2019-11-01 DIAGNOSIS — Z13.6 SCREENING FOR CARDIOVASCULAR CONDITION: ICD-10-CM

## 2019-11-01 DIAGNOSIS — Z01.810 PREOP CARDIOVASCULAR EXAM: ICD-10-CM

## 2019-11-01 DIAGNOSIS — Z01.810 PREOP CARDIOVASCULAR EXAM: Primary | ICD-10-CM

## 2019-11-01 DIAGNOSIS — I49.3 PVC (PREMATURE VENTRICULAR CONTRACTION): ICD-10-CM

## 2019-11-01 PROBLEM — R94.39 ABNORMAL STRESS TEST: Status: ACTIVE | Noted: 2019-11-01

## 2019-11-01 LAB
ANION GAP SERPL CALCULATED.3IONS-SCNC: 11.7 MMOL/L (ref 5–15)
BASOPHILS # BLD AUTO: 0.06 10*3/MM3 (ref 0–0.2)
BASOPHILS NFR BLD AUTO: 0.7 % (ref 0–1.5)
BUN BLD-MCNC: 20 MG/DL (ref 8–23)
BUN/CREAT SERPL: 16.7 (ref 7–25)
CALCIUM SPEC-SCNC: 9.9 MG/DL (ref 8.6–10.5)
CHLORIDE SERPL-SCNC: 103 MMOL/L (ref 98–107)
CO2 SERPL-SCNC: 26.3 MMOL/L (ref 22–29)
CREAT BLD-MCNC: 1.2 MG/DL (ref 0.76–1.27)
DEPRECATED RDW RBC AUTO: 42.5 FL (ref 37–54)
EOSINOPHIL # BLD AUTO: 0.26 10*3/MM3 (ref 0–0.4)
EOSINOPHIL NFR BLD AUTO: 3.2 % (ref 0.3–6.2)
ERYTHROCYTE [DISTWIDTH] IN BLOOD BY AUTOMATED COUNT: 14 % (ref 12.3–15.4)
GFR SERPL CREATININE-BSD FRML MDRD: 60 ML/MIN/1.73
GLUCOSE BLD-MCNC: 99 MG/DL (ref 65–99)
HCT VFR BLD AUTO: 48.4 % (ref 37.5–51)
HGB BLD-MCNC: 16.2 G/DL (ref 13–17.7)
IMM GRANULOCYTES # BLD AUTO: 0.02 10*3/MM3 (ref 0–0.05)
IMM GRANULOCYTES NFR BLD AUTO: 0.2 % (ref 0–0.5)
LYMPHOCYTES # BLD AUTO: 2.59 10*3/MM3 (ref 0.7–3.1)
LYMPHOCYTES NFR BLD AUTO: 31.5 % (ref 19.6–45.3)
MCH RBC QN AUTO: 28.3 PG (ref 26.6–33)
MCHC RBC AUTO-ENTMCNC: 33.5 G/DL (ref 31.5–35.7)
MCV RBC AUTO: 84.6 FL (ref 79–97)
MONOCYTES # BLD AUTO: 0.71 10*3/MM3 (ref 0.1–0.9)
MONOCYTES NFR BLD AUTO: 8.6 % (ref 5–12)
NEUTROPHILS # BLD AUTO: 4.59 10*3/MM3 (ref 1.7–7)
NEUTROPHILS NFR BLD AUTO: 55.8 % (ref 42.7–76)
NRBC BLD AUTO-RTO: 0 /100 WBC (ref 0–0.2)
PLATELET # BLD AUTO: 236 10*3/MM3 (ref 140–450)
PMV BLD AUTO: 10.4 FL (ref 6–12)
POTASSIUM BLD-SCNC: 4.6 MMOL/L (ref 3.5–5.2)
RBC # BLD AUTO: 5.72 10*6/MM3 (ref 4.14–5.8)
SODIUM BLD-SCNC: 141 MMOL/L (ref 136–145)
WBC NRBC COR # BLD: 8.23 10*3/MM3 (ref 3.4–10.8)

## 2019-11-01 PROCEDURE — 80048 BASIC METABOLIC PNL TOTAL CA: CPT

## 2019-11-01 PROCEDURE — 85025 COMPLETE CBC W/AUTO DIFF WBC: CPT

## 2019-11-01 PROCEDURE — 36415 COLL VENOUS BLD VENIPUNCTURE: CPT

## 2019-11-01 NOTE — TELEPHONE ENCOUNTER
From: Joaquin Peña  To: Muna Haque MD  Sent: 11/1/2019 5:23 PM EDT  Subject: Non-Urgent Medical Question    I'm celebrating my 35th wedding anniversary at the end of this month and traveling to the [facility] Will this procedure on Tuesday, November 5th, in any prevent me from making this trip? If so, I'd like to put it off until I return the first week in December. Thanks.

## 2019-11-03 ENCOUNTER — TELEPHONE (OUTPATIENT)
Dept: CARDIOLOGY | Facility: CLINIC | Age: 69
End: 2019-11-03

## 2019-11-05 ENCOUNTER — HOSPITAL ENCOUNTER (OUTPATIENT)
Facility: HOSPITAL | Age: 69
Setting detail: HOSPITAL OUTPATIENT SURGERY
Discharge: HOME | End: 2019-11-05
Attending: INTERNAL MEDICINE | Admitting: INTERNAL MEDICINE

## 2019-11-05 VITALS
OXYGEN SATURATION: 97 % | BODY MASS INDEX: 31.08 KG/M2 | TEMPERATURE: 97.3 F | HEIGHT: 75 IN | RESPIRATION RATE: 16 BRPM | DIASTOLIC BLOOD PRESSURE: 81 MMHG | WEIGHT: 250 LBS | SYSTOLIC BLOOD PRESSURE: 133 MMHG | HEART RATE: 48 BPM

## 2019-11-05 DIAGNOSIS — R94.39 ABNORMAL STRESS TEST: ICD-10-CM

## 2019-11-05 DIAGNOSIS — I49.3 PVC (PREMATURE VENTRICULAR CONTRACTION): ICD-10-CM

## 2019-11-05 PROCEDURE — 93458 L HRT ARTERY/VENTRICLE ANGIO: CPT | Performed by: INTERNAL MEDICINE

## 2019-11-05 PROCEDURE — 99219 PR INITIAL OBSERVATION CARE/DAY 50 MINUTES: CPT | Performed by: INTERNAL MEDICINE

## 2019-11-05 PROCEDURE — C1894 INTRO/SHEATH, NON-LASER: HCPCS | Performed by: INTERNAL MEDICINE

## 2019-11-05 PROCEDURE — C1769 GUIDE WIRE: HCPCS | Performed by: INTERNAL MEDICINE

## 2019-11-05 PROCEDURE — 99152 MOD SED SAME PHYS/QHP 5/>YRS: CPT | Performed by: INTERNAL MEDICINE

## 2019-11-05 PROCEDURE — 0 IOPAMIDOL PER 1 ML: Performed by: INTERNAL MEDICINE

## 2019-11-05 PROCEDURE — 25010000002 FENTANYL CITRATE (PF) 100 MCG/2ML SOLUTION: Performed by: INTERNAL MEDICINE

## 2019-11-05 PROCEDURE — 25010000002 HEPARIN (PORCINE) PER 1000 UNITS: Performed by: INTERNAL MEDICINE

## 2019-11-05 PROCEDURE — 25010000002 MIDAZOLAM PER 1 MG: Performed by: INTERNAL MEDICINE

## 2019-11-05 RX ORDER — ASPIRIN 81 MG/1
81 TABLET ORAL DAILY
COMMUNITY

## 2019-11-05 RX ORDER — SODIUM CHLORIDE 9 MG/ML
75 INJECTION, SOLUTION INTRAVENOUS CONTINUOUS
Status: DISCONTINUED | OUTPATIENT
Start: 2019-11-05 | End: 2019-11-05 | Stop reason: HOSPADM

## 2019-11-05 RX ORDER — LIDOCAINE HYDROCHLORIDE 10 MG/ML
0.1 INJECTION, SOLUTION EPIDURAL; INFILTRATION; INTRACAUDAL; PERINEURAL ONCE AS NEEDED
Status: DISCONTINUED | OUTPATIENT
Start: 2019-11-05 | End: 2019-11-05 | Stop reason: HOSPADM

## 2019-11-05 RX ORDER — MIDAZOLAM HYDROCHLORIDE 1 MG/ML
INJECTION INTRAMUSCULAR; INTRAVENOUS AS NEEDED
Status: DISCONTINUED | OUTPATIENT
Start: 2019-11-05 | End: 2019-11-05 | Stop reason: HOSPADM

## 2019-11-05 RX ORDER — SODIUM CHLORIDE 0.9 % (FLUSH) 0.9 %
3 SYRINGE (ML) INJECTION EVERY 12 HOURS SCHEDULED
Status: DISCONTINUED | OUTPATIENT
Start: 2019-11-05 | End: 2019-11-05 | Stop reason: HOSPADM

## 2019-11-05 RX ORDER — FENTANYL CITRATE 50 UG/ML
INJECTION, SOLUTION INTRAMUSCULAR; INTRAVENOUS AS NEEDED
Status: DISCONTINUED | OUTPATIENT
Start: 2019-11-05 | End: 2019-11-05 | Stop reason: HOSPADM

## 2019-11-05 RX ORDER — SODIUM CHLORIDE 9 MG/ML
100 INJECTION, SOLUTION INTRAVENOUS CONTINUOUS
Status: DISCONTINUED | OUTPATIENT
Start: 2019-11-05 | End: 2019-11-05 | Stop reason: HOSPADM

## 2019-11-05 RX ORDER — SODIUM CHLORIDE 0.9 % (FLUSH) 0.9 %
10 SYRINGE (ML) INJECTION AS NEEDED
Status: DISCONTINUED | OUTPATIENT
Start: 2019-11-05 | End: 2019-11-05 | Stop reason: HOSPADM

## 2019-11-05 RX ORDER — LIDOCAINE HYDROCHLORIDE 20 MG/ML
INJECTION, SOLUTION INFILTRATION; PERINEURAL AS NEEDED
Status: DISCONTINUED | OUTPATIENT
Start: 2019-11-05 | End: 2019-11-05 | Stop reason: HOSPADM

## 2019-11-05 RX ADMIN — SODIUM CHLORIDE 75 ML/HR: 9 INJECTION, SOLUTION INTRAVENOUS at 08:17

## 2019-11-05 NOTE — DISCHARGE INSTRUCTIONS
[facility]  [address] [place] [address]    Coronary Angiogram (Radial/Ulnar Approach) After Care    Refer to this sheet in the next few weeks. These instructions provide you with information on caring for yourself after your procedure. Your caregiver may also give you more specific instructions. Your treatment has been planned according to current medical practices, but problems sometimes occur. Call your caregiver if you have any problems or questions after your procedure.    Home Care Instructions:  · You may shower the day after the procedure. Remove the bandage (dressing) and gently wash the site with plain soap and water. Gently pat the site dry. You may apply a band aid daily for 2 days if desired.    · Do not apply powder or lotion to the site.  · Do not submerge the affected site in water for 3 to 5 days or until the site is completely healed.   · Do not lift, push or pull anything over 10 pounds for 2 days after your procedure.  · Inspect the site at least twice daily. You may notice some bruising at the site and it may be tender for 1 to 2 weeks.     · Increase your fluid intake for the next 2 days.    · Keep arm elevated for 24 hours. For the remainder of the day, keep your arm in “Pledge of Allegiance” position when up and about.     · You may drive 24 hours after the procedure unless otherwise instructed by your caregiver.  · Do not operate machinery or power tools for 24 hours.  · A responsible adult should be with you for the first 24 hours after you arrive home. Do not make any important legal decisions or sign legal papers for 24 hours.  Do not drink alcohol for 24 hours.    · Metformin or any medications containing Metformin should not be taken for 48 hours after your procedure.      Call Your Doctor if:   · You have unusual pain at the radial/ulnar (wrist) site.  · You have redness, warmth, swelling, or pain at the radial/ulnar (wrist) site.  · You have drainage (other  than a small amount of blood on the dressing).  · You have chills or a fever > 101.  · Your arm becomes pale or dark, cool, tingly, or numb.  · You have heavy bleeding from the site, hold pressure on the site for 20 minutes.  If the bleeding stops, apply a fresh bandage and call your cardiologist.  However, if you continue to have bleeding, call 911.

## 2019-11-05 NOTE — H&P
[facility] History and Physical       Encounter Date:19  Patient:Joaquin Peña  :1950  MRN:[id number]    Date of Admission: 2019  Date of Encounter Visit: 19  Encounter Provider: Storm Marcano MD  Place of Service: [facility]  Patient Care Team:  Erwin Escobar MD as PCP - General  Erwin Escobar MD as PCP - Family Medicine  Charlene Smith MD as Consulting Physician (Endocrinology)  Muna Haque MD as Consulting Physician (Cardiology)      Chief Complaint: Fatigue and KELLER      History of Presenting Illness:      Mr. Peña is a 69-year-old gentleman past medical history notable for mixed hyperlipidemia, pretension, PVCs, and paroxysmal atrial tachycardia as well as Graves' disease who had been following with Dr. Israel regarding his PVCs and symptoms of shortness of breath and fatigue.  He was noted to have an excessive PVC burden on recent monitoring.  This prompted further evaluation with stress testing which was abnormal with an inferior lateral area of ischemia.  Given his symptoms and abnormal stress test findings as well as PVC burden he was referred for cardiac catheterization.      Review of Systems:  Review of Systems   Constitution: Positive for weakness and malaise/fatigue.   HENT: Negative.    Eyes: Negative.    Cardiovascular: Positive for dyspnea on exertion and palpitations.   Respiratory: Positive for shortness of breath.    Endocrine: Negative.    Skin: Negative.    Musculoskeletal: Negative.    Gastrointestinal: Negative.    Genitourinary: Negative.    Psychiatric/Behavioral: Negative.    Allergic/Immunologic: Negative.        Medications:    Current Facility-Administered Medications:   •  lidocaine PF 1% (XYLOCAINE) injection 0.1 mL, 0.1 mL, Intradermal, Once PRN, Storm Marcano MD  •  sodium chloride 0.9 % flush 10 mL, 10 mL, Intravenous, PRN, Storm Marcano MD  •  sodium chloride 0.9 % flush 3 mL, 3 mL, Intravenous,  Q12H, Storm Marcano MD  •  sodium chloride 0.9 % infusion, 75 mL/hr, Intravenous, Continuous, Storm Marcano MD, Last Rate: 75 mL/hr at 11/05/19 0817, 75 mL/hr at 11/05/19 0817    Allergies   Allergen Reactions   • Latex Rash       Past Medical History:   Diagnosis Date   • Abdominal obesity    • Acquired spondylolisthesis    • Acute low back pain    • Acute right lumbar radiculopathy    • Bulging lumbar disc    • Concussion with no loss of consciousness    • DDD (degenerative disc disease)    • Essential hypertension    • Fatigue    • Graves disease    • H/O disorder of nervous system and sense organs    • Headache    • Hyperlipidemia    • Hypertension    • Hypertensive heart disease    • Hypothyroidism    • Insomnia    • Irregular heart beat    • Joint pain    • Lumbar radiculopathy    • Nephrolithiasis    • Paroxysmal nocturnal dyspnea    • PVC (premature ventricular contraction)    • Severe head trauma    • SOB (shortness of breath)    • Soemmering's ring    • Spondylolisthesis    • Subdural hematoma (CMS/HCC)    • Supraventricular tachycardia (CMS/HCC)        Past Surgical History:   Procedure Laterality Date   • KNEE ARTHROSCOPY Left    • KNEE ARTHROSCOPY Left 2018   • KNEE SURGERY      2008 and 2009   • LITHOTRIPSY     • OTHER SURGICAL HISTORY Left     ear surgery       Social History     Socioeconomic History   • Marital status:      Spouse name: Not on file   • Number of children: Not on file   • Years of education: Not on file   • Highest education level: Not on file   Tobacco Use   • Smoking status: Former Smoker   • Smokeless tobacco: Never Used   • Tobacco comment: quit 45 yrs ago   Substance and Sexual Activity   • Alcohol use: Yes     Comment: occasional/  occ caffeine use   • Drug use: No   • Sexual activity: Defer       Family History   Problem Relation Age of Onset   • Cancer Father         malignant neoplasm   • No Known Problems Mother    • No Known Problems Sister    • No Known  Problems Brother    • No Known Problems Sister    • No Known Problems Brother    • No Known Problems Brother    • Diabetes Maternal Grandmother        The following portions of the patient's history were reviewed and updated as appropriate: allergies, current medications, past family history, past medical history, past social history, past surgical history and problem list.         Objective:      Temp:  [97.3 °F (36.3 °C)] 97.3 °F (36.3 °C)  Heart Rate:  [42] 42  Resp:  [18] 18  BP: (152)/(87) 152/87   No intake or output data in the 24 hours ending 11/05/19 0955  Body mass index is 31.25 kg/m².      11/05/19  0758   Weight: 113 kg (250 lb)           Physical Exam:  Constitutional: Well appearing, well developed, no acute distress   HENT: Oropharynx clear and membrane moist  Eyes: Normal conjunctiva, no sclera icterus.  Neck: Supple, no carotid bruit bilaterally.  Cardiovascular: Regular rhythm but bradycardic, No Murmur, No bilateral lower extremity edema.  Pulmonary: Normal respiratory effort, normal lung sounds, no wheezing.  Abdominal: Soft, nontender, no hepatosplenomegaly, liver is non-pulsatile.  Neurological: Alert and orient x 3.   Skin: Warm, dry, no ecchymosis, no rash.  Psych: Appropriate mood and affect. Normal judgment and insight.        Lab Review:   Results from last 7 days   Lab Units 11/01/19  1426   SODIUM mmol/L 141   POTASSIUM mmol/L 4.6   CHLORIDE mmol/L 103   CO2 mmol/L 26.3   BUN mg/dL 20   CREATININE mg/dL 1.20   GLUCOSE mg/dL 99   CALCIUM mg/dL 9.9         Results from last 7 days   Lab Units 11/01/19  1426   WBC 10*3/mm3 8.23   HEMOGLOBIN g/dL 16.2   HEMATOCRIT % 48.4   PLATELETS 10*3/mm3 236                           Assessment:           PVC (premature ventricular contraction)    Abnormal stress test         Plan:       Mr. Peña is a 69-year-old gentleman past medical history notable for mixed hyperlipidemia, pretension, PVCs, and paroxysmal atrial tachycardia as well as Graves'  disease who had been following with Dr. Israel regarding his PVCs and symptoms of shortness of breath and fatigue.  He was noted to have an excessive PVC burden on recent monitoring.  This prompted further evaluation with stress testing which was abnormal with an inferior lateral area of ischemia.  Given his symptoms and abnormal stress test findings as well as PVC burden he was referred for cardiac catheterization.    Abnormal Stress Test:  · Will process with stress test    PVC  · Proceed with [facility]           Storm Marcano MD  [facility]  11/05/19  9:55 AM

## 2019-11-06 ENCOUNTER — TELEPHONE (OUTPATIENT)
Dept: CARDIOLOGY | Facility: CLINIC | Age: 69
End: 2019-11-06

## 2019-11-06 DIAGNOSIS — E78.2 MIXED HYPERLIPIDEMIA: ICD-10-CM

## 2019-11-06 RX ORDER — ATORVASTATIN CALCIUM 10 MG/1
TABLET, FILM COATED ORAL
Qty: 90 TABLET | Refills: 3 | Status: SHIPPED | OUTPATIENT
Start: 2019-11-06 | End: 2020-03-23 | Stop reason: SDUPTHER

## 2019-11-06 RX ORDER — ATORVASTATIN CALCIUM 10 MG/1
TABLET, FILM COATED ORAL
Qty: 90 TABLET | Refills: 3 | Status: SHIPPED | OUTPATIENT
Start: 2019-11-06 | End: 2019-11-06 | Stop reason: SDUPTHER

## 2019-11-07 DIAGNOSIS — I49.3 PVC (PREMATURE VENTRICULAR CONTRACTION): Primary | ICD-10-CM

## 2019-11-07 NOTE — TELEPHONE ENCOUNTER
Call pt with test results.  Dr. Hoffman reviewed results with me and the patient.  No coronary disease, would recommend echo.  Also has not abdominal aortic aneurysm screening adrienne

## 2019-11-08 NOTE — TELEPHONE ENCOUNTER
I talked to patient and he verified that Dr. Hoffman had reviewed cardiac cath with him.  I again reviewed this once more.  He has untreated sleep apnea.  I recommended an echocardiogram and a pulmonary pressures are elevated or LV dysfunction is present he see a sleep physician to retry treatment of sleep apnea which is likely contributing to his PVCs.    Please arrange for an echocardiogram to be done within the next 3 to 4 days and a follow-up with APRN in 6 months. adrienne

## 2019-11-13 ENCOUNTER — HOSPITAL ENCOUNTER (OUTPATIENT)
Dept: CARDIOLOGY | Facility: HOSPITAL | Age: 69
Discharge: HOME | End: 2019-11-13
Admitting: INTERNAL MEDICINE

## 2019-11-13 VITALS — WEIGHT: 247 LBS | BODY MASS INDEX: 30.71 KG/M2 | HEART RATE: 45 BPM | HEIGHT: 75 IN

## 2019-11-13 DIAGNOSIS — I49.3 PVC (PREMATURE VENTRICULAR CONTRACTION): ICD-10-CM

## 2019-11-13 LAB
AORTIC ROOT ANNULUS: 2.3 CM
BH CV ECHO MEAS - ACS: 2.4 CM
BH CV ECHO MEAS - AO MAX PG (FULL): 1.9 MMHG
BH CV ECHO MEAS - AO MAX PG: 8.3 MMHG
BH CV ECHO MEAS - AO MEAN PG (FULL): -0.1 MMHG
BH CV ECHO MEAS - AO MEAN PG: 4 MMHG
BH CV ECHO MEAS - AO ROOT AREA (BSA CORRECTED): 1.3
BH CV ECHO MEAS - AO ROOT AREA: 7.4 CM^2
BH CV ECHO MEAS - AO ROOT DIAM: 3.1 CM
BH CV ECHO MEAS - AO V2 MAX: 143.9 CM/SEC
BH CV ECHO MEAS - AO V2 MEAN: 91.8 CM/SEC
BH CV ECHO MEAS - AO V2 VTI: 29.8 CM
BH CV ECHO MEAS - AVA(I,A): 4.8 CM^2
BH CV ECHO MEAS - AVA(I,D): 4.8 CM^2
BH CV ECHO MEAS - AVA(V,A): 3.9 CM^2
BH CV ECHO MEAS - AVA(V,D): 3.9 CM^2
BH CV ECHO MEAS - BSA(HAYCOCK): 2.5 M^2
BH CV ECHO MEAS - BSA: 2.4 M^2
BH CV ECHO MEAS - BZI_BMI: 30.9 KILOGRAMS/M^2
BH CV ECHO MEAS - BZI_METRIC_HEIGHT: 190.5 CM
BH CV ECHO MEAS - BZI_METRIC_WEIGHT: 112 KG
BH CV ECHO MEAS - EDV(MOD-SP2): 114 ML
BH CV ECHO MEAS - EDV(MOD-SP4): 106 ML
BH CV ECHO MEAS - EDV(TEICH): 121.1 ML
BH CV ECHO MEAS - EF(CUBED): 48.4 %
BH CV ECHO MEAS - EF(MOD-BP): 67 %
BH CV ECHO MEAS - EF(MOD-SP2): 67.5 %
BH CV ECHO MEAS - EF(MOD-SP4): 67.9 %
BH CV ECHO MEAS - EF(TEICH): 40.4 %
BH CV ECHO MEAS - ESV(MOD-SP2): 37 ML
BH CV ECHO MEAS - ESV(MOD-SP4): 34 ML
BH CV ECHO MEAS - ESV(TEICH): 72.2 ML
BH CV ECHO MEAS - FS: 19.8 %
BH CV ECHO MEAS - IVS/LVPW: 1.3
BH CV ECHO MEAS - IVSD: 1.3 CM
BH CV ECHO MEAS - LAT PEAK E' VEL: 10 CM/SEC
BH CV ECHO MEAS - LV DIASTOLIC VOL/BSA (35-75): 44.2 ML/M^2
BH CV ECHO MEAS - LV MASS(C)D: 213.9 GRAMS
BH CV ECHO MEAS - LV MASS(C)DI: 89.1 GRAMS/M^2
BH CV ECHO MEAS - LV MAX PG: 6.3 MMHG
BH CV ECHO MEAS - LV MEAN PG: 4.1 MMHG
BH CV ECHO MEAS - LV SYSTOLIC VOL/BSA (12-30): 14.2 ML/M^2
BH CV ECHO MEAS - LV V1 MAX: 126 CM/SEC
BH CV ECHO MEAS - LV V1 MEAN: 98.5 CM/SEC
BH CV ECHO MEAS - LV V1 VTI: 31.7 CM
BH CV ECHO MEAS - LVIDD: 5.1 CM
BH CV ECHO MEAS - LVIDS: 4.1 CM
BH CV ECHO MEAS - LVLD AP2: 8 CM
BH CV ECHO MEAS - LVLD AP4: 7.9 CM
BH CV ECHO MEAS - LVLS AP2: 6.4 CM
BH CV ECHO MEAS - LVLS AP4: 5.8 CM
BH CV ECHO MEAS - LVOT AREA (M): 4.5 CM^2
BH CV ECHO MEAS - LVOT AREA: 4.5 CM^2
BH CV ECHO MEAS - LVOT DIAM: 2.4 CM
BH CV ECHO MEAS - LVPWD: 0.97 CM
BH CV ECHO MEAS - MED PEAK E' VEL: 7 CM/SEC
BH CV ECHO MEAS - MV A DUR: 0.14 SEC
BH CV ECHO MEAS - MV A MAX VEL: 53.6 CM/SEC
BH CV ECHO MEAS - MV DEC SLOPE: 396.9 CM/SEC^2
BH CV ECHO MEAS - MV DEC TIME: 0.18 SEC
BH CV ECHO MEAS - MV E MAX VEL: 64.7 CM/SEC
BH CV ECHO MEAS - MV E/A: 1.2
BH CV ECHO MEAS - MV MAX PG: 2.7 MMHG
BH CV ECHO MEAS - MV MEAN PG: 0.66 MMHG
BH CV ECHO MEAS - MV P1/2T MAX VEL: 85.5 CM/SEC
BH CV ECHO MEAS - MV P1/2T: 63.1 MSEC
BH CV ECHO MEAS - MV V2 MAX: 81.4 CM/SEC
BH CV ECHO MEAS - MV V2 MEAN: 33.6 CM/SEC
BH CV ECHO MEAS - MV V2 VTI: 26.1 CM
BH CV ECHO MEAS - MVA P1/2T LCG: 2.6 CM^2
BH CV ECHO MEAS - MVA(P1/2T): 3.5 CM^2
BH CV ECHO MEAS - MVA(VTI): 5.4 CM^2
BH CV ECHO MEAS - PA MAX PG (FULL): 2.1 MMHG
BH CV ECHO MEAS - PA MAX PG: 3.7 MMHG
BH CV ECHO MEAS - PA V2 MAX: 96.4 CM/SEC
BH CV ECHO MEAS - PULM A REVS DUR: 0.13 SEC
BH CV ECHO MEAS - PULM A REVS VEL: 28 CM/SEC
BH CV ECHO MEAS - PULM DIAS VEL: 55.6 CM/SEC
BH CV ECHO MEAS - PULM S/D: 0.98
BH CV ECHO MEAS - PULM SYS VEL: 54.7 CM/SEC
BH CV ECHO MEAS - PVA(V,A): 2.9 CM^2
BH CV ECHO MEAS - PVA(V,D): 2.9 CM^2
BH CV ECHO MEAS - QP/QS: 0.46
BH CV ECHO MEAS - RAP SYSTOLE: 8 MMHG
BH CV ECHO MEAS - RV MAX PG: 1.6 MMHG
BH CV ECHO MEAS - RV MEAN PG: 1.1 MMHG
BH CV ECHO MEAS - RV V1 MAX: 63 CM/SEC
BH CV ECHO MEAS - RV V1 MEAN: 49.9 CM/SEC
BH CV ECHO MEAS - RV V1 VTI: 14.9 CM
BH CV ECHO MEAS - RVOT AREA: 4.4 CM^2
BH CV ECHO MEAS - RVOT DIAM: 2.4 CM
BH CV ECHO MEAS - RVSP: 20 MMHG
BH CV ECHO MEAS - SI(AO): 91.9 ML/M^2
BH CV ECHO MEAS - SI(CUBED): 26 ML/M^2
BH CV ECHO MEAS - SI(LVOT): 59.1 ML/M^2
BH CV ECHO MEAS - SI(MOD-SP2): 32.1 ML/M^2
BH CV ECHO MEAS - SI(MOD-SP4): 30 ML/M^2
BH CV ECHO MEAS - SI(TEICH): 20.4 ML/M^2
BH CV ECHO MEAS - SUP REN AO DIAM: 2 CM
BH CV ECHO MEAS - SV(AO): 220.5 ML
BH CV ECHO MEAS - SV(CUBED): 62.4 ML
BH CV ECHO MEAS - SV(LVOT): 142 ML
BH CV ECHO MEAS - SV(MOD-SP2): 77 ML
BH CV ECHO MEAS - SV(MOD-SP4): 72 ML
BH CV ECHO MEAS - SV(RVOT): 65.3 ML
BH CV ECHO MEAS - SV(TEICH): 48.9 ML
BH CV ECHO MEAS - TAPSE (>1.6): 2.3 CM2
BH CV ECHO MEAS - TR MAX VEL: 179.2 CM/SEC
BH CV ECHO MEASUREMENTS AVERAGE E/E' RATIO: 7.61
BH CV XLRA - RV BASE: 3.9 CM
BH CV XLRA - RV LENGTH: 6.8 CM
BH CV XLRA - RV MID: 3.1 CM
BH CV XLRA - TDI S': 16 CM/SEC
LEFT ATRIUM VOLUME INDEX: 26 ML/M2
LEFT ATRIUM VOLUME: 60 CM3
LV EF 2D ECHO EST: 67 %
SINUS: 3.5 CM
STJ: 3.7 CM

## 2019-11-13 PROCEDURE — 93306 TTE W/DOPPLER COMPLETE: CPT | Performed by: INTERNAL MEDICINE

## 2019-11-13 PROCEDURE — 93306 TTE W/DOPPLER COMPLETE: CPT

## 2019-11-14 ENCOUNTER — TELEPHONE (OUTPATIENT)
Dept: CARDIOLOGY | Facility: CLINIC | Age: 69
End: 2019-11-14

## 2019-11-15 NOTE — TELEPHONE ENCOUNTER
"Patient notified of results and verbalized understanding    Re sleep apnea- he said he has been tested in the past and was told he had sleep apnea.  He said that he doesn't think he has it any more as he has lost over 25lbs and he \"sleeps soundly\" and its \"not affecting my bp.\"  He is not treating sleep apnea.    bp on average is running 115-110/70s the lowest he reports is 95/54.  Hr 53 now, upon waking hr is 47, and with exercise his hr ranges     He is taking metoprolol 25mg BID  Brittnee Lopez RN  Triage nurse      "

## 2019-11-15 NOTE — TELEPHONE ENCOUNTER
Please let the patient know that the echocardiogram shows his heart is strong with early changes of high blood pressure with the heart muscle being slightly thickened.  Also no significant valve disease other than mild calcification that happens as we get older.  If he is now treating sleep apnea.  Also what is his blood pressure, heart rate and dose of metoprolol?adrienne

## 2019-11-21 ENCOUNTER — TELEPHONE (OUTPATIENT)
Dept: CARDIOLOGY | Facility: CLINIC | Age: 69
End: 2019-11-21

## 2019-11-21 ENCOUNTER — OFFICE VISIT (OUTPATIENT)
Dept: FAMILY MEDICINE CLINIC | Facility: CLINIC | Age: 69
End: 2019-11-21

## 2019-11-21 VITALS
TEMPERATURE: 97.6 F | DIASTOLIC BLOOD PRESSURE: 78 MMHG | BODY MASS INDEX: 31.33 KG/M2 | SYSTOLIC BLOOD PRESSURE: 140 MMHG | WEIGHT: 252 LBS | OXYGEN SATURATION: 97 % | HEART RATE: 61 BPM | HEIGHT: 75 IN

## 2019-11-21 DIAGNOSIS — E78.00 PURE HYPERCHOLESTEROLEMIA: ICD-10-CM

## 2019-11-21 DIAGNOSIS — E88.81 INSULIN RESISTANCE: ICD-10-CM

## 2019-11-21 DIAGNOSIS — I10 ESSENTIAL HYPERTENSION: ICD-10-CM

## 2019-11-21 DIAGNOSIS — Z00.00 MEDICARE ANNUAL WELLNESS VISIT, SUBSEQUENT: Primary | ICD-10-CM

## 2019-11-21 PROCEDURE — G0402 INITIAL PREVENTIVE EXAM: HCPCS | Performed by: INTERNAL MEDICINE

## 2019-11-21 PROCEDURE — 99214 OFFICE O/P EST MOD 30 MIN: CPT | Performed by: INTERNAL MEDICINE

## 2019-11-21 NOTE — PROGRESS NOTES
Subjective   Joaquin Peña is a 69 y.o. male.     History of Present Illness   Patient was seen for Medicare wellness exam.  Patient was seen for hypertension.  Blood pressures running 120-1 130s over 80s at home.  His lipids are being treated by a statin diet and exercise.  Patient does have a history of insulin resistance.  His latest hemoglobin A1c was 5.9% and blood sugars running in the 80s.  Patient will continue his present diabetic diet and low impact exercise.    Dictated utilizing Dragon dictation. If there are questions or for further clarification, please contact me.  The following portions of the patient's history were reviewed and updated as appropriate: allergies, current medications, past family history, past medical history, past social history, past surgical history and problem list.    Review of Systems   Constitutional: Negative for fatigue and fever.   HENT: Positive for congestion. Negative for trouble swallowing.    Eyes: Negative for discharge and visual disturbance.   Respiratory: Negative for choking and shortness of breath.    Cardiovascular: Negative for chest pain and palpitations.   Gastrointestinal: Negative for abdominal pain and blood in stool.   Endocrine: Negative.    Genitourinary: Negative for genital sores and hematuria.   Musculoskeletal: Negative for gait problem and joint swelling.   Skin: Negative for color change, pallor, rash and wound.   Allergic/Immunologic: Positive for environmental allergies. Negative for immunocompromised state.   Neurological: Negative for facial asymmetry and speech difficulty.   Psychiatric/Behavioral: Negative for hallucinations and suicidal ideas.       Objective   Physical Exam   Constitutional: He is oriented to person, place, and time. He appears well-developed and well-nourished.   HENT:   Head: Normocephalic.   Eyes: Conjunctivae are normal. Pupils are equal, round, and reactive to light.   Neck: Normal range of motion. Neck supple.    Cardiovascular: Normal rate, regular rhythm and normal heart sounds.   Pulmonary/Chest: Effort normal and breath sounds normal.   Abdominal: Soft. Bowel sounds are normal.   Musculoskeletal: Normal range of motion.   Neurological: He is alert and oriented to person, place, and time.   Skin: Skin is warm and dry.   Psychiatric: He has a normal mood and affect. His behavior is normal. Judgment and thought content normal.   Nursing note and vitals reviewed.      Assessment/Plan #1 continue present diet and activity level #2 record blood pressure at home #3 labs today #4 return to clinic in 6 months.  Joaquin was seen today for hypertension and lab he is fasting.    Diagnoses and all orders for this visit:    Medicare annual wellness visit, subsequent    Essential hypertension    Pure hypercholesterolemia    Insulin resistance

## 2019-11-21 NOTE — PATIENT INSTRUCTIONS
Medicare Wellness  Personal Prevention Plan of Service     Date of Office Visit:  2019  Encounter Provider:  Erwin Escobra MD  Place of Service:  [facility]  Patient Name: Joaquin Peña  :  1950    As part of the Medicare Wellness portion of your visit today, we are providing you with this personalized preventive plan of services (PPPS). This plan is based upon recommendations of the [place] Preventive Services Task Force (USPSTF) and the Advisory Committee on Immunization Practices (ACIP).    This lists the preventive care services that should be considered, and provides dates of when you are due. Items listed as completed are up-to-date and do not require any further intervention.    Health Maintenance   Topic Date Due   • ZOSTER VACCINE (2 of 2) 2015   • AAA SCREEN (ONE-TIME)  2017   • LIPID PANEL  2020   • MEDICARE ANNUAL WELLNESS  2020   • TDAP/TD VACCINES (2 - Td) 2022   • COLONOSCOPY  2026   • HEPATITIS C SCREENING  Completed   • INFLUENZA VACCINE  Completed   • PNEUMOCOCCAL VACCINES (65+ LOW/MEDIUM RISK)  Discontinued       No orders of the defined types were placed in this encounter.      No Follow-up on file.

## 2019-11-21 NOTE — PROGRESS NOTES
Subsequent Medicare Wellness Visit   The ABC's of the Annual Wellness Visit    Chief Complaint   Patient presents with   • Hypertension   • lab he is fasting       HPI:  Joaquin Peña, -1950, is a 69 y.o. male who presents for a Subsequent Medicare Wellness Visit.    Recent Hospitalizations:  No hospitalization(s) within the last year..    Current Medical Providers:  Patient Care Team:  Erwin Escobar MD as PCP - General  Erwin Escobar MD as PCP - Family Medicine  Charlene Smith MD as Consulting Physician (Endocrinology)  Muna Haque MD as Consulting Physician (Cardiology)    Health Habits and Functional and Cognitive Screening and Depression Screening:  Functional & Cognitive Status 2019   Do you have difficulty preparing food and eating? No   Do you have difficulty bathing yourself, getting dressed or grooming yourself? No   Do you have difficulty using the toilet? No   Do you have difficulty moving around from place to place? No   Do you have trouble with steps or getting out of a bed or a chair? No   Current Diet Well Balanced Diet   Dental Exam Up to date   Eye Exam Up to date   Exercise (times per week) 4 times per week   Current Exercise Activities Include Aerobics   Do you need help using the phone?  No   Are you deaf or do you have serious difficulty hearing?  No   Do you need help with transportation? No   Do you need help shopping? No   Do you need help preparing meals?  No   Do you need help with housework?  No   Do you need help with laundry? No   Do you need help taking your medications? No   Do you need help managing money? No   Do you ever drive or ride in a car without wearing a seat belt? No   Have you felt unusual stress, anger or loneliness in the last month? No   Who do you live with? Spouse   If you need help, do you have trouble finding someone available to you? No   Have you been bothered in the last four weeks by sexual problems? No   Do you have difficulty  concentrating, remembering or making decisions? No       Compared to one year ago, the patient feels his physical health is the same and his mental health is better.    Depression Screen:  PHQ-2/PHQ-9 Depression Screening 11/21/2019   Little interest or pleasure in doing things 0   Feeling down, depressed, or hopeless 0   Trouble falling or staying asleep, or sleeping too much 0   Feeling tired or having little energy 0   Poor appetite or overeating 0   Feeling bad about yourself - or that you are a failure or have let yourself or your family down 0   Trouble concentrating on things, such as reading the newspaper or watching television 0   Moving or speaking so slowly that other people could have noticed. Or the opposite - being so fidgety or restless that you have been moving around a lot more than usual 0   Thoughts that you would be better off dead, or of hurting yourself in some way 0   Total Score 0   If you checked off any problems, how difficult have these problems made it for you to do your work, take care of things at home, or get along with other people? Not difficult at all         Past Medical/Family/Social History:  The following portions of the patient's history were reviewed and updated as appropriate: allergies, current medications, past family history, past medical history, past social history, past surgical history and problem list.    Allergies   Allergen Reactions   • Latex Rash         Current Outpatient Medications:   •  aspirin 81 MG EC tablet, Take 81 mg by mouth Daily., Disp: , Rfl:   •  atorvastatin (LIPITOR) 10 MG tablet, Take one tablet by mouth daily, Disp: 90 tablet, Rfl: 3  •  Cholecalciferol (VITAMIN D3) 125 MCG (5000 UT) tablet tablet, Take 1 tablet by mouth Daily. (Patient taking differently: Take 5,000 Units by mouth Daily.), Disp: 30 tablet, Rfl: 2  •  irbesartan (AVAPRO) 300 MG tablet, Take 1 tablet by mouth Daily., Disp: 90 tablet, Rfl: 3  •  ketotifen (ZADITOR) 0.025 %  ophthalmic solution, Apply 1 drop to eye(s) as directed by provider As Needed., Disp: , Rfl:   •  levothyroxine (SYNTHROID, LEVOTHROID) 150 MCG tablet, Take 1 tablet by mouth Daily., Disp: 90 tablet, Rfl: 3  •  metoprolol tartrate (LOPRESSOR) 25 MG tablet, Take 0.5 tablets by mouth 2 (Two) Times a Day., Disp: 90 tablet, Rfl: 3  •  naproxen (NAPROSYN) 500 MG tablet, Take 1 tablet by mouth 2 (Two) Times a Day. (Patient taking differently: Take 500 mg by mouth 2 (Two) Times a Day. prn), Disp: 60 tablet, Rfl: 3  •  Testosterone (ANDROGEL PUMP) 20.25 MG/ACT (1.62%) gel, 1 pump actuation on each shoulder daily., Disp: 75 g, Rfl: 5    Aspirin use counseling: Taking ASA appropriately as indicated    Current medication list contains no high risk medications.  No harmful drug interactions have been identified.     Family History   Problem Relation Age of Onset   • Cancer Father         malignant neoplasm   • No Known Problems Mother    • No Known Problems Sister    • No Known Problems Brother    • No Known Problems Sister    • No Known Problems Brother    • No Known Problems Brother    • Diabetes Maternal Grandmother        Social History     Tobacco Use   • Smoking status: Former Smoker   • Smokeless tobacco: Never Used   • Tobacco comment: quit 45 yrs ago   Substance Use Topics   • Alcohol use: Yes     Comment: occasional/  occ caffeine use       Past Surgical History:   Procedure Laterality Date   • CARDIAC CATHETERIZATION N/A 11/5/2019    Procedure: Coronary angiography;  Surgeon: Storm Marcano MD;  Location: [facility] INVASIVE LOCATION;  Service: Cardiovascular   • CARDIAC CATHETERIZATION N/A 11/5/2019    Procedure: Left heart cath;  Surgeon: Storm Marcano MD;  Location: [facility] CATH INVASIVE LOCATION;  Service: Cardiovascular   • CARDIAC CATHETERIZATION N/A 11/5/2019    Procedure: Left ventriculography;  Surgeon: Storm Marcano MD;  Location: [facility] CATH INVASIVE LOCATION;  Service: Cardiovascular   •  "KNEE ARTHROSCOPY Left    • KNEE ARTHROSCOPY Left 2018   • KNEE SURGERY      2008 and 2009   • LITHOTRIPSY     • OTHER SURGICAL HISTORY Left     ear surgery       Patient Active Problem List   Diagnosis   • Graves disease   • Supraventricular tachycardia (CMS/HCC)   • PVC (premature ventricular contraction)   • Hyperlipidemia   • Acute bilateral thoracic back pain   • Dizziness   • Irregular heart beat   • Seasonal allergic rhinitis due to pollen   • Hyperglycemia   • Erectile dysfunction   • Insulin resistance   • Acquired spondylolisthesis   • Lumbar radiculopathy   • Degeneration of intervertebral disc of lumbar region   • Essential hypertension   • PND (paroxysmal nocturnal dyspnea)   • Right foot pain   • Pain in both lower extremities   • JACQUELINE (obstructive sleep apnea)   • Snoring   • H/O multiple concussions   • Other specified hypothyroidism   • Low testosterone   • Vitamin D deficiency   • Preop cardiovascular exam   • Abnormal stress test       Review of Systems    Objective     Vitals:    11/21/19 0851   BP: 140/78   Pulse: 61   Temp: 97.6 °F (36.4 °C)   SpO2: 97%   Weight: 114 kg (252 lb)   Height: 190.5 cm (75\")       Patient's Body mass index is 31.5 kg/m². BMI is within normal parameters. No follow-up required..      No exam data present    The patient has no evidence of cognitve impairment.     Physical Exam    Recent Lab Results:  Lab Results   Component Value Date    GLU 85 07/31/2019     Lab Results   Component Value Date    CHOL 126 05/30/2018    TRIG 93 07/31/2019    HDL 38 (L) 07/31/2019    VLDL 18.6 07/31/2019    LDLHDL 1.79 05/30/2018       Assessment/Plan   Age-appropriate Screening Schedule:  Refer to the list below for future screening recommendations based on patient's age, sex and/or medical conditions.      Health Maintenance   Topic Date Due   • ZOSTER VACCINE (2 of 2) 02/26/2015   • LIPID PANEL  07/31/2020   • TDAP/TD VACCINES (2 - Td) 01/01/2022   • COLONOSCOPY  03/01/2026   • " INFLUENZA VACCINE  Completed   • PNEUMOCOCCAL VACCINES (65+ LOW/MEDIUM RISK)  Discontinued       Medicare Risks and Personalized Health Plan:  NA      CMS-Preventive Services Quick Reference  Medicare Preventive Services Addressed:  NA    Advance Care Planning:  Patient has an advance directive - a copy has not been provided. Have asked the patient to send this to us to add to record    There are no diagnoses linked to this encounter.    An After Visit Summary and PPPS with all of these plans were given to the patient.      Follow Up:  No Follow-up on file.

## 2019-11-26 DIAGNOSIS — I49.3 PVC (PREMATURE VENTRICULAR CONTRACTION): Primary | ICD-10-CM

## 2019-12-03 NOTE — TELEPHONE ENCOUNTER
No.  I believe I said depending on the results of the Holter, might need EP consult.  Please just do the Holter at this point. adrienne

## 2019-12-19 ENCOUNTER — TELEPHONE (OUTPATIENT)
Dept: CARDIOLOGY | Facility: CLINIC | Age: 69
End: 2019-12-19

## 2019-12-21 NOTE — TELEPHONE ENCOUNTER
I called patient on cell phone and someone picked up and hung up.  I called patient on home phone and got voicemail.  Left message that the monitor test was overall stable and to continue with the current regimen and keep routine follow-up.  I also asked him him to call back on Monday to verify he got this.  Please call him if he has not called on Monday.  Emeterio

## 2019-12-23 NOTE — TELEPHONE ENCOUNTER
Called- poor connection, patient hung up.  Will continue to try and reach the patient  Brittnee Lopez RN  Triage nurse

## 2020-01-20 ENCOUNTER — TELEPHONE (OUTPATIENT)
Dept: ENDOCRINOLOGY | Age: 70
End: 2020-01-20

## 2020-01-20 NOTE — TELEPHONE ENCOUNTER
----- Message from Jennifer Jarrell sent at 1/20/2020  1:09 PM EST -----  I tried to call the pt back to r/s he did not answer I did leave a v/m   ----- Message -----  From: Nya Mcgraw MA  Sent: 1/17/2020   2:36 PM EST  To: Maddison Castle MA, Lubna Horton, #    Wants to reschedule his appointment and states that he has called 3 times

## 2020-01-24 ENCOUNTER — PATIENT MESSAGE (OUTPATIENT)
Dept: CARDIOLOGY | Facility: CLINIC | Age: 70
End: 2020-01-24

## 2020-01-27 ENCOUNTER — TELEPHONE (OUTPATIENT)
Dept: CARDIOLOGY | Facility: CLINIC | Age: 70
End: 2020-01-27

## 2020-01-27 NOTE — TELEPHONE ENCOUNTER
----- Message from Joaquin Peña sent at 1/24/2020  9:24 AM EST -----  Regarding: Non-Urgent Medical Question  Contact: [phone]  Have an interest in trying CBD oil.  Are there any reasons why I should NOT use it?

## 2020-01-27 NOTE — TELEPHONE ENCOUNTER
CBD oil is not tested or regulated whatsoever.  Cannabis itself has some cardiovascular effect such as hypertension bradycardia, myocardial infarction, stroke,  myocardial spasm chest pain and myocardial infarction.  He will also definitely need to check with his pharmacy as to interaction of CBD oil to his medications which is also somewhat unknown.  I am not in favor of this however if he wishes to do so he will need to get a better understanding of it and accept some unknown risk.adrienne

## 2020-02-03 ENCOUNTER — RESULTS ENCOUNTER (OUTPATIENT)
Dept: ENDOCRINOLOGY | Age: 70
End: 2020-02-03

## 2020-02-03 DIAGNOSIS — R79.89 LOW TESTOSTERONE: ICD-10-CM

## 2020-02-03 DIAGNOSIS — E55.9 VITAMIN D DEFICIENCY: ICD-10-CM

## 2020-02-03 DIAGNOSIS — M43.10 ACQUIRED SPONDYLOLISTHESIS: ICD-10-CM

## 2020-02-03 DIAGNOSIS — E88.81 INSULIN RESISTANCE: ICD-10-CM

## 2020-02-03 DIAGNOSIS — I10 ESSENTIAL HYPERTENSION: ICD-10-CM

## 2020-02-03 DIAGNOSIS — E78.2 MIXED HYPERLIPIDEMIA: ICD-10-CM

## 2020-02-03 DIAGNOSIS — E05.00 GRAVES DISEASE: ICD-10-CM

## 2020-02-03 DIAGNOSIS — R73.9 HYPERGLYCEMIA: ICD-10-CM

## 2020-02-18 ENCOUNTER — OFFICE VISIT (OUTPATIENT)
Dept: FAMILY MEDICINE CLINIC | Facility: CLINIC | Age: 70
End: 2020-02-18

## 2020-02-18 VITALS
TEMPERATURE: 97.6 F | SYSTOLIC BLOOD PRESSURE: 138 MMHG | BODY MASS INDEX: 31.61 KG/M2 | DIASTOLIC BLOOD PRESSURE: 74 MMHG | HEART RATE: 66 BPM | WEIGHT: 254.2 LBS | HEIGHT: 75 IN | OXYGEN SATURATION: 97 %

## 2020-02-18 DIAGNOSIS — R07.9 CHEST PAIN, UNSPECIFIED TYPE: ICD-10-CM

## 2020-02-18 DIAGNOSIS — I10 ESSENTIAL HYPERTENSION: Primary | ICD-10-CM

## 2020-02-18 DIAGNOSIS — E78.00 PURE HYPERCHOLESTEROLEMIA: ICD-10-CM

## 2020-02-18 DIAGNOSIS — R73.9 HYPERGLYCEMIA: ICD-10-CM

## 2020-02-18 PROCEDURE — 93000 ELECTROCARDIOGRAM COMPLETE: CPT | Performed by: INTERNAL MEDICINE

## 2020-02-18 PROCEDURE — 99214 OFFICE O/P EST MOD 30 MIN: CPT | Performed by: INTERNAL MEDICINE

## 2020-02-18 NOTE — PROGRESS NOTES
ECG 12 Lead  Date/Time: 2/18/2020 10:25 AM  Performed by: Erwin Escobar MD  Authorized by: Erwin Escobar MD   Comparison: not compared with previous ECG   Rhythm: sinus bradycardia  Rate: bradycardic  Conduction: 1st degree AV block  ST Segments: ST segments normal  T Waves: T waves normal  QRS axis: normal  Other: no other findings    Clinical impression: non-specific ECG  Comments: EKG Interpretation Report    Heart rate:    51 beats/min, FL interval:  215 msec, QRS duration:  118 msec  QTu:432 msec, QTc:  408 msec

## 2020-02-18 NOTE — PROGRESS NOTES
Subjective   Joaquin Peña is a 69 y.o. male.     History of Present Illness   Patient was seen for hypertension.  His blood pressures ranging 130s to 120s over 80s.  His cholesterol is being treated with diet exercise and a statin.  Triglycerides 93, HDL 38, LDL nice 65.  She was instructed to continue present diet and activity levels.  His latest hemoglobin A1c was 5.9%.  Patient did have nondescript chest pain on the anterior chest.  It was worse with flexion extension of the chest.  EKG was obtained which was normal.  It was no change from EKG taken 1 year ago.    Dictated utilizing Dragon dictation. If there are questions or for further clarification, please contact me.  The following portions of the patient's history were reviewed and updated as appropriate: allergies, current medications, past family history, past medical history, past social history, past surgical history and problem list.    Review of Systems   Constitutional: Negative for fatigue and fever.   HENT: Positive for congestion. Negative for trouble swallowing.    Eyes: Negative for discharge and visual disturbance.   Respiratory: Negative for choking and shortness of breath.    Cardiovascular: Positive for chest pain. Negative for palpitations.   Gastrointestinal: Negative for abdominal pain and blood in stool.   Endocrine: Negative.    Genitourinary: Negative for genital sores and hematuria.   Musculoskeletal: Negative for gait problem and joint swelling.   Skin: Negative for color change, pallor, rash and wound.   Allergic/Immunologic: Positive for environmental allergies. Negative for immunocompromised state.   Neurological: Negative for facial asymmetry and speech difficulty.   Psychiatric/Behavioral: Negative for hallucinations and suicidal ideas.       Objective   Physical Exam   Constitutional: He is oriented to person, place, and time. He appears well-developed and well-nourished.   HENT:   Head: Normocephalic.   Eyes: Pupils are  equal, round, and reactive to light. Conjunctivae are normal.   Neck: Normal range of motion. Neck supple.   Cardiovascular: Normal rate, regular rhythm and normal heart sounds. Exam reveals no gallop and no friction rub.   No murmur heard.  Pulmonary/Chest: Effort normal and breath sounds normal. No stridor. No respiratory distress. He has no wheezes. He has no rales. He exhibits no tenderness.   Abdominal: Soft. Bowel sounds are normal.   Musculoskeletal: Normal range of motion.   Neurological: He is alert and oriented to person, place, and time.   Skin: Skin is warm and dry.   Psychiatric: He has a normal mood and affect. His behavior is normal. Judgment and thought content normal.   Nursing note and vitals reviewed.      Assessment/Plan #1 continue present diet and activity levels #2 continue to monitor blood pressure #3 EKG  Joaquin was seen today for pain in chest bone?/rib fx hx and vitamin d deficiency.    Diagnoses and all orders for this visit:    Essential hypertension    Pure hypercholesterolemia    Hyperglycemia    Chest pain, unspecified type  -     ECG 12 Lead    Other orders  -     CBD (cannabidiol) oral oil; Take 2 mL by mouth 2 (Two) Times a Day.

## 2020-02-28 DIAGNOSIS — R79.89 LOW TESTOSTERONE: ICD-10-CM

## 2020-02-28 RX ORDER — TELMISARTAN AND HYDROCHLORTHIAZIDE 80; 25 MG/1; MG/1
1 TABLET ORAL DAILY
Qty: 30 TABLET | Refills: 11 | Status: SHIPPED | OUTPATIENT
Start: 2020-02-28 | End: 2020-03-23 | Stop reason: SINTOL

## 2020-02-28 RX ORDER — METOPROLOL TARTRATE 50 MG/1
50 TABLET, FILM COATED ORAL 2 TIMES DAILY
Qty: 60 TABLET | Refills: 11 | Status: SHIPPED | OUTPATIENT
Start: 2020-02-28 | End: 2020-03-23

## 2020-02-28 RX ORDER — TESTOSTERONE 16.2 MG/G
GEL TRANSDERMAL
Qty: 75 G | Refills: 5 | Status: SHIPPED | OUTPATIENT
Start: 2020-02-28 | End: 2020-03-23 | Stop reason: SDUPTHER

## 2020-02-28 RX ORDER — TESTOSTERONE 16.2 MG/G
GEL TRANSDERMAL
Qty: 75 G | Refills: 5 | Status: SHIPPED | OUTPATIENT
Start: 2020-02-28 | End: 2020-02-28 | Stop reason: SDUPTHER

## 2020-03-10 LAB
25(OH)D3+25(OH)D2 SERPL-MCNC: 47 NG/ML (ref 30–100)
ALBUMIN SERPL-MCNC: 3.8 G/DL (ref 3.5–5.2)
ALBUMIN/GLOB SERPL: 1.3 G/DL
ALP SERPL-CCNC: 70 U/L (ref 39–117)
ALT SERPL-CCNC: 19 U/L (ref 1–41)
AST SERPL-CCNC: 18 U/L (ref 1–40)
BILIRUB SERPL-MCNC: 0.5 MG/DL (ref 0.2–1.2)
BUN SERPL-MCNC: 15 MG/DL (ref 8–23)
BUN/CREAT SERPL: 12.4 (ref 7–25)
C PEPTIDE SERPL-MCNC: 4.4 NG/ML (ref 1.1–4.4)
CALCIUM SERPL-MCNC: 9.7 MG/DL (ref 8.6–10.5)
CHLORIDE SERPL-SCNC: 104 MMOL/L (ref 98–107)
CHOLEST SERPL-MCNC: 118 MG/DL (ref 0–200)
CO2 SERPL-SCNC: 26.2 MMOL/L (ref 22–29)
CREAT SERPL-MCNC: 1.21 MG/DL (ref 0.76–1.27)
GLOBULIN SER CALC-MCNC: 2.9 GM/DL
GLUCOSE SERPL-MCNC: 98 MG/DL (ref 65–99)
HBA1C MFR BLD: 5.7 % (ref 4.8–5.6)
HCT VFR BLD AUTO: 47 % (ref 37.5–51)
HDLC SERPL-MCNC: 32 MG/DL (ref 40–60)
HGB BLD-MCNC: 15.2 G/DL (ref 13–17.7)
INTERPRETATION: NORMAL
LDLC SERPL CALC-MCNC: 55 MG/DL (ref 0–100)
Lab: NORMAL
POTASSIUM SERPL-SCNC: 5.7 MMOL/L (ref 3.5–5.2)
PROT SERPL-MCNC: 6.7 G/DL (ref 6–8.5)
SODIUM SERPL-SCNC: 141 MMOL/L (ref 136–145)
T3FREE SERPL-MCNC: 2.7 PG/ML (ref 2–4.4)
T4 FREE SERPL-MCNC: 1.38 NG/DL (ref 0.93–1.7)
T4 SERPL-MCNC: 8.37 MCG/DL (ref 4.5–11.7)
TRIGL SERPL-MCNC: 155 MG/DL (ref 0–150)
TSH SERPL DL<=0.005 MIU/L-ACNC: 2.17 UIU/ML (ref 0.27–4.2)
URATE SERPL-MCNC: 6.3 MG/DL (ref 3.4–7)
VLDLC SERPL CALC-MCNC: 31 MG/DL

## 2020-03-23 ENCOUNTER — OFFICE VISIT (OUTPATIENT)
Dept: ENDOCRINOLOGY | Age: 70
End: 2020-03-23

## 2020-03-23 VITALS
HEIGHT: 76 IN | BODY MASS INDEX: 30.61 KG/M2 | DIASTOLIC BLOOD PRESSURE: 78 MMHG | HEART RATE: 52 BPM | OXYGEN SATURATION: 98 % | WEIGHT: 251.4 LBS | SYSTOLIC BLOOD PRESSURE: 126 MMHG

## 2020-03-23 DIAGNOSIS — E78.00 PURE HYPERCHOLESTEROLEMIA: ICD-10-CM

## 2020-03-23 DIAGNOSIS — E55.9 VITAMIN D DEFICIENCY: ICD-10-CM

## 2020-03-23 DIAGNOSIS — E78.2 MIXED HYPERLIPIDEMIA: ICD-10-CM

## 2020-03-23 DIAGNOSIS — N52.9 ERECTILE DYSFUNCTION, UNSPECIFIED ERECTILE DYSFUNCTION TYPE: ICD-10-CM

## 2020-03-23 DIAGNOSIS — E05.00 GRAVES DISEASE: ICD-10-CM

## 2020-03-23 DIAGNOSIS — E88.81 INSULIN RESISTANCE: ICD-10-CM

## 2020-03-23 DIAGNOSIS — N40.0 BENIGN PROSTATIC HYPERPLASIA WITHOUT LOWER URINARY TRACT SYMPTOMS: ICD-10-CM

## 2020-03-23 DIAGNOSIS — E03.8 OTHER SPECIFIED HYPOTHYROIDISM: ICD-10-CM

## 2020-03-23 DIAGNOSIS — R73.9 HYPERGLYCEMIA: ICD-10-CM

## 2020-03-23 DIAGNOSIS — I10 ESSENTIAL HYPERTENSION: ICD-10-CM

## 2020-03-23 DIAGNOSIS — R79.89 LOW TESTOSTERONE: Primary | ICD-10-CM

## 2020-03-23 PROCEDURE — 99214 OFFICE O/P EST MOD 30 MIN: CPT | Performed by: INTERNAL MEDICINE

## 2020-03-23 RX ORDER — ATORVASTATIN CALCIUM 10 MG/1
TABLET, FILM COATED ORAL
Qty: 90 TABLET | Refills: 3 | Status: SHIPPED | OUTPATIENT
Start: 2020-03-23 | End: 2021-04-06

## 2020-03-23 RX ORDER — VARDENAFIL HYDROCHLORIDE 20 MG/1
20 TABLET ORAL AS NEEDED
Qty: 20 TABLET | Refills: 3 | Status: SHIPPED | OUTPATIENT
Start: 2020-03-23 | End: 2021-03-23

## 2020-03-23 RX ORDER — TESTOSTERONE 16.2 MG/G
GEL TRANSDERMAL
Qty: 75 G | Refills: 5 | Status: SHIPPED | OUTPATIENT
Start: 2020-03-23 | End: 2020-03-23 | Stop reason: SDUPTHER

## 2020-03-23 RX ORDER — IRBESARTAN 300 MG/1
300 TABLET ORAL NIGHTLY
Qty: 90 TABLET | Refills: 3 | Status: SHIPPED | OUTPATIENT
Start: 2020-03-23 | End: 2021-03-04

## 2020-03-23 RX ORDER — FLUTICASONE PROPIONATE 50 MCG
2 SPRAY, SUSPENSION (ML) NASAL DAILY
Qty: 18.2 ML | Refills: 11 | Status: SHIPPED | OUTPATIENT
Start: 2020-03-23

## 2020-03-23 RX ORDER — LEVOTHYROXINE SODIUM 0.15 MG/1
150 TABLET ORAL DAILY
Qty: 90 TABLET | Refills: 3 | Status: SHIPPED | OUTPATIENT
Start: 2020-03-23 | End: 2021-03-04

## 2020-03-23 RX ORDER — TESTOSTERONE 16.2 MG/G
GEL TRANSDERMAL
Qty: 75 G | Refills: 5 | Status: SHIPPED | OUTPATIENT
Start: 2020-03-23 | End: 2020-09-23 | Stop reason: SDUPTHER

## 2020-03-23 NOTE — PROGRESS NOTES
Subjective   Joaquin Peña is a 69 y.o. male follow up for goiter, hypothyroidism, graves, hyperlipidemia, HTN, lab review  History of Present Illness this is a 69-year-old gentleman known patient with history of Graves' disease status post radioactive iodine therapy and hypothyroidism as well as hypogonadism and hypertension and dyslipidemia with vitamin D deficiency.  He is complaining of having fatigue and weakness as well as swelling on his legs and having dizziness and lightheadedness.    The following portions of the patient's history were reviewed and updated as appropriate: allergies, current medications, past family history, past medical history, past social history, past surgical history and problem list.    Review of Systems   Constitutional: Positive for fatigue.   HENT: Negative.    Eyes: Negative.    Respiratory: Negative.    Cardiovascular: Positive for leg swelling.   Endocrine: Negative.    Genitourinary: Negative.    Musculoskeletal: Negative.    Skin: Negative.    Allergic/Immunologic: Negative.    Neurological: Positive for dizziness and light-headedness.   Hematological: Negative.    Psychiatric/Behavioral: Negative.    The above review of system was reviewed, corroborated and accepted.    Objective   Physical Exam   Constitutional: He is oriented to person, place, and time. He appears well-developed and well-nourished. No distress.   HENT:   Head: Normocephalic and atraumatic.   Right Ear: External ear normal.   Left Ear: External ear normal.   Nose: Nose normal.   Mouth/Throat: Oropharynx is clear and moist. No oropharyngeal exudate.   Eyes: Pupils are equal, round, and reactive to light. Conjunctivae and EOM are normal. Right eye exhibits no discharge. Left eye exhibits no discharge. No scleral icterus.   Neck: Normal range of motion. Neck supple. No JVD present. No tracheal deviation present. No thyromegaly present.   Cardiovascular: Normal rate, regular rhythm, normal heart sounds and  intact distal pulses. Exam reveals no gallop and no friction rub.   No murmur heard.  Pulmonary/Chest: Effort normal and breath sounds normal. No stridor. No respiratory distress. He has no wheezes. He has no rales. He exhibits no tenderness.   Abdominal: Soft. Bowel sounds are normal. He exhibits no distension and no mass. There is no tenderness. There is no rebound and no guarding. No hernia.   Musculoskeletal: Normal range of motion. He exhibits no edema, tenderness or deformity.   Lymphadenopathy:     He has no cervical adenopathy.   Neurological: He is alert and oriented to person, place, and time. He has normal reflexes. He displays normal reflexes. No cranial nerve deficit or sensory deficit. He exhibits normal muscle tone. Coordination normal.   Skin: Skin is warm and dry. No rash noted. No erythema. No pallor.   Psychiatric: He has a normal mood and affect. His behavior is normal. Judgment and thought content normal.   Nursing note and vitals reviewed.  No significant change since 8/14/2019 office visit.  Results for orders placed or performed in visit on 02/03/20   T4 & TSH ([facility])   Result Value Ref Range    TSH 2.170 0.270 - 4.200 uIU/mL    T4, Total 8.37 4.50 - 11.70 mcg/dL   T3, Free   Result Value Ref Range    T3, Free 2.7 2.0 - 4.4 pg/mL   T4, Free   Result Value Ref Range    Free T4 1.38 0.93 - 1.70 ng/dL   Uric Acid   Result Value Ref Range    Uric Acid 6.3 3.4 - 7.0 mg/dL   Vitamin D 25 Hydroxy   Result Value Ref Range    25 Hydroxy, Vitamin D 47.0 30.0 - 100.0 ng/mL   Comprehensive Metabolic Panel   Result Value Ref Range    Glucose 98 65 - 99 mg/dL    BUN 15 8 - 23 mg/dL    Creatinine 1.21 0.76 - 1.27 mg/dL    eGFR Non African Am 60 (L) >60 mL/min/1.73    eGFR African Am 72 >60 mL/min/1.73    BUN/Creatinine Ratio 12.4 7.0 - 25.0    Sodium 141 136 - 145 mmol/L    Potassium 5.7 (H) 3.5 - 5.2 mmol/L    Chloride 104 98 - 107 mmol/L    Total CO2 26.2 22.0 - 29.0 mmol/L    Calcium 9.7 8.6 - 10.5  "mg/dL    Total Protein 6.7 6.0 - 8.5 g/dL    Albumin 3.80 3.50 - 5.20 g/dL    Globulin 2.9 gm/dL    A/G Ratio 1.3 g/dL    Total Bilirubin 0.5 0.2 - 1.2 mg/dL    Alkaline Phosphatase 70 39 - 117 U/L    AST (SGOT) 18 1 - 40 U/L    ALT (SGPT) 19 1 - 41 U/L   C-Peptide   Result Value Ref Range    C-Peptide 4.4 1.1 - 4.4 ng/mL   Hemoglobin A1c   Result Value Ref Range    Hemoglobin A1C 5.70 (H) 4.80 - 5.60 %   Lipid Panel   Result Value Ref Range    Total Cholesterol 118 0 - 200 mg/dL    Triglycerides 155 (H) 0 - 150 mg/dL    HDL Cholesterol 32 (L) 40 - 60 mg/dL    VLDL Cholesterol 31 mg/dL    LDL Cholesterol  55 0 - 100 mg/dL   Hemoglobin & Hematocrit, Blood   Result Value Ref Range    Hemoglobin 15.2 13.0 - 17.7 g/dL    Hematocrit 47.0 37.5 - 51.0 %   Cardiovascular Risk Assessment   Result Value Ref Range    Interpretation Note    Diabetes Patient Education   Result Value Ref Range    PDF Image Not applicable      /78   Pulse 52   Ht 193 cm (76\")   Wt 114 kg (251 lb 6.4 oz)   SpO2 98%   BMI 30.60 kg/m²     Allergies   Allergen Reactions   • Latex Rash       Current Outpatient Medications:   •  aspirin 81 MG EC tablet, Take 81 mg by mouth Daily., Disp: , Rfl:   •  atorvastatin (LIPITOR) 10 MG tablet, Take one tablet by mouth daily, Disp: 90 tablet, Rfl: 3  •  CBD (cannabidiol) oral oil, Take 2 mL by mouth 2 (Two) Times a Day., Disp: 2 mL, Rfl: 0  •  Cholecalciferol (VITAMIN D3) 125 MCG (5000 UT) tablet tablet, Take 1 tablet by mouth Daily., Disp: 30 tablet, Rfl: 2  •  ketotifen (ZADITOR) 0.025 % ophthalmic solution, Apply 1 drop to eye(s) as directed by provider As Needed., Disp: , Rfl:   •  levothyroxine (SYNTHROID, LEVOTHROID) 150 MCG tablet, Take 1 tablet by mouth Daily., Disp: 90 tablet, Rfl: 3  •  metoprolol tartrate (LOPRESSOR) 50 MG tablet, Take 1 tablet by mouth 2 (Two) Times a Day. (Patient taking differently: Take 25 mg by mouth 2 (Two) Times a Day.), Disp: 60 tablet, Rfl: 11  •  Multiple Vitamin " (MULTI-VITAMIN DAILY PO), Take  by mouth., Disp: , Rfl:   •  naproxen (NAPROSYN) 500 MG tablet, Take 1 tablet by mouth 2 (Two) Times a Day. (Patient taking differently: Take 500 mg by mouth 2 (Two) Times a Day. prn), Disp: 60 tablet, Rfl: 3  •  Testosterone (ANDROGEL PUMP) 20.25 MG/ACT (1.62%) gel, 1 pump actuation on each shoulder daily., Disp: 75 g, Rfl: 5    Assessment/Plan   Joaquin was seen today for hypothyroidism and graves' disease.    Diagnoses and all orders for this visit:    Low testosterone  -     Testosterone (AndroGel Pump) 20.25 MG/ACT (1.62%) gel; 1 pump actuation on each shoulder daily.  -     T3, Free; Future  -     T4 & TSH ([facility]); Future  -     T4, Free; Future  -     TestT+TestF+SHBG; Future  -     Uric Acid; Future  -     Vitamin D 25 Hydroxy; Future  -     Comprehensive Metabolic Panel; Future  -     C-Peptide; Future  -     Hemoglobin A1c; Future  -     Lipid Panel; Future  -     MicroAlbumin, Urine, Random - Urine, Clean Catch; Future  -     PSA DIAGNOSTIC; Future  -     Hemoglobin & Hematocrit, Blood; Future    Erectile dysfunction, unspecified erectile dysfunction type  -     T3, Free; Future  -     T4 & TSH ([facility]); Future  -     T4, Free; Future  -     TestT+TestF+SHBG; Future  -     Uric Acid; Future  -     Vitamin D 25 Hydroxy; Future  -     Comprehensive Metabolic Panel; Future  -     C-Peptide; Future  -     Hemoglobin A1c; Future  -     Lipid Panel; Future  -     MicroAlbumin, Urine, Random - Urine, Clean Catch; Future  -     PSA DIAGNOSTIC; Future  -     Hemoglobin & Hematocrit, Blood; Future    Hyperglycemia  -     T3, Free; Future  -     T4 & TSH ([facility]); Future  -     T4, Free; Future  -     TestT+TestF+SHBG; Future  -     Uric Acid; Future  -     Vitamin D 25 Hydroxy; Future  -     Comprehensive Metabolic Panel; Future  -     C-Peptide; Future  -     Hemoglobin A1c; Future  -     Lipid Panel; Future  -     MicroAlbumin, Urine, Random - Urine, Clean Catch; Future  -      PSA DIAGNOSTIC; Future  -     Hemoglobin & Hematocrit, Blood; Future    Vitamin D deficiency  -     T3, Free; Future  -     T4 & TSH ([facility]); Future  -     T4, Free; Future  -     TestT+TestF+SHBG; Future  -     Uric Acid; Future  -     Vitamin D 25 Hydroxy; Future  -     Comprehensive Metabolic Panel; Future  -     C-Peptide; Future  -     Hemoglobin A1c; Future  -     Lipid Panel; Future  -     MicroAlbumin, Urine, Random - Urine, Clean Catch; Future  -     PSA DIAGNOSTIC; Future  -     Hemoglobin & Hematocrit, Blood; Future    Essential hypertension  -     T3, Free; Future  -     T4 & TSH ([facility]); Future  -     T4, Free; Future  -     TestT+TestF+SHBG; Future  -     Uric Acid; Future  -     Vitamin D 25 Hydroxy; Future  -     Comprehensive Metabolic Panel; Future  -     C-Peptide; Future  -     Hemoglobin A1c; Future  -     Lipid Panel; Future  -     MicroAlbumin, Urine, Random - Urine, Clean Catch; Future  -     PSA DIAGNOSTIC; Future  -     Hemoglobin & Hematocrit, Blood; Future    Pure hypercholesterolemia  -     T3, Free; Future  -     T4 & TSH ([facility]); Future  -     T4, Free; Future  -     TestT+TestF+SHBG; Future  -     Uric Acid; Future  -     Vitamin D 25 Hydroxy; Future  -     Comprehensive Metabolic Panel; Future  -     C-Peptide; Future  -     Hemoglobin A1c; Future  -     Lipid Panel; Future  -     MicroAlbumin, Urine, Random - Urine, Clean Catch; Future  -     PSA DIAGNOSTIC; Future  -     Hemoglobin & Hematocrit, Blood; Future    Mixed hyperlipidemia  -     atorvastatin (LIPITOR) 10 MG tablet; Take one tablet by mouth daily  -     T3, Free; Future  -     T4 & TSH ([facility]); Future  -     T4, Free; Future  -     TestT+TestF+SHBG; Future  -     Uric Acid; Future  -     Vitamin D 25 Hydroxy; Future  -     Comprehensive Metabolic Panel; Future  -     C-Peptide; Future  -     Hemoglobin A1c; Future  -     Lipid Panel; Future  -     MicroAlbumin, Urine, Random - Urine, Clean Catch; Future  -     PSA  DIAGNOSTIC; Future  -     Hemoglobin & Hematocrit, Blood; Future    Graves disease  -     T3, Free; Future  -     T4 & TSH ([facility]); Future  -     T4, Free; Future  -     TestT+TestF+SHBG; Future  -     Uric Acid; Future  -     Vitamin D 25 Hydroxy; Future  -     Comprehensive Metabolic Panel; Future  -     C-Peptide; Future  -     Hemoglobin A1c; Future  -     Lipid Panel; Future  -     MicroAlbumin, Urine, Random - Urine, Clean Catch; Future  -     PSA DIAGNOSTIC; Future  -     Hemoglobin & Hematocrit, Blood; Future    Insulin resistance  -     T3, Free; Future  -     T4 & TSH ([facility]); Future  -     T4, Free; Future  -     TestT+TestF+SHBG; Future  -     Uric Acid; Future  -     Vitamin D 25 Hydroxy; Future  -     Comprehensive Metabolic Panel; Future  -     C-Peptide; Future  -     Hemoglobin A1c; Future  -     Lipid Panel; Future  -     MicroAlbumin, Urine, Random - Urine, Clean Catch; Future  -     PSA DIAGNOSTIC; Future  -     Hemoglobin & Hematocrit, Blood; Future    Other specified hypothyroidism  -     T3, Free; Future  -     T4 & TSH ([facility]); Future  -     T4, Free; Future  -     TestT+TestF+SHBG; Future  -     Uric Acid; Future  -     Vitamin D 25 Hydroxy; Future  -     Comprehensive Metabolic Panel; Future  -     C-Peptide; Future  -     Hemoglobin A1c; Future  -     Lipid Panel; Future  -     MicroAlbumin, Urine, Random - Urine, Clean Catch; Future  -     PSA DIAGNOSTIC; Future  -     Hemoglobin & Hematocrit, Blood; Future    Benign prostatic hyperplasia without lower urinary tract symptoms  -     T3, Free; Future  -     T4 & TSH ([facility]); Future  -     T4, Free; Future  -     TestT+TestF+SHBG; Future  -     Uric Acid; Future  -     Vitamin D 25 Hydroxy; Future  -     Comprehensive Metabolic Panel; Future  -     C-Peptide; Future  -     Hemoglobin A1c; Future  -     Lipid Panel; Future  -     MicroAlbumin, Urine, Random - Urine, Clean Catch; Future  -     PSA DIAGNOSTIC; Future  -     Hemoglobin &  Hematocrit, Blood; Future    Other orders  -     levothyroxine (SYNTHROID, LEVOTHROID) 150 MCG tablet; Take 1 tablet by mouth Daily.  -     metoprolol tartrate (LOPRESSOR) 25 MG tablet; Take 1 tablet by mouth 2 (Two) Times a Day.      In summary I saw and examined this 69-year-old gentleman for above-mentioned problems.  I reviewed his laboratory evaluations of 3/9/2020 and provided him with a hard copy of it.  Overall he is clinically and metabolically stable therefore we will go ahead and continue all his current prescriptions.  I will see him in 6 months or sooner if needed with laboratory evaluation prior to each office visit.

## 2020-05-12 RX ORDER — NAPROXEN 500 MG/1
500 TABLET ORAL 2 TIMES DAILY
Qty: 60 TABLET | Refills: 3 | Status: SHIPPED | OUTPATIENT
Start: 2020-05-12 | End: 2021-03-09 | Stop reason: SDUPTHER

## 2020-05-13 ENCOUNTER — OFFICE VISIT (OUTPATIENT)
Dept: CARDIOLOGY | Facility: CLINIC | Age: 70
End: 2020-05-13

## 2020-05-13 VITALS
WEIGHT: 240 LBS | BODY MASS INDEX: 29.22 KG/M2 | DIASTOLIC BLOOD PRESSURE: 68 MMHG | HEIGHT: 76 IN | HEART RATE: 50 BPM | SYSTOLIC BLOOD PRESSURE: 113 MMHG

## 2020-05-13 DIAGNOSIS — I10 ESSENTIAL HYPERTENSION: ICD-10-CM

## 2020-05-13 DIAGNOSIS — I47.1 SUPRAVENTRICULAR TACHYCARDIA (HCC): ICD-10-CM

## 2020-05-13 DIAGNOSIS — I25.10 CORONARY ARTERY DISEASE INVOLVING NATIVE CORONARY ARTERY OF NATIVE HEART WITHOUT ANGINA PECTORIS: Primary | ICD-10-CM

## 2020-05-13 DIAGNOSIS — E78.5 HYPERLIPIDEMIA, UNSPECIFIED HYPERLIPIDEMIA TYPE: ICD-10-CM

## 2020-05-13 DIAGNOSIS — I49.3 PVC (PREMATURE VENTRICULAR CONTRACTION): ICD-10-CM

## 2020-05-13 DIAGNOSIS — G47.33 OSA (OBSTRUCTIVE SLEEP APNEA): ICD-10-CM

## 2020-05-13 PROCEDURE — 99442 PR PHYS/QHP TELEPHONE EVALUATION 11-20 MIN: CPT | Performed by: NURSE PRACTITIONER

## 2020-05-13 NOTE — PROGRESS NOTES
Date of Office Visit: 2020  Encounter Provider: Ludy Clements, DARRYL, APRN  Place of Service: [facility] CARDIOLOGY  Patient Name: Joaquin Peña  :1950        Subjective:     Chief Complaint:  Follow-up, PVCs, nonobstructive CAD      History of Present Illness:  Joaquin Peña is a 69 y.o. male patient of Dr. Haque.  I am doing a telehealth visit with patient today and I have reviewed his records.     Patient has a history of PVCs, bradycardia, atrial tachycardia, nonobstructive coronary atherosclerosis, Grave's disease, dyslipidemia, renal calculi.     PER PREVIOUS OFFICE NOTE: In  he developed brief episodes of supraventricular tachycardia in the setting of hypertension.  This was treated medically.  In 2014 had a stress echocardiogram that showed normal left ventricular size and function with ejection fraction 59% grade 1 diastolic dysfunction mild left ventricular hypertrophy with no significant valvular heart disease and no ischemia in May 2015 he had mild bradycardia in the 30s.  A follow-up Holter revealed frequent PVCs and brief bursts of atrial tachycardia for which metoprolol was increased and he has not had any recurrent bradycardia arrhythmia.  THE FOLLOWING IS MY CONTRIBUTION TO NOTE:    Patient was seen on office 2019 by Dr. Haque.  He was planning to undergo knee replacement so stress test was ordered.  10/28/2019 nuclear stress test showed small to medium sized, mildly severe area of ischemia in the inferior and lateral walls with frequent PVCs during stress and EF 55%. Patient had heart catheterization 2020 showing minimal nonobstructive coronary atherosclerosis with EF 60%, normal wall motion, and normal LV filling pressure.   Echocardiogram 2019 showed LV EF 67%, mild concentric LVH, moderate MAC with trace regurgitation, trace tricuspid regurgitation with normal RVSP.  48 hr holter 2019 was relatively benign with PVCs occurring  1.4% of monitored time.        Patient has called into the office today for a telehealth phone follow-up appointment.  Patient reports he is doing well overall since last visit.  Had slight lower extremity edema and high BP for a couple days a while back and then resolved without recurrence.  Does not recall eating increased salt at that time.  However BP well controlled over the last months without any edema.  Highest 138/81 over last month but usually 109-124/66-75.   HR staying 47-63, but usually around 55 bpm.  He is asymptomatic without fatigue.  Gets some occasional lightheadedness with position changes, mild, not new or worsening.  Exercises on exercise bike 10 minutes 3 times a week and lifts some weights.  Denies chest pain/ discomfort, shortness of breath, shortness of breath with exertion, palpitations, falls, syncope, near-syncope, or abnormal bleeding.         Past Medical History:   Diagnosis Date   • Abdominal obesity    • Acquired spondylolisthesis    • Acute low back pain    • Acute right lumbar radiculopathy    • Bulging lumbar disc    • Concussion with no loss of consciousness    • DDD (degenerative disc disease)    • Essential hypertension    • Fatigue    • Graves disease    • H/O disorder of nervous system and sense organs    • Headache    • Hyperlipidemia    • Hypertension    • Hypertensive heart disease    • Hypothyroidism    • Insomnia    • Irregular heart beat    • Joint pain    • Lumbar radiculopathy    • Nephrolithiasis    • Paroxysmal nocturnal dyspnea    • PVC (premature ventricular contraction)    • Severe head trauma    • SOB (shortness of breath)    • Soemmering's ring    • Spondylolisthesis    • Subdural hematoma (CMS/HCC)    • Supraventricular tachycardia (CMS/HCC)      Past Surgical History:   Procedure Laterality Date   • CARDIAC CATHETERIZATION N/A 11/5/2019    Procedure: Coronary angiography;  Surgeon: Storm Marcano MD;  Location: [facility] INVASIVE LOCATION;  Service:  Cardiovascular   • CARDIAC CATHETERIZATION N/A 11/5/2019    Procedure: Left heart cath;  Surgeon: Storm Marcano MD;  Location:  [facility] CATH INVASIVE LOCATION;  Service: Cardiovascular   • CARDIAC CATHETERIZATION N/A 11/5/2019    Procedure: Left ventriculography;  Surgeon: Storm Marcano MD;  Location:  [facility] CATH INVASIVE LOCATION;  Service: Cardiovascular   • KNEE ARTHROSCOPY Left    • KNEE ARTHROSCOPY Left 2018   • KNEE SURGERY      2008 and 2009   • LITHOTRIPSY     • OTHER SURGICAL HISTORY Left     ear surgery     Outpatient Medications Prior to Visit   Medication Sig Dispense Refill   • aspirin 81 MG EC tablet Take 81 mg by mouth Daily.     • atorvastatin (LIPITOR) 10 MG tablet Take one tablet by mouth daily 90 tablet 3   • CBD (cannabidiol) oral oil Take 2 mL by mouth 2 (Two) Times a Day. (Patient taking differently: Take 2 mL by mouth 2 (Two) Times a Day As Needed.) 2 mL 0   • Cholecalciferol (VITAMIN D3) 125 MCG (5000 UT) tablet tablet Take 1 tablet by mouth Daily. 30 tablet 5   • fluticasone (Flonase) 50 MCG/ACT nasal spray 2 sprays into the nostril(s) as directed by provider Daily. (Patient taking differently: 2 sprays into the nostril(s) as directed by provider As Needed.) 18.2 mL 11   • irbesartan (AVAPRO) 300 MG tablet Take 1 tablet by mouth Every Night. 90 tablet 3   • ketotifen (ZADITOR) 0.025 % ophthalmic solution Apply 1 drop to eye(s) as directed by provider As Needed.     • levothyroxine (SYNTHROID, LEVOTHROID) 150 MCG tablet Take 1 tablet by mouth Daily. 90 tablet 3   • metoprolol tartrate (LOPRESSOR) 25 MG tablet Take 1 tablet by mouth 2 (Two) Times a Day. 180 tablet 3   • Multiple Vitamin (MULTI-VITAMIN DAILY PO) Take  by mouth.     • naproxen (NAPROSYN) 500 MG tablet Take 1 tablet by mouth 2 (Two) Times a Day. (Patient taking differently: Take 500 mg by mouth 2 (Two) Times a Day As Needed.) 60 tablet 3   • Testosterone (AndroGel Pump) 20.25 MG/ACT (1.62%) gel 1 pump actuation on each  "shoulder daily. 75 g 5   • vardenafil (Levitra) 20 MG tablet Take 1 tablet by mouth As Needed for Erectile Dysfunction. 20 tablet 3     No facility-administered medications prior to visit.        Allergies as of 05/13/2020 - Reviewed 05/13/2020   Allergen Reaction Noted   • Latex Rash 03/15/2016     Social History     Socioeconomic History   • Marital status:      Spouse name: Not on file   • Number of children: Not on file   • Years of education: Not on file   • Highest education level: Not on file   Tobacco Use   • Smoking status: Former Smoker   • Smokeless tobacco: Never Used   • Tobacco comment: quit 45 yrs ago   Substance and Sexual Activity   • Alcohol use: Yes     Comment: occasional/  occ caffeine use   • Drug use: No   • Sexual activity: Defer     Family History   Problem Relation Age of Onset   • Cancer Father         malignant neoplasm   • No Known Problems Mother    • No Known Problems Sister    • No Known Problems Brother    • No Known Problems Sister    • No Known Problems Brother    • No Known Problems Brother    • Diabetes Maternal Grandmother        Review of Systems   Constitution: Negative for malaise/fatigue.   Cardiovascular: Negative for chest pain, dyspnea on exertion, leg swelling (Resolved), near-syncope, palpitations and syncope.   Respiratory: Negative for shortness of breath.    Hematologic/Lymphatic: Negative for bleeding problem.   Musculoskeletal: Negative for falls.   Gastrointestinal: Negative for melena.   Genitourinary: Negative for hematuria.   Neurological: Positive for light-headedness (Rare with rapid position changes, nothing new or worsening).   Psychiatric/Behavioral: Negative for altered mental status.          Objective:     Vitals:    05/13/20 0955 05/13/20 0956 05/13/20 0957   BP: 130/75 121/72 113/68   Pulse: (!) 49 50 50   Weight: 109 kg (240 lb)     Height: 193 cm (76\")       Body mass index is 29.21 kg/m².        Assessment:       Diagnosis Plan   1. Coronary " artery disease involving native coronary artery of native heart without angina pectoris     2. Essential hypertension     3. PVC (premature ventricular contraction)     4. Supraventricular tachycardia (CMS/HCC)     5. Hyperlipidemia, unspecified hyperlipidemia type     6. JACQUELINE (obstructive sleep apnea)           Plan:     1. Coronary artery disease: mild non-obstructive on 11/2019 heart cath.  Denies anginal symptoms or concerns.  On beta-blocker, low dose ASA, and statin.    2. Frequent PVCs: burden of 1.4% seen on 12/2019 holter. Continue low dose beta-blocker.  Denies palpitations.  3. Mild bradycardia: Average HR 52 bpm on 12/2019 holter.  Asymptomatic.  Continue current beta-blocker dose and continue to monitor.  4. Hypertension: BP overall well controlled.  Call if staying >130/80 on average.   5. Paroxysmal SVT: continue beta-blocker therapy as above. Also avoid caffeine and stimulants.  6. Mild right carotid artery stenosis: <50% on 9/2019 u/s.  Left carotid artery with plaque without stenosis.   7. Hx lower extremity edema: resolved.  Continue to avoid salt.   8. Hx lightheadedness: occasional with rapid position changes, resolves quickly, not new or worsening.  Notify office of any new or worsening symptoms.   9. Dyslipidemia: managed by Dr. Smith  10. Grave's Disease: managed by Dr. Smith  11. HX untreated JACQUELINE: never used CPAP.  Reports lost weight and does not think he snores anymore.  Denies fatiuge or AM fatigue    Patient to follow-up with Dr. Haque in 6 months or sooner if needed for any new, recurrent, or worsening symptoms or other problems/ concerns.      This patient has consented to a telehealth visit via phone. The visit was scheduled as a telehealth phone visit to comply with patient safety concerns in accordance with CDC recommendations.  All vitals recorded within this visit are reported by the patient.  I spent  19 minutes in total including but not limited to the 15 minutes spent in direct  conversation with this patient.              Your medication list           Accurate as of May 13, 2020 10:45 AM. If you have any questions, ask your nurse or doctor.               CHANGE how you take these medications      Instructions Last Dose Given Next Dose Due   CBD oral oil  Commonly known as:  cannabidiol  What changed:    · when to take this  · reasons to take this      Take 2 mL by mouth 2 (Two) Times a Day.       fluticasone 50 MCG/ACT nasal spray  Commonly known as:  Flonase  What changed:    · when to take this  · reasons to take this      2 sprays into the nostril(s) as directed by provider Daily.       naproxen 500 MG tablet  Commonly known as:  NAPROSYN  What changed:    · when to take this  · reasons to take this      Take 1 tablet by mouth 2 (Two) Times a Day.          CONTINUE taking these medications      Instructions Last Dose Given Next Dose Due   aspirin 81 MG EC tablet      Take 81 mg by mouth Daily.       atorvastatin 10 MG tablet  Commonly known as:  LIPITOR      Take one tablet by mouth daily       irbesartan 300 MG tablet  Commonly known as:  AVAPRO      Take 1 tablet by mouth Every Night.       levothyroxine 150 MCG tablet  Commonly known as:  SYNTHROID, LEVOTHROID      Take 1 tablet by mouth Daily.       metoprolol tartrate 25 MG tablet  Commonly known as:  LOPRESSOR      Take 1 tablet by mouth 2 (Two) Times a Day.       MULTI-VITAMIN DAILY PO      Take  by mouth.       Testosterone 20.25 MG/ACT (1.62%) gel  Commonly known as:  AndroGel Pump      1 pump actuation on each shoulder daily.       vardenafil 20 MG tablet  Commonly known as:  Levitra      Take 1 tablet by mouth As Needed for Erectile Dysfunction.       vitamin D3 125 MCG (5000 UT) tablet tablet      Take 1 tablet by mouth Daily.       Zaditor 0.025 % ophthalmic solution  Generic drug:  ketotifen      Apply 1 drop to eye(s) as directed by provider As Needed.              I did not stop or change the above medications.   Patient's medication list was updated to reflect medications they are currently taking including medication changes made by other providers.        Thanks,    Ludy Clements, DARRYL, APRN  05/13/2020         Dictated utilizing Dragon dictation

## 2020-05-21 ENCOUNTER — OFFICE VISIT (OUTPATIENT)
Dept: FAMILY MEDICINE CLINIC | Facility: CLINIC | Age: 70
End: 2020-05-21

## 2020-05-21 VITALS
WEIGHT: 245.5 LBS | HEIGHT: 76 IN | TEMPERATURE: 97.8 F | OXYGEN SATURATION: 97 % | HEART RATE: 49 BPM | SYSTOLIC BLOOD PRESSURE: 106 MMHG | DIASTOLIC BLOOD PRESSURE: 60 MMHG | BODY MASS INDEX: 29.9 KG/M2

## 2020-05-21 DIAGNOSIS — R73.9 HYPERGLYCEMIA: ICD-10-CM

## 2020-05-21 DIAGNOSIS — E78.00 PURE HYPERCHOLESTEROLEMIA: ICD-10-CM

## 2020-05-21 DIAGNOSIS — I10 ESSENTIAL HYPERTENSION: Primary | ICD-10-CM

## 2020-05-21 PROCEDURE — 99214 OFFICE O/P EST MOD 30 MIN: CPT | Performed by: INTERNAL MEDICINE

## 2020-05-21 NOTE — PROGRESS NOTES
Subjective   Joaquin Peña is a 69 y.o. male.     History of Present Illness   Patient was seen for hypertension.  Blood pressure now is running 130s over 80s.  Patient does have a diet and is exercising at least 3 days a week.  Patient's blood sugars 110s to 90s.  Hemoglobin A1c is running 5.7%.  Patient was advised to continue his present diabetic treatment.  Patient's lipids treated with his diet exercise and a statin.  Triglycerides 155, HDL 32, LDL 55.  Patient is very stable at present time.  Patient is doing his regular exercise routine with balanced diet.  Patient will continue to monitor blood pressure and blood sugar and follow-up in 6 months.    Dictated utilizing Dragon dictation. If there are questions or for further clarification, please contact me.  The following portions of the patient's history were reviewed and updated as appropriate: allergies, current medications, past family history, past medical history, past social history, past surgical history and problem list.    Review of Systems   Constitutional: Negative for fatigue and fever.   HENT: Positive for congestion. Negative for trouble swallowing.    Eyes: Negative for discharge and visual disturbance.   Respiratory: Negative for choking and shortness of breath.    Cardiovascular: Negative for chest pain and palpitations.   Gastrointestinal: Negative for abdominal pain and blood in stool.   Endocrine: Negative.    Genitourinary: Negative for genital sores and hematuria.   Musculoskeletal: Negative for gait problem and joint swelling.   Skin: Negative for color change, pallor, rash and wound.   Allergic/Immunologic: Positive for environmental allergies. Negative for immunocompromised state.   Neurological: Negative for facial asymmetry and speech difficulty.   Psychiatric/Behavioral: Negative for hallucinations and suicidal ideas.       Objective   Physical Exam   Constitutional: He is oriented to person, place, and time. He appears  well-developed and well-nourished.   HENT:   Head: Normocephalic and atraumatic.   Eyes: Pupils are equal, round, and reactive to light. Conjunctivae and EOM are normal.   Neck: Normal range of motion. Neck supple.   Cardiovascular: Normal rate, regular rhythm and normal heart sounds. Exam reveals no gallop and no friction rub.   No murmur heard.  Pulmonary/Chest: Effort normal and breath sounds normal. No stridor. No respiratory distress. He has no wheezes. He has no rales. He exhibits no tenderness.   Abdominal: Soft. Bowel sounds are normal.   Musculoskeletal: Normal range of motion.   Neurological: He is alert and oriented to person, place, and time.   Skin: Skin is warm and dry.   Psychiatric: He has a normal mood and affect. His behavior is normal. Judgment and thought content normal.   Nursing note and vitals reviewed.      Assessment/Plan #1 blood pressure blood sugar check at home #2 continue present diet and activity level #3 labs before return visit  Joaquin was seen today for follow-up.    Diagnoses and all orders for this visit:    Essential hypertension    Pure hypercholesterolemia    Hyperglycemia

## 2020-07-22 ENCOUNTER — OFFICE VISIT (OUTPATIENT)
Dept: FAMILY MEDICINE CLINIC | Facility: CLINIC | Age: 70
End: 2020-07-22

## 2020-07-22 VITALS
HEART RATE: 62 BPM | SYSTOLIC BLOOD PRESSURE: 122 MMHG | TEMPERATURE: 98.2 F | DIASTOLIC BLOOD PRESSURE: 78 MMHG | OXYGEN SATURATION: 99 % | BODY MASS INDEX: 30.35 KG/M2 | WEIGHT: 249.2 LBS | HEIGHT: 76 IN

## 2020-07-22 DIAGNOSIS — M94.0 COSTOCHONDRITIS, ACUTE: Primary | ICD-10-CM

## 2020-07-22 PROCEDURE — 99213 OFFICE O/P EST LOW 20 MIN: CPT | Performed by: NURSE PRACTITIONER

## 2020-07-22 NOTE — PATIENT INSTRUCTIONS
reviewed past cardiac and lung imaging low suspicion for heart or lung etiology, suspect acute costochondritis d/t recent restart of heavy 70 lbs weights with butterfly exercise, will start naproxen 500 mg BID after meals with H20 has at home and gave patient handout, if sx persist or worsen will RTC eval EKG CXR, advised decrease weights to half when resume exercise in 1-2 weeks and monitor  Costochondritis    Costochondritis is swelling and irritation (inflammation) of the tissue (cartilage) that connects your ribs to your breastbone (sternum). This causes pain in the front of your chest. The pain usually starts gradually and involves more than one rib.  What are the causes?  The exact cause of this condition is not always known. It results from stress on the cartilage where your ribs attach to your sternum. The cause of this stress could be:  · Chest injury (trauma).  · Exercise or activity, such as lifting.  · Severe coughing.  What increases the risk?  You may be at higher risk for this condition if you:  · Are female.  · Are 30?40 years old.  · Recently started a new exercise or work activity.  · Have low levels of vitamin D.  · Have a condition that makes you cough frequently.  What are the signs or symptoms?  The main symptom of this condition is chest pain. The pain:  · Usually starts gradually and can be sharp or dull.  · Gets worse with deep breathing, coughing, or exercise.  · Gets better with rest.  · May be worse when you press on the sternum-rib connection (tenderness).  How is this diagnosed?  This condition is diagnosed based on your symptoms, medical history, and a physical exam. Your health care provider will check for tenderness when pressing on your sternum. This is the most important finding. You may also have tests to rule out other causes of chest pain. These may include:  · A chest X-ray to check for lung problems.  · An electrocardiogram (ECG) to see if you have a heart problem that could  be causing the pain.  · An imaging scan to rule out a chest or rib fracture.  How is this treated?  This condition usually goes away on its own over time. Your health care provider may prescribe an NSAID to reduce pain and inflammation. Your health care provider may also suggest that you:  · Rest and avoid activities that make pain worse.  · Apply heat or cold to the area to reduce pain and inflammation.  · Do exercises to stretch your chest muscles.  If these treatments do not help, your health care provider may inject a numbing medicine at the sternum-rib connection to help relieve the pain.  Follow these instructions at home:  · Avoid activities that make pain worse. This includes any activities that use chest, abdominal, and side muscles.  · If directed, put ice on the painful area:  ? Put ice in a plastic bag.  ? Place a towel between your skin and the bag.  ? Leave the ice on for 20 minutes, 2-3 times a day.  · If directed, apply heat to the affected area as often as told by your health care provider. Use the heat source that your health care provider recommends, such as a moist heat pack or a heating pad.  ? Place a towel between your skin and the heat source.  ? Leave the heat on for 20-30 minutes.  ? Remove the heat if your skin turns bright red. This is especially important if you are unable to feel pain, heat, or cold. You may have a greater risk of getting burned.  · Take over-the-counter and prescription medicines only as told by your health care provider.  · Return to your normal activities as told by your health care provider. Ask your health care provider what activities are safe for you.  · Keep all follow-up visits as told by your health care provider. This is important.  Contact a health care provider if:  · You have chills or a fever.  · Your pain does not go away or it gets worse.  · You have a cough that does not go away (is persistent).  Get help right away if:  · You have shortness of  breath.  This information is not intended to replace advice given to you by your health care provider. Make sure you discuss any questions you have with your health care provider.  Document Released: 09/27/2006 Document Revised: 01/02/2019 Document Reviewed: 04/12/2017  Elsevier Patient Education © 2020 [employer]

## 2020-07-22 NOTE — PROGRESS NOTES
Subjective   Joaquin Peña is a 70 y.o. male.     History of Present Illness   C/o central sternal pain beginning last week when woke up AM and only with deep breathing, no pain with regular breathing but some SOA, pain up to 6/10 with deep inspiration, notes earlier the month was sitting outside had bug bites on trunk still red and then resumed exercising with weights 70 lbs chest butterfly after taking a few weeks off, no recent trauma or injury, denies heartburn no OTC, former smoker 3 years during  service light, no wheezing or cough, no fevers, had COVID-19 test neg 3 weeks no fever or sore throat, no change in sense of taste or smell, With chronic HTN and CAD on irbesartan 300 mg, metoprolol 25 mg BID, ASA 81 mg sees cardiology Dr Haque abnormal stress test 10/19 and last echo, holter and cardiac cath 11/19, checks BP at home usually runs similar, denies cardiac pain, dizziness HA LE edema, With chronic chol well controlled on atorvastatin 10 mg last labs 03/20, last dexa 06/19 osteopenia hx of rib fractures 05/19 imaging showing normal heart and lungs, last EKG 02/20 here by Dr Escobar no change    The following portions of the patient's history were reviewed and updated as appropriate: allergies, current medications, past family history, past medical history, past social history, past surgical history and problem list.    Review of Systems   Constitutional: Negative for fever.   Respiratory: Positive for shortness of breath (with deep inspiration). Negative for apnea, cough, choking, chest tightness, wheezing and stridor.    Cardiovascular: Negative for chest pain, palpitations and leg swelling.   Gastrointestinal: Negative for abdominal distention, abdominal pain, anal bleeding, blood in stool, constipation, diarrhea, nausea, rectal pain and vomiting.   Musculoskeletal: Positive for arthralgias.   Neurological: Negative for dizziness and headaches.   All other systems reviewed and are  negative.      Objective   Physical Exam   Constitutional: He is oriented to person, place, and time. He appears well-developed and well-nourished.   HENT:   Head: Normocephalic and atraumatic.   Eyes: Pupils are equal, round, and reactive to light. Conjunctivae and EOM are normal.   Cardiovascular: Normal rate, regular rhythm and normal heart sounds.   Pulmonary/Chest: Effort normal and breath sounds normal.   Abdominal: Soft. He exhibits no distension and no mass. There is no tenderness. There is no rebound, no guarding and no CVA tenderness. No hernia.   Musculoskeletal: Normal range of motion. He exhibits tenderness (central sternum with deep inspiration, not reproducible).   Neurological: He is alert and oriented to person, place, and time.   Skin: Skin is warm and dry. There is erythema (mild patch RUQ nontender, no dc or bleeding consistent with resolving bug bite).   Psychiatric: He has a normal mood and affect. His behavior is normal. Judgment and thought content normal.   Vitals reviewed.      Assessment/Plan   Joaquin was seen today for sob with deep chest pain in bones.    Diagnoses and all orders for this visit:    Costochondritis, acute    reviewed past cardiac and lung imaging low suspicion for heart or lung etiology, suspect acute costochondritis d/t recent restart of heavy 70 lbs weights with butterfly exercise, will start naproxen 500 mg BID after meals with H20 has at home and gave patient handout, if sx persist or worsen will RTC eval EKG CXR, advised decrease weights to half when resume exercise in 1-2 weeks and monitor     Answers for HPI/ROS submitted by the patient on 7/21/2020   What is the primary reason for your visit?: Other  Please describe your symptoms.: Taking deep breaths causes chest pain. Feels like it is in the bones.  Have you had these symptoms before?: No  How long have you been having these symptoms?: 5-7 days  Please list any medications you are currently taking for this  "condition.: none  Please describe any probable cause for these symptoms. : Maybe weight lifting. Had some insect bites back on July 4th. Had \"cracked\" ribs in the past on lower right side.    "

## 2020-09-09 ENCOUNTER — RESULTS ENCOUNTER (OUTPATIENT)
Dept: ENDOCRINOLOGY | Age: 70
End: 2020-09-09

## 2020-09-09 DIAGNOSIS — N40.0 BENIGN PROSTATIC HYPERPLASIA WITHOUT LOWER URINARY TRACT SYMPTOMS: ICD-10-CM

## 2020-09-09 DIAGNOSIS — E78.00 PURE HYPERCHOLESTEROLEMIA: ICD-10-CM

## 2020-09-09 DIAGNOSIS — I10 ESSENTIAL HYPERTENSION: ICD-10-CM

## 2020-09-09 DIAGNOSIS — E78.2 MIXED HYPERLIPIDEMIA: ICD-10-CM

## 2020-09-09 DIAGNOSIS — E03.8 OTHER SPECIFIED HYPOTHYROIDISM: ICD-10-CM

## 2020-09-09 DIAGNOSIS — N52.9 ERECTILE DYSFUNCTION, UNSPECIFIED ERECTILE DYSFUNCTION TYPE: ICD-10-CM

## 2020-09-09 DIAGNOSIS — R79.89 LOW TESTOSTERONE: ICD-10-CM

## 2020-09-09 DIAGNOSIS — R73.9 HYPERGLYCEMIA: ICD-10-CM

## 2020-09-09 DIAGNOSIS — E05.00 GRAVES DISEASE: ICD-10-CM

## 2020-09-09 DIAGNOSIS — E88.81 INSULIN RESISTANCE: ICD-10-CM

## 2020-09-09 DIAGNOSIS — E55.9 VITAMIN D DEFICIENCY: ICD-10-CM

## 2020-09-16 ENCOUNTER — CLINICAL SUPPORT (OUTPATIENT)
Dept: FAMILY MEDICINE CLINIC | Facility: CLINIC | Age: 70
End: 2020-09-16

## 2020-09-16 PROCEDURE — G0008 ADMIN INFLUENZA VIRUS VAC: HCPCS | Performed by: INTERNAL MEDICINE

## 2020-09-16 PROCEDURE — 90694 VACC AIIV4 NO PRSRV 0.5ML IM: CPT | Performed by: INTERNAL MEDICINE

## 2020-09-23 DIAGNOSIS — R79.89 LOW TESTOSTERONE: ICD-10-CM

## 2020-09-23 RX ORDER — TESTOSTERONE 16.2 MG/G
GEL TRANSDERMAL
Qty: 75 G | Refills: 5 | Status: SHIPPED | OUTPATIENT
Start: 2020-09-23 | End: 2020-09-24 | Stop reason: SDUPTHER

## 2020-09-23 NOTE — TELEPHONE ENCOUNTER
Last ov appt 3/23/20 with dr Smith   Next appt 1/20/21 with us       Pt would like a written script     Last min ran 9/23/20  Last filled 1/17/20 for 30 day supply

## 2020-09-24 DIAGNOSIS — R79.89 LOW TESTOSTERONE: ICD-10-CM

## 2020-09-25 RX ORDER — TESTOSTERONE 16.2 MG/G
GEL TRANSDERMAL
Qty: 75 G | Refills: 3 | Status: SHIPPED | OUTPATIENT
Start: 2020-09-25 | End: 2021-05-26 | Stop reason: SDUPTHER

## 2020-10-16 RX ORDER — CHOLECALCIFEROL TAB 125 MCG (5000 UNIT) 125 MCG (5000 UT)
7000 TAB DAILY
Qty: 30 TABLET | Refills: 5 | Status: SHIPPED | OUTPATIENT
Start: 2020-10-16 | End: 2020-10-16

## 2020-10-16 RX ORDER — CHOLECALCIFEROL TAB 125 MCG (5000 UNIT) 125 MCG (5000 UT)
TAB
Qty: 30 TABLET | Refills: 12 | Status: SHIPPED | OUTPATIENT
Start: 2020-10-16

## 2020-11-13 ENCOUNTER — TELEPHONE (OUTPATIENT)
Dept: FAMILY MEDICINE CLINIC | Facility: CLINIC | Age: 70
End: 2020-11-13

## 2020-11-13 NOTE — TELEPHONE ENCOUNTER
PATIENT WANTS TO KNOW THE REASON FOR THE 6 MONTH FOLLOW UP SCHEDULED ON 11/25/20. HE IS NOT SURE EXACTLY WHAT IS BEING FOLLOWED UP ON, OR IF ITS EVEN NECCESSARY     PLEASE ADVISE  [phone]

## 2020-12-18 DIAGNOSIS — I10 ESSENTIAL HYPERTENSION: ICD-10-CM

## 2020-12-18 DIAGNOSIS — R73.9 HYPERGLYCEMIA: ICD-10-CM

## 2020-12-18 DIAGNOSIS — R79.9 ABNORMAL FINDING OF BLOOD CHEMISTRY, UNSPECIFIED: ICD-10-CM

## 2020-12-18 DIAGNOSIS — E55.9 VITAMIN D DEFICIENCY: ICD-10-CM

## 2020-12-18 DIAGNOSIS — E05.00 GRAVES DISEASE: ICD-10-CM

## 2020-12-18 DIAGNOSIS — R79.89 LOW TESTOSTERONE: Primary | ICD-10-CM

## 2020-12-23 ENCOUNTER — RESULTS ENCOUNTER (OUTPATIENT)
Dept: ENDOCRINOLOGY | Age: 70
End: 2020-12-23

## 2020-12-23 ENCOUNTER — OFFICE VISIT (OUTPATIENT)
Dept: FAMILY MEDICINE CLINIC | Facility: CLINIC | Age: 70
End: 2020-12-23

## 2020-12-23 VITALS
SYSTOLIC BLOOD PRESSURE: 120 MMHG | HEIGHT: 76 IN | RESPIRATION RATE: 20 BRPM | TEMPERATURE: 98 F | HEART RATE: 42 BPM | DIASTOLIC BLOOD PRESSURE: 80 MMHG | OXYGEN SATURATION: 97 % | WEIGHT: 248 LBS | BODY MASS INDEX: 30.2 KG/M2

## 2020-12-23 DIAGNOSIS — I25.10 CORONARY ARTERY DISEASE INVOLVING NATIVE CORONARY ARTERY OF NATIVE HEART WITHOUT ANGINA PECTORIS: ICD-10-CM

## 2020-12-23 DIAGNOSIS — E55.9 VITAMIN D DEFICIENCY: ICD-10-CM

## 2020-12-23 DIAGNOSIS — I10 ESSENTIAL HYPERTENSION: ICD-10-CM

## 2020-12-23 DIAGNOSIS — Z12.5 PROSTATE CANCER SCREENING: ICD-10-CM

## 2020-12-23 DIAGNOSIS — E78.00 PURE HYPERCHOLESTEROLEMIA: ICD-10-CM

## 2020-12-23 DIAGNOSIS — Z00.00 MEDICARE ANNUAL WELLNESS VISIT, SUBSEQUENT: Primary | ICD-10-CM

## 2020-12-23 DIAGNOSIS — R07.9 CHEST PAIN, UNSPECIFIED TYPE: ICD-10-CM

## 2020-12-23 DIAGNOSIS — E05.00 GRAVES DISEASE: ICD-10-CM

## 2020-12-23 DIAGNOSIS — R79.9 ABNORMAL FINDING OF BLOOD CHEMISTRY, UNSPECIFIED: ICD-10-CM

## 2020-12-23 DIAGNOSIS — R73.9 HYPERGLYCEMIA: ICD-10-CM

## 2020-12-23 LAB — PSA SERPL-MCNC: 0.78 NG/ML (ref 0–4)

## 2020-12-23 PROCEDURE — G0439 PPPS, SUBSEQ VISIT: HCPCS | Performed by: INTERNAL MEDICINE

## 2020-12-23 PROCEDURE — 36415 COLL VENOUS BLD VENIPUNCTURE: CPT | Performed by: INTERNAL MEDICINE

## 2020-12-23 PROCEDURE — 93000 ELECTROCARDIOGRAM COMPLETE: CPT | Performed by: INTERNAL MEDICINE

## 2020-12-23 PROCEDURE — 99214 OFFICE O/P EST MOD 30 MIN: CPT | Performed by: INTERNAL MEDICINE

## 2020-12-23 PROCEDURE — G0103 PSA SCREENING: HCPCS | Performed by: INTERNAL MEDICINE

## 2020-12-23 RX ORDER — NITROGLYCERIN 0.4 MG/1
0.4 TABLET SUBLINGUAL
Qty: 10 TABLET | Refills: 12 | Status: SHIPPED | OUTPATIENT
Start: 2020-12-23

## 2020-12-23 NOTE — PATIENT INSTRUCTIONS
Medicare Wellness  Personal Prevention Plan of Service     Date of Office Visit:  2020  Encounter Provider:  Erwin Escobar MD  Place of Service:  [facility]  Patient Name: Joaquin Peña  :  1950    As part of the Medicare Wellness portion of your visit today, we are providing you with this personalized preventive plan of services (PPPS). This plan is based upon recommendations of the [place] Preventive Services Task Force (USPSTF) and the Advisory Committee on Immunization Practices (ACIP).    This lists the preventive care services that should be considered, and provides dates of when you are due. Items listed as completed are up-to-date and do not require any further intervention.    Health Maintenance   Topic Date Due   • AAA SCREEN (ONE-TIME)  2017   • ANNUAL WELLNESS VISIT  2020   • LIPID PANEL  2021   • TDAP/TD VACCINES (2 - Td) 2022   • COLONOSCOPY  2026   • HEPATITIS C SCREENING  Completed   • INFLUENZA VACCINE  Completed   • Pneumococcal Vaccine 65+  Completed   • ZOSTER VACCINE  Completed       Orders Placed This Encounter   Procedures   • PSA Screen     Standing Status:   Future     Number of Occurrences:   1     Standing Expiration Date:   2021   • ECG 12 Lead     Order Specific Question:   Reason for Exam:     Answer:   chest pain       No follow-ups on file.

## 2020-12-23 NOTE — PROGRESS NOTES
The ABCs of the Annual Wellness Visit  Subsequent Medicare Wellness Visit    Chief Complaint   Patient presents with   • Medicare Wellness-subsequent     due PSA   • epigastric discomfort     last week no pain last 2 days. seemed to occur first thing in am lasting about an hour       Subjective   History of Present Illness:  Joaquin Peña is a 70 y.o. male who presents for a Subsequent Medicare Wellness Visit. Patient was seen for Medicare wellness exam.  Patient was seen for hypertension. Blood pressures are running 120s over 80s. Patient's lipids being treated with diet exercise and atorvastatin 10 mg daily. Patient does have coronary disease and is following up with cardiologist in February. Patient has had nondescript chest pain which he describes as indigestion but has not been taking any antacids. Patient does have a history of coronary artery disease but EKG was normal. Patient was advised to use antacids the next time it reoccurs. Patient was also given sublingual nitroglycerin to try if he needs it. Patient will follow-up after using an acids.    Dictated utilizing Dragon dictation. If there are questions or for further clarification, please contact me.  HEALTH RISK ASSESSMENT    Recent Hospitalizations:  No hospitalization(s) within the last year.    Current Medical Providers:  Patient Care Team:  Erwin Escobar MD as PCP - General  Erwin Escobar MD as PCP - Family Medicine  Charlene Smith MD as Consulting Physician (Endocrinology)  Muna Haque MD as Consulting Physician (Cardiology)    Smoking Status:  Social History     Tobacco Use   Smoking Status Former Smoker   Smokeless Tobacco Never Used   Tobacco Comment    quit 45 yrs ago       Alcohol Consumption:  Social History     Substance and Sexual Activity   Alcohol Use Yes    Comment: occasional/  occ caffeine use       Depression Screen:   PHQ-2/PHQ-9 Depression Screening 12/23/2020   Little interest or pleasure in doing things 0   Feeling  down, depressed, or hopeless 0   Trouble falling or staying asleep, or sleeping too much 0   Feeling tired or having little energy 0   Poor appetite or overeating 0   Feeling bad about yourself - or that you are a failure or have let yourself or your family down 0   Trouble concentrating on things, such as reading the newspaper or watching television 0   Moving or speaking so slowly that other people could have noticed. Or the opposite - being so fidgety or restless that you have been moving around a lot more than usual 0   Thoughts that you would be better off dead, or of hurting yourself in some way 0   Total Score 0   If you checked off any problems, how difficult have these problems made it for you to do your work, take care of things at home, or get along with other people? Not difficult at all       Fall Risk Screen:  JUAN Fall Risk Assessment was completed, and patient is at LOW risk for falls.Assessment completed on:12/23/2020    Health Habits and Functional and Cognitive Screening:  Functional & Cognitive Status 12/23/2020   Do you have difficulty preparing food and eating? No   Do you have difficulty bathing yourself, getting dressed or grooming yourself? No   Do you have difficulty using the toilet? No   Do you have difficulty moving around from place to place? No   Do you have trouble with steps or getting out of a bed or a chair? No   Current Diet Well Balanced Diet   Dental Exam Up to date   Eye Exam Not up to date   Exercise (times per week) 5 times per week   Current Exercises Include Cardiovascular Workout   Current Exercise Activities Include -   Do you need help using the phone?  No   Are you deaf or do you have serious difficulty hearing?  No   Do you need help with transportation? No   Do you need help shopping? No   Do you need help preparing meals?  No   Do you need help with housework?  No   Do you need help with laundry? No   Do you need help taking your medications? No   Do you need help  managing money? No   Do you ever drive or ride in a car without wearing a seat belt? No   Have you felt unusual stress, anger or loneliness in the last month? No   Who do you live with? Spouse   If you need help, do you have trouble finding someone available to you? No   Have you been bothered in the last four weeks by sexual problems? No   Do you have difficulty concentrating, remembering or making decisions? No         Does the patient have evidence of cognitive impairment? No    Asprin use counseling:Taking ASA appropriately as indicated    Age-appropriate Screening Schedule:  Refer to the list below for future screening recommendations based on patient's age, sex and/or medical conditions. Orders for these recommended tests are listed in the plan section. The patient has been provided with a written plan.    Health Maintenance   Topic Date Due   • LIPID PANEL  03/09/2021   • TDAP/TD VACCINES (2 - Td) 01/01/2022   • COLONOSCOPY  03/01/2026   • INFLUENZA VACCINE  Completed   • ZOSTER VACCINE  Completed          The following portions of the patient's history were reviewed and updated as appropriate: past family history, past medical history and past surgical history.    Outpatient Medications Prior to Visit   Medication Sig Dispense Refill   • Ascorbic Acid (VITAMIN C) 500 MG chewable tablet Chew.     • aspirin 81 MG EC tablet Take 81 mg by mouth Daily.     • atorvastatin (LIPITOR) 10 MG tablet Take one tablet by mouth daily 90 tablet 3   • fluticasone (Flonase) 50 MCG/ACT nasal spray 2 sprays into the nostril(s) as directed by provider Daily. (Patient taking differently: 2 sprays into the nostril(s) as directed by provider As Needed.) 18.2 mL 11   • irbesartan (AVAPRO) 300 MG tablet Take 1 tablet by mouth Every Night. 90 tablet 3   • ketotifen (ZADITOR) 0.025 % ophthalmic solution Apply 1 drop to eye(s) as directed by provider As Needed.     • levothyroxine (SYNTHROID, LEVOTHROID) 150 MCG tablet Take 1 tablet by  mouth Daily. 90 tablet 3   • metoprolol tartrate (LOPRESSOR) 25 MG tablet TAKE ONE TABLET BY MOUTH TWICE A  tablet 1   • Multiple Vitamin (MULTI-VITAMIN DAILY PO) Take  by mouth.     • naproxen (NAPROSYN) 500 MG tablet Take 1 tablet by mouth 2 (Two) Times a Day. (Patient taking differently: Take 500 mg by mouth 2 (Two) Times a Day As Needed.) 60 tablet 3   • Natural Vitamin D-3 125 MCG (5000 UT) tablet TAKE ONE TABLET BY MOUTH DAILY (Patient taking differently: 9,000 Units.) 30 tablet 12   • Testosterone (AndroGel Pump) 20.25 MG/ACT (1.62%) gel 1 pump actuation on each shoulder daily. 75 g 3   • vardenafil (Levitra) 20 MG tablet Take 1 tablet by mouth As Needed for Erectile Dysfunction. 20 tablet 3   • CBD (cannabidiol) oral oil Take 2 mL by mouth 2 (Two) Times a Day. (Patient taking differently: Take 2 mL by mouth 2 (Two) Times a Day As Needed.) 2 mL 0     No facility-administered medications prior to visit.        Patient Active Problem List   Diagnosis   • Graves disease   • Supraventricular tachycardia (CMS/HCC)   • PVC (premature ventricular contraction)   • Hyperlipidemia   • Acute bilateral thoracic back pain   • Dizziness   • Irregular heart beat   • Seasonal allergic rhinitis due to pollen   • Hyperglycemia   • Erectile dysfunction   • Insulin resistance   • Acquired spondylolisthesis   • Lumbar radiculopathy   • Degeneration of intervertebral disc of lumbar region   • Essential hypertension   • PND (paroxysmal nocturnal dyspnea)   • Right foot pain   • Pain in both lower extremities   • JACQUELINE (obstructive sleep apnea)   • Snoring   • H/O multiple concussions   • Other specified hypothyroidism   • Low testosterone   • Vitamin D deficiency   • Preop cardiovascular exam   • Abnormal stress test   • Benign prostatic hyperplasia without lower urinary tract symptoms   • Coronary artery disease involving native coronary artery of native heart without angina pectoris       Advanced Care Planning:  ACP  "discussion was held with the patient during this visit. Patient has an advance directive (not in EMR), copy requested.    Review of Systems   Constitutional: Negative for fatigue and fever.   HENT: Positive for congestion. Negative for trouble swallowing.    Eyes: Negative for discharge and visual disturbance.   Respiratory: Negative for choking and shortness of breath.    Cardiovascular: Positive for chest pain. Negative for palpitations.   Gastrointestinal: Negative for abdominal pain and blood in stool.   Endocrine: Negative.    Genitourinary: Negative for genital sores and hematuria.   Musculoskeletal: Negative for gait problem and joint swelling.   Skin: Negative for color change, pallor, rash and wound.   Allergic/Immunologic: Positive for environmental allergies. Negative for immunocompromised state.   Neurological: Negative for facial asymmetry and speech difficulty.   Psychiatric/Behavioral: Negative for hallucinations and suicidal ideas.       Compared to one year ago, the patient feels his physical health is the same.  Compared to one year ago, the patient feels his mental health is the same.    Reviewed chart for potential of high risk medication in the elderly: yes  Reviewed chart for potential of harmful drug interactions in the elderly:yes    Objective         Vitals:    12/23/20 0849   BP: 120/80   BP Location: Left arm   Patient Position: Sitting   Pulse: (Abnormal) 42   Resp: 20   Temp: 98 °F (36.7 °C)   TempSrc: Infrared   SpO2: 97%   Weight: 112 kg (248 lb)   Height: 193 cm (76\")   PainSc: 0-No pain       Body mass index is 30.19 kg/m².  Discussed the patient's BMI with him. The BMI is in the acceptable range.    Physical Exam  Vitals signs and nursing note reviewed.   Constitutional:       Appearance: Normal appearance. He is well-developed.   HENT:      Head: Normocephalic and atraumatic.      Nose: Nose normal.      Mouth/Throat:      Mouth: Mucous membranes are moist.      Pharynx: Oropharynx " is clear.   Eyes:      Extraocular Movements: Extraocular movements intact.      Conjunctiva/sclera: Conjunctivae normal.      Pupils: Pupils are equal, round, and reactive to light.   Neck:      Musculoskeletal: Normal range of motion and neck supple.   Cardiovascular:      Rate and Rhythm: Normal rate and regular rhythm.      Heart sounds: Normal heart sounds. No murmur. No friction rub. No gallop.    Pulmonary:      Effort: Pulmonary effort is normal. No respiratory distress.      Breath sounds: Normal breath sounds. No stridor. No wheezing, rhonchi or rales.   Chest:      Chest wall: No tenderness.   Abdominal:      General: Bowel sounds are normal.      Palpations: Abdomen is soft.   Musculoskeletal: Normal range of motion.   Skin:     General: Skin is warm and dry.   Neurological:      General: No focal deficit present.      Mental Status: He is alert and oriented to person, place, and time. Mental status is at baseline.   Psychiatric:         Mood and Affect: Mood normal.         Behavior: Behavior normal.         Thought Content: Thought content normal.         Judgment: Judgment normal.               Assessment/Plan #1 nitroglycerin sublingually as needed chest pain #2 liquid antacids after indigestion occurs #3 EKG before follow-up after treatment  Medicare Risks and Personalized Health Plan  CMS Preventative Services Quick Reference  Advance Directive Discussion  Fall Risk  Immunizations Discussed/Encouraged (specific immunizations; Shingrix )    The above risks/problems have been discussed with the patient.  Pertinent information has been shared with the patient in the After Visit Summary.  Follow up plans and orders are seen below in the Assessment/Plan Section.    Diagnoses and all orders for this visit:    1. Medicare annual wellness visit, subsequent (Primary)    2. Essential hypertension    3. Pure hypercholesterolemia    4. Coronary artery disease involving native coronary artery of native heart  without angina pectoris    5. Chest pain, unspecified type  -     ECG 12 Lead    6. Prostate cancer screening  -     PSA Screen; Future  -     PSA Screen    Other orders  -     nitroglycerin (Nitrostat) 0.4 MG SL tablet; Place 1 tablet under the tongue Every 5 (Five) Minutes As Needed for Chest Pain. Take no more than 3 doses in 15 minutes.  Dispense: 10 tablet; Refill: 12      Follow Up:  Return in about 4 weeks (around 1/20/2021), or if symptoms worsen or fail to improve, for Recheck.     An After Visit Summary and PPPS were given to the patient.             Answers for HPI/ROS submitted by the patient on 12/16/2020   What is the primary reason for your visit?: Other  Please describe your symptoms.: Annual wellness check  Have you had these symptoms before?: No  How long have you been having these symptoms?: Greater than 2 weeks  Please list any medications you are currently taking for this condition.: none  Please describe any probable cause for these symptoms. : none

## 2020-12-29 RX ORDER — OMEPRAZOLE 40 MG/1
40 CAPSULE, DELAYED RELEASE ORAL DAILY
Qty: 30 CAPSULE | Refills: 3 | Status: SHIPPED | OUTPATIENT
Start: 2020-12-29 | End: 2021-01-20

## 2021-01-06 ENCOUNTER — LAB (OUTPATIENT)
Dept: ENDOCRINOLOGY | Age: 71
End: 2021-01-06

## 2021-01-06 DIAGNOSIS — E05.00 GRAVES DISEASE: ICD-10-CM

## 2021-01-06 DIAGNOSIS — R79.89 LOW TESTOSTERONE: ICD-10-CM

## 2021-01-09 LAB
25(OH)D3+25(OH)D2 SERPL-MCNC: 63.2 NG/ML (ref 30–100)
ALBUMIN SERPL-MCNC: 3.9 G/DL (ref 3.5–5.2)
ALBUMIN/GLOB SERPL: 1.4 G/DL
ALP SERPL-CCNC: 72 U/L (ref 39–117)
ALT SERPL-CCNC: 18 U/L (ref 1–41)
AST SERPL-CCNC: 17 U/L (ref 1–40)
BILIRUB SERPL-MCNC: 0.5 MG/DL (ref 0–1.2)
BUN SERPL-MCNC: 19 MG/DL (ref 8–23)
BUN/CREAT SERPL: 18.6 (ref 7–25)
CALCIUM SERPL-MCNC: 10 MG/DL (ref 8.6–10.5)
CHLORIDE SERPL-SCNC: 103 MMOL/L (ref 98–107)
CHOLEST SERPL-MCNC: 138 MG/DL (ref 0–200)
CO2 SERPL-SCNC: 26.5 MMOL/L (ref 22–29)
CREAT SERPL-MCNC: 1.02 MG/DL (ref 0.76–1.27)
GLOBULIN SER CALC-MCNC: 2.8 GM/DL
GLUCOSE SERPL-MCNC: 84 MG/DL (ref 65–99)
HDLC SERPL-MCNC: 30 MG/DL (ref 40–60)
INTERPRETATION: NORMAL
LDLC SERPL CALC-MCNC: 69 MG/DL (ref 0–100)
POTASSIUM SERPL-SCNC: 4.5 MMOL/L (ref 3.5–5.2)
PROT SERPL-MCNC: 6.7 G/DL (ref 6–8.5)
SODIUM SERPL-SCNC: 137 MMOL/L (ref 136–145)
T4 FREE SERPL-MCNC: 1.4 NG/DL (ref 0.93–1.7)
TESTOST FREE MFR SERPL: 3.7 % (ref 1.5–4.2)
TESTOST FREE SERPL-MCNC: 12.96 NG/DL (ref 5–21)
TESTOST SERPL-MCNC: 350.2 NG/DL (ref 264–916)
TRIGL SERPL-MCNC: 234 MG/DL (ref 0–150)
TSH SERPL DL<=0.005 MIU/L-ACNC: 1.8 UIU/ML (ref 0.27–4.2)
VLDLC SERPL CALC-MCNC: 39 MG/DL (ref 5–40)

## 2021-01-13 NOTE — PROGRESS NOTES
Subjective   Joaquin Peña is a 70 y.o. male.     F/u from dr Smith for goiter, hypothyroidism, graves disease, hyperlipidemia, hypertension     Patient is a 70-year-old male who came in for follow-up.  He is new to me.    He has Graves' disease and had radioactive iodine therapy.  He became hypothyroid and is on levothyroxine 150 mcg/day.  He has no significant weight changes over the past year.  He denies bowel changes, palpitations, heat or cold intolerance.    He has hyperlipidemia with elevated LDL he has been on Lipitor 10 mg/day.  He denies myalgia.  Lipid panel done in January 2021 as follows: Cholesterol 138.  HDL 30.  LDL 69.  Triglycerides 234-week    He has history of low testosterone and was started on AndroGel by Dr. Smith in 2018.  He is on AndroGel 1.62% 1 pump on 1 shoulder once a day.  He has periodic phlebotomy and done at the [facility] and the last one was in December 2020.  He has no history of sleep apnea.  PSA done in December 2020 is normal at 0.781.    He is  and has fathered 1 child.  He is not planning any more children    He denies headaches.  He denies changes in shoes or ring size.  He denies easy bruising or muscle weakness.      He has hypertension and is on Avapro 300 mg once a day and Lopressor 25 mg twice a day.  He has no history of heart attack or stroke.    He has acid reflux disease which is controlled by diet.  He is off Prilosec.    He has prediabetes with hemoglobin A1c ranging from 5.4 to 5.9% since 2017.    He has vitamin D deficiency and is vit D3 5000 units a day plus OTC vitamin D 4000 units a day.  25 hydroxy vitamin D done in January 2021 is normal at 63.2 ng/mL.    He has osteopenia on bone density done in June 2019.    The following portions of the patient's history were reviewed and updated as appropriate: allergies, current medications, past family history, past medical history, past social history, past surgical history and problem list.    Review of  "Systems   Respiratory: Negative for shortness of breath.    Cardiovascular: Negative for chest pain and palpitations.   Genitourinary: Negative.  Negative for difficulty urinating and frequency.        Nocturia 2 times a night for years     Objective      Vitals:    01/20/21 1600   BP: 118/80   BP Location: Right arm   Patient Position: Sitting   Cuff Size: Large Adult   Pulse: 50   SpO2: 98%   Weight: 114 kg (252 lb 6.4 oz)   Height: 193 cm (75.98\")     Physical Exam  Constitutional:       General: He is not in acute distress.     Appearance: Normal appearance. He is not ill-appearing, toxic-appearing or diaphoretic.   Eyes:      General: No scleral icterus.        Right eye: No discharge.         Left eye: No discharge.      Extraocular Movements: Extraocular movements intact.      Conjunctiva/sclera: Conjunctivae normal.      Comments: Full visual fields by confrontation   Neck:      Musculoskeletal: Normal range of motion and neck supple. No neck rigidity or muscular tenderness.      Vascular: No carotid bruit.   Cardiovascular:      Rate and Rhythm: Regular rhythm.      Heart sounds: Normal heart sounds. No murmur. No friction rub. No gallop.    Pulmonary:      Effort: No respiratory distress.      Breath sounds: Normal breath sounds. No stridor. No wheezing, rhonchi or rales.   Chest:      Chest wall: No tenderness.   Abdominal:      General: Bowel sounds are normal. There is no distension.      Palpations: Abdomen is soft.      Tenderness: There is no abdominal tenderness.      Comments:  no prominent striae   Musculoskeletal: Normal range of motion.   Lymphadenopathy:      Cervical: No cervical adenopathy.   Skin:     General: Skin is warm and dry.      Coloration: Skin is not jaundiced.   Neurological:      General: No focal deficit present.      Mental Status: He is alert and oriented to person, place, and time.   Psychiatric:         Mood and Affect: Mood normal.         Behavior: Behavior normal. "       Orders Only on 01/06/2021   Component Date Value Ref Range Status   • Glucose 01/06/2021 84  65 - 99 mg/dL Final   • BUN 01/06/2021 19  8 - 23 mg/dL Final   • Creatinine 01/06/2021 1.02  0.76 - 1.27 mg/dL Final   • eGFR Non  Am 01/06/2021 73  >60 mL/min/1.73 Final    Comment: GFR Normal >60  Chronic Kidney Disease <60  Kidney Failure <15     • eGFR  Am 01/06/2021 88  >60 mL/min/1.73 Final   • BUN/Creatinine Ratio 01/06/2021 18.6  7.0 - 25.0 Final   • Sodium 01/06/2021 137  136 - 145 mmol/L Final   • Potassium 01/06/2021 4.5  3.5 - 5.2 mmol/L Final   • Chloride 01/06/2021 103  98 - 107 mmol/L Final   • Total CO2 01/06/2021 26.5  22.0 - 29.0 mmol/L Final   • Calcium 01/06/2021 10.0  8.6 - 10.5 mg/dL Final   • Total Protein 01/06/2021 6.7  6.0 - 8.5 g/dL Final   • Albumin 01/06/2021 3.90  3.50 - 5.20 g/dL Final   • Globulin 01/06/2021 2.8  gm/dL Final   • A/G Ratio 01/06/2021 1.4  g/dL Final   • Total Bilirubin 01/06/2021 0.5  0.0 - 1.2 mg/dL Final   • Alkaline Phosphatase 01/06/2021 72  39 - 117 U/L Final   • AST (SGOT) 01/06/2021 17  1 - 40 U/L Final   • ALT (SGPT) 01/06/2021 18  1 - 41 U/L Final   • Total Cholesterol 01/06/2021 138  0 - 200 mg/dL Final    Comment: Cholesterol Reference Ranges  (U.S. Department of Health and Human Services ATP III  Classifications)  Desirable          <200 mg/dL  Borderline High    200-239 mg/dL  High Risk          >240 mg/dL  Triglyceride Reference Ranges  (U.S. Department of Health and Human Services ATP III  Classifications)  Normal           <150 mg/dL  Borderline High  150-199 mg/dL  High             200-499 mg/dL  Very High        >500 mg/dL  HDL Reference Ranges  (U.S. Department of Health and Human Services ATP III  Classifcations)  Low     <40 mg/dl (major risk factor for CHD)  High    >60 mg/dl ('negative' risk factor for CHD)  LDL Reference Ranges  (U.S. Department of Health and Human Services ATP III  Classifcations)  Optimal          <100 mg/dL  Near  Optimal     100-129 mg/dL  Borderline High  130-159 mg/dL  High             160-189 mg/dL  Very High        >189 mg/dL     • Triglycerides 01/06/2021 234* 0 - 150 mg/dL Final   • HDL Cholesterol 01/06/2021 30* 40 - 60 mg/dL Final   • VLDL Cholesterol Ricci 01/06/2021 39  5 - 40 mg/dL Final   • LDL Chol Calc (NIH) 01/06/2021 69  0 - 100 mg/dL Final   • Testosterone, Total 01/06/2021 350.2  264.0 - 916.0 ng/dL Final    Comment: This [facility] LC/MS-MS method is currently certified by the CDC  Hormone Standardization Program (HoSt). Adult male reference  interval is based on a population of healthy nonobese males  (BMI <30) between 19 and 39 years old. Brett et.al. JCEM  2017,102;6868-7786. PMID: 21817366.     • Testosterone, Free 01/06/2021 12.96  5.00 - 21.00 ng/dL Final   • Testosterone, Free % 01/06/2021 3.70  1.50 - 4.20 % Final   • Free T4 01/06/2021 1.40  0.93 - 1.70 ng/dL Final    Results may be falsely increased if patient taking Biotin.   • TSH 01/06/2021 1.800  0.270 - 4.200 uIU/mL Final   • 25 Hydroxy, Vitamin D 01/06/2021 63.2  30.0 - 100.0 ng/ml Final    Comment: Results may be falsely increased if patient taking Biotin.  Reference Range for Total Vitamin D 25(OH)  Deficiency <20.0 ng/mL  Insufficiency 21-29 ng/mL  Sufficiency  ng/mL  Toxicity >100 ng/ml     • Interpretation 01/06/2021 Note   Final    Supplemental report is available.     Assessment/Plan   Diagnoses and all orders for this visit:    1. Postablative hypothyroidism (Primary)    2. Graves disease    3. Other hyperlipidemia    4. Essential hypertension    5. Gastroesophageal reflux disease, unspecified whether esophagitis present    6. Low testosterone  -     CBC & Differential  -     Prolactin  -     Insulin-like Growth Factor  -     Iron Profile  -     Ferritin    7. Vitamin D deficiency    8. Osteopenia of multiple sites    9. Polycythemia  -     CBC & Differential      Continue levothyroxine 150 mcg/day.  Continue Lipitor 10  mg/day.  Continue Avapro and Lopressor.  Check CBC, iron and ferritin, prolactin, and IGF-I.  Continue vitamin D supplements  Follow-up bone density after June 2021.    Copy of my note sent to Dr. Escobar.    RTC 4 mos.

## 2021-01-20 ENCOUNTER — OFFICE VISIT (OUTPATIENT)
Dept: ENDOCRINOLOGY | Age: 71
End: 2021-01-20

## 2021-01-20 VITALS
WEIGHT: 252.4 LBS | DIASTOLIC BLOOD PRESSURE: 80 MMHG | SYSTOLIC BLOOD PRESSURE: 118 MMHG | BODY MASS INDEX: 30.74 KG/M2 | HEART RATE: 50 BPM | HEIGHT: 76 IN | OXYGEN SATURATION: 98 %

## 2021-01-20 DIAGNOSIS — I10 ESSENTIAL HYPERTENSION: ICD-10-CM

## 2021-01-20 DIAGNOSIS — E05.00 GRAVES DISEASE: ICD-10-CM

## 2021-01-20 DIAGNOSIS — E89.0 POSTABLATIVE HYPOTHYROIDISM: Primary | ICD-10-CM

## 2021-01-20 DIAGNOSIS — E78.49 OTHER HYPERLIPIDEMIA: ICD-10-CM

## 2021-01-20 DIAGNOSIS — M85.89 OSTEOPENIA OF MULTIPLE SITES: ICD-10-CM

## 2021-01-20 DIAGNOSIS — E55.9 VITAMIN D DEFICIENCY: ICD-10-CM

## 2021-01-20 DIAGNOSIS — R79.89 LOW TESTOSTERONE: ICD-10-CM

## 2021-01-20 DIAGNOSIS — K21.9 GASTROESOPHAGEAL REFLUX DISEASE, UNSPECIFIED WHETHER ESOPHAGITIS PRESENT: ICD-10-CM

## 2021-01-20 DIAGNOSIS — D75.1 POLYCYTHEMIA: ICD-10-CM

## 2021-01-20 DIAGNOSIS — R73.9 HYPERGLYCEMIA: Primary | ICD-10-CM

## 2021-01-20 PROCEDURE — 99214 OFFICE O/P EST MOD 30 MIN: CPT | Performed by: INTERNAL MEDICINE

## 2021-01-21 LAB — HBA1C MFR BLD: 5.7 % (ref 4.8–5.6)

## 2021-03-04 RX ORDER — IRBESARTAN 300 MG/1
TABLET ORAL
Qty: 90 TABLET | Refills: 2 | Status: SHIPPED | OUTPATIENT
Start: 2021-03-04 | End: 2021-11-23

## 2021-03-04 RX ORDER — LEVOTHYROXINE SODIUM 0.15 MG/1
TABLET ORAL
Qty: 90 TABLET | Refills: 2 | Status: SHIPPED | OUTPATIENT
Start: 2021-03-04 | End: 2021-11-23

## 2021-03-10 RX ORDER — NAPROXEN 500 MG/1
500 TABLET ORAL 2 TIMES DAILY
Qty: 60 TABLET | Refills: 3 | Status: SHIPPED | OUTPATIENT
Start: 2021-03-10

## 2021-03-16 ENCOUNTER — OFFICE VISIT (OUTPATIENT)
Dept: CARDIOLOGY | Facility: CLINIC | Age: 71
End: 2021-03-16

## 2021-03-16 VITALS
WEIGHT: 246.4 LBS | HEART RATE: 52 BPM | DIASTOLIC BLOOD PRESSURE: 72 MMHG | SYSTOLIC BLOOD PRESSURE: 114 MMHG | HEIGHT: 76 IN | BODY MASS INDEX: 30.01 KG/M2

## 2021-03-16 DIAGNOSIS — I25.10 CORONARY ARTERY DISEASE INVOLVING NATIVE CORONARY ARTERY OF NATIVE HEART WITHOUT ANGINA PECTORIS: Primary | ICD-10-CM

## 2021-03-16 DIAGNOSIS — I10 ESSENTIAL HYPERTENSION: ICD-10-CM

## 2021-03-16 DIAGNOSIS — I49.3 PVC (PREMATURE VENTRICULAR CONTRACTION): ICD-10-CM

## 2021-03-16 PROCEDURE — 99214 OFFICE O/P EST MOD 30 MIN: CPT | Performed by: NURSE PRACTITIONER

## 2021-03-16 PROCEDURE — 93000 ELECTROCARDIOGRAM COMPLETE: CPT | Performed by: NURSE PRACTITIONER

## 2021-03-16 NOTE — PROGRESS NOTES
`Date of Office Visit: 2021  Encounter Provider: Ludy Clements, DARRYL, APRN  Place of Service: [facility] CARDIOLOGY  Patient Name: Joaquin Peña  :1950        Subjective:     Chief Complaint:  Nonobstructive coronary artery disease, PVCs      History of Present Illness:  Joaquin Peña is a 70 y.o. male patient of Dr. Haque.  I am seeing this patient in the office today and I have reviewed his records.    Patient has a history of PVCs, bradycardia, atrial tachycardia, nonobstructive coronary atherosclerosis, Grave's disease, dyslipidemia, renal calculi.      PER PREVIOUS OFFICE NOTE: In  he developed brief episodes of supraventricular tachycardia in the setting of hypertension.  This was treated medically.  In 2014 had a stress echocardiogram that showed normal left ventricular size and function with ejection fraction 59% grade 1 diastolic dysfunction mild left ventricular hypertrophy with no significant valvular heart disease and no ischemia in May 2015 he had mild bradycardia in the 30s.  A follow-up Holter revealed frequent PVCs and brief bursts of atrial tachycardia for which metoprolol was increased and he has not had any recurrent bradycardia arrhythmia.  THE FOLLOWING IS MY CONTRIBUTION TO NOTE: Patient was seen on office 2019 by Dr. Haque.  He was planning to undergo knee replacement so stress test was ordered.  10/28/2019 nuclear stress test showed small to medium sized, mildly severe area of ischemia in the inferior and lateral walls with frequent PVCs during stress and EF 55%. Patient had heart catheterization 2020 showing minimal nonobstructive coronary atherosclerosis with EF 60%, normal wall motion, and normal LV filling pressure.   Echocardiogram 2019 showed LV EF 67%, mild concentric LVH, moderate MAC with trace regurgitation, trace tricuspid regurgitation with normal RVSP.  48 hr holter 2019 was relatively benign with PVCs  occurring 1.4% of monitored time.        Patient presents to office today for follow-up appointment.  Patient reports he is doing well since last visit.  He had some acid reflux several months ago when he was taking a high dose of vitamin C during coronavirus epidemic and this resolved without recurrence after stopping that supplement.  Was not exertional or anginal in nature.  He rides his exercise bike 20 to 25 minutes 3 times a week and then does free weights afterwards for about 15 minutes after.  He also stays very active helping friends out with various projects.  He denies any exertional symptoms or concerns.  Denies any chest pain or discomfort, shortness of breath, shortness of breath with exertion, palpitations, racing heartbeat sensation, lower extremity edema, syncope, near syncope, falls, fatigue, snoring, or abnormal bleeding.  Blood pressure at home overall well controlled, rare systolic blood pressure reading of 135 after dietary indiscretion of salt but otherwise well controlled.  Heart rate usually stays in the 50s at rest during the day and then increases to 80s with activities.          Past Medical History:   Diagnosis Date   • Abdominal obesity    • Acquired spondylolisthesis    • Acute low back pain    • Acute right lumbar radiculopathy    • Bulging lumbar disc    • Concussion with no loss of consciousness    • DDD (degenerative disc disease)    • Essential hypertension    • Fatigue    • Graves disease    • H/O disorder of nervous system and sense organs    • Headache    • Hyperlipidemia    • Hypertensive heart disease    • Hypothyroidism    • Insomnia    • Irregular heart beat    • Joint pain    • Lumbar radiculopathy    • Nephrolithiasis    • Paroxysmal nocturnal dyspnea    • PVC (premature ventricular contraction)    • Severe head trauma    • SOB (shortness of breath)    • Soemmering's ring    • Spondylolisthesis    • Subdural hematoma (CMS/HCC)    • Supraventricular tachycardia (CMS/HCC)        Past Surgical History:   Procedure Laterality Date   • CARDIAC CATHETERIZATION N/A 11/5/2019    Procedure: Coronary angiography;  Surgeon: Storm Marcano MD;  Location: [facility] CATH INVASIVE LOCATION;  Service: Cardiovascular   • CARDIAC CATHETERIZATION N/A 11/5/2019    Procedure: Left heart cath;  Surgeon: Storm Marcano MD;  [facility];  Service: Cardiovascular   • CARDIAC CATHETERIZATION N/A 11/5/2019    Procedure: Left ventriculography;  Surgeon: Storm Marcano MD;  Location: [facility] CATH INVASIVE LOCATION;  Service: Cardiovascular   • KNEE ARTHROSCOPY Left    • KNEE ARTHROSCOPY Left 2018   • KNEE SURGERY      2008 and 2009   • LITHOTRIPSY     • OTHER SURGICAL HISTORY Left     ear surgery     Outpatient Medications Prior to Visit   Medication Sig Dispense Refill   • aspirin 81 MG EC tablet Take 81 mg by mouth Daily.     • atorvastatin (LIPITOR) 10 MG tablet Take one tablet by mouth daily 90 tablet 3   • fluticasone (Flonase) 50 MCG/ACT nasal spray 2 sprays into the nostril(s) as directed by provider Daily. (Patient taking differently: 2 sprays into the nostril(s) as directed by provider As Needed.) 18.2 mL 11   • irbesartan (AVAPRO) 300 MG tablet TAKE ONE TABLET BY MOUTH AT NIGHT 90 tablet 2   • ketotifen (ZADITOR) 0.025 % ophthalmic solution Apply 1 drop to eye(s) as directed by provider As Needed.     • levothyroxine (SYNTHROID, LEVOTHROID) 150 MCG tablet TAKE ONE TABLET BY MOUTH DAILY 90 tablet 2   • metoprolol tartrate (LOPRESSOR) 25 MG tablet TAKE ONE TABLET BY MOUTH TWICE A  tablet 1   • Multiple Vitamin (MULTI-VITAMIN DAILY PO) Take 1 tablet by mouth Daily.     • naproxen (NAPROSYN) 500 MG tablet Take 1 tablet by mouth 2 (Two) Times a Day. (Patient taking differently: Take 500 mg by mouth 2 (Two) Times a Day As Needed.) 60 tablet 3   • Natural Vitamin D-3 125 MCG (5000 UT) tablet TAKE ONE TABLET BY MOUTH DAILY (Patient taking differently: 7,000 Units.) 30  tablet 12   • nitroglycerin (Nitrostat) 0.4 MG SL tablet Place 1 tablet under the tongue Every 5 (Five) Minutes As Needed for Chest Pain. Take no more than 3 doses in 15 minutes. 10 tablet 12   • Testosterone (AndroGel Pump) 20.25 MG/ACT (1.62%) gel 1 pump actuation on each shoulder daily. 75 g 3   • vardenafil (Levitra) 20 MG tablet Take 1 tablet by mouth As Needed for Erectile Dysfunction. 20 tablet 3   • Ascorbic Acid (VITAMIN C) 500 MG chewable tablet Chew.       No facility-administered medications prior to visit.       Allergies as of 2021 - Reviewed 2021   Allergen Reaction Noted   • Latex Rash 03/15/2016     Social History     Socioeconomic History   • Marital status:      Spouse name: Not on file   • Number of children: Not on file   • Years of education: Not on file   • Highest education level: Not on file   Tobacco Use   • Smoking status: Former Smoker     Packs/day: 0.25     Types: Cigarettes     Quit date:      Years since quittin.2   • Smokeless tobacco: Never Used   Substance and Sexual Activity   • Alcohol use: Yes     Comment: occasional/  occ caffeine use   • Drug use: No   • Sexual activity: Defer     Family History   Problem Relation Age of Onset   • Cancer Father         malignant neoplasm   • No Known Problems Mother    • No Known Problems Sister    • No Known Problems Brother    • No Known Problems Sister    • No Known Problems Brother    • No Known Problems Brother    • Diabetes Maternal Grandmother        Review of Systems   Constitutional: Negative for malaise/fatigue.   Cardiovascular: Negative for chest pain, dyspnea on exertion, leg swelling, near-syncope, palpitations and syncope.        SEE HPI   Respiratory: Negative for shortness of breath.    Hematologic/Lymphatic: Negative for bleeding problem.   Musculoskeletal: Negative for falls.   Gastrointestinal: Negative for melena.   Genitourinary: Negative for hematuria.   Psychiatric/Behavioral: Negative for  "altered mental status.          Objective:     Vitals:    03/16/21 0907   BP: 114/72   BP Location: Right arm   Cuff Size: Adult   Pulse: 52   Weight: 112 kg (246 lb 6.4 oz)   Height: 193 cm (76\")     Body mass index is 29.99 kg/m².      PHYSICAL EXAM:  Constitutional:       General: Not in acute distress.     Appearance: Well-developed. Not diaphoretic.   Eyes:      Pupils: Pupils are equal, round, and reactive to light.   HENT:      Head: Normocephalic and atraumatic.   Neck:      Vascular: No carotid bruit or JVD.   Pulmonary:      Effort: Pulmonary effort is normal. No respiratory distress.      Breath sounds: Normal breath sounds. No wheezing. No rales.   Cardiovascular:      Normal rate. Regular rhythm.      Murmurs: There is no murmur.      No gallop. No click. No rub.   Edema:     Peripheral edema absent.   Abdominal:      General: Bowel sounds are normal. There is no distension.      Palpations: Abdomen is soft.   Musculoskeletal: Normal range of motion.         General: No tenderness or deformity.      Cervical back: Neck supple. Skin:     General: Skin is warm and dry.      Findings: No erythema or rash.   Neurological:      Mental Status: Alert and oriented to person, place, and time.   Psychiatric:         Behavior: Behavior normal.         Judgment: Judgment normal.             ECG 12 Lead    Date/Time: 3/16/2021 9:21 AM  Performed by: Ludy Clements DNP, APRN  Authorized by: Ludy Clements DNP, APRN   Comparison: compared with previous ECG from 9/3/2019  Rhythm: sinus rhythm  BPM: 52  Conduction: non-specific intraventricular conduction delay  Comments: No significant changes from previous EKG              Assessment:       Diagnosis Plan   1. Coronary artery disease involving native coronary artery of native heart without angina pectoris     2. PVC (premature ventricular contraction)     3. Essential hypertension           Plan:     1. Coronary artery disease: Mild nonobstructive on 11/2019 " heart cath.  Denies anginal symptoms or concerns.  Continue medical therapy and low-dose aspirin.  2. PVCs: Albers of 1.4% on 12/2019 Holter.  On low-dose beta-blocker.  Asymptomatic.  3. Mild bradycardia: Average heart rate 52 bpm on 12/2019 Holter.  Asymptomatic.  Will call if he starts to stay symptomatic low readings or heart rate less than 50 during the day.  4. Hypertension: Blood pressure goal less than 130/80.  Well controlled at home, patient to continue to monitor.  5. Paroxysmal SVT: On beta-blocker therapy, as above.  6. Carotid artery disease: Seen on 2019 ultrasound with plaque on the left and less than 50% stenosis on the right.  Would recommend repeating screening this year--patient will call and schedule.  Continue with risk factor modification.  7. Dyslipidemia: Follows with endocrinology.  Lipid panel 1/2021 showed LDL of 69, HDL is low, triglycerides little high.  He is working on cutting back on carbs and sugars.  He will continue with healthy diet and exercise.  He is on low-dose statin therapy.  8. Graves' disease: Follows with endocrinology, Dr. Stuart  9. History of untreated sleep apnea: Never used CPAP.  Reports that he lost weight and no longer snored and no longer had symptoms of sleep apnea.    Patient to follow-up with Dr. Haque in 6 months or sooner if needed for any new, recurrent, or worsening symptoms or other issues or concerns.  Discussed in detail signs/symptoms that warrant sooner call or follow-up to the office.          Records reviewed including but not limited to 2019 stress test, 2019 heart cath, 2019 echo, 2019 Holter, 1/2021 lipid panel       Your medication list          Accurate as of March 16, 2021  9:19 AM. If you have any questions, ask your nurse or doctor.            CHANGE how you take these medications      Instructions Last Dose Given Next Dose Due   fluticasone 50 MCG/ACT nasal spray  Commonly known as: Flonase  What changed:   · when to take this  · reasons to  take this      2 sprays into the nostril(s) as directed by provider Daily.       naproxen 500 MG tablet  Commonly known as: NAPROSYN  What changed:   · when to take this  · reasons to take this      Take 1 tablet by mouth 2 (Two) Times a Day.       Natural Vitamin D-3 125 MCG (5000 UT) tablet  Generic drug: Cholecalciferol  What changed:   · how much to take  · how to take this  · when to take this      TAKE ONE TABLET BY MOUTH DAILY          CONTINUE taking these medications      Instructions Last Dose Given Next Dose Due   aspirin 81 MG EC tablet      Take 81 mg by mouth Daily.       atorvastatin 10 MG tablet  Commonly known as: LIPITOR      Take one tablet by mouth daily       irbesartan 300 MG tablet  Commonly known as: AVAPRO      TAKE ONE TABLET BY MOUTH AT NIGHT       levothyroxine 150 MCG tablet  Commonly known as: SYNTHROID, LEVOTHROID      TAKE ONE TABLET BY MOUTH DAILY       metoprolol tartrate 25 MG tablet  Commonly known as: LOPRESSOR      TAKE ONE TABLET BY MOUTH TWICE A DAY       multivitamin tablet tablet  Commonly known as: THERAGRAN      Take 1 tablet by mouth Daily.       nitroglycerin 0.4 MG SL tablet  Commonly known as: Nitrostat      Place 1 tablet under the tongue Every 5 (Five) Minutes As Needed for Chest Pain. Take no more than 3 doses in 15 minutes.       Testosterone 20.25 MG/ACT (1.62%) gel  Commonly known as: AndroGel Pump      1 pump actuation on each shoulder daily.       vardenafil 20 MG tablet  Commonly known as: Levitra      Take 1 tablet by mouth As Needed for Erectile Dysfunction.       Zaditor 0.025 % ophthalmic solution  Generic drug: ketotifen      Apply 1 drop to eye(s) as directed by provider As Needed.              I did not stop or change the above medications.  Patient's medication list was updated to reflect medications they are currently taking including medication changes made by other providers.            Thanks,    Ludy Clements, DNP, APRN  03/16/2021                  Dictated utilizing Dragon dictation

## 2021-03-19 ENCOUNTER — BULK ORDERING (OUTPATIENT)
Dept: CASE MANAGEMENT | Facility: OTHER | Age: 71
End: 2021-03-19

## 2021-03-19 DIAGNOSIS — Z23 IMMUNIZATION DUE: ICD-10-CM

## 2021-03-29 ENCOUNTER — TELEPHONE (OUTPATIENT)
Dept: FAMILY MEDICINE CLINIC | Facility: CLINIC | Age: 71
End: 2021-03-29

## 2021-03-29 NOTE — TELEPHONE ENCOUNTER
Patient called on call provider 03/29/21 6:25 c/o body aches, chills, nausea, elevated BP, GI upset, fatigue cough and congestion starting yesterday after eating taco meat 10 days old. Suspect he has food poisoning, has already had first COVID shot. [place] monitor sx and [place] [facility] [facility] [place] Offered nausea medication prn but will monitor at this time, please call and check on him

## 2021-03-30 ENCOUNTER — TRANSCRIBE ORDERS (OUTPATIENT)
Dept: ADMINISTRATIVE | Facility: HOSPITAL | Age: 71
End: 2021-03-30

## 2021-03-30 DIAGNOSIS — Z13.6 ENCOUNTER FOR SCREENING FOR VASCULAR DISEASE: Primary | ICD-10-CM

## 2021-04-06 DIAGNOSIS — E78.2 MIXED HYPERLIPIDEMIA: ICD-10-CM

## 2021-04-06 RX ORDER — ATORVASTATIN CALCIUM 10 MG/1
TABLET, FILM COATED ORAL
Qty: 90 TABLET | Refills: 1 | Status: SHIPPED | OUTPATIENT
Start: 2021-04-06 | End: 2021-07-28 | Stop reason: SDUPTHER

## 2021-05-04 ENCOUNTER — HOSPITAL ENCOUNTER (OUTPATIENT)
Dept: CARDIOLOGY | Facility: HOSPITAL | Age: 71
Discharge: HOME | End: 2021-05-04
Admitting: SURGERY

## 2021-05-04 VITALS
HEIGHT: 76 IN | BODY MASS INDEX: 29.35 KG/M2 | HEART RATE: 50 BPM | WEIGHT: 241 LBS | DIASTOLIC BLOOD PRESSURE: 66 MMHG | SYSTOLIC BLOOD PRESSURE: 122 MMHG

## 2021-05-04 DIAGNOSIS — Z13.6 ENCOUNTER FOR SCREENING FOR VASCULAR DISEASE: ICD-10-CM

## 2021-05-04 LAB
BH CV VAS BP LEFT ARM: NORMAL MMHG
BH CV VAS BP RIGHT ARM: NORMAL MMHG
BH CV XLRA MEAS LEFT ICA/CCA RATIO: 1.07
BH CV XLRA MEAS LEFT MID CCA PSV: NORMAL CM/SEC
BH CV XLRA MEAS LEFT MID ICA PSV: NORMAL CM/SEC
BH CV XLRA MEAS LEFT PROX ECA PSV: NORMAL CM/SEC
BH CV XLRA MEAS RIGHT ICA/CCA RATIO: 1.24
BH CV XLRA MEAS RIGHT MID CCA PSV: NORMAL CM/SEC
BH CV XLRA MEAS RIGHT MID ICA PSV: NORMAL CM/SEC
BH CV XLRA MEAS RIGHT PROX ECA PSV: NORMAL CM/SEC

## 2021-05-04 PROCEDURE — 93799 UNLISTED CV SVC/PROCEDURE: CPT

## 2021-05-05 ENCOUNTER — TELEPHONE (OUTPATIENT)
Dept: CARDIOLOGY | Facility: CLINIC | Age: 71
End: 2021-05-05

## 2021-05-07 DIAGNOSIS — E78.49 OTHER HYPERLIPIDEMIA: ICD-10-CM

## 2021-05-07 DIAGNOSIS — E05.00 GRAVES DISEASE: ICD-10-CM

## 2021-05-07 DIAGNOSIS — E03.8 OTHER SPECIFIED HYPOTHYROIDISM: ICD-10-CM

## 2021-05-07 DIAGNOSIS — R79.89 LOW TESTOSTERONE: ICD-10-CM

## 2021-05-07 DIAGNOSIS — E55.9 VITAMIN D DEFICIENCY: Primary | ICD-10-CM

## 2021-05-26 ENCOUNTER — LAB (OUTPATIENT)
Dept: ENDOCRINOLOGY | Age: 71
End: 2021-05-26

## 2021-05-26 DIAGNOSIS — E03.8 OTHER SPECIFIED HYPOTHYROIDISM: ICD-10-CM

## 2021-05-26 DIAGNOSIS — R79.89 LOW TESTOSTERONE: ICD-10-CM

## 2021-05-26 DIAGNOSIS — E78.49 OTHER HYPERLIPIDEMIA: ICD-10-CM

## 2021-05-26 DIAGNOSIS — E05.00 GRAVES DISEASE: ICD-10-CM

## 2021-05-26 DIAGNOSIS — E55.9 VITAMIN D DEFICIENCY: ICD-10-CM

## 2021-05-26 RX ORDER — TESTOSTERONE 16.2 MG/G
GEL TRANSDERMAL
Qty: 75 G | Refills: 1 | Status: SHIPPED | OUTPATIENT
Start: 2021-05-26 | End: 2021-06-02 | Stop reason: SDUPTHER

## 2021-05-28 LAB
25(OH)D3+25(OH)D2 SERPL-MCNC: 63.9 NG/ML (ref 30–100)
ALBUMIN SERPL-MCNC: 4.2 G/DL (ref 3.5–5.2)
ALBUMIN/GLOB SERPL: 1.6 G/DL
ALP SERPL-CCNC: 75 U/L (ref 39–117)
ALT SERPL-CCNC: 21 U/L (ref 1–41)
AST SERPL-CCNC: 19 U/L (ref 1–40)
BASOPHILS # BLD AUTO: 0.07 10*3/MM3 (ref 0–0.2)
BASOPHILS NFR BLD AUTO: 0.9 % (ref 0–1.5)
BILIRUB SERPL-MCNC: 0.5 MG/DL (ref 0–1.2)
BUN SERPL-MCNC: 15 MG/DL (ref 8–23)
BUN/CREAT SERPL: 15.3 (ref 7–25)
CALCIUM SERPL-MCNC: 10.4 MG/DL (ref 8.6–10.5)
CHLORIDE SERPL-SCNC: 106 MMOL/L (ref 98–107)
CHOLEST SERPL-MCNC: 133 MG/DL (ref 0–200)
CO2 SERPL-SCNC: 26 MMOL/L (ref 22–29)
CREAT SERPL-MCNC: 0.98 MG/DL (ref 0.76–1.27)
EOSINOPHIL # BLD AUTO: 0.3 10*3/MM3 (ref 0–0.4)
EOSINOPHIL NFR BLD AUTO: 3.9 % (ref 0.3–6.2)
ERYTHROCYTE [DISTWIDTH] IN BLOOD BY AUTOMATED COUNT: 14.7 % (ref 12.3–15.4)
FERRITIN SERPL-MCNC: 24.2 NG/ML (ref 30–400)
GLOBULIN SER CALC-MCNC: 2.7 GM/DL
GLUCOSE SERPL-MCNC: 90 MG/DL (ref 65–99)
HCT VFR BLD AUTO: 47.7 % (ref 37.5–51)
HDLC SERPL-MCNC: 37 MG/DL (ref 40–60)
HGB BLD-MCNC: 15.8 G/DL (ref 13–17.7)
IGF-I SERPL-MCNC: 189 NG/ML (ref 59–230)
IMM GRANULOCYTES # BLD AUTO: 0.02 10*3/MM3 (ref 0–0.05)
IMM GRANULOCYTES NFR BLD AUTO: 0.3 % (ref 0–0.5)
IMP & REVIEW OF LAB RESULTS: NORMAL
IRON SATN MFR SERPL: 13 % (ref 20–50)
IRON SATN MFR SERPL: 16 % SATURATION
IRON SERPL-MCNC: 47 MCG/DL (ref 59–158)
IRON SERPL-MCNC: 56 UG/DL
LDLC SERPL CALC-MCNC: 67 MG/DL (ref 0–100)
LYMPHOCYTES # BLD AUTO: 2.24 10*3/MM3 (ref 0.7–3.1)
LYMPHOCYTES NFR BLD AUTO: 28.9 % (ref 19.6–45.3)
MCH RBC QN AUTO: 28.4 PG (ref 26.6–33)
MCHC RBC AUTO-ENTMCNC: 33.1 G/DL (ref 31.5–35.7)
MCV RBC AUTO: 85.6 FL (ref 79–97)
MONOCYTES # BLD AUTO: 0.74 10*3/MM3 (ref 0.1–0.9)
MONOCYTES NFR BLD AUTO: 9.5 % (ref 5–12)
NEUTROPHILS # BLD AUTO: 4.38 10*3/MM3 (ref 1.7–7)
NEUTROPHILS NFR BLD AUTO: 56.5 % (ref 42.7–76)
NRBC BLD AUTO-RTO: 0 /100 WBC (ref 0–0.2)
PLATELET # BLD AUTO: 251 10*3/MM3 (ref 140–450)
POTASSIUM SERPL-SCNC: 5.2 MMOL/L (ref 3.5–5.2)
PROLACTIN SERPL-MCNC: 11.1 NG/ML (ref 4–15.2)
PROT SERPL-MCNC: 6.9 G/DL (ref 6–8.5)
RBC # BLD AUTO: 5.57 10*6/MM3 (ref 4.14–5.8)
SODIUM SERPL-SCNC: 140 MMOL/L (ref 136–145)
T4 FREE SERPL-MCNC: 1.21 NG/DL (ref 0.93–1.7)
TESTOST FREE MFR SERPL: 2.5 % (ref 1.5–4.2)
TESTOST FREE SERPL-MCNC: 8.83 NG/DL (ref 5–21)
TESTOST SERPL-MCNC: 353 NG/DL (ref 264–916)
TIBC SERPL-MCNC: 371 MCG/DL
TRANSFERRIN SERPL-MCNC: 255 MG/DL
TRIGL SERPL-MCNC: 170 MG/DL (ref 0–150)
TSH SERPL DL<=0.005 MIU/L-ACNC: 2.55 UIU/ML (ref 0.27–4.2)
UIBC SERPL-MCNC: 324 MCG/DL (ref 112–346)
VLDLC SERPL CALC-MCNC: 29 MG/DL (ref 5–40)
WBC # BLD AUTO: 7.75 10*3/MM3 (ref 3.4–10.8)

## 2021-06-02 DIAGNOSIS — R79.89 LOW TESTOSTERONE: ICD-10-CM

## 2021-06-03 RX ORDER — TESTOSTERONE 16.2 MG/G
GEL TRANSDERMAL
Qty: 75 G | Refills: 0 | Status: SHIPPED | OUTPATIENT
Start: 2021-06-03 | End: 2022-04-21 | Stop reason: SDUPTHER

## 2021-06-07 ENCOUNTER — TELEPHONE (OUTPATIENT)
Dept: CARDIOLOGY | Facility: CLINIC | Age: 71
End: 2021-06-07

## 2021-06-07 NOTE — TELEPHONE ENCOUNTER
----- Message from Joaquin Peña sent at 6/6/2021  2:42 PM EDT -----  Regarding: Prescription Question  Contact: [phone]  Have been taking 25mg of metoprolol twice a day. Pulse rate had dropped into the 40's. Started taking 25mg in the morning and 12.5mg in the evening. Do you have a concern with me taking this medication like this? Is there a health risk in doing this?

## 2021-06-07 NOTE — TELEPHONE ENCOUNTER
Spoke with pt.  BP running 109-120/70-80 with pulse now about 57.    No palpitations.  No fatigue.  No dizziness.  He will continue to monitor and call if any issues or increased palpitations.

## 2021-06-07 NOTE — TELEPHONE ENCOUNTER
Okay to take lower dose of metoprolol at this time.  Please find out how blood pressure and heart rate are running on the lower dose.  Was he having any symptoms when heart rate got a little low?  Are palpitations controlled on the lower dose?

## 2021-06-08 NOTE — TELEPHONE ENCOUNTER
Okay great, okay to continue current regimen right now and call if he has further issues or concerns or high or low readings.

## 2021-06-08 NOTE — TELEPHONE ENCOUNTER
Spoke with pt.  Verbalized understanding.  No other question.  He will call if any issues.  (done)

## 2021-07-01 ENCOUNTER — TELEPHONE (OUTPATIENT)
Dept: CARDIOLOGY | Facility: CLINIC | Age: 71
End: 2021-07-01

## 2021-07-01 NOTE — TELEPHONE ENCOUNTER
Notified pt of your recommendations. She verbalized understanding.    Thank you,    Soledad Pratt, RN  Triage MG

## 2021-07-01 NOTE — TELEPHONE ENCOUNTER
----- Message from Joaquin Peña sent at 7/1/2021  6:56 AM EDT -----  Regarding: Prescription Question  Contact: [phone]  Have not been feeling well off and on for the past few months. Have returned to taking 25 mg of metoprolol twice a day. Don't believe I should wait until October 4th to see someone. While exercising pulse rate is usually around 120 and quickly returns to 60 a short time after ending the exercise. Lately my pulse rate has gone to 165 and remains high for ten or more minutes before slowly returning to normal. Have some queasy feelings in the torso area that comes and goes. Have felt fatigue, but never a shortness of breath. Will contact your office today, July 1st, when it opens.

## 2021-07-01 NOTE — TELEPHONE ENCOUNTER
Okay, we will see Tuesday or call sooner for issues or concerns.  In the meantime would recommend increasing hydration and cutting back on caffeine and alcohol if applicable.  Would recommend avoiding exercising in the extreme summer temperatures.

## 2021-07-01 NOTE — TELEPHONE ENCOUNTER
Pt called and stated he has trouble with nausea during exercise the last two to three months. Scheduled to see  next Tuesday.    Thank you,    Soledad Pratt, RN  Triage MG

## 2021-07-06 ENCOUNTER — OFFICE VISIT (OUTPATIENT)
Dept: CARDIOLOGY | Facility: CLINIC | Age: 71
End: 2021-07-06

## 2021-07-06 VITALS
BODY MASS INDEX: 29.71 KG/M2 | WEIGHT: 244 LBS | DIASTOLIC BLOOD PRESSURE: 78 MMHG | SYSTOLIC BLOOD PRESSURE: 110 MMHG | HEIGHT: 76 IN | HEART RATE: 50 BPM

## 2021-07-06 DIAGNOSIS — R00.2 PALPITATIONS: Primary | ICD-10-CM

## 2021-07-06 DIAGNOSIS — R00.0 TACHYCARDIA: ICD-10-CM

## 2021-07-06 DIAGNOSIS — I49.3 PVC (PREMATURE VENTRICULAR CONTRACTION): ICD-10-CM

## 2021-07-06 DIAGNOSIS — I47.1 SUPRAVENTRICULAR TACHYCARDIA (HCC): ICD-10-CM

## 2021-07-06 PROCEDURE — 93000 ELECTROCARDIOGRAM COMPLETE: CPT | Performed by: NURSE PRACTITIONER

## 2021-07-06 PROCEDURE — 99214 OFFICE O/P EST MOD 30 MIN: CPT | Performed by: NURSE PRACTITIONER

## 2021-07-06 NOTE — PROGRESS NOTES
Date of Office Visit: 2021  Encounter Provider: Ludy Clements, DARRYL, APRN  Place of Service: [facility] CARDIOLOGY  Patient Name: Joaquin Peña  :1950        Subjective:     Chief Complaint:  Palpitations, tachycardia, PVCs, history of atrial tachycardia      History of Present Illness:  Joaquin Peña is a 71 y.o. male patient of Dr. Haque.  I am seeing this patient in the office today and I have reviewed his records.    Patient has a history of PVCs, atrial tachycardia, mild bradycardia, nonobstructive coronary atherosclerosis, Grave's disease, dyslipidemia, renal calculi.      PER PREVIOUS OFFICE NOTE: In  he developed brief episodes of supraventricular tachycardia in the setting of hypertension.  This was treated medically.  In 2014 had a stress echocardiogram that showed normal left ventricular size and function with ejection fraction 59% grade 1 diastolic dysfunction mild left ventricular hypertrophy with no significant valvular heart disease and no ischemia in May 2015 he had mild bradycardia in the 30s.  A follow-up Holter revealed frequent PVCs and brief bursts of atrial tachycardia for which metoprolol was increased and he has not had any recurrent bradycardia arrhythmia.  THE FOLLOWING IS MY CONTRIBUTION TO NOTE: Patient was seen on office 2019 by Dr. Haque.  He was planning to undergo knee replacement so stress test was ordered.  10/28/2019 nuclear stress test showed small to medium sized, mildly severe area of ischemia in the inferior and lateral walls with frequent PVCs during stress and EF 55%. Patient had heart catheterization 2020 showing minimal nonobstructive coronary atherosclerosis with EF 60%, normal wall motion, and normal LV filling pressure.   Echocardiogram 2019 showed LV EF 67%, mild concentric LVH, moderate MAC with trace regurgitation, trace tricuspid regurgitation with normal RVSP.  48 hr holter 2019 was relatively  benign with PVCs occurring 1.4% of monitored time.        Patient presents to office today for follow-up appointment.  Patient reports he is doing okay overall since last visit.  He reports he was having some mildly decreased heart rate readings at home and tried to decrease his metoprolol dose however he started having some elevated heart rate readings and increased it back to 25 mg in the morning and was taking 12.5 in the evening.  He reports that he exercises heavily on his exercise bike and with some weightlifting and denies any chest pain or discomfort or shortness of breath or exertional fatigue.  He did have 1 episode where he got an elevated heart rate reading of about 165 with exercise and it was staying elevated for about 10 minutes after this and he felt a fluttering feeling in his chest with this.  Several hours later he felt maybe a queasy feeling in his chest though is not sure if this was related.  He then increased his metoprolol back to 25 mg twice a day and cut out caffeine and increased hydration and has been avoiding the sun.  He has not noticed any recurrence of tachycardia or palpitations but also has not been exercising as he misunderstood instructions relayed when he called into the office.  He really does not feel like he has any anginal symptoms and feels excellent with exercise but is concerned about possible heart rhythm/high heart rate issues.          Past Medical History:   Diagnosis Date   • Abdominal obesity    • Acquired spondylolisthesis    • Acute low back pain    • Acute right lumbar radiculopathy    • Bulging lumbar disc    • Concussion with no loss of consciousness    • DDD (degenerative disc disease)    • Essential hypertension    • Fatigue    • Graves disease    • H/O disorder of nervous system and sense organs    • Headache    • Hyperlipidemia    • Hypertensive heart disease    • Hypothyroidism    • Insomnia    • Irregular heart beat    • Joint pain    • Lumbar  radiculopathy    • Nephrolithiasis    • Paroxysmal nocturnal dyspnea    • PVC (premature ventricular contraction)    • Severe head trauma    • SOB (shortness of breath)    • Soemmering's ring    • Spondylolisthesis    • Subdural hematoma (CMS/HCC)    • Supraventricular tachycardia (CMS/HCC)      Past Surgical History:   Procedure Laterality Date   • CARDIAC CATHETERIZATION N/A 11/5/2019    Procedure: Coronary angiography;  Surgeon: Storm Marcano MD;  Location:  [facility];  Service: Cardiovascular   • CARDIAC CATHETERIZATION N/A 11/5/2019    Procedure: Left heart cath;  Surgeon: Storm Marcano MD;  Location:  [facility];  Service: Cardiovascular   • CARDIAC CATHETERIZATION N/A 11/5/2019    Procedure: Left ventriculography;  Surgeon: Storm Marcano MD;  Location:  [facility];  Service: Cardiovascular   • KNEE ARTHROSCOPY Left    • KNEE ARTHROSCOPY Left 2018   • KNEE SURGERY      2008 and 2009   • LITHOTRIPSY     • OTHER SURGICAL HISTORY Left     ear surgery     Outpatient Medications Prior to Visit   Medication Sig Dispense Refill   • aspirin 81 MG EC tablet Take 81 mg by mouth Daily.     • atorvastatin (LIPITOR) 10 MG tablet TAKE ONE TABLET BY MOUTH DAILY 90 tablet 1   • fluticasone (Flonase) 50 MCG/ACT nasal spray 2 sprays into the nostril(s) as directed by provider Daily. (Patient taking differently: 2 sprays into the nostril(s) as directed by provider As Needed.) 18.2 mL 11   • irbesartan (AVAPRO) 300 MG tablet TAKE ONE TABLET BY MOUTH AT NIGHT 90 tablet 2   • ketotifen (ZADITOR) 0.025 % ophthalmic solution Apply 1 drop to eye(s) as directed by provider As Needed.     • levothyroxine (SYNTHROID, LEVOTHROID) 150 MCG tablet TAKE ONE TABLET BY MOUTH DAILY 90 tablet 2   • metoprolol tartrate (LOPRESSOR) 25 MG tablet TAKE ONE TABLET BY MOUTH TWICE A  tablet 1   • Multiple Vitamin (MULTI-VITAMIN DAILY PO) Take 1 tablet by mouth Daily.     •  naproxen (NAPROSYN) 500 MG tablet Take 1 tablet by mouth 2 (Two) Times a Day. (Patient taking differently: Take 500 mg by mouth 2 (Two) Times a Day As Needed.) 60 tablet 3   • Natural Vitamin D-3 125 MCG (5000 UT) tablet TAKE ONE TABLET BY MOUTH DAILY (Patient taking differently: 7,000 Units.) 30 tablet 12   • nitroglycerin (Nitrostat) 0.4 MG SL tablet Place 1 tablet under the tongue Every 5 (Five) Minutes As Needed for Chest Pain. Take no more than 3 doses in 15 minutes. 10 tablet 12   • Testosterone (AndroGel Pump) 20.25 MG/ACT (1.62%) gel 1 pump actuation on 1 shoulder daily. 75 g 0     No facility-administered medications prior to visit.       Allergies as of 2021 - Reviewed 2021   Allergen Reaction Noted   • Latex Rash 03/15/2016     Social History     Socioeconomic History   • Marital status:      Spouse name: Not on file   • Number of children: Not on file   • Years of education: Not on file   • Highest education level: Not on file   Tobacco Use   • Smoking status: Former Smoker     Packs/day: 0.25     Types: Cigarettes     Quit date:      Years since quittin.5   • Smokeless tobacco: Never Used   Substance and Sexual Activity   • Alcohol use: Yes     Comment: occasional/  occ caffeine use   • Drug use: No   • Sexual activity: Defer     Family History   Problem Relation Age of Onset   • Cancer Father         malignant neoplasm   • No Known Problems Mother    • No Known Problems Sister    • No Known Problems Brother    • No Known Problems Sister    • No Known Problems Brother    • No Known Problems Brother    • Diabetes Maternal Grandmother        Review of Systems   Constitutional: Negative for malaise/fatigue.   Cardiovascular:        SEE HPI   Respiratory: Negative for shortness of breath.    Hematologic/Lymphatic: Negative for bleeding problem.   Musculoskeletal: Negative for falls.   Gastrointestinal: Negative for melena.   Genitourinary: Negative for hematuria.   Neurological:  "Negative for dizziness and light-headedness.   Psychiatric/Behavioral: Negative for altered mental status.          Objective:     Vitals:    07/06/21 1256   BP: 110/78   BP Location: Right arm   Cuff Size: Adult   Pulse: 50   Weight: 111 kg (244 lb)   Height: 193 cm (76\")     Body mass index is 29.7 kg/m².      PHYSICAL EXAM:  Constitutional:       General: Not in acute distress.     Appearance: Well-developed. Not diaphoretic.   Eyes:      Pupils: Pupils are equal, round, and reactive to light.   HENT:      Head: Normocephalic and atraumatic.   Neck:      Vascular: No JVD.   Pulmonary:      Effort: Pulmonary effort is normal. No respiratory distress.      Breath sounds: Normal breath sounds. No wheezing. No rales.   Cardiovascular:      Normal rate. Regular rhythm.      Murmurs: There is no murmur.      No gallop. No click. No rub.   Edema:     Peripheral edema absent.   Abdominal:      General: Bowel sounds are normal. There is no distension.      Palpations: Abdomen is soft.   Musculoskeletal: Normal range of motion.         General: No tenderness or deformity.      Cervical back: Neck supple. Skin:     General: Skin is warm and dry.      Findings: No erythema or rash.   Neurological:      Mental Status: Alert and oriented to person, place, and time.   Psychiatric:         Behavior: Behavior normal.         Judgment: Judgment normal.             ECG 12 Lead    Date/Time: 7/6/2021 5:39 PM  Performed by: Ludy Clements DNP, APRN  Authorized by: Ludy Clements DNP, QIAN   Comparison: compared with previous ECG from 3/16/2021  Rhythm: sinus rhythm  Ectopy comments: PVC  BPM: 50  Comments: No significant changes from previous EKG.              Assessment:       Diagnosis Plan   1. Palpitations  Holter Monitor - 72 Hour Up To 15 Days   2. Tachycardia  Holter Monitor - 72 Hour Up To 15 Days   3. PVC (premature ventricular contraction)     4. Supraventricular tachycardia (CMS/HCC)           Plan: "     1. Palpitations/tachycardia: He had an episode where his heart rate got up to 165 with exercise and then he felt fluttering which persisted for about 10 minutes or more even after stopping exercising.  Did not have any chest pain or discomfort or tightness or shortness of breath or dizziness with this.  Sounds like he could have been a little dehydrated as he was spending a lot of time in the sun.  He has since increase his hydration and cut back on his caffeine.  This does not sound anginal or ischemic in nature.  He denies any chest pain or shortness of breath or exertional fatigue.  He had a recent heart cath showing some luminal irregularities but no obstructive disease.  We will place a 2-week Zio patch monitor to evaluate further as he does have a history of some atrial tachycardia and would like to rule out any tachyarrhythmias.  He is going to ease back into his regular exercise routine and will notify the office if he has any issues or concerns with this.  2. Paroxysmal atrial tachycardia: Recheck monitor as above.  Would like to rule out any atrial fibrillation or tachyarrhythmias.  3. Mild bradycardia: Completely asymptomatic.  Heart rate at home usually in the 50s and completely asymptomatic.  He believes he feels better on 25 mg twice a day of metoprolol than he did at lower doses.  Denies sleep apnea symptoms at this time.  Will continue same dose for now and assess on monitor as above but may need to look at decreasing metoprolol dose if having any significant bradycardia on monitor or if he becomes symptomatic.  4. Mild luminal irregularities on 2019 heart cath: With history of false positive stress test 10/2019.  Continue with aggressive risk factor modification.  Denies anginal symptoms.  On statin and low-dose aspirin.  5. Carotid artery disease: Plaque without stenosis present bilaterally on 5/2021 carotid screening.  6. PVCs: On beta-blocker therapy and occurred about 1.4% on 12/2019 Holter.   Recheck monitor as above.  7. History of sleep apnea: He reports he had a sleep study at home that showed some sleep apnea but subsequent sleep study showed no sleep apnea.  He currently denies any symptoms.  8. Graves' disease: Follows with endocrinology  9. Dyslipidemia: Follows with endocrinology    Patient to keep October follow-up with Dr. Haque as scheduled or follow-up sooner if needed for any new, recurrent, or worsening symptoms or other issues or concerns.        Records reviewed including but not limited to 2019 heart cath, 2019 echo, 2019 monitor study, 2019 stress test..             Your medication list          Accurate as of July 6, 2021  5:44 PM. If you have any questions, ask your nurse or doctor.            CHANGE how you take these medications      Instructions Last Dose Given Next Dose Due   fluticasone 50 MCG/ACT nasal spray  Commonly known as: Flonase  What changed:   · when to take this  · reasons to take this      2 sprays into the nostril(s) as directed by provider Daily.       naproxen 500 MG tablet  Commonly known as: NAPROSYN  What changed:   · when to take this  · reasons to take this      Take 1 tablet by mouth 2 (Two) Times a Day.       Natural Vitamin D-3 125 MCG (5000 UT) tablet  Generic drug: Cholecalciferol  What changed:   · how much to take  · how to take this  · when to take this      TAKE ONE TABLET BY MOUTH DAILY          CONTINUE taking these medications      Instructions Last Dose Given Next Dose Due   aspirin 81 MG EC tablet      Take 81 mg by mouth Daily.       atorvastatin 10 MG tablet  Commonly known as: LIPITOR      TAKE ONE TABLET BY MOUTH DAILY       irbesartan 300 MG tablet  Commonly known as: AVAPRO      TAKE ONE TABLET BY MOUTH AT NIGHT       levothyroxine 150 MCG tablet  Commonly known as: SYNTHROID, LEVOTHROID      TAKE ONE TABLET BY MOUTH DAILY       metoprolol tartrate 25 MG tablet  Commonly known as: LOPRESSOR      TAKE ONE TABLET BY MOUTH TWICE A DAY        multivitamin tablet tablet  Commonly known as: THERAGRAN      Take 1 tablet by mouth Daily.       nitroglycerin 0.4 MG SL tablet  Commonly known as: Nitrostat      Place 1 tablet under the tongue Every 5 (Five) Minutes As Needed for Chest Pain. Take no more than 3 doses in 15 minutes.       Testosterone 20.25 MG/ACT (1.62%) gel  Commonly known as: AndroGel Pump      1 pump actuation on 1 shoulder daily.       Zaditor 0.025 % ophthalmic solution  Generic drug: ketotifen      Apply 1 drop to eye(s) as directed by provider As Needed.              I did not stop or change the above medications.  Patient's medication list was updated to reflect medications they are currently taking including medication changes made by other providers.            Thanks,    Ludy Clements, DNP, APRN  07/06/2021         Dictated utilizing Dragon dictation

## 2021-07-15 LAB
BASOPHILS # BLD AUTO: 0.07 10*3/MM3 (ref 0–0.2)
BASOPHILS NFR BLD AUTO: 1 % (ref 0–1.5)
EOSINOPHIL # BLD AUTO: 0.23 10*3/MM3 (ref 0–0.4)
EOSINOPHIL NFR BLD AUTO: 3.3 % (ref 0.3–6.2)
ERYTHROCYTE [DISTWIDTH] IN BLOOD BY AUTOMATED COUNT: 15.5 % (ref 12.3–15.4)
FERRITIN SERPL-MCNC: 25.5 NG/ML (ref 30–400)
HCT VFR BLD AUTO: 46.6 % (ref 37.5–51)
HGB BLD-MCNC: 15 G/DL (ref 13–17.7)
IGF-I SERPL-MCNC: 182 NG/ML (ref 59–230)
IMM GRANULOCYTES # BLD AUTO: 0.02 10*3/MM3 (ref 0–0.05)
IMM GRANULOCYTES NFR BLD AUTO: 0.3 % (ref 0–0.5)
IRON SATN MFR SERPL: 14 % (ref 20–50)
IRON SERPL-MCNC: 52 MCG/DL (ref 59–158)
LYMPHOCYTES # BLD AUTO: 2.15 10*3/MM3 (ref 0.7–3.1)
LYMPHOCYTES NFR BLD AUTO: 30.5 % (ref 19.6–45.3)
MCH RBC QN AUTO: 27.5 PG (ref 26.6–33)
MCHC RBC AUTO-ENTMCNC: 32.2 G/DL (ref 31.5–35.7)
MCV RBC AUTO: 85.5 FL (ref 79–97)
MONOCYTES # BLD AUTO: 0.64 10*3/MM3 (ref 0.1–0.9)
MONOCYTES NFR BLD AUTO: 9.1 % (ref 5–12)
NEUTROPHILS # BLD AUTO: 3.94 10*3/MM3 (ref 1.7–7)
NEUTROPHILS NFR BLD AUTO: 55.8 % (ref 42.7–76)
NRBC BLD AUTO-RTO: 0 /100 WBC (ref 0–0.2)
PLATELET # BLD AUTO: 256 10*3/MM3 (ref 140–450)
PROLACTIN SERPL-MCNC: 10.1 NG/ML (ref 4–15.2)
RBC # BLD AUTO: 5.45 10*6/MM3 (ref 4.14–5.8)
TIBC SERPL-MCNC: 363 MCG/DL
UIBC SERPL-MCNC: 311 MCG/DL (ref 112–346)
WBC # BLD AUTO: 7.05 10*3/MM3 (ref 3.4–10.8)

## 2021-07-19 ENCOUNTER — TELEPHONE (OUTPATIENT)
Dept: ENDOCRINOLOGY | Age: 71
End: 2021-07-19

## 2021-07-19 NOTE — TELEPHONE ENCOUNTER
7/19 pt called very upset. Stated he was here 7/14/21 for labs and he recvd a letter in the mail stating that someone in the office was exposed to COVID and he has been around his mother and other family members. Wants to know why it took so long to get a letter in the mail

## 2021-07-20 ENCOUNTER — TELEPHONE (OUTPATIENT)
Dept: ENDOCRINOLOGY | Age: 71
End: 2021-07-20

## 2021-07-20 NOTE — TELEPHONE ENCOUNTER
Pt left My Chart msg upset that a letter was recvd advising someone in the ofc tested positive for COVID.the patient felt like calls shouldve been made and not letters sent.the patient stated has been around family and now worried about this..called back pt and left voicemail for pt to call ofc so we can discuss...VRR

## 2021-07-21 NOTE — PROGRESS NOTES
Subjective   Joaquin Peña is a 71 y.o. male.     F/u for goiter, hypothyroidism, graves disease, hyperlipidemia, hypertension           Patient is a 70-year-old male who came in for follow-up.      He has Graves' disease and had radioactive iodine therapy.  He became hypothyroid and is on levothyroxine 150 mcg/day.  He has no significant weight changes over the past year.  He denies bowel changes, palpitations, heat or cold intolerance.     He has hyperlipidemia with elevated LDL he has been on Lipitor 10 mg/day.  He denies myalgia.       He has history of low testosterone and was started on AndroGel by Dr. Smith in 2018.  He is on AndroGel 1.62% 1 pump on 1 shoulder once a day.  He has periodic phlebotomy and done at the [facility] and the last one was in June 2021.  He has no history of sleep apnea.  PSA done in December 2020 is normal at 0.781.  He has less urinary stream.  He denies retention or dysuria.  He has occasional erectile dysfunction and uses Levitra as needed. He wants to consider to consider discontinuing testosterone.     He is  and has fathered 1 child.  He is not planning any more children     He denies headaches.  He denies changes in shoes or ring size.  He denies easy bruising or muscle weakness.       He has hypertension and is on Avapro 300 mg once a day and Lopressor 25 mg twice a day.  He has no history of heart attack or stroke.     He has acid reflux disease which is controlled by diet.  He is off Prilosec.     He has prediabetes with hemoglobin A1c ranging from 5.4 to 5.9% since 2017.     He has vitamin D deficiency and is vit D3 5000 units a day alone jfor the past month.     He has osteopenia on bone density done in June 2019.      The following portions of the patient's history were reviewed and updated as appropriate: allergies, current medications, past family history, past medical history, past social history, past surgical history and problem list.    Review of Systems  "  Constitutional: Negative.    HENT: Negative.    Eyes: Negative for visual disturbance.   Respiratory: Negative for shortness of breath.    Cardiovascular: Negative for chest pain and palpitations.   Gastrointestinal: Negative.    Endocrine: Negative for cold intolerance and heat intolerance.   Genitourinary: Positive for difficulty urinating.   Musculoskeletal: Positive for myalgias. Negative for neck stiffness.   Neurological: Negative for numbness.     Objective      Vitals:    07/28/21 0832   BP: 124/70   Pulse: 50   SpO2: 98%   Weight: 112 kg (248 lb)   Height: 193 cm (76\")     Physical Exam  Constitutional:       General: He is not in acute distress.     Appearance: Normal appearance. He is obese. He is not ill-appearing, toxic-appearing or diaphoretic.   Eyes:      General: No scleral icterus.        Right eye: No discharge.         Left eye: No discharge.      Extraocular Movements: Extraocular movements intact.      Conjunctiva/sclera: Conjunctivae normal.   Neck:      Vascular: No carotid bruit.   Cardiovascular:      Rate and Rhythm: Normal rate and regular rhythm.      Heart sounds: Normal heart sounds. No murmur heard.   No friction rub. No gallop.    Pulmonary:      Effort: No respiratory distress.      Breath sounds: Normal breath sounds. No stridor. No wheezing or rales.   Chest:      Chest wall: No tenderness.   Abdominal:      General: Bowel sounds are normal.      Palpations: Abdomen is soft.      Tenderness: There is no right CVA tenderness or left CVA tenderness.   Musculoskeletal:         General: No swelling or tenderness. Normal range of motion.      Cervical back: Normal range of motion and neck supple. No rigidity or tenderness.   Lymphadenopathy:      Cervical: No cervical adenopathy.   Skin:     General: Skin is warm and dry.   Neurological:      General: No focal deficit present.      Mental Status: He is alert and oriented to person, place, and time.   Psychiatric:         Mood and " Affect: Mood normal.         Behavior: Behavior normal.       Office Visit on 07/06/2021   Component Date Value Ref Range Status   • Target HR (85%) 07/06/2021 128  bpm In process   • Max. Pred. HR (100%) 07/06/2021 151  bpm In process     Assessment/Plan   Diagnoses and all orders for this visit:    1. Graves disease (Primary)  -     Comprehensive Metabolic Panel  -     TSH  -     T4, Free    2. Other hyperlipidemia  -     Comprehensive Metabolic Panel  -     Lipid Panel    3. Postablative hypothyroidism  -     Comprehensive Metabolic Panel  -     TSH  -     T4, Free    4. Essential hypertension  -     Comprehensive Metabolic Panel    5. JACQUELINE (obstructive sleep apnea)  -     TSH  -     T4, Free    6. Other specified hypothyroidism    7. Vitamin D deficiency  -     Vitamin D 25 Hydroxy    8. Coronary artery disease involving native coronary artery of native heart without angina pectoris  -     Comprehensive Metabolic Panel  -     Lipid Panel    9. Benign prostatic hyperplasia without lower urinary tract symptoms  -     Testosterone, Free / Tot Equilib    10. Osteopenia, unspecified location  -     Testosterone, Free / Tot Equilib  -     DEXA Bone Density Axial  -     Vitamin D 25 Hydroxy    11. Abnormal findings on diagnostic imaging of other parts of musculoskeletal system   -     DEXA Bone Density Axial      Continue levothyroxine 150 mcg/day.  May discontinue AndroGel and observe.  Hold blood donations temporarily.  Continue irbesartan 300 mg once a day and Lopressor 25 mg twice a day  Continue vitamin D3 5000 units/day.  Schedule bone density.  Request for the following labs to be done in 3 months was given to the patient: CBC, total and free testosterone equilibrium dialysis, iron profile and ferritin.    Copy my note sent to Dr. Escobar.    RTC 4 mos.

## 2021-07-28 ENCOUNTER — OFFICE VISIT (OUTPATIENT)
Dept: ENDOCRINOLOGY | Age: 71
End: 2021-07-28

## 2021-07-28 VITALS
HEIGHT: 76 IN | HEART RATE: 50 BPM | WEIGHT: 248 LBS | OXYGEN SATURATION: 98 % | SYSTOLIC BLOOD PRESSURE: 124 MMHG | DIASTOLIC BLOOD PRESSURE: 70 MMHG | BODY MASS INDEX: 30.2 KG/M2

## 2021-07-28 DIAGNOSIS — R93.7 ABNORMAL FINDINGS ON DIAGNOSTIC IMAGING OF OTHER PARTS OF MUSCULOSKELETAL SYSTEM: ICD-10-CM

## 2021-07-28 DIAGNOSIS — I25.10 CORONARY ARTERY DISEASE INVOLVING NATIVE CORONARY ARTERY OF NATIVE HEART WITHOUT ANGINA PECTORIS: ICD-10-CM

## 2021-07-28 DIAGNOSIS — E89.0 POSTABLATIVE HYPOTHYROIDISM: ICD-10-CM

## 2021-07-28 DIAGNOSIS — E55.9 VITAMIN D DEFICIENCY: ICD-10-CM

## 2021-07-28 DIAGNOSIS — N40.0 BENIGN PROSTATIC HYPERPLASIA WITHOUT LOWER URINARY TRACT SYMPTOMS: ICD-10-CM

## 2021-07-28 DIAGNOSIS — E05.00 GRAVES DISEASE: Primary | ICD-10-CM

## 2021-07-28 DIAGNOSIS — G47.33 OSA (OBSTRUCTIVE SLEEP APNEA): ICD-10-CM

## 2021-07-28 DIAGNOSIS — I10 ESSENTIAL HYPERTENSION: ICD-10-CM

## 2021-07-28 DIAGNOSIS — E78.49 OTHER HYPERLIPIDEMIA: ICD-10-CM

## 2021-07-28 DIAGNOSIS — E03.8 OTHER SPECIFIED HYPOTHYROIDISM: ICD-10-CM

## 2021-07-28 DIAGNOSIS — E78.2 MIXED HYPERLIPIDEMIA: ICD-10-CM

## 2021-07-28 DIAGNOSIS — R79.89 LOW TESTOSTERONE: ICD-10-CM

## 2021-07-28 DIAGNOSIS — M85.80 OSTEOPENIA, UNSPECIFIED LOCATION: ICD-10-CM

## 2021-07-28 PROCEDURE — 99214 OFFICE O/P EST MOD 30 MIN: CPT | Performed by: INTERNAL MEDICINE

## 2021-07-28 RX ORDER — ATORVASTATIN CALCIUM 10 MG/1
10 TABLET, FILM COATED ORAL DAILY
Qty: 90 TABLET | Refills: 1 | Status: SHIPPED | OUTPATIENT
Start: 2021-07-28 | End: 2021-12-27

## 2021-07-29 ENCOUNTER — TELEPHONE (OUTPATIENT)
Dept: CARDIOLOGY | Facility: CLINIC | Age: 71
End: 2021-07-29

## 2021-07-29 ENCOUNTER — PATIENT MESSAGE (OUTPATIENT)
Dept: CARDIOLOGY | Facility: CLINIC | Age: 71
End: 2021-07-29

## 2021-07-29 NOTE — TELEPHONE ENCOUNTER
----- Message from Joaquin Peña sent at 7/29/2021  8:13 AM EDT -----  Regarding: Test Results Question  Contact: [phone]  Has the heart monitor results been divulged to you yet?

## 2021-08-01 LAB
25(OH)D3+25(OH)D2 SERPL-MCNC: 68.5 NG/ML (ref 30–100)
ALBUMIN SERPL-MCNC: 3.9 G/DL (ref 3.5–5.2)
ALBUMIN/GLOB SERPL: 1.4 G/DL
ALP SERPL-CCNC: 68 U/L (ref 39–117)
ALT SERPL-CCNC: 24 U/L (ref 1–41)
AST SERPL-CCNC: 22 U/L (ref 1–40)
BILIRUB SERPL-MCNC: 0.3 MG/DL (ref 0–1.2)
BUN SERPL-MCNC: 17 MG/DL (ref 8–23)
BUN/CREAT SERPL: 17 (ref 7–25)
CALCIUM SERPL-MCNC: 10.1 MG/DL (ref 8.6–10.5)
CHLORIDE SERPL-SCNC: 107 MMOL/L (ref 98–107)
CHOLEST SERPL-MCNC: 136 MG/DL (ref 0–200)
CO2 SERPL-SCNC: 26.5 MMOL/L (ref 22–29)
CREAT SERPL-MCNC: 1 MG/DL (ref 0.76–1.27)
GLOBULIN SER CALC-MCNC: 2.8 GM/DL
GLUCOSE SERPL-MCNC: 82 MG/DL (ref 65–99)
HDLC SERPL-MCNC: 35 MG/DL (ref 40–60)
IMP & REVIEW OF LAB RESULTS: NORMAL
LDLC SERPL CALC-MCNC: 75 MG/DL (ref 0–100)
POTASSIUM SERPL-SCNC: 5.1 MMOL/L (ref 3.5–5.2)
PROT SERPL-MCNC: 6.7 G/DL (ref 6–8.5)
SODIUM SERPL-SCNC: 140 MMOL/L (ref 136–145)
T4 FREE SERPL-MCNC: 1.24 NG/DL (ref 0.93–1.7)
TESTOST FREE MFR SERPL: 3.7 % (ref 1.5–4.2)
TESTOST FREE SERPL-MCNC: 14.21 NG/DL (ref 5–21)
TESTOST SERPL-MCNC: 384 NG/DL (ref 264–916)
TRIGL SERPL-MCNC: 148 MG/DL (ref 0–150)
TSH SERPL DL<=0.005 MIU/L-ACNC: 1.55 UIU/ML (ref 0.27–4.2)
VLDLC SERPL CALC-MCNC: 26 MG/DL (ref 5–40)

## 2021-08-06 LAB
MAXIMAL PREDICTED HEART RATE: 151 BPM
STRESS TARGET HR: 128 BPM

## 2021-08-11 ENCOUNTER — TELEPHONE (OUTPATIENT)
Dept: CARDIOLOGY | Facility: CLINIC | Age: 71
End: 2021-08-11

## 2021-08-11 DIAGNOSIS — I47.1 PSVT (PAROXYSMAL SUPRAVENTRICULAR TACHYCARDIA) (HCC): Primary | ICD-10-CM

## 2021-08-11 DIAGNOSIS — I49.3 PVC (PREMATURE VENTRICULAR CONTRACTION): ICD-10-CM

## 2021-08-11 DIAGNOSIS — G47.33 OSA (OBSTRUCTIVE SLEEP APNEA): ICD-10-CM

## 2021-08-11 NOTE — TELEPHONE ENCOUNTER
Patient has a history of frequent PVCs and paroxysmal atrial tachycardia and also with false positive showing ischemia however subsequent heart cath 11/2019 showed only some luminal irregularities but no obstructive disease.  LV systolic function was normal at 60%.  Subsequent monitor study showed occasional PVCs.    Zio patch monitor 7/2021 shows occasional PVCs as well as some paroxysmal atrial fibrillation the longest 11 beats and the fastest at 187 bpm.  Did also have 1 episode nonsustained VT at 9 beats.  Also with episodes of idioventricular rhythm.    ---------------------------------------      Called to discuss with patient but no answer.  Left a voicemail asking him to call the office back.

## 2021-08-12 NOTE — TELEPHONE ENCOUNTER
Discussed with Dr. Haque.  Has had recent cath without obstructive disease and no anginal symptoms.  Will recheck echo with right and left sided strain.  Would have him follow back up with sleep medicine to ensure no sleep apnea contributing.  If NO sleep apnea and if echo abnormal may need to consider cardiac MRI, per MD.     Called and discussed with patient.  He is feeling well.  He is very active exercising with his exercise bike and also golfing and also doing some weights and denies any exertional symptoms or concerns.  He is currently on the metoprolol 25 mg twice a day and we will continue it at this time.  He will really try to avoid stimulants.  He also does have a history of sleep apnea.  Recommended following up with sleep medicine to find out if he still has this and if so then to pursue possible treatment options.  He is amenable.  Order and referral placed.  He will call sooner if he has symptoms or concerns.

## 2021-08-19 ENCOUNTER — TELEPHONE (OUTPATIENT)
Dept: ENDOCRINOLOGY | Age: 71
End: 2021-08-19

## 2021-08-25 ENCOUNTER — TELEPHONE (OUTPATIENT)
Dept: FAMILY MEDICINE CLINIC | Facility: CLINIC | Age: 71
End: 2021-08-25

## 2021-08-25 DIAGNOSIS — Z12.5 PROSTATE CANCER SCREENING: Primary | ICD-10-CM

## 2021-09-22 ENCOUNTER — TELEPHONE (OUTPATIENT)
Dept: CARDIOLOGY | Facility: CLINIC | Age: 71
End: 2021-09-22

## 2021-09-22 ENCOUNTER — HOSPITAL ENCOUNTER (OUTPATIENT)
Dept: CARDIOLOGY | Facility: HOSPITAL | Age: 71
Discharge: HOME | End: 2021-09-22
Admitting: NURSE PRACTITIONER

## 2021-09-22 VITALS
HEART RATE: 48 BPM | HEIGHT: 76 IN | WEIGHT: 248 LBS | SYSTOLIC BLOOD PRESSURE: 124 MMHG | DIASTOLIC BLOOD PRESSURE: 70 MMHG | BODY MASS INDEX: 30.2 KG/M2

## 2021-09-22 DIAGNOSIS — I49.3 PVC (PREMATURE VENTRICULAR CONTRACTION): ICD-10-CM

## 2021-09-22 DIAGNOSIS — I47.1 PSVT (PAROXYSMAL SUPRAVENTRICULAR TACHYCARDIA) (HCC): ICD-10-CM

## 2021-09-22 LAB
AORTIC ARCH: 2.9 CM
BH CV ECHO MEAS - ACS: 2.4 CM
BH CV ECHO MEAS - AO MAX PG (FULL): 6.1 MMHG
BH CV ECHO MEAS - AO MAX PG: 10.5 MMHG
BH CV ECHO MEAS - AO MEAN PG (FULL): 3 MMHG
BH CV ECHO MEAS - AO MEAN PG: 5 MMHG
BH CV ECHO MEAS - AO ROOT AREA (BSA CORRECTED): 1.4
BH CV ECHO MEAS - AO ROOT AREA: 8.6 CM^2
BH CV ECHO MEAS - AO ROOT DIAM: 3.3 CM
BH CV ECHO MEAS - AO V2 MAX: 162 CM/SEC
BH CV ECHO MEAS - AO V2 MEAN: 101 CM/SEC
BH CV ECHO MEAS - AO V2 VTI: 41.5 CM
BH CV ECHO MEAS - AVA(I,A): 3 CM^2
BH CV ECHO MEAS - AVA(I,D): 3 CM^2
BH CV ECHO MEAS - AVA(V,A): 2.7 CM^2
BH CV ECHO MEAS - AVA(V,D): 2.7 CM^2
BH CV ECHO MEAS - BSA(HAYCOCK): 2.5 M^2
BH CV ECHO MEAS - BSA: 2.4 M^2
BH CV ECHO MEAS - BZI_BMI: 30.2 KILOGRAMS/M^2
BH CV ECHO MEAS - BZI_METRIC_HEIGHT: 193 CM
BH CV ECHO MEAS - BZI_METRIC_WEIGHT: 112.5 KG
BH CV ECHO MEAS - EDV(CUBED): 166.4 ML
BH CV ECHO MEAS - EDV(MOD-SP2): 78 ML
BH CV ECHO MEAS - EDV(MOD-SP4): 69 ML
BH CV ECHO MEAS - EDV(TEICH): 147.4 ML
BH CV ECHO MEAS - EF(CUBED): 61.5 %
BH CV ECHO MEAS - EF(MOD-BP): 66.8 %
BH CV ECHO MEAS - EF(MOD-SP2): 62.8 %
BH CV ECHO MEAS - EF(MOD-SP4): 66.7 %
BH CV ECHO MEAS - EF(TEICH): 52.5 %
BH CV ECHO MEAS - ESV(MOD-SP2): 29 ML
BH CV ECHO MEAS - ESV(MOD-SP4): 23 ML
BH CV ECHO MEAS - ESV(TEICH): 70 ML
BH CV ECHO MEAS - FS: 27.3 %
BH CV ECHO MEAS - IVS/LVPW: 1.1
BH CV ECHO MEAS - IVSD: 1.3 CM
BH CV ECHO MEAS - LAT PEAK E' VEL: 8.7 CM/SEC
BH CV ECHO MEAS - LV DIASTOLIC VOL/BSA (35-75): 28.4 ML/M^2
BH CV ECHO MEAS - LV MASS(C)D: 288.2 GRAMS
BH CV ECHO MEAS - LV MASS(C)DI: 118.7 GRAMS/M^2
BH CV ECHO MEAS - LV MAX PG: 4.4 MMHG
BH CV ECHO MEAS - LV MEAN PG: 2 MMHG
BH CV ECHO MEAS - LV V1 MAX: 105 CM/SEC
BH CV ECHO MEAS - LV V1 MEAN: 72.9 CM/SEC
BH CV ECHO MEAS - LV V1 VTI: 30.2 CM
BH CV ECHO MEAS - LVIDD: 5.5 CM
BH CV ECHO MEAS - LVIDS: 4 CM
BH CV ECHO MEAS - LVLD AP2: 7.9 CM
BH CV ECHO MEAS - LVLD AP4: 6.8 CM
BH CV ECHO MEAS - LVLS AP2: 5.9 CM
BH CV ECHO MEAS - LVLS AP4: 5.7 CM
BH CV ECHO MEAS - LVOT AREA (M): 4.2 CM^2
BH CV ECHO MEAS - LVOT AREA: 4.2 CM^2
BH CV ECHO MEAS - LVOT DIAM: 2.3 CM
BH CV ECHO MEAS - LVPWD: 1.2 CM
BH CV ECHO MEAS - MED PEAK E' VEL: 8.3 CM/SEC
BH CV ECHO MEAS - MV A DUR: 0.12 SEC
BH CV ECHO MEAS - MV A MAX VEL: 50.2 CM/SEC
BH CV ECHO MEAS - MV DEC SLOPE: 297 CM/SEC^2
BH CV ECHO MEAS - MV DEC TIME: 0.24 SEC
BH CV ECHO MEAS - MV E MAX VEL: 36.1 CM/SEC
BH CV ECHO MEAS - MV E/A: 0.72
BH CV ECHO MEAS - MV MAX PG: 2.9 MMHG
BH CV ECHO MEAS - MV MEAN PG: 1 MMHG
BH CV ECHO MEAS - MV P1/2T MAX VEL: 64 CM/SEC
BH CV ECHO MEAS - MV P1/2T: 63.1 MSEC
BH CV ECHO MEAS - MV V2 MAX: 85.1 CM/SEC
BH CV ECHO MEAS - MV V2 MEAN: 37.9 CM/SEC
BH CV ECHO MEAS - MV V2 VTI: 25.6 CM
BH CV ECHO MEAS - MVA P1/2T LCG: 3.4 CM^2
BH CV ECHO MEAS - MVA(P1/2T): 3.5 CM^2
BH CV ECHO MEAS - MVA(VTI): 4.9 CM^2
BH CV ECHO MEAS - PA MAX PG (FULL): 2.4 MMHG
BH CV ECHO MEAS - PA MAX PG: 4.2 MMHG
BH CV ECHO MEAS - PA V2 MAX: 102 CM/SEC
BH CV ECHO MEAS - PULM A REVS DUR: 0.13 SEC
BH CV ECHO MEAS - PULM A REVS VEL: 23.6 CM/SEC
BH CV ECHO MEAS - PULM DIAS VEL: 22.3 CM/SEC
BH CV ECHO MEAS - PULM S/D: 1.5
BH CV ECHO MEAS - PULM SYS VEL: 32.4 CM/SEC
BH CV ECHO MEAS - PVA(V,A): 2.5 CM^2
BH CV ECHO MEAS - PVA(V,D): 2.5 CM^2
BH CV ECHO MEAS - QP/QS: 0.51
BH CV ECHO MEAS - RV MAX PG: 1.8 MMHG
BH CV ECHO MEAS - RV MEAN PG: 1 MMHG
BH CV ECHO MEAS - RV V1 MAX: 66.6 CM/SEC
BH CV ECHO MEAS - RV V1 MEAN: 46.2 CM/SEC
BH CV ECHO MEAS - RV V1 VTI: 16.8 CM
BH CV ECHO MEAS - RVOT AREA: 3.8 CM^2
BH CV ECHO MEAS - RVOT DIAM: 2.2 CM
BH CV ECHO MEAS - SI(AO): 146.2 ML/M^2
BH CV ECHO MEAS - SI(CUBED): 42.2 ML/M^2
BH CV ECHO MEAS - SI(LVOT): 51.7 ML/M^2
BH CV ECHO MEAS - SI(MOD-SP2): 20.2 ML/M^2
BH CV ECHO MEAS - SI(MOD-SP4): 18.9 ML/M^2
BH CV ECHO MEAS - SI(TEICH): 31.9 ML/M^2
BH CV ECHO MEAS - SUP REN AO DIAM: 2.5 CM
BH CV ECHO MEAS - SV(AO): 354.9 ML
BH CV ECHO MEAS - SV(CUBED): 102.4 ML
BH CV ECHO MEAS - SV(LVOT): 125.5 ML
BH CV ECHO MEAS - SV(MOD-SP2): 49 ML
BH CV ECHO MEAS - SV(MOD-SP4): 46 ML
BH CV ECHO MEAS - SV(RVOT): 63.9 ML
BH CV ECHO MEAS - SV(TEICH): 77.4 ML
BH CV ECHO MEAS - TAPSE (>1.6): 3.6 CM
BH CV ECHO MEASUREMENTS AVERAGE E/E' RATIO: 4.25
BH CV XLRA - RV BASE: 4.1 CM
BH CV XLRA - RV LENGTH: 7.4 CM
BH CV XLRA - RV MID: 3.2 CM
BH CV XLRA - TDI S': 13.3 CM/SEC
LEFT ATRIUM VOLUME INDEX: 18 ML/M2
MAXIMAL PREDICTED HEART RATE: 151 BPM
SINUS: 3.9 CM
STJ: 3.2 CM
STRESS TARGET HR: 128 BPM

## 2021-09-22 PROCEDURE — 93356 MYOCRD STRAIN IMG SPCKL TRCK: CPT

## 2021-09-22 PROCEDURE — 93356 MYOCRD STRAIN IMG SPCKL TRCK: CPT | Performed by: INTERNAL MEDICINE

## 2021-09-22 PROCEDURE — 93306 TTE W/DOPPLER COMPLETE: CPT

## 2021-09-22 PROCEDURE — 93306 TTE W/DOPPLER COMPLETE: CPT | Performed by: INTERNAL MEDICINE

## 2021-09-29 ENCOUNTER — TELEPHONE (OUTPATIENT)
Dept: ENDOCRINOLOGY | Age: 71
End: 2021-09-29

## 2021-10-04 ENCOUNTER — OFFICE VISIT (OUTPATIENT)
Dept: CARDIOLOGY | Facility: CLINIC | Age: 71
End: 2021-10-04

## 2021-10-04 ENCOUNTER — TELEPHONE (OUTPATIENT)
Dept: CARDIOLOGY | Facility: CLINIC | Age: 71
End: 2021-10-04

## 2021-10-04 VITALS
BODY MASS INDEX: 30.2 KG/M2 | DIASTOLIC BLOOD PRESSURE: 80 MMHG | SYSTOLIC BLOOD PRESSURE: 130 MMHG | HEART RATE: 44 BPM | HEIGHT: 76 IN | WEIGHT: 248 LBS

## 2021-10-04 DIAGNOSIS — I25.10 CORONARY ARTERY DISEASE INVOLVING NATIVE CORONARY ARTERY OF NATIVE HEART WITHOUT ANGINA PECTORIS: ICD-10-CM

## 2021-10-04 DIAGNOSIS — G47.33 OSA (OBSTRUCTIVE SLEEP APNEA): ICD-10-CM

## 2021-10-04 DIAGNOSIS — I49.3 PVC (PREMATURE VENTRICULAR CONTRACTION): ICD-10-CM

## 2021-10-04 DIAGNOSIS — I47.1 SUPRAVENTRICULAR TACHYCARDIA (HCC): ICD-10-CM

## 2021-10-04 DIAGNOSIS — I47.1 PSVT (PAROXYSMAL SUPRAVENTRICULAR TACHYCARDIA) (HCC): Primary | ICD-10-CM

## 2021-10-04 PROCEDURE — 93000 ELECTROCARDIOGRAM COMPLETE: CPT | Performed by: INTERNAL MEDICINE

## 2021-10-04 PROCEDURE — 99213 OFFICE O/P EST LOW 20 MIN: CPT | Performed by: INTERNAL MEDICINE

## 2021-10-04 RX ORDER — FLUOROURACIL 50 MG/G
CREAM TOPICAL AS NEEDED
COMMUNITY
Start: 2021-09-24 | End: 2022-04-12

## 2021-10-04 NOTE — TELEPHONE ENCOUNTER
Patient has an appointment in January with Dr. Aguiar.  Anyway to see if he can be moved up earlier?

## 2021-10-04 NOTE — PROGRESS NOTES
Date of Office Visit: 10/04/2021  Encounter Provider: Muna Haque MD  Place of Service: [facility] CARDIOLOGY  Patient Name: Joaquin Peña  :1950    Chief complaint  Paroxysmal supraventricular tachycardia, VT,     History of Present Illness  Patient is a 71-year-old gentleman with Graves' disease, dyslipidemia, renal calculi who in  developed brief episodes of supraventricular tachycardia in the setting of hypertension.  This was treated medically.  In 2014 had a stress echocardiogram that showed normal left ventricular size and function with ejection fraction 59% grade 1 diastolic dysfunction mild left ventricular hypertrophy with no significant valvular heart disease and no ischemia in May 2015 he had mild bradycardia in the 30s.  A follow-up Holter revealed frequent PVCs and brief bursts of atrial tachycardia for which metoprolol was increased and he has not had any recurrent bradycardia arrhythmia.  In 2021 with complaints of palpitations he had a 2-week Zio patch that showed sinus rhythm with PVCs 17 episodes of supraventricular tachycardia longest lasting 11 beats.  Ventricular bigeminy was present there was one episode of ventricular tachycardia lasting 9 beats.  There were episodes of idioventricular rhythm as well.  Echocardiogram showed normal left ventricular size systolic function ejection fraction was normal.  GLS was normal.  Diastolic function was normal.  There is no significant valvular heart disease or pulmonary hypertension.    Patient denies any chest pain shortness of breath palpitations syncope near syncope is mild dependent edema.  He is exercising 2-3 times a week using a stationary bike lifting weights and to 3 times playing golf.  He brings in his blood pressure device with him which is very accurate.  He brings in 2 years of blood pressure and heart rate readings.  Most of which have been within acceptable readings.  Morning readings  are slightly higher.  He is only consuming small amounts of caffeinated beverages.  He does not consume alcohol.  He has an appointment to see Dr. Aguiar in January.  For follow-up of sleep apnea which is not treated though has lost 30 pounds    Blood work 7/2021 includes normal thyroid studies, LDL 75, HDL 35, triglycerides 148, hemoglobin was normal.    Past Medical History:   Diagnosis Date   • Abdominal obesity    • Acquired spondylolisthesis    • Acute low back pain    • Acute right lumbar radiculopathy    • Bulging lumbar disc    • Concussion with no loss of consciousness    • DDD (degenerative disc disease)    • Essential hypertension    • Fatigue    • Graves disease    • H/O disorder of nervous system and sense organs    • Headache    • Hyperlipidemia    • Hypertensive heart disease    • Hypothyroidism    • Insomnia    • Irregular heart beat    • Joint pain    • Lumbar radiculopathy    • Nephrolithiasis    • Paroxysmal nocturnal dyspnea    • PVC (premature ventricular contraction)    • Severe head trauma    • SOB (shortness of breath)    • Soemmering's ring    • Spondylolisthesis    • Subdural hematoma (HCC)    • Supraventricular tachycardia (HCC)      Past Surgical History:   Procedure Laterality Date   • CARDIAC CATHETERIZATION N/A 11/5/2019    Procedure: Coronary angiography;  Surgeon: Storm Marcano MD;  Location: [facility] CATH INVASIVE LOCATION;  Service: Cardiovascular   • CARDIAC CATHETERIZATION N/A 11/5/2019    Procedure: Left heart cath;  Surgeon: Storm Marcano MD;  Location:  [facility] CATH INVASIVE LOCATION;  Service: Cardiovascular   • CARDIAC CATHETERIZATION N/A 11/5/2019    Procedure: Left ventriculography;  Surgeon: Storm Marcano MD;  Location[facility] CATH [facility]VASIVE [facility];  Service: Cardiovascular   • KNEE ARTHROSCOPY Left    • KNEE ARTHROSCOPY Left 2018   • KNEE SURGERY      2008 and 2009   • LITHOTRIPSY     • OTHER SURGICAL HISTORY Left     ear surgery     Outpatient Medications  Prior to Visit   Medication Sig Dispense Refill   • aspirin 81 MG EC tablet Take 81 mg by mouth Daily.     • atorvastatin (LIPITOR) 10 MG tablet Take 1 tablet by mouth Daily. 90 tablet 1   • fluorouracil (EFUDEX) 5 % cream      • fluticasone (Flonase) 50 MCG/ACT nasal spray 2 sprays into the nostril(s) as directed by provider Daily. (Patient taking differently: 2 sprays into the nostril(s) as directed by provider As Needed.) 18.2 mL 11   • irbesartan (AVAPRO) 300 MG tablet TAKE ONE TABLET BY MOUTH AT NIGHT 90 tablet 2   • ketotifen (ZADITOR) 0.025 % ophthalmic solution Apply 1 drop to eye(s) as directed by provider As Needed.     • levothyroxine (SYNTHROID, LEVOTHROID) 150 MCG tablet TAKE ONE TABLET BY MOUTH DAILY 90 tablet 2   • metoprolol tartrate (LOPRESSOR) 25 MG tablet Take 1 tablet by mouth 2 (Two) Times a Day. 180 tablet 1   • Multiple Vitamin (MULTI-VITAMIN DAILY PO) Take 1 tablet by mouth Daily.     • naproxen (NAPROSYN) 500 MG tablet Take 1 tablet by mouth 2 (Two) Times a Day. (Patient taking differently: Take 500 mg by mouth 2 (Two) Times a Day As Needed.) 60 tablet 3   • Natural Vitamin D-3 125 MCG (5000 UT) tablet TAKE ONE TABLET BY MOUTH DAILY (Patient taking differently: 7,000 Units.) 30 tablet 12   • nitroglycerin (Nitrostat) 0.4 MG SL tablet Place 1 tablet under the tongue Every 5 (Five) Minutes As Needed for Chest Pain. Take no more than 3 doses in 15 minutes. 10 tablet 12   • Testosterone (AndroGel Pump) 20.25 MG/ACT (1.62%) gel 1 pump actuation on 1 shoulder daily. 75 g 0     No facility-administered medications prior to visit.       Allergies as of 10/04/2021 - Reviewed 10/04/2021   Allergen Reaction Noted   • Latex Rash 03/15/2016     Social History     Socioeconomic History   • Marital status:      Spouse name: Not on file   • Number of children: Not on file   • Years of education: Not on file   • Highest education level: Not on file   Tobacco Use   • Smoking status: Former Smoker      "Packs/day: 0.25     Types: Cigarettes     Quit date:      Years since quittin.7   • Smokeless tobacco: Never Used   Substance and Sexual Activity   • Alcohol use: Yes     Comment: occasional/  occ caffeine use 90z frap daily    • Drug use: No   • Sexual activity: Defer     Family History   Problem Relation Age of Onset   • Cancer Father         malignant neoplasm   • No Known Problems Mother    • No Known Problems Sister    • No Known Problems Brother    • No Known Problems Sister    • No Known Problems Brother    • No Known Problems Brother    • Diabetes Maternal Grandmother      Review of Systems   Constitutional: Negative for chills, fever, weight gain and weight loss.   Cardiovascular: Negative for leg swelling.   Respiratory: Negative for cough, snoring and wheezing.    Hematologic/Lymphatic: Negative for bleeding problem. Does not bruise/bleed easily.   Skin: Negative for color change.   Musculoskeletal: Negative for falls, joint pain and myalgias.   Gastrointestinal: Negative for melena.   Genitourinary: Negative for hematuria.   Neurological: Negative for excessive daytime sleepiness.   Psychiatric/Behavioral: Negative for depression. The patient is not nervous/anxious.         Objective:     Vitals:    10/04/21 0748   BP: 130/80   Pulse: (!) 44   Weight: 112 kg (248 lb)   Height: 193 cm (76\")     Body mass index is 30.19 kg/m².    Vitals reviewed.   Constitutional:       Appearance: Well-developed. Obese.   Eyes:      General: No scleral icterus.        Right eye: No discharge.      Conjunctiva/sclera: Conjunctivae normal.      Pupils: Pupils are equal, round, and reactive to light.   HENT:      Head: Normocephalic.      Nose: Nose normal.   Neck:      Thyroid: No thyromegaly.      Vascular: No JVD.   Pulmonary:      Effort: Pulmonary effort is normal. No respiratory distress.      Breath sounds: Normal breath sounds. No wheezing. No rales.   Cardiovascular:      Bradycardia present. Regular " rhythm. Normal S1. Normal S2.      Murmurs: There is no murmur.      No gallop.   Pulses:     Intact distal pulses.   Edema:     Peripheral edema absent.   Abdominal:      General: Bowel sounds are normal. There is no distension.      Palpations: Abdomen is soft.      Tenderness: There is no abdominal tenderness. There is no rebound.   Musculoskeletal: Normal range of motion.         General: No tenderness.      Cervical back: Normal range of motion and neck supple. Skin:     General: Skin is warm and dry.      Findings: No erythema or rash.   Neurological:      Mental Status: Alert and oriented to person, place, and time.   Psychiatric:         Behavior: Behavior normal.         Thought Content: Thought content normal.         Judgment: Judgment normal.       Lab Review:     ECG 12 Lead    Date/Time: 10/4/2021 8:06 AM  Performed by: Muna Haque MD  Authorized by: Muna Haque MD   Comparison: compared with previous ECG   Comparison to previous ECG: Heart rate is lower.  PVC is not present.  Rhythm: sinus bradycardia  Conduction: non-specific intraventricular conduction delay    Clinical impression: abnormal EKG          Assessment:       Diagnosis Plan   1. PSVT (paroxysmal supraventricular tachycardia) (HCC)  ECG 12 Lead   2. PVC (premature ventricular contraction)  ECG 12 Lead   3. Supraventricular tachycardia (HCC)     4. Coronary artery disease involving native coronary artery of native heart without angina pectoris     5. JACQUELINE (obstructive sleep apnea)       Plan:       1.  Palpitations, resolved with decreased frequency of blood donaations from every 2 months to every 3 months  2.  Frequent PVC's.  On beta-blocker therapy.  He is to address sleep apnea further with Dr. Aguiar.  3.  Paroxysmal supraventricular tachycardia.  As above.  Cannot increase beta-blockers further due to bradycardia  4.  Hypertension.  Overall fairly well controlled  5.  Dyslipidemia. HDL remains low.    6.  Graves' disease.  Managed  by Dr. Escobar and recent labs look normal  7.  Carotid artery plaque. F/p done in 5/2021 with mild plaque bilaterally.  Continue statin modification.  8.  Edema.  Mild and dependent and none on exam today.  9.  Untreated sleep apnea.  He has lost significant amount of weight and may not need further treatment.  Defer to Dr. Aguiar      Time Spent: I spent 25 minutes caring for Joaquin on this date of service. This time includes time spent by me in the following activities: preparing for the visit, reviewing tests, obtaining and/or reviewing a separately obtained history, performing a medically appropriate examination and/or evaluation, counseling and educating the patient/family/caregiver, documenting information in the medical record and independently interpreting results and communicating that information with the patient/family/caregiver0.   I spent 1 minutes on the separately reported service of ECG. This time is not included in the time used to support the E/M service also reported today.        Your medication list          Accurate as of October 4, 2021  8:24 AM. If you have any questions, ask your nurse or doctor.            CHANGE how you take these medications      Instructions Last Dose Given Next Dose Due   fluticasone 50 MCG/ACT nasal spray  Commonly known as: Flonase  What changed:   · when to take this  · reasons to take this      2 sprays into the nostril(s) as directed by provider Daily.       naproxen 500 MG tablet  Commonly known as: NAPROSYN  What changed:   · when to take this  · reasons to take this      Take 1 tablet by mouth 2 (Two) Times a Day.       Natural Vitamin D-3 125 MCG (5000 UT) tablet  Generic drug: Cholecalciferol  What changed:   · how much to take  · how to take this  · when to take this      TAKE ONE TABLET BY MOUTH DAILY          CONTINUE taking these medications      Instructions Last Dose Given Next Dose Due   aspirin 81 MG EC tablet      Take 81 mg by mouth Daily.        atorvastatin 10 MG tablet  Commonly known as: LIPITOR      Take 1 tablet by mouth Daily.       fluorouracil 5 % cream  Commonly known as: EFUDEX           irbesartan 300 MG tablet  Commonly known as: AVAPRO      TAKE ONE TABLET BY MOUTH AT NIGHT       levothyroxine 150 MCG tablet  Commonly known as: SYNTHROID, LEVOTHROID      TAKE ONE TABLET BY MOUTH DAILY       metoprolol tartrate 25 MG tablet  Commonly known as: LOPRESSOR      Take 1 tablet by mouth 2 (Two) Times a Day.       multivitamin tablet tablet  Commonly known as: THERAGRAN      Take 1 tablet by mouth Daily.       nitroglycerin 0.4 MG SL tablet  Commonly known as: Nitrostat      Place 1 tablet under the tongue Every 5 (Five) Minutes As Needed for Chest Pain. Take no more than 3 doses in 15 minutes.       Testosterone 20.25 MG/ACT (1.62%) gel  Commonly known as: AndroGel Pump      1 pump actuation on 1 shoulder daily.       Zaditor 0.025 % ophthalmic solution  Generic drug: ketotifen      Apply 1 drop to eye(s) as directed by provider As Needed.              Dictated utilizing Dragon dictation

## 2021-10-12 ENCOUNTER — PATIENT MESSAGE (OUTPATIENT)
Dept: CARDIOLOGY | Facility: CLINIC | Age: 71
End: 2021-10-12

## 2021-10-13 ENCOUNTER — TELEPHONE (OUTPATIENT)
Dept: CARDIOLOGY | Facility: CLINIC | Age: 71
End: 2021-10-13

## 2021-10-13 NOTE — TELEPHONE ENCOUNTER
----- Message from Joaquin Peña sent at 10/12/2021  5:37 PM EDT -----  Regarding: low dose aspirin  Watching the news this evening and witnessed an article stating that the requirement for daily low dose aspirin may now be doing more harm than good for some patients. Is this a concern for me? Do I need to discontinue this medication?

## 2021-10-13 NOTE — TELEPHONE ENCOUNTER
The article is saying that they do not recommend low-dose aspirin for people who do not have indications to be on it.  Patient does have a history of nonobstructive coronary disease and carotid artery plaque.  If he is tolerating the aspirin without bleeding or bruising or abdominal discomfort then would continue the 81 mg enteric-coated aspirin daily on a full stomach but call if he develops any issues or concerns.

## 2021-10-14 ENCOUNTER — OFFICE VISIT (OUTPATIENT)
Dept: SLEEP MEDICINE | Facility: HOSPITAL | Age: 71
End: 2021-10-14

## 2021-10-14 VITALS — BODY MASS INDEX: 30.69 KG/M2 | HEIGHT: 76 IN | OXYGEN SATURATION: 98 % | WEIGHT: 252 LBS

## 2021-10-14 DIAGNOSIS — G47.33 OSA (OBSTRUCTIVE SLEEP APNEA): Primary | ICD-10-CM

## 2021-10-14 PROCEDURE — 99214 OFFICE O/P EST MOD 30 MIN: CPT | Performed by: INTERNAL MEDICINE

## 2021-10-14 PROCEDURE — G0463 HOSPITAL OUTPT CLINIC VISIT: HCPCS

## 2021-10-14 NOTE — PROGRESS NOTES
"Follow Up [facility] Note     Chief Complaint:  JACQUELINE     Primary Care Physician: Erwin Escobar MD    Interval History:   The patient is a 71 y.o. male  who was last seen in the [facility] 2018 and that note was reviewed.  The patient has had an attended overnight polysomnogram dated 6/3/2012 with weight 250 pounds.  AHI is normal but the patient did have an abnormal respiratory effort related arousal index.  Home sleep study 3/21/2018 with severe obstructive sleep apnea and his weight was 273.8 pounds 3/6/2018.  Home sleep study 2018 mild JACQUELINE with AHI 7.5 events per hour with weight 2018 at 245 pounds.  Reevaluation requested by cardiology.    The patient goes to bed at 8 PM and awakens at 6 AM.  He states he feels fine upon awakening.  The patient does awaken with a dry mouth.  He uses the restroom twice during the nighttime.    Self-administered Chandler Sleepiness Scale test results: 0  0-5 Lower normal daytime sleepiness  6-10 Higher normal daytime sleepiness  11-12 Mild, 13-15 Moderate, & 16-24 Severe excessive daytime sleepiness    Review of Systems:    A complete review of systems was done and all were negative with the exception of arthritis complaints    Social History:    Social History     Socioeconomic History   • Marital status:    Tobacco Use   • Smoking status: Former Smoker     Packs/day: 0.25     Types: Cigarettes     Quit date: 1973     Years since quittin.8   • Smokeless tobacco: Never Used   Substance and Sexual Activity   • Alcohol use: Yes     Comment: occasional/  occ caffeine use 90z frap daily    • Drug use: No   • Sexual activity: Defer       Allergies:  Latex     Medication Review: His list was reviewed.      Vital Signs:    Vitals:    10/14/21 1132   SpO2: 98%   Weight: 114 kg (252 lb)   Height: 193 cm (75.98\")     Body mass index is 30.69 kg/m².    Physical Exam:    Constitutional:  Well developed 71 y.o. male that appears in no " apparent distress.  Awake & oriented times 3.  Normal mood with normal recent and remote memory and normal judgement.  Eyes:  Conjunctivae normal.  Oropharynx: Previously, moist mucous membranes without exudate and a large tongue with scalloped margins and a normal uvula and moderate-severe narrowing of the posterior pharyngeal opening and class II-3 Mallampati airway, patient is wearing a facemask.    I have reviewed the results of the patient's overnight polysomnogram from 2012 and his 2 home sleep study from 2018.      Impression:   Depending on the patient's weight, the patient has demonstrated respiratory effort related arousals versus severe-mild obstructive sleep apnea.  Presently, the patient has no complaints of hypersomnolence.    Plan:  Good sleep hygiene measures should be maintained.  Weight loss would be beneficial in this patient who is obese by BMI.  Presently, the patient is near his low weight.    After reviewing all with the patient, since cardiology requested reevaluation due to palpitations/tachycardia and paroxysmal atrial tachycardia, repeat home sleep study recommended to reevaluate for obstructive sleep apnea and/or snoring with increased arousals.  Risk of untreated obstructive sleep apnea with cardiac ramifications reviewed with the patient.  This will be arranged.      I answered all of the patient's questions.  The patient will call for any problems and will follow up after home sleep study performed       Rob Aguiar MD  Sleep Medicine  10/14/21  11:43 EDT

## 2021-11-03 ENCOUNTER — TELEPHONE (OUTPATIENT)
Dept: SLEEP MEDICINE | Facility: HOSPITAL | Age: 71
End: 2021-11-03

## 2021-11-03 ENCOUNTER — OFFICE VISIT (OUTPATIENT)
Dept: FAMILY MEDICINE CLINIC | Facility: CLINIC | Age: 71
End: 2021-11-03

## 2021-11-03 VITALS
BODY MASS INDEX: 30.32 KG/M2 | HEIGHT: 76 IN | OXYGEN SATURATION: 98 % | DIASTOLIC BLOOD PRESSURE: 80 MMHG | TEMPERATURE: 98 F | HEART RATE: 50 BPM | SYSTOLIC BLOOD PRESSURE: 128 MMHG | WEIGHT: 249 LBS

## 2021-11-03 DIAGNOSIS — I10 ESSENTIAL HYPERTENSION: ICD-10-CM

## 2021-11-03 DIAGNOSIS — M25.521 RIGHT ELBOW PAIN: Primary | ICD-10-CM

## 2021-11-03 PROCEDURE — 99213 OFFICE O/P EST LOW 20 MIN: CPT | Performed by: INTERNAL MEDICINE

## 2021-11-03 PROCEDURE — 73070 X-RAY EXAM OF ELBOW: CPT | Performed by: INTERNAL MEDICINE

## 2021-11-03 RX ORDER — AMOXICILLIN 500 MG
CAPSULE ORAL DAILY
COMMUNITY
Start: 2021-10-16

## 2021-11-03 RX ORDER — ARM BRACE
1 EACH MISCELLANEOUS DAILY
Qty: 1 EACH | Refills: 0 | Status: SHIPPED | OUTPATIENT
Start: 2021-11-03 | End: 2022-04-12

## 2021-11-03 NOTE — PROGRESS NOTES
Subjective   Joaquin Peña is a 71 y.o. male.     Vitals:    11/03/21 1107   BP: 128/80   Pulse: 50   Temp: 98 °F (36.7 °C)   SpO2: 98%      Body mass index is 30.31 kg/m².     History of Present Illness   Patient was seen for right elbow pain.  Patient was splitting logs and stumps.  Patient developed severe pain in his right elbow.  Pain was moderate to severe but he did not want to take any medication.  Patient was doing this 3 weeks ago.  Patient's x-ray showed possible avulsion fracture or calcified tendon.  Patient was placed on elbow sleeve and asked to use ice packs patient still did not want any medication.  Patient will follow up in 2 weeks.  Patient blood pressures been running 120s over 80s.  Patient will continue irbesartan 300 mg daily, metoprolol tartrate 25 mg twice daily.  Patient will continue monitoring blood pressure.    X-Ray  Interpretation report in house X-rays that I personally viewed    Relevant Clinical Issues/Diagnoses/Indications: Right elbow pain right elbow x-ray        Clinical Findings: #1 avulsion fracture versus calcified tendon          Comparative Data: No previous x-ray          Date of Previous X-ray:    Change on current X-ray:        Dictated utilizing Dragon dictation. If there are questions or for further clarification, please contact me.  The following portions of the patient's history were reviewed and updated as appropriate: allergies, current medications, past family history, past medical history, past social history, past surgical history and problem list.    Review of Systems   Constitutional: Negative for fatigue and fever.   HENT: Positive for congestion. Negative for trouble swallowing.    Eyes: Negative for discharge and visual disturbance.   Respiratory: Negative for choking and shortness of breath.    Cardiovascular: Negative for chest pain and palpitations.   Gastrointestinal: Negative for abdominal pain and blood in stool.   Endocrine: Negative.     Genitourinary: Negative for genital sores and hematuria.   Musculoskeletal: Positive for joint swelling. Negative for gait problem.   Skin: Negative for color change, pallor, rash and wound.   Allergic/Immunologic: Positive for environmental allergies. Negative for immunocompromised state.   Neurological: Negative for facial asymmetry and speech difficulty.   Psychiatric/Behavioral: Negative for hallucinations and suicidal ideas.       Objective   Physical Exam  Vitals and nursing note reviewed.   Constitutional:       Appearance: Normal appearance. He is well-developed.   HENT:      Head: Normocephalic and atraumatic.      Nose: Nose normal.      Mouth/Throat:      Mouth: Mucous membranes are moist.      Pharynx: Oropharynx is clear.   Eyes:      Extraocular Movements: Extraocular movements intact.      Conjunctiva/sclera: Conjunctivae normal.      Pupils: Pupils are equal, round, and reactive to light.   Cardiovascular:      Rate and Rhythm: Normal rate and regular rhythm.      Heart sounds: Normal heart sounds. No murmur heard.  No friction rub. No gallop.    Pulmonary:      Effort: Pulmonary effort is normal. No respiratory distress.      Breath sounds: Normal breath sounds. No stridor. No wheezing, rhonchi or rales.   Chest:      Chest wall: No tenderness.   Abdominal:      General: Bowel sounds are normal.      Palpations: Abdomen is soft.   Musculoskeletal:         General: Swelling and tenderness present. Normal range of motion.      Cervical back: Normal range of motion and neck supple.   Skin:     General: Skin is warm and dry.   Neurological:      General: No focal deficit present.      Mental Status: He is alert and oriented to person, place, and time. Mental status is at baseline.   Psychiatric:         Mood and Affect: Mood normal.         Behavior: Behavior normal.         Thought Content: Thought content normal.         Judgment: Judgment normal.         Assessment/Plan #1 Elbow sleeve #2 ice pack  #3 continue monitoring blood pressure  Problems Addressed this Visit        Cardiac and Vasculature    Essential hypertension      Other Visit Diagnoses     Right elbow pain    -  Primary    Relevant Orders    XR Elbow 2 View Right (Completed)      Diagnoses     Diagnosis Codes Comments    Right elbow pain    -  Primary ICD-10-CM: M25.521  ICD-9-CM: 719.42     Essential hypertension     ICD-10-CM: I10  ICD-9-CM: 401.9

## 2021-11-19 ENCOUNTER — HOSPITAL ENCOUNTER (OUTPATIENT)
Dept: SLEEP MEDICINE | Facility: HOSPITAL | Age: 71
Discharge: HOME | End: 2021-11-19
Admitting: INTERNAL MEDICINE

## 2021-11-19 DIAGNOSIS — E78.49 OTHER HYPERLIPIDEMIA: ICD-10-CM

## 2021-11-19 DIAGNOSIS — E55.9 VITAMIN D DEFICIENCY: ICD-10-CM

## 2021-11-19 DIAGNOSIS — E89.0 POSTABLATIVE HYPOTHYROIDISM: ICD-10-CM

## 2021-11-19 DIAGNOSIS — R79.89 LOW TESTOSTERONE: ICD-10-CM

## 2021-11-19 DIAGNOSIS — E05.00 GRAVES DISEASE: Primary | ICD-10-CM

## 2021-11-19 DIAGNOSIS — I10 ESSENTIAL HYPERTENSION: ICD-10-CM

## 2021-11-19 PROCEDURE — 95806 SLEEP STUDY UNATT&RESP EFFT: CPT

## 2021-11-19 PROCEDURE — 95806 SLEEP STUDY UNATT&RESP EFFT: CPT | Performed by: INTERNAL MEDICINE

## 2021-11-23 RX ORDER — IRBESARTAN 300 MG/1
TABLET ORAL
Qty: 90 TABLET | Refills: 1 | Status: SHIPPED | OUTPATIENT
Start: 2021-11-23 | End: 2022-05-04 | Stop reason: SDUPTHER

## 2021-11-23 RX ORDER — LEVOTHYROXINE SODIUM 0.15 MG/1
TABLET ORAL
Qty: 90 TABLET | Refills: 1 | Status: SHIPPED | OUTPATIENT
Start: 2021-11-23 | End: 2022-05-04 | Stop reason: SDUPTHER

## 2021-11-29 ENCOUNTER — TELEPHONE (OUTPATIENT)
Dept: SLEEP MEDICINE | Facility: HOSPITAL | Age: 71
End: 2021-11-29

## 2021-12-08 DIAGNOSIS — R73.09 ELEVATED HEMOGLOBIN A1C: ICD-10-CM

## 2021-12-08 DIAGNOSIS — R79.9 ABNORMAL FINDING OF BLOOD CHEMISTRY, UNSPECIFIED: ICD-10-CM

## 2021-12-08 DIAGNOSIS — D75.1 POLYCYTHEMIA: Primary | ICD-10-CM

## 2021-12-17 NOTE — PROGRESS NOTES
Subjective   Joaquin Peña is a 71 y.o. male.      F/u for goiter, hypothyroidism, graves disease, hyperlipidemia, hypertension        Patient is a 70-year-old male who came in for follow-up.      He has Graves' disease and had radioactive iodine therapy.  He became hypothyroid and is on levothyroxine 150 mcg/day.  He has no significant weight changes over the past year.  He denies bowel changes, palpitations, heat or cold intolerance.     He has hyperlipidemia with elevated LDL he has been on Lipitor 10 mg/day and fish oil 1200 mg/day.  He denies myalgia.  He has intentionally lost 8 pounds since July 2021.  Lipid panel done in 12/21 are as follows: Cholesterol 132.  HDL 31.  LDL 62.  Triglycerides 242.     He has history of low testosterone and was started on AndroGel by Dr. Smith in 2018.  He restarted on AndroGel 1.62% 1 pump on 1 shoulder once a day.    He has voluntary phlebotomy every 2 months done at the [facility] and the last one was in Sept 2021.  He has no history of sleep apnea.  PSA done in December 2020 is normal at 0.781.  He has good urinary stream.  He denies retention or dysuria.  He has occasional erectile dysfunction and uses Levitra as needed. He has occasional headaches when he takes Levitra.     He is  and has fathered 1 child.  He is not planning any more children     He denies headaches.  He denies changes in shoes or ring size.  He denies easy bruising or muscle weakness.       He has hypertension and is on Avapro 300 mg once a day and Lopressor 25 mg twice a day.  He has no history of heart attack or stroke.  He denies chest pain or SOB.     He has prediabetes with hemoglobin A1c ranging from 5.4 to 5.9% since 2017.  Hemoglobin A1c done in December 2021 is 5.8%.     He has vitamin D deficiency and is vit D3 5000 units a day and multivitamins 1 tablet/day.  25 hydroxy vitamin D done in December 2021 is normal at 50.8 ng/mL.     He has osteopenia on bone density done in June 2019.   "He has a bone density scheduled next month.    He had a home sleep study done in November 2021 and does not know the results.  He has lost 35 pounds since his last sleep study.  He will follow with Dr. Aguiar next month.    He was a [place] vet and was in a [employer].     The following portions of the patient's history were reviewed and updated as appropriate: allergies, current medications, past family history, past medical history, past social history, past surgical history and problem list.    Review of Systems   Eyes: Negative for visual disturbance.   Respiratory: Negative for shortness of breath and wheezing.    Cardiovascular: Negative for chest pain and palpitations.   Endocrine: Negative for cold intolerance and heat intolerance.   Genitourinary: Negative.  Negative for difficulty urinating.   Musculoskeletal: Negative for myalgias.   Neurological: Negative.  Negative for numbness.     Objective      Vitals:    12/29/21 0800   BP: 122/78   Pulse: (!) 46   SpO2: 98%   Weight: 109 kg (240 lb 9.6 oz)   Height: 193 cm (76\")     Physical Exam  Constitutional:       General: He is not in acute distress.     Appearance: Normal appearance. He is obese. He is not ill-appearing, toxic-appearing or diaphoretic.   HENT:      Nose: Nose normal.      Mouth/Throat:      Pharynx: No oropharyngeal exudate or posterior oropharyngeal erythema.   Eyes:      General: No scleral icterus.        Right eye: No discharge.         Left eye: No discharge.      Extraocular Movements: Extraocular movements intact.      Conjunctiva/sclera: Conjunctivae normal.   Neck:      Vascular: No carotid bruit.   Cardiovascular:      Rate and Rhythm: Normal rate and regular rhythm.      Pulses: Normal pulses.      Heart sounds: Normal heart sounds. No murmur heard.  No friction rub. No gallop.    Pulmonary:      Effort: Pulmonary effort is normal. No respiratory distress.      Breath sounds: Normal breath sounds. No stridor. No rales.   Chest:      " Chest wall: No tenderness.   Abdominal:      General: Bowel sounds are normal. There is no distension.      Palpations: Abdomen is soft. There is no mass.      Tenderness: There is no right CVA tenderness or left CVA tenderness.   Musculoskeletal:         General: No swelling or tenderness. Normal range of motion.      Cervical back: Normal range of motion and neck supple. No rigidity or tenderness.      Right lower leg: No edema.      Left lower leg: No edema.   Lymphadenopathy:      Cervical: No cervical adenopathy.   Skin:     General: Skin is warm.      Coloration: Skin is not jaundiced or pale.   Neurological:      General: No focal deficit present.      Mental Status: He is alert and oriented to person, place, and time.   Psychiatric:         Mood and Affect: Mood normal.         Behavior: Behavior normal.       Results Encounter on 11/24/2021   Component Date Value Ref Range Status   • 25 Hydroxy, Vitamin D 12/08/2021 50.8  30.0 - 100.0 ng/mL Final    Comment: Vitamin D deficiency has been defined by the [place] of  Medicine and an Endocrine Society practice guideline as a  level of serum 25-OH vitamin D less than 20 ng/mL (1,2).  The Endocrine Society went on to further define vitamin D  insufficiency as a level between 21 and 29 ng/mL (2).  1. IOM ([place] of [employer]). 2010. Dietary reference     intakes for calcium and D. [place]: The     National Academies Press.  2. Martine MF, Timoteo NC, Reina LUTZ, et al.     Evaluation, treatment, and prevention of vitamin D     deficiency: an Endocrine Society clinical practice     guideline. JCEM. 2011 Jul; 96(7):1911-30.     • Glucose 12/08/2021 83  65 - 99 mg/dL Final   • BUN 12/08/2021 17  8 - 27 mg/dL Final   • Creatinine 12/08/2021 1.10  0.76 - 1.27 mg/dL Final   • eGFR Non  Am 12/08/2021 68  >59 mL/min/1.73 Final   • eGFR African Am 12/08/2021 79  >59 mL/min/1.73 Final    Comment: **In accordance with recommendations from the NKF-ASN  Task force,**    [facility] is in the process of updating its eGFR calculation to the    2021 CKD-EPI creatinine equation that estimates kidney function    without a race variable.     • BUN/Creatinine Ratio 12/08/2021 15  10 - 24 Final   • Sodium 12/08/2021 139  134 - 144 mmol/L Final   • Potassium 12/08/2021 5.1  3.5 - 5.2 mmol/L Final   • Chloride 12/08/2021 105  96 - 106 mmol/L Final   • Total CO2 12/08/2021 24  20 - 29 mmol/L Final   • Calcium 12/08/2021 10.0  8.6 - 10.2 mg/dL Final   • Total Protein 12/08/2021 6.6  6.0 - 8.5 g/dL Final   • Albumin 12/08/2021 3.9  3.8 - 4.8 g/dL Final   • Globulin 12/08/2021 2.7  1.5 - 4.5 g/dL Final   • A/G Ratio 12/08/2021 1.4  1.2 - 2.2 Final   • Total Bilirubin 12/08/2021 0.5  0.0 - 1.2 mg/dL Final   • Alkaline Phosphatase 12/08/2021 65  44 - 121 IU/L Final                  **Please note reference interval change**   • AST (SGOT) 12/08/2021 23  0 - 40 IU/L Final   • ALT (SGPT) 12/08/2021 22  0 - 44 IU/L Final   • Total Cholesterol 12/08/2021 132  100 - 199 mg/dL Final   • Triglycerides 12/08/2021 242* 0 - 149 mg/dL Final   • HDL Cholesterol 12/08/2021 31* >39 mg/dL Final   • VLDL Cholesterol Ricci 12/08/2021 39  5 - 40 mg/dL Final   • LDL Chol Calc (NIH) 12/08/2021 62  0 - 99 mg/dL Final   • Free T4 12/08/2021 1.29  0.82 - 1.77 ng/dL Final   • WBC 12/08/2021 7.2  3.4 - 10.8 x10E3/uL Final   • RBC 12/08/2021 5.36  4.14 - 5.80 x10E6/uL Final   • Hemoglobin 12/08/2021 15.6  13.0 - 17.7 g/dL Final   • Hematocrit 12/08/2021 46.4  37.5 - 51.0 % Final   • MCV 12/08/2021 87  79 - 97 fL Final   • MCH 12/08/2021 29.1  26.6 - 33.0 pg Final   • MCHC 12/08/2021 33.6  31.5 - 35.7 g/dL Final   • RDW 12/08/2021 14.5  11.6 - 15.4 % Final   • Platelets 12/08/2021 259  150 - 450 x10E3/uL Final   • Neutrophil Rel % 12/08/2021 60  Not Estab. % Final   • Lymphocyte Rel % 12/08/2021 24  Not Estab. % Final   • Monocyte Rel % 12/08/2021 11  Not Estab. % Final   • Eosinophil Rel % 12/08/2021 4  Not  Estab. % Final   • Basophil Rel % 12/08/2021 1  Not Estab. % Final   • Neutrophils Absolute 12/08/2021 4.3  1.4 - 7.0 x10E3/uL Final   • Lymphocytes Absolute 12/08/2021 1.7  0.7 - 3.1 x10E3/uL Final   • Monocytes Absolute 12/08/2021 0.8  0.1 - 0.9 x10E3/uL Final   • Eosinophils Absolute 12/08/2021 0.3  0.0 - 0.4 x10E3/uL Final   • Basophils Absolute 12/08/2021 0.1  0.0 - 0.2 x10E3/uL Final   • Immature Granulocyte Rel % 12/08/2021 0  Not Estab. % Final   • Immature Grans Absolute 12/08/2021 0.0  0.0 - 0.1 x10E3/uL Final   • TIBC 12/08/2021 280  250 - 450 ug/dL Final   • UIBC 12/08/2021 213  111 - 343 ug/dL Final   • Iron 12/08/2021 67  38 - 169 ug/dL Final   • Iron Saturation 12/08/2021 24  15 - 55 % Final   • Testosterone, Total 12/08/2021 345.1  264.0 - 916.0 ng/dL Final    Comment: This [facility] LC/MS-MS method is currently certified by the CDC  Hormone Standardization Program (HoSt). Adult male reference  interval is based on a population of healthy nonobese males  (BMI <30) between 19 and 39 years old. Brett, et.al. JCEM  2017,102;8665-7248. PMID: 14261025.     • Testosterone, Free 12/08/2021 9.66  5.00 - 21.00 ng/dL Final   • Testosterone, Free % 12/08/2021 2.80  1.50 - 4.20 % Final   • Hemoglobin A1C 12/08/2021 5.8* 4.8 - 5.6 % Final    Comment:          Prediabetes: 5.7 - 6.4           Diabetes: >6.4           Glycemic control for adults with diabetes: <7.0     • TSH 12/08/2021 1.740  0.450 - 4.500 uIU/mL Final   • Ferritin 12/08/2021 25* 30 - 400 ng/mL Final   • Interpretation 12/08/2021 Note   Final    Supplemental report is available.     Assessment/Plan   Diagnoses and all orders for this visit:    1. Graves disease (Primary)    2. Postablative hypothyroidism    3. Other hyperlipidemia    4. Low testosterone    5. Essential hypertension    6. Vitamin D deficiency      Continue levothyroxine 150 mcg/day.  Continue Lipitor 10 mg/day and fish oil.  Continue AndroGel 1.62% 1 pump on 1 shoulder once a  day.  Continue metoprolol and irbesartan per Dr. Haque.  Continue vitamin D and multivitamins.    Copy my note sent to Dr. Escobar.    RTC 6 mos.

## 2021-12-26 DIAGNOSIS — E78.2 MIXED HYPERLIPIDEMIA: ICD-10-CM

## 2021-12-27 RX ORDER — ATORVASTATIN CALCIUM 10 MG/1
TABLET, FILM COATED ORAL
Qty: 90 TABLET | Refills: 0 | Status: SHIPPED | OUTPATIENT
Start: 2021-12-27 | End: 2022-03-14 | Stop reason: SDUPTHER

## 2021-12-29 ENCOUNTER — OFFICE VISIT (OUTPATIENT)
Dept: ENDOCRINOLOGY | Age: 71
End: 2021-12-29

## 2021-12-29 VITALS
DIASTOLIC BLOOD PRESSURE: 78 MMHG | BODY MASS INDEX: 29.3 KG/M2 | WEIGHT: 240.6 LBS | SYSTOLIC BLOOD PRESSURE: 122 MMHG | OXYGEN SATURATION: 98 % | HEIGHT: 76 IN | HEART RATE: 46 BPM

## 2021-12-29 DIAGNOSIS — E05.00 GRAVES DISEASE: Primary | ICD-10-CM

## 2021-12-29 DIAGNOSIS — R79.89 LOW TESTOSTERONE: ICD-10-CM

## 2021-12-29 DIAGNOSIS — E55.9 VITAMIN D DEFICIENCY: ICD-10-CM

## 2021-12-29 DIAGNOSIS — I10 ESSENTIAL HYPERTENSION: ICD-10-CM

## 2021-12-29 DIAGNOSIS — E78.49 OTHER HYPERLIPIDEMIA: ICD-10-CM

## 2021-12-29 DIAGNOSIS — E89.0 POSTABLATIVE HYPOTHYROIDISM: ICD-10-CM

## 2021-12-29 PROBLEM — I25.10 CORONARY ARTERY DISEASE INVOLVING NATIVE CORONARY ARTERY OF NATIVE HEART WITHOUT ANGINA PECTORIS: Status: RESOLVED | Noted: 2020-05-13 | Resolved: 2021-12-29

## 2021-12-29 PROBLEM — E03.8 OTHER SPECIFIED HYPOTHYROIDISM: Status: RESOLVED | Noted: 2018-06-20 | Resolved: 2021-12-29

## 2021-12-29 PROCEDURE — 99214 OFFICE O/P EST MOD 30 MIN: CPT | Performed by: INTERNAL MEDICINE

## 2022-01-13 ENCOUNTER — OFFICE VISIT (OUTPATIENT)
Dept: SLEEP MEDICINE | Facility: HOSPITAL | Age: 72
End: 2022-01-13

## 2022-01-13 VITALS — OXYGEN SATURATION: 97 % | HEIGHT: 76 IN | BODY MASS INDEX: 30.2 KG/M2 | WEIGHT: 248 LBS | HEART RATE: 52 BPM

## 2022-01-13 DIAGNOSIS — G47.33 OSA (OBSTRUCTIVE SLEEP APNEA): Primary | ICD-10-CM

## 2022-01-13 PROCEDURE — G0463 HOSPITAL OUTPT CLINIC VISIT: HCPCS

## 2022-01-13 PROCEDURE — 99213 OFFICE O/P EST LOW 20 MIN: CPT | Performed by: INTERNAL MEDICINE

## 2022-01-13 NOTE — PROGRESS NOTES
"Follow Up [facility] Note     Chief Complaint:  JACQUELINE     Primary Care Physician: Erwin Escobar MD    Interval History:   The patient is a 71 y.o. male  who I last saw 10/14/2021 and that note was reviewed.  The patient did undergo an attended overnight polysomnogram 6/3/2012.  The patient's AHI was normal but he did have an abnormal respiratory effort related arousal index.  Home sleep study 3/21/2018 with severe JACQUELINE.  Home sleep study 2018 had mild JACQUELINE.  The patient's obstructive sleep apnea fluctuates with his weight.    Repeat home sleep study performed 2021.  Weight 252 pounds.  Moderate JACQUELINE with AHI 16.8 events per hour noted.  No sleep-related hypoxia.  The patient snored 67.7% of total monitoring time.    The patient goes to bed at 8 PM and arises at 6 AM.  He will use the restroom during that time.    Self-administered Sandoval Sleepiness Scale test results: 0  0-5 Lower normal daytime sleepiness  6-10 Higher normal daytime sleepiness  11-12 Mild, 13-15 Moderate, & 16-24 Severe excessive daytime sleepiness    Review of Systems:    A complete review of systems was done and all were negative with the exception of the above    Social History:    Social History     Socioeconomic History   • Marital status:    Tobacco Use   • Smoking status: Former Smoker     Packs/day: 0.25     Types: Cigarettes     Quit date: 1973     Years since quittin.0   • Smokeless tobacco: Never Used   Substance and Sexual Activity   • Alcohol use: Yes     Comment: occasional/  occ caffeine use 90z frap daily    • Drug use: No   • Sexual activity: Defer       Allergies:  Latex     Medication Review:  Reviewed.      Vital Signs:    Vitals:    22 0941   Pulse: 52   SpO2: 97%   Weight: 112 kg (248 lb)   Height: 193 cm (76\")     Body mass index is 30.19 kg/m².    Physical Exam:    Constitutional:  Well developed 71 y.o. male that appears in no apparent distress.  Awake & oriented times 3.  Normal " mood with normal recent and remote memory and normal judgement.  Eyes:  Conjunctivae normal.  Oropharynx: Previously, moist mucous membranes without exudate and a large tongue with scalloped margins and a normal uvula and moderate-severe narrowing of the posterior pharyngeal opening and class II-3 Mallampati airway, patient is wearing a facemask.    I have reviewed the results of the home sleep study with the patient     Impression:   Moderate obstructive sleep apnea Home sleep study 11/19/2021.  The patient denies complaints of hypersomnolence.    Plan:  Good sleep hygiene measures should be maintained.  Weight loss would be beneficial in this patient who is obese by BMI.      I reviewed all with the patient.  Previously, when CPAP utilized, the patient reports he could not breathe with CPAP?  The patient is reluctant to try CPAP again.  The patient is in the process of obtaining implants to the 4 middle teeth of his mandible.  This should be finished in the next several months.  I have asked him to review with his dentist to approve a dental device for obstructive sleep apnea.  Possibly, the dental oral appliance could adequately treat the patient's moderate obstructive sleep apnea as long as he keeps his weight down.  The patient reports he will keep me informed.    I answered all of the patient's questions.  The patient will call for any problems and will call me once dental implants are completed       Rob Aguiar MD  Sleep Medicine  01/13/22  09:47 EST

## 2022-01-14 ENCOUNTER — HOSPITAL ENCOUNTER (OUTPATIENT)
Dept: BONE DENSITY | Facility: HOSPITAL | Age: 72
Discharge: HOME | End: 2022-01-14
Admitting: INTERNAL MEDICINE

## 2022-01-14 PROCEDURE — 77080 DXA BONE DENSITY AXIAL: CPT

## 2022-02-24 ENCOUNTER — OFFICE VISIT (OUTPATIENT)
Dept: CARDIOLOGY | Facility: CLINIC | Age: 72
End: 2022-02-24

## 2022-02-24 ENCOUNTER — TELEPHONE (OUTPATIENT)
Dept: CARDIOLOGY | Facility: CLINIC | Age: 72
End: 2022-02-24

## 2022-02-24 VITALS
BODY MASS INDEX: 30.81 KG/M2 | HEART RATE: 47 BPM | SYSTOLIC BLOOD PRESSURE: 120 MMHG | HEIGHT: 76 IN | WEIGHT: 253 LBS | DIASTOLIC BLOOD PRESSURE: 80 MMHG

## 2022-02-24 DIAGNOSIS — I49.3 PVC (PREMATURE VENTRICULAR CONTRACTION): ICD-10-CM

## 2022-02-24 DIAGNOSIS — I10 ESSENTIAL HYPERTENSION: ICD-10-CM

## 2022-02-24 DIAGNOSIS — I25.10 CORONARY ARTERY DISEASE INVOLVING NATIVE CORONARY ARTERY OF NATIVE HEART WITHOUT ANGINA PECTORIS: ICD-10-CM

## 2022-02-24 DIAGNOSIS — R00.2 PALPITATIONS: ICD-10-CM

## 2022-02-24 DIAGNOSIS — I47.1 PSVT (PAROXYSMAL SUPRAVENTRICULAR TACHYCARDIA): Primary | ICD-10-CM

## 2022-02-24 PROCEDURE — 93000 ELECTROCARDIOGRAM COMPLETE: CPT | Performed by: NURSE PRACTITIONER

## 2022-02-24 PROCEDURE — 99214 OFFICE O/P EST MOD 30 MIN: CPT | Performed by: NURSE PRACTITIONER

## 2022-02-24 NOTE — TELEPHONE ENCOUNTER
"Patient called informing me that \" something is not right with his heart.\" Patient said he is having a similar episode as to what he had previously.  Patient stated that this started about 2 days ago. Patient said its hard to describe but he knows his heart is there.  Patient denies chest pain, chest pressure and SOA.  Patient said his b/p has been 116-120/75.  Patient said in the am his heart rate is 45-50, then goes up to 60-65 once he is moving around.  Patient wandered if he needs to wear another monitor.  Please advise.  Veronica  "

## 2022-02-24 NOTE — PROGRESS NOTES
[facility]   [address], [address]  [place], [place], [address]    [phone]  [email or web address]           OUTPATIENT CLINIC FOLLOW UP NOTE    Patient Care Team:  Patient Care Team:  Erwin Escobar MD as PCP - General  Erwin Escobar MD as PCP - Family Medicine  Muna Haque MD as Consulting Physician (Cardiology)  Oscar Stuart MD as Consulting Physician (Endocrinology)  Horace Casper MD as Consulting Physician (Dermatology)    Subjective:      Chief Complaint   Patient presents with   • Coronary Artery Disease       HPI:    Joaquin Peña is a 71 y.o. male.  Problem list:  1. Paroxysmal SVT  a. Stress echocardiogram 11/2014: Normal LV size and function with EF 59%, grade 1 diastolic dysfunction, mild left ventricular hypertrophy with no significant valvular heart disease or evidence of ischemia.  b. Holter monitor 2015: Frequent PVCs with brief burst of atrial tachycardia.  c. 7/2021 14-day Holter: Sinus rhythm with PVCs, 17 episodes of SVT, longest lasting 11 beats.  Ventricular bigeminy was present with one episode of ventricular tachycardia lasting 9 beats.  There were episodes of idioventricular rhythm as well.  d. TTE 9/2021: Normal LV systolic function, GLS was normal, normal diastolic function, no significant valvular heart disease or pulmonary hypertension.  e. Nuclear stress test 10/2019: Small to medium sized, mildly severe area of ischemia located in the inferior wall and lateral wall, frequent PVCs, EF 55%  f. LHC 11/2019: Minimal CAD with no significant obstruction, normal EF, normal wall motion and normal left ventricular filling pressures of 5 mmHg.  2. Frequent PVCs  3. Hypertension  4. Graves' disease  5. Dyslipidemia  6. Renal oculi    The patient presents today for follow-up.  His primary cardiologist is Dr. Haque.      The patient was last seen he reports that overall he has been doing well from a cardiac standpoint until this week.  He  reports he is having a strange feeling in his chest.  He believes this is likely palpitations.  He notes on the first day he feels this way his symptoms are more severe and then they taper off until resolving entirely.  He is currently on proximately day 5 and his symptoms are now almost resolved.  He denies chest pain/pressure/heaviness, shortness of breath, lower extremity edema, lightheadedness, or syncope.  His blood pressure has been well controlled.  Heart rate has been stable.  He exercises regularly without significant difficulty.  Rhythm strip collected today revealed sinus rhythm with occasional PVCs.    Review of Systems:  Positive for palpitations  Negative for exertional chest pain, dyspnea with exertion, lower extremity edema, syncope.     PFSH:  Patient Active Problem List   Diagnosis   • Graves disease   • Supraventricular tachycardia (HCC)   • PVC (premature ventricular contraction)   • Other hyperlipidemia   • Acute bilateral thoracic back pain   • Dizziness   • Irregular heart beat   • Seasonal allergic rhinitis due to pollen   • Hyperglycemia   • Erectile dysfunction   • Postablative hypothyroidism   • Insulin resistance   • Acquired spondylolisthesis   • Lumbar radiculopathy   • Degeneration of intervertebral disc of lumbar region   • Essential hypertension   • PND (paroxysmal nocturnal dyspnea)   • Right foot pain   • Pain in both lower extremities   • JACQUELINE (obstructive sleep apnea)   • Snoring   • H/O multiple concussions   • Low testosterone   • Vitamin D deficiency   • Preop cardiovascular exam   • Abnormal stress test   • Benign prostatic hyperplasia without lower urinary tract symptoms   • Gastroesophageal reflux disease         Current Outpatient Medications:   •  aspirin 81 MG EC tablet, Take 81 mg by mouth Daily., Disp: , Rfl:   •  atorvastatin (LIPITOR) 10 MG tablet, TAKE ONE TABLET BY MOUTH DAILY, Disp: 90 tablet, Rfl: 0  •  Elastic Bandages & Supports (ACE Elbow Strap) [facility] [place]  "Daily., Disp: 1 each, Rfl: 0  •  fluorouracil (EFUDEX) 5 % cream, As Needed., Disp: , Rfl:   •  fluticasone (Flonase) 50 MCG/ACT nasal spray, 2 sprays into the nostril(s) as directed by provider Daily. (Patient taking differently: 2 sprays into the nostril(s) as directed by provider As Needed.), Disp: 18.2 mL, Rfl: 11  •  irbesartan (AVAPRO) 300 MG tablet, TAKE ONE TABLET BY MOUTH ONCE NIGHTLY, Disp: 90 tablet, Rfl: 1  •  ketotifen (ZADITOR) 0.025 % ophthalmic solution, Apply 1 drop to eye(s) as directed by provider As Needed., Disp: , Rfl:   •  levothyroxine (SYNTHROID, LEVOTHROID) 150 MCG tablet, TAKE ONE TABLET BY MOUTH DAILY, Disp: 90 tablet, Rfl: 1  •  metoprolol tartrate (LOPRESSOR) 25 MG tablet, Take 1 tablet by mouth 2 (Two) Times a Day., Disp: 180 tablet, Rfl: 1  •  Multiple Vitamin (MULTI-VITAMIN DAILY PO), Take 1 tablet by mouth Daily., Disp: , Rfl:   •  naproxen (NAPROSYN) 500 MG tablet, Take 1 tablet by mouth 2 (Two) Times a Day. (Patient taking differently: Take 500 mg by mouth 2 (Two) Times a Day As Needed.), Disp: 60 tablet, Rfl: 3  •  Natural Vitamin D-3 125 MCG (5000 UT) tablet, TAKE ONE TABLET BY MOUTH DAILY (Patient taking differently: 7,000 Units.), Disp: 30 tablet, Rfl: 12  •  nitroglycerin (Nitrostat) 0.4 MG SL tablet, Place 1 tablet under the tongue Every 5 (Five) Minutes As Needed for Chest Pain. Take no more than 3 doses in 15 minutes., Disp: 10 tablet, Rfl: 12  •  Omega-3 Fatty Acids (fish oil) 1200 MG capsule capsule, Daily., Disp: , Rfl:   •  Testosterone (AndroGel Pump) 20.25 MG/ACT (1.62%) gel, 1 pump actuation on 1 shoulder daily., Disp: 75 g, Rfl: 0    Allergies   Allergen Reactions   • Latex Rash        reports that he quit smoking about 49 years ago. His smoking use included cigarettes. He smoked 0.25 packs per day. He has never used smokeless tobacco.      Objective:   Physical exam:  /80   Pulse (!) 47   Ht 193 cm (76\")   Wt 115 kg (253 lb)   BMI 30.80 kg/m² "   CONSTITUTIONAL: No acute distress  RESPIRATORY: Normal effort. Clear to auscultation bilaterally without wheezing or rales  CARDIOVASCULAR: Carotids with normal upstrokes without bruits.  Regular rate and rhythm with normal S1 and S2. Without murmur, gallop or rub. Normal radial pulse. There is no lower extremity edema bilaterally.    Labs:    BUN   Date Value Ref Range Status   12/08/2021 17 8 - 27 mg/dL Final   11/01/2019 20 8 - 23 mg/dL Final     Creatinine   Date Value Ref Range Status   12/08/2021 1.10 0.76 - 1.27 mg/dL Final   11/01/2019 1.20 0.76 - 1.27 mg/dL Final     Potassium   Date Value Ref Range Status   12/08/2021 5.1 3.5 - 5.2 mmol/L Final   11/01/2019 4.6 3.5 - 5.2 mmol/L Final     ALT (SGPT)   Date Value Ref Range Status   12/08/2021 22 0 - 44 IU/L Final   02/27/2019 19 1 - 41 U/L Final     AST (SGOT)   Date Value Ref Range Status   12/08/2021 23 0 - 40 IU/L Final   02/27/2019 14 1 - 40 U/L Final       Lab Results   Component Value Date    CHOL 126 05/30/2018     Lab Results   Component Value Date    TRIG 242 (H) 12/08/2021     Lab Results   Component Value Date    HDL 31 (L) 12/08/2021     Lab Results   Component Value Date    LDL 62 12/08/2021     No components found for: LDLDIRECTC    Diagnostic Data:      ECG 12 Lead    Date/Time: 2/24/2022 2:54 PM  Performed by: Layla Edouard APRN  Authorized by: Layla Edouard APRN   Comparison: compared with previous ECG from 10/4/2021  Similar to previous ECG  Rhythm: sinus bradycardia  Rate: bradycardic  BPM: 47  Conduction: non-specific intraventricular conduction delay  Comments:  ms,  ms            Results for orders placed during the hospital encounter of 09/22/21    Adult Transthoracic Echo Complete w/ Color, Spectral and Contrast if Necessary Per Protocol    Interpretation Summary  · Left ventricular ejection fraction appears to be 66 - 70%. Left ventricular systolic function is normal. Normal global longitudinal LV strain (GLS) =  -22.3%. Left ventricle strain data was reviewed by the physician and found to be accurate. Normal left ventricular cavity size noted. Left ventricular wall thickness is consistent with mild concentric hypertrophy. All left ventricular wall segments contract normally. Left ventricular diastolic function was normal.    Nuclear stress test 10/2019  · Myocardial perfusion imaging indicates a small-to-medium-sized, mildly severe area of ischemia located in the inferior wall and lateral wall.  · Left ventricular ejection fraction is normal (Calculated EF = 55%).  · Arrhythmias during stress: frequent PVCs, couplets. Aberrant beats noted  · There is no prior study available for comparison    [facility] 11/2019  1. Minimal coronary artery disease with no significant obstruction.  2. Normal left ventricular systolic function with estimated ejection fraction of 60% and normal wall motion with normal left ventricular filling pressures of 5 mmHg.    Assessment and Plan:   Diagnoses and all orders for this visit:    PSVT (paroxysmal supraventricular tachycardia) (HCC) (Primary)  PVC (premature ventricular contraction)  Palpitations  -Patient with intermittent palpitations.  -14-day cardiac monitor 7/2021 with occasional PVCs, one 9 beat run of NSVT, 17 episodes of paroxysmal SVT and rare PACs.  -Discussed with the patient repeating cardiac monitor today.  Patient wishes to wait until his symptoms return in the future.  Patient instructed to call if he begins having the symptoms again.  We will place 48-hour Holter at that time.    Coronary artery disease involving native coronary artery of native heart without angina pectoris  -Patient denies angina.  -Cardiac catheterization in 2019 with minimal nonobstructive CAD.  -Continue ongoing risk factor modification.    Essential hypertension  -At goal, continue irbesartan and metoprolol.    - Return in about 6 months (around 8/24/2022) for Next scheduled follow up with   Haque.    Electronically signed by QIAN Burk, 02/24/22, 1:34 PM EST.

## 2022-03-14 ENCOUNTER — TELEPHONE (OUTPATIENT)
Dept: ENDOCRINOLOGY | Age: 72
End: 2022-03-14

## 2022-03-14 DIAGNOSIS — E78.2 MIXED HYPERLIPIDEMIA: ICD-10-CM

## 2022-03-14 RX ORDER — ATORVASTATIN CALCIUM 10 MG/1
10 TABLET, FILM COATED ORAL DAILY
Qty: 90 TABLET | Refills: 1 | Status: SHIPPED | OUTPATIENT
Start: 2022-03-14 | End: 2022-09-07

## 2022-04-11 DIAGNOSIS — R79.89 LOW TESTOSTERONE: Primary | ICD-10-CM

## 2022-04-11 DIAGNOSIS — R79.89 LOW TESTOSTERONE: ICD-10-CM

## 2022-04-11 DIAGNOSIS — R94.8 ABNORMAL RESULTS OF FUNCTION STUDIES OF OTHER ORGANS AND SYSTEMS: ICD-10-CM

## 2022-04-11 RX ORDER — TESTOSTERONE 16.2 MG/G
GEL TRANSDERMAL
Qty: 75 G | Refills: 0 | OUTPATIENT
Start: 2022-04-11

## 2022-04-11 NOTE — TELEPHONE ENCOUNTER
He last filled AndroGel in June 2021.  Please ask patient for additional lab work between 8 AM and 10 AM.  Orders for total and free testosterone, LH, FSH, sex hormone binding globulin, CBC and PSA were placed on chart.

## 2022-04-12 ENCOUNTER — OFFICE VISIT (OUTPATIENT)
Dept: FAMILY MEDICINE CLINIC | Facility: CLINIC | Age: 72
End: 2022-04-12

## 2022-04-12 VITALS
TEMPERATURE: 98.9 F | BODY MASS INDEX: 30.25 KG/M2 | DIASTOLIC BLOOD PRESSURE: 84 MMHG | WEIGHT: 248.4 LBS | HEIGHT: 76 IN | HEART RATE: 45 BPM | SYSTOLIC BLOOD PRESSURE: 132 MMHG | OXYGEN SATURATION: 95 %

## 2022-04-12 DIAGNOSIS — M25.571 ACUTE RIGHT ANKLE PAIN: Primary | ICD-10-CM

## 2022-04-12 DIAGNOSIS — K21.9 GASTROESOPHAGEAL REFLUX DISEASE, UNSPECIFIED WHETHER ESOPHAGITIS PRESENT: ICD-10-CM

## 2022-04-12 DIAGNOSIS — I10 ESSENTIAL HYPERTENSION: ICD-10-CM

## 2022-04-12 PROCEDURE — 99213 OFFICE O/P EST LOW 20 MIN: CPT | Performed by: NURSE PRACTITIONER

## 2022-04-12 NOTE — ASSESSMENT & PLAN NOTE
Right ankle pain has improved since onset; patient took naproxen which helped with swelling  I advised pt to use Ace wrap and ice for inflammation   If pain starts to develop again to rto for xray   Pt refused xray today

## 2022-04-12 NOTE — PROGRESS NOTES
"Chief Complaint  Ankle Pain (Right ankle)    Subjective          Joaquin Peña presents to [facility]  Patient presents to the office today with a new onset of right ankle pain. Patient reports one week ago he woke up with ankle pain and swelling. Pt use a lidocaine patch and naproxen for swelling. He reports the naproxen helped the next day and was able to walk better.       Objective   Vital Signs:   /84 (BP Location: Left arm, Patient Position: Sitting, Cuff Size: Adult)   Pulse (!) 45   Temp 98.9 °F (37.2 °C) (Infrared)   Ht 193 cm (76\")   Wt 113 kg (248 lb 6.4 oz)   SpO2 95%   BMI 30.24 kg/m²            Physical Exam  Constitutional:       General: He is not in acute distress.     Appearance: Normal appearance.   Eyes:      Pupils: Pupils are equal, round, and reactive to light.   Cardiovascular:      Rate and Rhythm: Normal rate and regular rhythm.      Pulses: Normal pulses.      Heart sounds: Normal heart sounds.   Pulmonary:      Effort: Pulmonary effort is normal.      Breath sounds: Normal breath sounds. No wheezing or rales.   Musculoskeletal:         General: Swelling and tenderness present. No signs of injury.      Right ankle: Swelling present. Tenderness present. Normal range of motion.      Left ankle: Normal.      Comments: Pain has improved   Neurological:      Mental Status: He is alert.   Psychiatric:         Mood and Affect: Mood normal.         Behavior: Behavior normal.        Result Review :   The following data was reviewed by: QIAN Max on 04/12/2022:  Common labs    Common Labsle 7/14/21 7/28/21 7/28/21 12/8/21 12/8/21 12/8/21 12/8/21     0952 0952 1026 1026 1026 1026   Glucose  82  83      BUN  17  17      Creatinine  1.00  1.10      eGFR Non  Am  74  68      eGFR African Am  90  79      Sodium  140  139      Potassium  5.1  5.1      Chloride  107  105      Calcium  10.1  10.0      Total Protein  6.7  6.6      Albumin  " 3.90  3.9      Total Bilirubin  0.3  0.5      Alkaline Phosphatase  68  65      AST (SGOT)  22  23      ALT (SGPT)  24  22      WBC 7.05     7.2    Hemoglobin 15.0     15.6    Hematocrit 46.6     46.4    Platelets 256     259    Total Cholesterol   136  132     Triglycerides   148  242 (A)     HDL Cholesterol   35 (A)  31 (A)     LDL Cholesterol    75  62     Hemoglobin A1C       5.8 (A)   (A) Abnormal value       Comments are available for some flowsheets but are not being displayed.                     Assessment and Plan    Diagnoses and all orders for this visit:    1. Acute right ankle pain (Primary)  Assessment & Plan:  Right ankle pain has improved since onset; patient took naproxen which helped with swelling  I advised pt to use Ace wrap and ice for inflammation   If pain starts to develop again to rto for xray   Pt refused xray today      2. Essential hypertension  Assessment & Plan:  Hypertension is improving with treatment.  Continue current treatment regimen.  Dietary sodium restriction.  Weight loss.  Regular aerobic exercise.  Continue current medications.  Blood pressure will be reassessed at the next regular appointment.      3. Gastroesophageal reflux disease, unspecified whether esophagitis present      Follow Up   Return in about 6 months (around 10/12/2022), or if symptoms worsen or fail to improve.  Patient was given instructions and counseling regarding his condition or for health maintenance advice. Please see specific information pulled into the AVS if appropriate.

## 2022-04-15 ENCOUNTER — TELEPHONE (OUTPATIENT)
Dept: ENDOCRINOLOGY | Age: 72
End: 2022-04-15

## 2022-04-15 ENCOUNTER — LAB (OUTPATIENT)
Dept: ENDOCRINOLOGY | Age: 72
End: 2022-04-15

## 2022-04-15 DIAGNOSIS — R94.8 ABNORMAL RESULTS OF FUNCTION STUDIES OF OTHER ORGANS AND SYSTEMS: ICD-10-CM

## 2022-04-15 DIAGNOSIS — R79.89 LOW TESTOSTERONE: ICD-10-CM

## 2022-04-20 ENCOUNTER — TELEPHONE (OUTPATIENT)
Dept: ENDOCRINOLOGY | Age: 72
End: 2022-04-20

## 2022-04-20 LAB
BASOPHILS # BLD AUTO: 0.1 X10E3/UL (ref 0–0.2)
BASOPHILS NFR BLD AUTO: 1 %
EOSINOPHIL # BLD AUTO: 0.2 X10E3/UL (ref 0–0.4)
EOSINOPHIL NFR BLD AUTO: 3 %
ERYTHROCYTE [DISTWIDTH] IN BLOOD BY AUTOMATED COUNT: 14.7 % (ref 11.6–15.4)
FSH SERPL-ACNC: 4 MIU/ML (ref 1.5–12.4)
HCT VFR BLD AUTO: 53.1 % (ref 37.5–51)
HGB BLD-MCNC: 17.3 G/DL (ref 13–17.7)
IMM GRANULOCYTES # BLD AUTO: 0 X10E3/UL (ref 0–0.1)
IMM GRANULOCYTES NFR BLD AUTO: 0 %
LH SERPL-ACNC: 4 MIU/ML (ref 1.7–8.6)
LYMPHOCYTES # BLD AUTO: 2.1 X10E3/UL (ref 0.7–3.1)
LYMPHOCYTES NFR BLD AUTO: 31 %
MCH RBC QN AUTO: 28.5 PG (ref 26.6–33)
MCHC RBC AUTO-ENTMCNC: 32.6 G/DL (ref 31.5–35.7)
MCV RBC AUTO: 87 FL (ref 79–97)
MONOCYTES # BLD AUTO: 0.6 X10E3/UL (ref 0.1–0.9)
MONOCYTES NFR BLD AUTO: 9 %
NEUTROPHILS # BLD AUTO: 3.9 X10E3/UL (ref 1.4–7)
NEUTROPHILS NFR BLD AUTO: 56 %
PLATELET # BLD AUTO: 248 X10E3/UL (ref 150–450)
PSA SERPL-MCNC: 0.9 NG/ML (ref 0–4)
RBC # BLD AUTO: 6.08 X10E6/UL (ref 4.14–5.8)
SHBG SERPL-SCNC: 26 NMOL/L (ref 19.3–76.4)
TESTOST FREE MFR SERPL: 1.98 % (ref 1.5–4.2)
TESTOST FREE SERPL-MCNC: 7.15 NG/DL (ref 5–21)
TESTOST SERPL-MCNC: 361 NG/DL (ref 264–916)
WBC # BLD AUTO: 6.9 X10E3/UL (ref 3.4–10.8)

## 2022-04-20 NOTE — TELEPHONE ENCOUNTER
Patient left a voicemail regarding testosterone prescription please call [phone] would like a change of preferred  pharmacy

## 2022-04-21 ENCOUNTER — TELEPHONE (OUTPATIENT)
Dept: ENDOCRINOLOGY | Age: 72
End: 2022-04-21

## 2022-04-21 DIAGNOSIS — R79.89 LOW TESTOSTERONE: ICD-10-CM

## 2022-04-21 RX ORDER — TESTOSTERONE 16.2 MG/G
GEL TRANSDERMAL
Qty: 75 G | Refills: 0 | Status: SHIPPED | OUTPATIENT
Start: 2022-04-21

## 2022-04-21 NOTE — PROGRESS NOTES
Normal testosterone on AndroGel 1.62% 1 pump on 1 shoulder daily.  Normal sex hormone binding globulin.  Hemoglobin A1c 5.8%.  Hemoglobin A1c elevated but not in diabetic range.  Normal thyroid function tests.  Hematocrit elevated and may be related to testosterone therapy.  Hematology referral to Dr. Stanton.  AndroGel refill sent to [facility].  Please notify patient results and instructions.

## 2022-04-22 ENCOUNTER — TELEPHONE (OUTPATIENT)
Dept: ENDOCRINOLOGY | Age: 72
End: 2022-04-22

## 2022-04-25 ENCOUNTER — TELEPHONE (OUTPATIENT)
Dept: ENDOCRINOLOGY | Age: 72
End: 2022-04-25

## 2022-04-25 NOTE — TELEPHONE ENCOUNTER
Patient left a voicemail regarding testosterone RX he needs a physical copy of the prescription he can dmitri pick it up please call [phone]

## 2022-04-26 ENCOUNTER — TELEPHONE (OUTPATIENT)
Dept: ENDOCRINOLOGY | Age: 72
End: 2022-04-26

## 2022-04-26 NOTE — TELEPHONE ENCOUNTER
4/26 called and sw pt per dr Stuart hold until he sees the hematologist / put a note in pt is requesting printed one to take to the pharmacys so he can get it at the one that is cheaper

## 2022-04-27 ENCOUNTER — TELEPHONE (OUTPATIENT)
Dept: ENDOCRINOLOGY | Age: 72
End: 2022-04-27

## 2022-05-04 RX ORDER — IRBESARTAN 300 MG/1
300 TABLET ORAL NIGHTLY
Qty: 90 TABLET | Refills: 1 | Status: SHIPPED | OUTPATIENT
Start: 2022-05-04 | End: 2022-10-18 | Stop reason: SDUPTHER

## 2022-05-04 RX ORDER — LEVOTHYROXINE SODIUM 0.15 MG/1
150 TABLET ORAL DAILY
Qty: 90 TABLET | Refills: 1 | Status: SHIPPED | OUTPATIENT
Start: 2022-05-04 | End: 2022-10-18 | Stop reason: SDUPTHER

## 2022-05-06 ENCOUNTER — CONSULT (OUTPATIENT)
Dept: ONCOLOGY | Facility: CLINIC | Age: 72
End: 2022-05-06

## 2022-05-06 ENCOUNTER — LAB (OUTPATIENT)
Dept: LAB | Facility: HOSPITAL | Age: 72
End: 2022-05-06

## 2022-05-06 VITALS
HEART RATE: 52 BPM | HEIGHT: 75 IN | BODY MASS INDEX: 31.01 KG/M2 | RESPIRATION RATE: 14 BRPM | SYSTOLIC BLOOD PRESSURE: 128 MMHG | WEIGHT: 249.4 LBS | TEMPERATURE: 97.5 F | DIASTOLIC BLOOD PRESSURE: 71 MMHG | OXYGEN SATURATION: 97 %

## 2022-05-06 DIAGNOSIS — D75.1 ERYTHROCYTOSIS: Primary | ICD-10-CM

## 2022-05-06 DIAGNOSIS — D75.1 SECONDARY POLYCYTHEMIA: Primary | ICD-10-CM

## 2022-05-06 LAB
BASOPHILS # BLD AUTO: 0.09 10*3/MM3 (ref 0–0.2)
BASOPHILS NFR BLD AUTO: 1.2 % (ref 0–1.5)
DEPRECATED RDW RBC AUTO: 47.7 FL (ref 37–54)
EOSINOPHIL # BLD AUTO: 0.28 10*3/MM3 (ref 0–0.4)
EOSINOPHIL NFR BLD AUTO: 3.6 % (ref 0.3–6.2)
ERYTHROCYTE [DISTWIDTH] IN BLOOD BY AUTOMATED COUNT: 15.1 % (ref 12.3–15.4)
HCT VFR BLD AUTO: 48.3 % (ref 37.5–51)
HGB BLD-MCNC: 15.8 G/DL (ref 13–17.7)
IMM GRANULOCYTES # BLD AUTO: 0.05 10*3/MM3 (ref 0–0.05)
IMM GRANULOCYTES NFR BLD AUTO: 0.6 % (ref 0–0.5)
LYMPHOCYTES # BLD AUTO: 2.17 10*3/MM3 (ref 0.7–3.1)
LYMPHOCYTES NFR BLD AUTO: 28.1 % (ref 19.6–45.3)
MCH RBC QN AUTO: 28.3 PG (ref 26.6–33)
MCHC RBC AUTO-ENTMCNC: 32.7 G/DL (ref 31.5–35.7)
MCV RBC AUTO: 86.6 FL (ref 79–97)
MONOCYTES # BLD AUTO: 0.79 10*3/MM3 (ref 0.1–0.9)
MONOCYTES NFR BLD AUTO: 10.2 % (ref 5–12)
NEUTROPHILS NFR BLD AUTO: 4.33 10*3/MM3 (ref 1.7–7)
NEUTROPHILS NFR BLD AUTO: 56.3 % (ref 42.7–76)
NRBC BLD AUTO-RTO: 0 /100 WBC (ref 0–0.2)
PLATELET # BLD AUTO: 254 10*3/MM3 (ref 140–450)
PMV BLD AUTO: 10.3 FL (ref 6–12)
RBC # BLD AUTO: 5.58 10*6/MM3 (ref 4.14–5.8)
WBC NRBC COR # BLD: 7.71 10*3/MM3 (ref 3.4–10.8)

## 2022-05-06 PROCEDURE — 36415 COLL VENOUS BLD VENIPUNCTURE: CPT

## 2022-05-06 PROCEDURE — 99204 OFFICE O/P NEW MOD 45 MIN: CPT | Performed by: INTERNAL MEDICINE

## 2022-05-06 PROCEDURE — 85025 COMPLETE CBC W/AUTO DIFF WBC: CPT

## 2022-05-06 RX ORDER — SODIUM CHLORIDE 9 MG/ML
250 INJECTION, SOLUTION INTRAVENOUS ONCE
OUTPATIENT
Start: 2022-05-06

## 2022-05-06 RX ORDER — AMOXICILLIN 500 MG/1
CAPSULE ORAL
COMMUNITY
Start: 2022-04-29 | End: 2022-05-12

## 2022-05-06 NOTE — PROGRESS NOTES
Subjective     REASON FOR CONSULTATION: Secondary polycythemia  Provide an opinion on any further workup or treatment                             REQUESTING PHYSICIAN:  Dr. Stuart    RECORDS OBTAINED:  Records of the patients history including those obtained from the referring provider were reviewed and summarized in detail.    HISTORY OF PRESENT ILLNESS:  The patient is a 71 y.o. year old male who is here for an opinion about the above issue.    History of Present Illness   This is a pleasant 71-year-old man with hypogonadism on androgen replacement with AndroGel 2 pumps a day that he has been on for 6 or 7 years by his report.  He also has history of obstructive sleep apnea but was intolerant to treatment and therefore does not use CPAP or BiPAP.  He states he has lost 30 pounds over the previous 5 years and feels like he no longer has sleep apnea.    The patient is referred for evaluation of polycythemia.  Reviewing his records, the patient has had 1 or 2 occasions of polycythemia for example 4/12/2019 hematocrit 53% and 7/31/2019 hematocrit 51%.  The patient typically donates blood every 2 months and this was around the time of COVID start when he was unable to donate blood.  He also had an elevated hematocrit on 4/15/2022 at 53.1%.  At this time he had not been able to donate blood because of a cadaveric bone implant in the jaw for tooth replacement.  He recently had another similar procedure and cannot donate blood again for 4 months.  He has held his AndroGel the previous week and his hemoglobin/hematocrit are normal today at 15.8/48.3% respectively.    He denies prior history of COPD, coronary artery disease/valvular heart disease, DVT or arterial thromboses.    Past Medical History:   Diagnosis Date   • Abdominal obesity    • Acquired spondylolisthesis    • Acute low back pain    • Acute right lumbar radiculopathy    • Basal cell carcinoma 10/2021    left cheek   • Bulging lumbar disc    • Concussion with  no loss of consciousness    • DDD (degenerative disc disease)    • Essential hypertension    • Fatigue    • Graves disease    • H/O disorder of nervous system and sense organs    • Headache    • Hyperlipidemia    • Hypertensive heart disease    • Hypothyroidism    • Insomnia    • Irregular heart beat    • Joint pain    • Lumbar radiculopathy    • Nephrolithiasis    • JACQUELINE (obstructive sleep apnea) 11/19/2021    Home sleep study.  Weight 252 pounds.  Moderate JACQUELINE with AHI 16.8 events per hour.  No sleep-related hypoxia.  The patient snored 67.7% of total sleep time.   • Paroxysmal nocturnal dyspnea    • PVC (premature ventricular contraction)    • Severe head trauma    • Sleep-disordered breathing 06/03/2012    Overnight polysomnogram.  Weight 250 pounds.  AHI normal at 2.8 events per hour.  The patient did have 11 events per hour related to respiratory effort related arousals.  No sleep-related hypoxia.  Arousals associated with snoring abnormal.   • SOB (shortness of breath)    • Soemmering's ring    • Spondylolisthesis    • Subdural hematoma (HCC)    • Supraventricular tachycardia (HCC)         Past Surgical History:   Procedure Laterality Date   • BASAL CELL CARCINOMA EXCISION  10/2021    left cheek   • CARDIAC CATHETERIZATION N/A 11/05/2019    Procedure: Coronary angiography;  Surgeon: Storm Marcano MD;  Location:  [facility];  Service: Cardiovascular   • CARDIAC CATHETERIZATION N/A 11/05/2019    Procedure: Left heart cath;  Surgeon: Storm Marcano MD;  Location:  [facility];  Service: Cardiovascular   • CARDIAC CATHETERIZATION N/A 11/05/2019    Procedure: Left ventriculography;  Surgeon: Storm Marcano MD;  Location:  [facility];  Service: Cardiovascular   • INNER EAR SURGERY Right     nonmalignant tumor removal   • KNEE ARTHROSCOPY Left    • KNEE ARTHROSCOPY Left 2018   • KNEE SURGERY      2008 and 2009   • LITHOTRIPSY     • OTHER SURGICAL  HISTORY Left     ear surgery        Current Outpatient Medications on File Prior to Visit   Medication Sig Dispense Refill   • amoxicillin (AMOXIL) 500 MG capsule      • aspirin 81 MG EC tablet Take 81 mg by mouth Daily.     • atorvastatin (LIPITOR) 10 MG tablet Take 1 tablet by mouth Daily. 90 tablet 1   • fluticasone (Flonase) 50 MCG/ACT nasal spray 2 sprays into the nostril(s) as directed by provider Daily. (Patient taking differently: 2 sprays into the nostril(s) as directed by provider As Needed.) 18.2 mL 11   • irbesartan (AVAPRO) 300 MG tablet Take 1 tablet by mouth Every Night. 90 tablet 1   • ketotifen (ZADITOR) 0.025 % ophthalmic solution Apply 1 drop to eye(s) as directed by provider As Needed.     • levothyroxine (SYNTHROID, LEVOTHROID) 150 MCG tablet Take 1 tablet by mouth Daily. 90 tablet 1   • metoprolol tartrate (LOPRESSOR) 25 MG tablet TAKE ONE TABLET BY MOUTH TWICE A  tablet 1   • Multiple Vitamin (MULTI-VITAMIN DAILY PO) Take 1 tablet by mouth Daily.     • naproxen (NAPROSYN) 500 MG tablet Take 1 tablet by mouth 2 (Two) Times a Day. (Patient taking differently: Take 500 mg by mouth 2 (Two) Times a Day As Needed.) 60 tablet 3   • Natural Vitamin D-3 125 MCG (5000 UT) tablet TAKE ONE TABLET BY MOUTH DAILY (Patient taking differently: 7,000 Units.) 30 tablet 12   • nitroglycerin (Nitrostat) 0.4 MG SL tablet Place 1 tablet under the tongue Every 5 (Five) Minutes As Needed for Chest Pain. Take no more than 3 doses in 15 minutes. 10 tablet 12   • Omega-3 Fatty Acids (fish oil) 1200 MG capsule capsule Daily.     • Testosterone (AndroGel Pump) 20.25 MG/ACT (1.62%) gel 1 pump actuation on 1 shoulder daily. 75 g 0     No current facility-administered medications on file prior to visit.        ALLERGIES:    Allergies   Allergen Reactions   • Latex Rash        Social History     Socioeconomic History   • Marital status:    Tobacco Use   • Smoking status: Former Smoker     Packs/day: 0.25      "Types: Cigarettes     Quit date:      Years since quittin.3   • Smokeless tobacco: Never Used   Substance and Sexual Activity   • Alcohol use: Yes     Comment: occasional/  occ caffeine use 90z frap daily    • Drug use: No   • Sexual activity: Defer        Family History   Problem Relation Age of Onset   • Cancer Father         malignant neoplasm   • No Known Problems Mother    • No Known Problems Sister    • No Known Problems Brother    • No Known Problems Sister    • No Known Problems Brother    • No Known Problems Brother    • Diabetes Maternal Grandmother         Review of Systems   Constitutional: Negative.    HENT: Negative.    Respiratory: Negative.    Cardiovascular: Negative.    Gastrointestinal: Negative.    Genitourinary: Negative.    Musculoskeletal: Negative.    Skin: Negative.    Allergic/Immunologic: Negative.    Neurological: Negative.    Hematological: Negative.    Psychiatric/Behavioral: Negative.         Objective     Vitals:    22 1355   BP: 128/71   Pulse: 52   Resp: 14   Temp: 97.5 °F (36.4 °C)   TempSrc: Temporal   SpO2: 97%   Weight: 113 kg (249 lb 6.4 oz)   Height: 190 cm (74.8\")   PainSc: 0-No pain     Current Status 2022   ECOG score 0       Physical Exam    CONSTITUTIONAL: pleasant well-developed adult man  HEENT: no icterus, no thrush, moist membranes  NECK: no jvd  LYMPH: no cervical or supraclavicular lad  CV: RRR, S1S2, no murmur  RESP: cta bilat, no wheezing, no rales  GI: soft, non-tender, no splenomegaly, +bs  MUSC: no edema, normal gait  NEURO: alert and oriented x3, normal strength  PSYCH: normal mood and affect    RECENT LABS:  Hematology WBC   Date Value Ref Range Status   2022 7.71 3.40 - 10.80 10*3/mm3 Final   04/15/2022 6.9 3.4 - 10.8 x10E3/uL Final     RBC   Date Value Ref Range Status   2022 5.58 4.14 - 5.80 10*6/mm3 Final   04/15/2022 6.08 (H) 4.14 - 5.80 x10E6/uL Final     Hemoglobin   Date Value Ref Range Status   2022 15.8 13.0 - " 17.7 g/dL Final     Hematocrit   Date Value Ref Range Status   05/06/2022 48.3 37.5 - 51.0 % Final     Platelets   Date Value Ref Range Status   05/06/2022 254 140 - 450 10*3/mm3 Final        Lab Results   Component Value Date    GLUCOSE 83 12/08/2021    BUN 17 12/08/2021    CREATININE 1.10 12/08/2021    EGFRIFNONA 68 12/08/2021    EGFRIFAFRI 79 12/08/2021    BCR 15 12/08/2021    K 5.1 12/08/2021    CO2 24 12/08/2021    CALCIUM 10.0 12/08/2021    PROTENTOTREF 6.6 12/08/2021    ALBUMIN 3.9 12/08/2021    LABIL2 1.4 12/08/2021    AST 23 12/08/2021    ALT 22 12/08/2021       Assessment/Plan     *Secondary polycythemia  · The patient's labs are consistent with secondary polycythemia secondary to testosterone replacement therapy.  He has held his AndroGel the previous week resulting in normal hemoglobin/hematocrit today.  He has not had leukocytosis or thrombocytosis or left shifted myeloid cells to suggest a myeloproliferative disorder.  My suspicion for a myeloproliferative disorder is low.  · The patient typically donates blood every 2 months but has not been able to donate recently secondary to a cadaveric bone implant for tooth replacement; therefore his recent hematocrit was elevated at 53.1%.    *Hypogonadism on androgen replacement    *Probable untreated sleep apnea which could contribute to polycythemia although he has lost 30 pounds so unclear if he still has sleep apnea at this point    Hematology plan/recommendations:  1. I do not feel any additional evaluation is needed for polycythemia today since his hemoglobin/hematocrit are both normal after a brief testosterone holiday  2. The patient cannot donate blood again at the [facility] for 4 months after recent cadaveric bone graft for tooth replacement  3. He can resume testosterone replacement.  We will check his CBC in about 2 months and perform phlebotomy here if hematocrit is over 52%.  Thereafter he will continue to donate blood every 2 months.  4. I would  continue to monitor his CBC twice annually; we refer to hematology if hematocrit is over 52% despite every 2-month blood donation    Thank you for allowing me to participate in the care of this pleasant gentleman.

## 2022-05-12 ENCOUNTER — OFFICE VISIT (OUTPATIENT)
Dept: FAMILY MEDICINE CLINIC | Facility: CLINIC | Age: 72
End: 2022-05-12

## 2022-05-12 VITALS
DIASTOLIC BLOOD PRESSURE: 80 MMHG | TEMPERATURE: 97.7 F | OXYGEN SATURATION: 98 % | WEIGHT: 247 LBS | BODY MASS INDEX: 30.71 KG/M2 | SYSTOLIC BLOOD PRESSURE: 132 MMHG | HEART RATE: 62 BPM | HEIGHT: 75 IN

## 2022-05-12 DIAGNOSIS — J30.2 SEASONAL ALLERGIES: ICD-10-CM

## 2022-05-12 DIAGNOSIS — J01.01 ACUTE RECURRENT MAXILLARY SINUSITIS: Primary | ICD-10-CM

## 2022-05-12 PROCEDURE — 99213 OFFICE O/P EST LOW 20 MIN: CPT | Performed by: NURSE PRACTITIONER

## 2022-05-12 RX ORDER — AMOXICILLIN AND CLAVULANATE POTASSIUM 875; 125 MG/1; MG/1
1 TABLET, FILM COATED ORAL 2 TIMES DAILY
Qty: 14 TABLET | Refills: 0 | Status: SHIPPED | OUTPATIENT
Start: 2022-05-12 | End: 2022-05-19

## 2022-05-12 NOTE — PROGRESS NOTES
"Chief Complaint  Nasal Congestion (Sore throat , runny nose , headache, . Started yesterday after mowing grass/Took claritin d ,Flonase ,cough drops . Not tested for covid or around any known )    Subjective          Joaquin Peña presents to [facility]  History of Present Illness   71-year-old male presenting with complaints of nasal congestion, sore throat and headache, denies fever, N/V/D, SOA, loss of taste or smell or known COVID exposure.  States symptoms started after mowing grass, took Claritin and Flonase with minimal relief.       Objective   Vital Signs:  /80 (BP Location: Right arm)   Pulse 62   Temp 97.7 °F (36.5 °C)   Ht 190 cm (74.8\")   Wt 112 kg (247 lb)   SpO2 98%   BMI 31.04 kg/m²           Physical Exam  HENT:      Nose: Nasal tenderness and congestion present.      Right Sinus: Maxillary sinus tenderness present.      Left Sinus: Maxillary sinus tenderness present.      Mouth/Throat:      Pharynx: Posterior oropharyngeal erythema present.   Cardiovascular:      Rate and Rhythm: Normal rate.      Pulses: Normal pulses.      Heart sounds: Normal heart sounds.   Pulmonary:      Effort: Pulmonary effort is normal.      Breath sounds: Normal breath sounds.   Neurological:      General: No focal deficit present.      Mental Status: He is alert and oriented to person, place, and time.   Psychiatric:         Mood and Affect: Mood normal.         Behavior: Behavior normal.        Result Review :                 Assessment and Plan    Diagnoses and all orders for this visit:    1. Acute recurrent maxillary sinusitis (Primary)  -     amoxicillin-clavulanate (AUGMENTIN) 875-125 MG per tablet; Take 1 tablet by mouth 2 (Two) Times a Day for 7 days.  Dispense: 14 tablet; Refill: 0    2. Seasonal allergies      Continue taking Claritin and Flonase. Can also try hot tea and throat lozenges for sore throat. Start Augmentin if symptoms not improved in 2 days.      "     Follow Up   Return if symptoms worsen or fail to improve.  Patient was given instructions and counseling regarding his condition or for health maintenance advice. Please see specific information pulled into the AVS if appropriate.

## 2022-05-19 ENCOUNTER — TELEPHONE (OUTPATIENT)
Dept: ENDOCRINOLOGY | Age: 72
End: 2022-05-19

## 2022-05-22 DIAGNOSIS — E78.5 HYPERLIPIDEMIA, UNSPECIFIED HYPERLIPIDEMIA TYPE: ICD-10-CM

## 2022-05-22 DIAGNOSIS — E89.0 POSTABLATIVE HYPOTHYROIDISM: ICD-10-CM

## 2022-05-22 DIAGNOSIS — E05.00 GRAVES DISEASE: Primary | ICD-10-CM

## 2022-05-22 DIAGNOSIS — R73.09 ELEVATED HEMOGLOBIN A1C: ICD-10-CM

## 2022-05-22 DIAGNOSIS — R79.89 LOW TESTOSTERONE: ICD-10-CM

## 2022-05-22 DIAGNOSIS — E55.9 VITAMIN D DEFICIENCY: ICD-10-CM

## 2022-06-23 ENCOUNTER — LAB (OUTPATIENT)
Dept: ENDOCRINOLOGY | Age: 72
End: 2022-06-23

## 2022-06-23 DIAGNOSIS — E78.5 HYPERLIPIDEMIA, UNSPECIFIED HYPERLIPIDEMIA TYPE: ICD-10-CM

## 2022-06-23 DIAGNOSIS — E55.9 VITAMIN D DEFICIENCY: ICD-10-CM

## 2022-06-23 DIAGNOSIS — R79.89 LOW TESTOSTERONE: ICD-10-CM

## 2022-06-23 DIAGNOSIS — E89.0 POSTABLATIVE HYPOTHYROIDISM: ICD-10-CM

## 2022-06-23 DIAGNOSIS — R73.09 ELEVATED HEMOGLOBIN A1C: ICD-10-CM

## 2022-06-26 LAB
25(OH)D3+25(OH)D2 SERPL-MCNC: 65.2 NG/ML (ref 30–100)
ALBUMIN SERPL-MCNC: 3.9 G/DL (ref 3.8–4.8)
ALBUMIN/GLOB SERPL: 1.4 {RATIO} (ref 1.2–2.2)
ALP SERPL-CCNC: 71 IU/L (ref 44–121)
ALT SERPL-CCNC: 24 IU/L (ref 0–44)
AST SERPL-CCNC: 33 IU/L (ref 0–40)
BILIRUB SERPL-MCNC: 1 MG/DL (ref 0–1.2)
BUN SERPL-MCNC: 24 MG/DL (ref 8–27)
BUN/CREAT SERPL: 21 (ref 10–24)
CALCIUM SERPL-MCNC: 9.8 MG/DL (ref 8.6–10.2)
CHLORIDE SERPL-SCNC: 104 MMOL/L (ref 96–106)
CHOLEST SERPL-MCNC: 142 MG/DL (ref 100–199)
CO2 SERPL-SCNC: 24 MMOL/L (ref 20–29)
CREAT SERPL-MCNC: 1.14 MG/DL (ref 0.76–1.27)
EGFRCR SERPLBLD CKD-EPI 2021: 69 ML/MIN/1.73
FSH SERPL-ACNC: 3.8 MIU/ML (ref 1.5–12.4)
GLOBULIN SER CALC-MCNC: 2.7 G/DL (ref 1.5–4.5)
GLUCOSE SERPL-MCNC: 87 MG/DL (ref 65–99)
HBA1C MFR BLD: 5.7 % (ref 4.8–5.6)
HDLC SERPL-MCNC: 36 MG/DL
IMP & REVIEW OF LAB RESULTS: NORMAL
LDLC SERPL CALC-MCNC: 88 MG/DL (ref 0–99)
LH SERPL-ACNC: 4.4 MIU/ML (ref 1.7–8.6)
POTASSIUM SERPL-SCNC: 4.9 MMOL/L (ref 3.5–5.2)
PROT SERPL-MCNC: 6.6 G/DL (ref 6–8.5)
SODIUM SERPL-SCNC: 139 MMOL/L (ref 134–144)
T4 FREE SERPL-MCNC: 1.24 NG/DL (ref 0.82–1.77)
TESTOST FREE MFR SERPL: 3.24 % (ref 1.5–4.2)
TESTOST FREE SERPL-MCNC: 8.07 NG/DL (ref 5–21)
TESTOST SERPL-MCNC: 249 NG/DL (ref 264–916)
TRIGL SERPL-MCNC: 92 MG/DL (ref 0–149)
TSH SERPL DL<=0.005 MIU/L-ACNC: 2.14 UIU/ML (ref 0.45–4.5)
VLDLC SERPL CALC-MCNC: 18 MG/DL (ref 5–40)

## 2022-06-29 NOTE — PROGRESS NOTES
Subjective   Joaquin Peña is a 72 y.o. male.      F/u for goiter, hypothyroidism, graves disease, hyperlipidemia, hypertension        Patient is a 70-year-old male who came in for follow-up.      He has Graves' disease and had radioactive iodine therapy.  He became hypothyroid and is on levothyroxine 150 mcg/day.  He has intentionally lost 10 lbs since 1/22.  He denies bowel changes, palpitations, heat or cold intolerance.  TSH done in June 2022 is normal at 2.14 with normal free T4 at 1.24 ng per DL.     He has hyperlipidemia with elevated LDL.  He has been on Lipitor 10 mg/day and fish oil 1200 mg/day.  He denies myalgia.    Lipid panel done in June 2022 are as follows: Cholesterol 142.  HDL 36.  LDL 88.  Triglycerides 92.     He has history of low testosterone and was started on AndroGel by Dr. Smith in 2018.  He is on AndroGel 1.62% 1 pump on 1 shoulder once a day.  PSA done in April 2022 is normal at 0.9 ng/mL.  Total testosterone done in June 2022 is 249 ng per DL with normal free testosterone at 8.07 ng per DL.       He has voluntary phlebotomy every 2 months done at the [facility] and the last one was in Sept 2021.  He had bone graft on his jaw in December 2021 in April 2022 which temporarily disqualifies him from blood donation.      He has no history of sleep apnea.  He has good urinary stream.  He denies retention or dysuria.  He has occasional erectile dysfunction and uses Levitra 10 mg as needed. He has occasional headaches when he takes Levitra.     He is  and has fathered 1 child.  He is not planning any more children     He denies headaches.  He denies changes in shoes or ring size.  He denies easy bruising or muscle weakness.       He has hypertension and is on Avapro 300 mg once a day and Lopressor 25 mg twice a day.  He has no history of heart attack or stroke.  He denies chest pain or SOB.  He had intermittent bradycardia with heart rates in the 40s.  He has intermittently reduced his  "dose to 1 tablet per day.     He has prediabetes with hemoglobin A1c ranging from 5.4 to 5.9% since 2017.  Hemoglobin A1c done in June 2022 is mildly elevated at 5.7%.     He has vitamin D deficiency and is vit D3 5000 units a day and multivitamins 1 tablet/day.  25 hydroxy vitamin D done in June 2022 is normal at 65.2 ng/mL.     He has osteopenia on bone density done in June 2019.    Bone density done in January 2022 showed osteopenia with interval increase in the lumbar spine.  10-year risk of major osteoporotic fracture is 9.5% and of hip fracture is 1.7%.     He had moderate obstructive sleep apnea on home study done in November 2021.  He has no complaints of hypersomnolence.  He wakes up rested.  Dr. Aguiar advised consulting with his dentist about a dental appliance.     He was a [place] vet and was in a [employer].     The following portions of the patient's history were reviewed and updated as appropriate: allergies, current medications, past family history, past medical history, past social history, past surgical history and problem list.    Review of Systems   Eyes: Negative for visual disturbance.   Respiratory: Negative for shortness of breath.    Cardiovascular: Negative for chest pain and palpitations.   Gastrointestinal: Negative for constipation.   Endocrine: Negative for cold intolerance and heat intolerance.   Genitourinary: Negative.    Musculoskeletal: Negative for myalgias.   Neurological: Negative for numbness.   Hematological: Negative for adenopathy. Does not bruise/bleed easily.     Vitals:    06/30/22 0920   BP: 130/65   Pulse: (!) 43   Temp: 98.4 °F (36.9 °C)   TempSrc: Temporal   SpO2: 95%   Weight: 108 kg (238 lb 12.8 oz)   Height: 188 cm (74\")      Objective   Physical Exam  Lab on 06/23/2022   Component Date Value Ref Range Status   • Testosterone, Total 06/23/2022 249 (A) 264 - 916 ng/dL Final    Comment: Adult male reference interval is based on a population of  healthy nonobese males " (BMI <30) between 19 and 39 years old.  [place].al. JCEM 2017,102;2010-4632. PMID: 15471183.     • Testosterone, Free 06/23/2022 8.07  5.00 - 21.00 ng/dL Final   • Testosterone, Free % 06/23/2022 3.24  1.50 - 4.20 % Final   • LH 06/23/2022 4.4  1.7 - 8.6 mIU/mL Final   • FSH 06/23/2022 3.8  1.5 - 12.4 mIU/mL Final   • Free T4 06/23/2022 1.24  0.82 - 1.77 ng/dL Final   • TSH 06/23/2022 2.140  0.450 - 4.500 uIU/mL Final   • Glucose 06/23/2022 87  65 - 99 mg/dL Final   • BUN 06/23/2022 24  8 - 27 mg/dL Final   • Creatinine 06/23/2022 1.14  0.76 - 1.27 mg/dL Final   • EGFR Result 06/23/2022 69  >59 mL/min/1.73 Final   • BUN/Creatinine Ratio 06/23/2022 21  10 - 24 Final   • Sodium 06/23/2022 139  134 - 144 mmol/L Final   • Potassium 06/23/2022 4.9  3.5 - 5.2 mmol/L Final   • Chloride 06/23/2022 104  96 - 106 mmol/L Final   • Total CO2 06/23/2022 24  20 - 29 mmol/L Final   • Calcium 06/23/2022 9.8  8.6 - 10.2 mg/dL Final   • Total Protein 06/23/2022 6.6  6.0 - 8.5 g/dL Final   • Albumin 06/23/2022 3.9  3.8 - 4.8 g/dL Final   • Globulin 06/23/2022 2.7  1.5 - 4.5 g/dL Final   • A/G Ratio 06/23/2022 1.4  1.2 - 2.2 Final   • Total Bilirubin 06/23/2022 1.0  0.0 - 1.2 mg/dL Final   • Alkaline Phosphatase 06/23/2022 71  44 - 121 IU/L Final   • AST (SGOT) 06/23/2022 33  0 - 40 IU/L Final   • ALT (SGPT) 06/23/2022 24  0 - 44 IU/L Final   • Total Cholesterol 06/23/2022 142  100 - 199 mg/dL Final   • Triglycerides 06/23/2022 92  0 - 149 mg/dL Final   • HDL Cholesterol 06/23/2022 36 (A) >39 mg/dL Final   • VLDL Cholesterol Ricci 06/23/2022 18  5 - 40 mg/dL Final   • LDL Chol Calc (NIH) 06/23/2022 88  0 - 99 mg/dL Final   • Hemoglobin A1C 06/23/2022 5.7 (A) 4.8 - 5.6 % Final    Comment:          Prediabetes: 5.7 - 6.4           Diabetes: >6.4           Glycemic control for adults with diabetes: <7.0     • 25 Hydroxy, Vitamin D 06/23/2022 65.2  30.0 - 100.0 ng/mL Final    Comment: Vitamin D deficiency has been defined by the  [place] of  Medicine and an Endocrine Society practice guideline as a  level of serum 25-OH vitamin D less than 20 ng/mL (1,2).  The Endocrine Society went on to further define vitamin D  insufficiency as a level between 21 and 29 ng/mL (2).  1. IOM ([place] of [employer]). 2010. Dietary reference     intakes for calcium and D. [place]: The     National [employer].  2. Martine MF, Timoteo NC, Reina LUTZ, et al.     Evaluation, treatment, and prevention of vitamin D     deficiency: an Endocrine Society clinical practice     guideline. JCEM. 2011 Jul; 96(7):1911-30.     • Interpretation 06/23/2022 Note   Final    Supplemental report is available.     Assessment & Plan   Diagnoses and all orders for this visit:    1. Graves disease (Primary)    2. Other hyperlipidemia    3. Postablative hypothyroidism    4. Essential hypertension    5. Low testosterone    6. Vitamin D deficiency    7. Secondary polycythemia      Continue levothyroxine 150 mcg/day.  Continue Lipitor 10 mg/day and fish oil 1 capsule/day.  Continue AndroGel 1.62% 1 pump on 1 shoulder once a day.  Follow-up with Dr. Horace Terry.  Continue Avapro 300 mg/day.  May decrease Lopressor 25 mg to 1/2 tablet twice a day.  Continue vitamin D3 5000 units/day multivitamins 1 tablet/day.  Continue regular exercise.  Repeat bone density in January 2024.    Copy of my note sent to Dr. Escobar.    RTC 6 mos.

## 2022-06-30 ENCOUNTER — OFFICE VISIT (OUTPATIENT)
Dept: ENDOCRINOLOGY | Age: 72
End: 2022-06-30

## 2022-06-30 VITALS
HEART RATE: 43 BPM | DIASTOLIC BLOOD PRESSURE: 65 MMHG | BODY MASS INDEX: 30.65 KG/M2 | SYSTOLIC BLOOD PRESSURE: 130 MMHG | OXYGEN SATURATION: 95 % | WEIGHT: 238.8 LBS | HEIGHT: 74 IN | TEMPERATURE: 98.4 F

## 2022-06-30 DIAGNOSIS — D75.1 SECONDARY POLYCYTHEMIA: ICD-10-CM

## 2022-06-30 DIAGNOSIS — E89.0 POSTABLATIVE HYPOTHYROIDISM: ICD-10-CM

## 2022-06-30 DIAGNOSIS — E78.49 OTHER HYPERLIPIDEMIA: ICD-10-CM

## 2022-06-30 DIAGNOSIS — E55.9 VITAMIN D DEFICIENCY: ICD-10-CM

## 2022-06-30 DIAGNOSIS — R79.89 LOW TESTOSTERONE: ICD-10-CM

## 2022-06-30 DIAGNOSIS — E05.00 GRAVES DISEASE: Primary | ICD-10-CM

## 2022-06-30 DIAGNOSIS — I10 ESSENTIAL HYPERTENSION: ICD-10-CM

## 2022-06-30 PROCEDURE — 99214 OFFICE O/P EST MOD 30 MIN: CPT | Performed by: INTERNAL MEDICINE

## 2022-07-01 ENCOUNTER — OFFICE VISIT (OUTPATIENT)
Dept: ONCOLOGY | Facility: CLINIC | Age: 72
End: 2022-07-01

## 2022-07-01 ENCOUNTER — APPOINTMENT (OUTPATIENT)
Dept: ONCOLOGY | Facility: HOSPITAL | Age: 72
End: 2022-07-01

## 2022-07-01 ENCOUNTER — LAB (OUTPATIENT)
Dept: LAB | Facility: HOSPITAL | Age: 72
End: 2022-07-01

## 2022-07-01 ENCOUNTER — OFFICE VISIT (OUTPATIENT)
Dept: FAMILY MEDICINE CLINIC | Facility: CLINIC | Age: 72
End: 2022-07-01

## 2022-07-01 VITALS
HEART RATE: 46 BPM | HEIGHT: 74 IN | SYSTOLIC BLOOD PRESSURE: 130 MMHG | DIASTOLIC BLOOD PRESSURE: 72 MMHG | WEIGHT: 239 LBS | TEMPERATURE: 97.3 F | BODY MASS INDEX: 30.67 KG/M2 | OXYGEN SATURATION: 100 %

## 2022-07-01 VITALS
BODY MASS INDEX: 29.93 KG/M2 | RESPIRATION RATE: 16 BRPM | HEART RATE: 48 BPM | TEMPERATURE: 97.3 F | WEIGHT: 240.7 LBS | OXYGEN SATURATION: 97 % | DIASTOLIC BLOOD PRESSURE: 67 MMHG | HEIGHT: 75 IN | SYSTOLIC BLOOD PRESSURE: 117 MMHG

## 2022-07-01 DIAGNOSIS — Z00.00 MEDICARE ANNUAL WELLNESS VISIT, SUBSEQUENT: Primary | ICD-10-CM

## 2022-07-01 DIAGNOSIS — D75.1 SECONDARY POLYCYTHEMIA: Primary | ICD-10-CM

## 2022-07-01 DIAGNOSIS — Z12.5 PROSTATE CANCER SCREENING: ICD-10-CM

## 2022-07-01 DIAGNOSIS — H92.01 RIGHT EAR PAIN: ICD-10-CM

## 2022-07-01 DIAGNOSIS — R79.89 LOW TESTOSTERONE: ICD-10-CM

## 2022-07-01 DIAGNOSIS — D75.1 SECONDARY POLYCYTHEMIA: ICD-10-CM

## 2022-07-01 LAB
BASOPHILS # BLD AUTO: 0.06 10*3/MM3 (ref 0–0.2)
BASOPHILS NFR BLD AUTO: 0.7 % (ref 0–1.5)
DEPRECATED RDW RBC AUTO: 49.1 FL (ref 37–54)
EOSINOPHIL # BLD AUTO: 0.26 10*3/MM3 (ref 0–0.4)
EOSINOPHIL NFR BLD AUTO: 3.2 % (ref 0.3–6.2)
ERYTHROCYTE [DISTWIDTH] IN BLOOD BY AUTOMATED COUNT: 15.2 % (ref 12.3–15.4)
HCT VFR BLD AUTO: 50.6 % (ref 37.5–51)
HGB BLD-MCNC: 16.2 G/DL (ref 13–17.7)
IMM GRANULOCYTES # BLD AUTO: 0.01 10*3/MM3 (ref 0–0.05)
IMM GRANULOCYTES NFR BLD AUTO: 0.1 % (ref 0–0.5)
LYMPHOCYTES # BLD AUTO: 2 10*3/MM3 (ref 0.7–3.1)
LYMPHOCYTES NFR BLD AUTO: 24.9 % (ref 19.6–45.3)
MCH RBC QN AUTO: 28.4 PG (ref 26.6–33)
MCHC RBC AUTO-ENTMCNC: 32 G/DL (ref 31.5–35.7)
MCV RBC AUTO: 88.8 FL (ref 79–97)
MONOCYTES # BLD AUTO: 0.69 10*3/MM3 (ref 0.1–0.9)
MONOCYTES NFR BLD AUTO: 8.6 % (ref 5–12)
NEUTROPHILS NFR BLD AUTO: 5.02 10*3/MM3 (ref 1.7–7)
NEUTROPHILS NFR BLD AUTO: 62.5 % (ref 42.7–76)
NRBC BLD AUTO-RTO: 0 /100 WBC (ref 0–0.2)
PLATELET # BLD AUTO: 238 10*3/MM3 (ref 140–450)
PMV BLD AUTO: 10 FL (ref 6–12)
PSA SERPL-MCNC: 0.91 NG/ML (ref 0–4)
RBC # BLD AUTO: 5.7 10*6/MM3 (ref 4.14–5.8)
WBC NRBC COR # BLD: 8.04 10*3/MM3 (ref 3.4–10.8)

## 2022-07-01 PROCEDURE — 1170F FXNL STATUS ASSESSED: CPT | Performed by: INTERNAL MEDICINE

## 2022-07-01 PROCEDURE — 99213 OFFICE O/P EST LOW 20 MIN: CPT | Performed by: INTERNAL MEDICINE

## 2022-07-01 PROCEDURE — 85025 COMPLETE CBC W/AUTO DIFF WBC: CPT

## 2022-07-01 PROCEDURE — 36415 COLL VENOUS BLD VENIPUNCTURE: CPT

## 2022-07-01 PROCEDURE — G0103 PSA SCREENING: HCPCS | Performed by: INTERNAL MEDICINE

## 2022-07-01 PROCEDURE — G0439 PPPS, SUBSEQ VISIT: HCPCS | Performed by: INTERNAL MEDICINE

## 2022-07-01 PROCEDURE — 1159F MED LIST DOCD IN RCRD: CPT | Performed by: INTERNAL MEDICINE

## 2022-07-01 NOTE — PATIENT INSTRUCTIONS
Medicare Wellness  Personal Prevention Plan of Service     Date of Office Visit:    Encounter Provider:  Erwin Escobar MD  Place of Service:  [facility]  Patient Name: Joaquin Peña  :  1950    As part of the Medicare Wellness portion of your visit today, we are providing you with this personalized preventive plan of services (PPPS). This plan is based upon recommendations of the [place] Preventive Services Task Force (USPSTF) and the Advisory Committee on Immunization Practices (ACIP).    This lists the preventive care services that should be considered, and provides dates of when you are due. Items listed as completed are up-to-date and do not require any further intervention.    Health Maintenance   Topic Date Due    AAA SCREEN (ONE-TIME)  Never done    TDAP/TD VACCINES (2 - Td or Tdap) 2022    INFLUENZA VACCINE  10/01/2022    LIPID PANEL  2023    ANNUAL WELLNESS VISIT  2023    COLORECTAL CANCER SCREENING  2026    HEPATITIS C SCREENING  Completed    COVID-19 Vaccine  Completed    Pneumococcal Vaccine 65+  Completed    ZOSTER VACCINE  Completed       No orders of the defined types were placed in this encounter.      No follow-ups on file.

## 2022-07-01 NOTE — PROGRESS NOTES
QUICK REFERENCE INFORMATION:  The ABCs of the Annual Wellness Visit    Subsequent Medicare Wellness Visit patient was seen for Medicare wellness exam.  Patient was seen for moderate pain in his right ear.  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  Patient states he has a new eardrum and is developed pain.  Patient been using eardrops without success.  Tympanic membranes looks normal but recommend that he see ENT to assess it.  Patient is being referred back to Dr. Chopra for evaluation.  Patient is also having PSA screening today.    Dictated utilizing Dragon dictation. If there are questions or for further clarification, please contact me.    HEALTH RISK ASSESSMENT    1950    Recent Hospitalizations:  No hospitalization(s) within the last year..        Current Medical Providers:  Patient Care Team:  Erwin Escobar MD as PCP - General  Erwin Escobar MD as PCP - Family Medicine  Muna Haque MD as Consulting Physician (Cardiology)  Oscar Stuart MD as Consulting Physician (Endocrinology)  Horace Casper MD as Consulting Physician (Dermatology)  Horace Terry MD as Consulting Physician (Hematology and Oncology)  Oscar Stuart MD as Referring Physician (Endocrinology)        Smoking Status:  Social History     Tobacco Use   Smoking Status Former Smoker   • Packs/day: 0.25   • Years: 3.00   • Pack years: 0.75   • Types: Cigarettes   • Quit date:    • Years since quittin.5   Smokeless Tobacco Never Used       Alcohol Consumption:  Social History     Substance and Sexual Activity   Alcohol Use Yes    Comment: occasional/  occ caffeine use 90z frap daily        Depression Screen:   PHQ-2/PHQ-9 Depression Screening 2022   Retired PHQ-9 Total Score -   Retired Total Score -   Little Interest or Pleasure in Doing Things 0-->not at all   Feeling Down, Depressed or Hopeless 0-->not at all   PHQ-9: Brief Depression Severity  Measure Score 0       Health Habits and Functional and Cognitive Screening:  Functional & Cognitive Status 7/1/2022   Do you have difficulty preparing food and eating? No   Do you have difficulty bathing yourself, getting dressed or grooming yourself? No   Do you have difficulty using the toilet? No   Do you have difficulty moving around from place to place? No   Do you have trouble with steps or getting out of a bed or a chair? No   Current Diet Well Balanced Diet   Dental Exam Up to date   Eye Exam Up to date   Exercise (times per week) 5 times per week   Current Exercises Include Aerobics   Current Exercise Activities Include -   Do you need help using the phone?  No   Are you deaf or do you have serious difficulty hearing?  No   Do you need help with transportation? No   Do you need help shopping? No   Do you need help preparing meals?  No   Do you need help with housework?  No   Do you need help with laundry? No   Do you need help taking your medications? No   Do you need help managing money? No   Do you ever drive or ride in a car without wearing a seat belt? No   Have you felt unusual stress, anger or loneliness in the last month? No   Who do you live with? Spouse   If you need help, do you have trouble finding someone available to you? No   Have you been bothered in the last four weeks by sexual problems? No   Do you have difficulty concentrating, remembering or making decisions? No           Does the patient have evidence of cognitive impairment? No    Aspirin use counseling: Taking ASA appropriately as indicated      Recent Lab Results:  CMP:  Lab Results   Component Value Date    BUN 24 06/23/2022    CREATININE 1.14 06/23/2022    EGFRIFNONA 68 12/08/2021    EGFRIFAFRI 79 12/08/2021    BCR 21 06/23/2022     06/23/2022    K 4.9 06/23/2022    CO2 24 06/23/2022    CALCIUM 9.8 06/23/2022    PROTENTOTREF 6.6 06/23/2022    ALBUMIN 3.9 06/23/2022    LABGLOBREF 2.7 06/23/2022    LABIL2 1.4 06/23/2022     BILITOT 1.0 06/23/2022    ALKPHOS 71 06/23/2022    AST 33 06/23/2022    ALT 24 06/23/2022     Lipid Panel:  Lab Results   Component Value Date    CHOL 126 05/30/2018    TRIG 92 06/23/2022    HDL 36 (L) 06/23/2022    VLDL 18 06/23/2022    LDLHDL 1.79 05/30/2018     HbA1c:  Lab Results   Component Value Date    HGBA1C 5.7 (H) 06/23/2022       Visual Acuity:  No exam data present    Age-appropriate Screening Schedule:  Refer to the list below for future screening recommendations based on patient's age, sex and/or medical conditions. Orders for these recommended tests are listed in the plan section. The patient has been provided with a written plan.    Health Maintenance   Topic Date Due   • TDAP/TD VACCINES (2 - Td or Tdap) 01/01/2022   • INFLUENZA VACCINE  10/01/2022   • LIPID PANEL  06/23/2023   • ZOSTER VACCINE  Completed        Subjective   History of Present Illness    Joaquin Peña is a 72 y.o. male who presents for an Subsequent Wellness Visit.    The following portions of the patient's history were reviewed and updated as appropriate: allergies, current medications, past family history, past medical history, past social history, past surgical history and problem list.    Outpatient Medications Prior to Visit   Medication Sig Dispense Refill   • aspirin 81 MG EC tablet Take 81 mg by mouth Daily.     • atorvastatin (LIPITOR) 10 MG tablet Take 1 tablet by mouth Daily. 90 tablet 1   • fluticasone (Flonase) 50 MCG/ACT nasal spray 2 sprays into the nostril(s) as directed by provider Daily. (Patient taking differently: 2 sprays into the nostril(s) as directed by provider As Needed.) 18.2 mL 11   • irbesartan (AVAPRO) 300 MG tablet Take 1 tablet by mouth Every Night. 90 tablet 1   • ketotifen (ZADITOR) 0.025 % ophthalmic solution Apply 1 drop to eye(s) as directed by provider As Needed.     • levothyroxine (SYNTHROID, LEVOTHROID) 150 MCG tablet Take 1 tablet by mouth Daily. 90 tablet 1   • Multiple Vitamin  (MULTI-VITAMIN DAILY PO) Take 1 tablet by mouth Daily.     • naproxen (NAPROSYN) 500 MG tablet Take 1 tablet by mouth 2 (Two) Times a Day. (Patient taking differently: Take 500 mg by mouth 2 (Two) Times a Day As Needed.) 60 tablet 3   • Natural Vitamin D-3 125 MCG (5000 UT) tablet TAKE ONE TABLET BY MOUTH DAILY (Patient taking differently: 7,000 Units.) 30 tablet 12   • nitroglycerin (Nitrostat) 0.4 MG SL tablet Place 1 tablet under the tongue Every 5 (Five) Minutes As Needed for Chest Pain. Take no more than 3 doses in 15 minutes. 10 tablet 12   • Omega-3 Fatty Acids (fish oil) 1200 MG capsule capsule Daily.     • Testosterone (AndroGel Pump) 20.25 MG/ACT (1.62%) gel 1 pump actuation on 1 shoulder daily. 75 g 0   • metoprolol tartrate (LOPRESSOR) 25 MG tablet TAKE ONE TABLET BY MOUTH TWICE A DAY (Patient taking differently: Take 0.5 mg by mouth 2 (Two) Times a Day.) 180 tablet 1     No facility-administered medications prior to visit.       Patient Active Problem List   Diagnosis   • Graves disease   • Supraventricular tachycardia (HCC)   • PVC (premature ventricular contraction)   • Other hyperlipidemia   • Acute bilateral thoracic back pain   • Dizziness   • Irregular heart beat   • Seasonal allergic rhinitis due to pollen   • Hyperglycemia   • Erectile dysfunction   • Postablative hypothyroidism   • Insulin resistance   • Acquired spondylolisthesis   • Lumbar radiculopathy   • Degeneration of intervertebral disc of lumbar region   • Essential hypertension   • PND (paroxysmal nocturnal dyspnea)   • Right foot pain   • Pain in both lower extremities   • JACQUELINE (obstructive sleep apnea)   • Snoring   • H/O multiple concussions   • Low testosterone   • Vitamin D deficiency   • Preop cardiovascular exam   • Abnormal stress test   • Benign prostatic hyperplasia without lower urinary tract symptoms   • Gastroesophageal reflux disease   • Acute right ankle pain   • Secondary polycythemia       Advance Care Planning:  ACP  discussion was held with the patient during this visit. Patient has an advance directive in EMR which is still valid.     Identification of Risk Factors:  Risk factors include: na.    Review of Systems   Constitutional: Negative for fatigue and fever.   HENT: Positive for congestion and ear pain. Negative for trouble swallowing.    Eyes: Negative for discharge and visual disturbance.   Respiratory: Negative for choking and shortness of breath.    Cardiovascular: Negative for chest pain and palpitations.   Gastrointestinal: Negative for abdominal pain and blood in stool.   Endocrine: Negative.    Genitourinary: Negative for genital sores and hematuria.   Musculoskeletal: Negative for gait problem and joint swelling.   Skin: Negative for color change, pallor, rash and wound.   Allergic/Immunologic: Positive for environmental allergies. Negative for immunocompromised state.   Neurological: Negative for facial asymmetry and speech difficulty.   Psychiatric/Behavioral: Negative for hallucinations and suicidal ideas.       Compared to one year ago, the patient feels his physical health is better.  Compared to one year ago, the patient feels his mental health is better.    Objective     Physical Exam  Vitals and nursing note reviewed.   Constitutional:       Appearance: Normal appearance. He is well-developed.   HENT:      Head: Normocephalic and atraumatic.      Nose: Nose normal.      Mouth/Throat:      Mouth: Mucous membranes are moist.      Pharynx: Oropharynx is clear.   Eyes:      Extraocular Movements: Extraocular movements intact.      Conjunctiva/sclera: Conjunctivae normal.      Pupils: Pupils are equal, round, and reactive to light.   Cardiovascular:      Rate and Rhythm: Normal rate and regular rhythm.      Heart sounds: Normal heart sounds. No murmur heard.    No friction rub. No gallop.   Pulmonary:      Effort: Pulmonary effort is normal. No respiratory distress.      Breath sounds: Normal breath sounds. No  "stridor. No wheezing, rhonchi or rales.   Chest:      Chest wall: No tenderness.   Abdominal:      General: Bowel sounds are normal.      Palpations: Abdomen is soft.   Musculoskeletal:         General: Normal range of motion.      Cervical back: Normal range of motion and neck supple.   Skin:     General: Skin is warm and dry.   Neurological:      General: No focal deficit present.      Mental Status: He is alert and oriented to person, place, and time. Mental status is at baseline.   Psychiatric:         Mood and Affect: Mood normal.         Behavior: Behavior normal.         Thought Content: Thought content normal.         Judgment: Judgment normal.         Vitals:    07/01/22 0831   BP: 130/72   Pulse: (Abnormal) 46   Temp: 97.3 °F (36.3 °C)   SpO2: 100%   Weight: 108 kg (239 lb)   Height: 188 cm (74.02\")       BMI is >= 30 and <35. (Class 1 Obesity). The following options were offered after discussion;: NA      Assessment & Plan #1 Medicare wellness exam 2 PSA #3 refer to Dr. Chopra for evaluation  Patient Self-Management and Personalized Health Advice  The patient has been provided with information about: NA and preventive services including:   · NA.    Visit Diagnoses:    ICD-10-CM ICD-9-CM   1. Medicare annual wellness visit, subsequent  Z00.00 V70.0   2. Prostate cancer screening  Z12.5 V76.44   3. Right ear pain  H92.01 388.70       Orders Placed This Encounter   Procedures   • PSA Screen     Standing Status:   Future     Standing Expiration Date:   7/2/2023     Order Specific Question:   Release to patient     Answer:   Immediate   • Ambulatory Referral to ENT (Otolaryngology)     Referral Priority:   Routine     Referral Type:   Consultation     Referral Reason:   Specialty Services Required     Referred to Provider:   Naveen Chopra MD     Requested Specialty:   Otolaryngology     Number of Visits Requested:   1       Outpatient Encounter Medications as of 7/1/2022   Medication Sig Dispense Refill "   • aspirin 81 MG EC tablet Take 81 mg by mouth Daily.     • atorvastatin (LIPITOR) 10 MG tablet Take 1 tablet by mouth Daily. 90 tablet 1   • fluticasone (Flonase) 50 MCG/ACT nasal spray 2 sprays into the nostril(s) as directed by provider Daily. (Patient taking differently: 2 sprays into the nostril(s) as directed by provider As Needed.) 18.2 mL 11   • irbesartan (AVAPRO) 300 MG tablet Take 1 tablet by mouth Every Night. 90 tablet 1   • ketotifen (ZADITOR) 0.025 % ophthalmic solution Apply 1 drop to eye(s) as directed by provider As Needed.     • levothyroxine (SYNTHROID, LEVOTHROID) 150 MCG tablet Take 1 tablet by mouth Daily. 90 tablet 1   • metoprolol tartrate (LOPRESSOR) 25 MG tablet 1/2 tablet po BID 60 tablet 3   • Multiple Vitamin (MULTI-VITAMIN DAILY PO) Take 1 tablet by mouth Daily.     • naproxen (NAPROSYN) 500 MG tablet Take 1 tablet by mouth 2 (Two) Times a Day. (Patient taking differently: Take 500 mg by mouth 2 (Two) Times a Day As Needed.) 60 tablet 3   • Natural Vitamin D-3 125 MCG (5000 UT) tablet TAKE ONE TABLET BY MOUTH DAILY (Patient taking differently: 7,000 Units.) 30 tablet 12   • nitroglycerin (Nitrostat) 0.4 MG SL tablet Place 1 tablet under the tongue Every 5 (Five) Minutes As Needed for Chest Pain. Take no more than 3 doses in 15 minutes. 10 tablet 12   • Omega-3 Fatty Acids (fish oil) 1200 MG capsule capsule Daily.     • Testosterone (AndroGel Pump) 20.25 MG/ACT (1.62%) gel 1 pump actuation on 1 shoulder daily. 75 g 0   • [DISCONTINUED] metoprolol tartrate (LOPRESSOR) 25 MG tablet TAKE ONE TABLET BY MOUTH TWICE A DAY (Patient taking differently: Take 0.5 mg by mouth 2 (Two) Times a Day.) 180 tablet 1     No facility-administered encounter medications on file as of 7/1/2022.       Reviewed use of high risk medication in the elderly: not applicable  Reviewed for potential of harmful drug interactions in the elderly: not applicable    Follow Up:  Return in about 6 months (around 1/1/2023),  or if symptoms worsen or fail to improve, for Recheck.     An After Visit Summary and PPPS with all of these plans were given to the patient.

## 2022-09-06 ENCOUNTER — OFFICE VISIT (OUTPATIENT)
Dept: CARDIOLOGY | Facility: CLINIC | Age: 72
End: 2022-09-06

## 2022-09-06 VITALS
HEIGHT: 75 IN | WEIGHT: 242 LBS | OXYGEN SATURATION: 99 % | RESPIRATION RATE: 18 BRPM | BODY MASS INDEX: 30.09 KG/M2 | DIASTOLIC BLOOD PRESSURE: 76 MMHG | HEART RATE: 45 BPM | SYSTOLIC BLOOD PRESSURE: 126 MMHG

## 2022-09-06 DIAGNOSIS — I10 ESSENTIAL HYPERTENSION: Primary | ICD-10-CM

## 2022-09-06 DIAGNOSIS — I25.10 CORONARY ARTERY DISEASE INVOLVING NATIVE CORONARY ARTERY OF NATIVE HEART WITHOUT ANGINA PECTORIS: ICD-10-CM

## 2022-09-06 DIAGNOSIS — I49.3 PVC (PREMATURE VENTRICULAR CONTRACTION): ICD-10-CM

## 2022-09-06 DIAGNOSIS — E78.2 MIXED HYPERLIPIDEMIA: ICD-10-CM

## 2022-09-06 DIAGNOSIS — I47.1 SUPRAVENTRICULAR TACHYCARDIA: ICD-10-CM

## 2022-09-06 PROCEDURE — 99214 OFFICE O/P EST MOD 30 MIN: CPT | Performed by: NURSE PRACTITIONER

## 2022-09-06 PROCEDURE — 93000 ELECTROCARDIOGRAM COMPLETE: CPT | Performed by: NURSE PRACTITIONER

## 2022-09-06 NOTE — PROGRESS NOTES
Date of Office Visit: 2022  Encounter Provider: Ludy Clements, DARRYL, APRN  Place of Service: [facility] CARDIOLOGY  Patient Name: Joaquin Peña  :1950        Subjective:     Chief Complaint:  Hypertension, nonobstructive CAD, PVCs/PSVT      History of Present Illness:  Joaquin Peña is a 72 y.o. male patient of Dr. Haque.  I am seeing this patient in the office today and I reviewed his records.    Patient has a history of PVCs, atrial tachycardia, mild bradycardia, nonobstructive coronary atherosclerosis, Grave's disease, dyslipidemia, renal calculi.      PER PREVIOUS OFFICE NOTE: In  he developed brief episodes of supraventricular tachycardia in the setting of hypertension.  This was treated medically.  In 2014 had a stress echocardiogram that showed normal left ventricular size and function with ejection fraction 59% grade 1 diastolic dysfunction mild left ventricular hypertrophy with no significant valvular heart disease and no ischemia in May 2015 he had mild bradycardia in the 30s.  A follow-up Holter revealed frequent PVCs and brief bursts of atrial tachycardia for which metoprolol was increased and he has not had any recurrent bradycardia arrhythmia.  THE FOLLOWING IS MY CONTRIBUTION TO NOTE: Patient was seen on office 2019 by Dr. Haque.  He was planning to undergo knee replacement so stress test was ordered.  10/28/2019 nuclear stress test showed small to medium sized, mildly severe area of ischemia in the inferior and lateral walls with frequent PVCs during stress and EF 55%. Patient had heart catheterization 2020 showing minimal nonobstructive coronary atherosclerosis with EF 60%, normal wall motion, and normal LV filling pressure.   Echocardiogram 2019 showed LV EF 67%, mild concentric LVH, moderate MAC with trace regurgitation, trace tricuspid regurgitation with normal RVSP.  48 hr holter 2019 was relatively benign with PVCs occurring  1.4% of monitored time.  Vascular screening 5/2021 showed carotid artery plaque bilaterally without stenosis.  Monitor study 7/2021 showed 17 episodes of nonsustained SVT longest lasting 11 beats.  PVCs occurred occasionally.  Echo 9/2021 showed EF of 66 to 70% with normal global longitudinal LV strain of -22.3% with mild concentric LVH.      Patient presents to office today for follow-up appointment.  Patient reports he is doing well overall since last visit.  He reports that back in June he had some lightheadedness and felt that his heart rate was lower than normal so he decreased his metoprolol to half a tablet twice a day.  Since then he is feeling great, no recurrence of lightheadedness.  Heart rate at home primarily stays in the 50s at rest.  He wears a smart watch and monitors it at home with activities and while sleeping.  He reports he is active golfing 2-3 times a week and also very active outside of this.  He denies any fatigue or tiredness, no dizziness or lightheadedness, no syncope or near syncope, no shortness of breath, no chest pain or discomfort, palpitations, edema, or abnormal bleeding.  Blood pressures been well controlled.          Past Medical History:   Diagnosis Date   • Abdominal obesity    • Acquired spondylolisthesis    • Acute low back pain    • Acute right lumbar radiculopathy    • Basal cell carcinoma 10/2021    left cheek   • Bulging lumbar disc    • Concussion with no loss of consciousness    • DDD (degenerative disc disease)    • Essential hypertension    • Fatigue    • Graves disease    • H/O disorder of nervous system and sense organs    • Headache    • Hyperlipidemia    • Hypertensive heart disease    • Hypothyroidism    • Insomnia    • Irregular heart beat    • Joint pain    • Lumbar radiculopathy    • Nephrolithiasis    • JACQUELINE (obstructive sleep apnea) 11/19/2021    Home sleep study.  Weight 252 pounds.  Moderate JACQUELINE with AHI 16.8 events per hour.  No sleep-related hypoxia.  The  patient snored 67.7% of total sleep time.   • Paroxysmal nocturnal dyspnea    • PVC (premature ventricular contraction)    • Severe head trauma    • Sleep-disordered breathing 06/03/2012    Overnight polysomnogram.  Weight 250 pounds.  AHI normal at 2.8 events per hour.  The patient did have 11 events per hour related to respiratory effort related arousals.  No sleep-related hypoxia.  Arousals associated with snoring abnormal.   • SOB (shortness of breath)    • Soemmering's ring    • Spondylolisthesis    • Subdural hematoma (HCC)    • Supraventricular tachycardia (HCC)      Past Surgical History:   Procedure Laterality Date   • BASAL CELL CARCINOMA EXCISION  10/2021    left cheek   • CARDIAC CATHETERIZATION N/A 11/05/2019    Procedure: Coronary angiography;  Surgeon: Storm Marcano MD;  Location:  [facility];  Service: Cardiovascular   • CARDIAC CATHETERIZATION N/A 11/05/2019    Procedure: Left heart cath;  Surgeon: Storm Marcano MD;  Location:  [facility];  Service: Cardiovascular   • CARDIAC CATHETERIZATION N/A 11/05/2019    Procedure: Left ventriculography;  Surgeon: Storm Marcano MD;  Location:  [facility];  Service: Cardiovascular   • INNER EAR SURGERY Right     nonmalignant tumor removal   • KNEE ARTHROSCOPY Left    • KNEE ARTHROSCOPY Left 2018   • KNEE SURGERY      2008 and 2009   • LITHOTRIPSY     • OTHER SURGICAL HISTORY Left     ear surgery     Outpatient Medications Prior to Visit   Medication Sig Dispense Refill   • aspirin 81 MG EC tablet Take 81 mg by mouth Daily.     • atorvastatin (LIPITOR) 10 MG tablet Take 1 tablet by mouth Daily. 90 tablet 1   • fluticasone (Flonase) 50 MCG/ACT nasal spray 2 sprays into the nostril(s) as directed by provider Daily. (Patient taking differently: 2 sprays into the nostril(s) as directed by provider As Needed. Seasonal allergies PRN) 18.2 mL 11   • irbesartan (AVAPRO) 300 MG tablet Take 1 tablet by  mouth Every Night. 90 tablet 1   • ketotifen (ZADITOR) 0.025 % ophthalmic solution Apply 1 drop to eye(s) as directed by provider As Needed. PRN for seasonal allergies     • levothyroxine (SYNTHROID, LEVOTHROID) 150 MCG tablet Take 1 tablet by mouth Daily. 90 tablet 1   • metoprolol tartrate (LOPRESSOR) 25 MG tablet 1/2 tablet po BID (Patient taking differently: Take 12.5 mg by mouth. Taking 12.5 Mg twice daily) 60 tablet 3   • Multiple Vitamin (MULTI-VITAMIN DAILY PO) Take 1 tablet by mouth Daily.     • naproxen (NAPROSYN) 500 MG tablet Take 1 tablet by mouth 2 (Two) Times a Day. (Patient taking differently: Take 500 mg by mouth 2 (Two) Times a Day As Needed. Very rarely takes) 60 tablet 3   • Natural Vitamin D-3 125 MCG (5000 UT) tablet TAKE ONE TABLET BY MOUTH DAILY (Patient taking differently: 7,000 Units.) 30 tablet 12   • Omega-3 Fatty Acids (fish oil) 1200 MG capsule capsule Daily.     • Testosterone (AndroGel Pump) 20.25 MG/ACT (1.62%) gel 1 pump actuation on 1 shoulder daily. 75 g 0   • nitroglycerin (Nitrostat) 0.4 MG SL tablet Place 1 tablet under the tongue Every 5 (Five) Minutes As Needed for Chest Pain. Take no more than 3 doses in 15 minutes. 10 tablet 12     No facility-administered medications prior to visit.       Allergies as of 2022 - Reviewed 2022   Allergen Reaction Noted   • Hydrocodone Anaphylaxis 2022   • Latex Rash 03/15/2016     Social History     Socioeconomic History   • Marital status:    Tobacco Use   • Smoking status: Former Smoker     Packs/day: 0.25     Years: 3.00     Pack years: 0.75     Types: Cigarettes     Quit date: 1973     Years since quittin.7   • Smokeless tobacco: Never Used   Vaping Use   • Vaping Use: Never used   Substance and Sexual Activity   • Alcohol use: Yes     Comment: occasional/  occ caffeine use 90z frap daily    • Drug use: No   • Sexual activity: Defer     Family History   Problem Relation Age of Onset   • No Known Problems  "Mother    • Cancer Father         malignant neoplasm   • No Known Problems Sister    • No Known Problems Sister    • No Known Problems Brother    • No Known Problems Brother    • No Known Problems Brother    • Diabetes Maternal Grandmother    • Hypertension Other    • Heart disease Other        ROS       Objective:     Vitals:    09/06/22 0853   BP: 126/76   BP Location: Right arm   Patient Position: Sitting   Cuff Size: Adult   Pulse: (!) 45   Resp: 18   SpO2: 99%   Weight: 110 kg (242 lb)   Height: 190 cm (74.8\")     HR on recheck: 56 bpm    Body mass index is 30.41 kg/m².          PHYSICAL EXAM:  Constitutional:       General: Not in acute distress.     Appearance: Well-developed. Not diaphoretic.   Eyes:      Pupils: Pupils are equal, round, and reactive to light.   HENT:      Head: Normocephalic and atraumatic.   Pulmonary:      Effort: Pulmonary effort is normal. No respiratory distress.      Breath sounds: Normal breath sounds. No wheezing. No rales.   Cardiovascular:      Normal rate. Regular rhythm.      Murmurs: There is no murmur.      No gallop. No click. No rub.   Edema:     Peripheral edema absent.   Abdominal:      General: Bowel sounds are normal.      Palpations: Abdomen is soft.   Musculoskeletal: Normal range of motion.         General: No tenderness or deformity. Skin:     General: Skin is warm and dry.      Findings: No erythema.   Neurological:      Mental Status: Alert and oriented to person, place, and time.   Psychiatric:         Behavior: Behavior normal.         Judgment: Judgment normal.             ECG 12 Lead    Date/Time: 9/6/2022 10:43 AM  Performed by: Ludy Clements DNP, APRN  Authorized by: Ludy Clements DNP, QIAN   Comparison: compared with previous ECG from 2/24/2022  Rhythm: sinus bradycardia  Comments: No significant changes from previous EKGs              Assessment:       Diagnosis Plan   1. Essential hypertension     2. PVC (premature ventricular contraction)     3. " Supraventricular tachycardia (HCC)     4. Coronary artery disease involving native coronary artery of native heart without angina pectoris           Plan:     1. Hypertension: Well-controlled on current regimen.  Patient continue to monitor.  2. Bradycardia: Mild and improved with decreased beta-blocker dose.  Patient completely asymptomatic and he monitors his heart rate during the day and at night on his smart watch.  He declines monitor study at this time as he is feeling well without cardiac symptoms or concerns.  He will notify office of any issues or concerns prior to next visit or if heart rate below 50.  3. Paroxysmal SVT: On beta-blocker.  Unable to uptitrate due to mild baseline bradycardia.  Avoid stimulants.  4. PVCs: On beta-blocker.  Unable to uptitrate due to mild baseline bradycardia.  5. Coronary artery disease: 2019 heart cath showed minimal nonobstructive CAD.  Continue with risk factor modification.  6. Bilateral carotid artery plaque: Without stenosis.  Noted on 5/2021 carotid ultrasound.  On statin.  7. History of sleep apnea: Follows with Dr. Aguiar with sleep medicine  8. Graves' disease    Patient to follow-up with Dr. Haque in 6 months or sooner if needed for any new, recurrent, or worsening symptoms or concerns.  Discussed in detail signs/symptoms that warrant sooner call or follow-up to the office or ER visit.             Your medication list          Accurate as of September 6, 2022 10:44 AM. If you have any questions, ask your nurse or doctor.            CHANGE how you take these medications      Instructions Last Dose Given Next Dose Due   fluticasone 50 MCG/ACT nasal spray  Commonly known as: Flonase  What changed:   · when to take this  · reasons to take this  · additional instructions      2 sprays into the nostril(s) as directed by provider Daily.       metoprolol tartrate 25 MG tablet  Commonly known as: LOPRESSOR  What changed:   · how much to take  · how to take  this  · additional instructions      1/2 tablet po BID       naproxen 500 MG tablet  Commonly known as: NAPROSYN  What changed:   · when to take this  · reasons to take this  · additional instructions      Take 1 tablet by mouth 2 (Two) Times a Day.       Natural Vitamin D-3 125 MCG (5000 UT) tablet  Generic drug: Cholecalciferol  What changed:   · how much to take  · how to take this  · when to take this      TAKE ONE TABLET BY MOUTH DAILY          CONTINUE taking these medications      Instructions Last Dose Given Next Dose Due   aspirin 81 MG EC tablet      Take 81 mg by mouth Daily.       atorvastatin 10 MG tablet  Commonly known as: LIPITOR      Take 1 tablet by mouth Daily.       fish oil 1200 MG capsule capsule      Daily.       irbesartan 300 MG tablet  Commonly known as: AVAPRO      Take 1 tablet by mouth Every Night.       ketotifen 0.025 % ophthalmic solution  Commonly known as: ZADITOR      Apply 1 drop to eye(s) as directed by provider As Needed. PRN for seasonal allergies       levothyroxine 150 MCG tablet  Commonly known as: SYNTHROID, LEVOTHROID      Take 1 tablet by mouth Daily.       multivitamin tablet tablet  Commonly known as: THERAGRAN      Take 1 tablet by mouth Daily.       nitroglycerin 0.4 MG SL tablet  Commonly known as: Nitrostat      Place 1 tablet under the tongue Every 5 (Five) Minutes As Needed for Chest Pain. Take no more than 3 doses in 15 minutes.       Testosterone 20.25 MG/ACT (1.62%) gel  Commonly known as: AndroGel Pump      1 pump actuation on 1 shoulder daily.              I did NOT stop or change the above medications.   Patient's medication list was updated to reflect medications they are currently taking including medication changes made by other providers.            Thanks,    Ludy Clements, DARRYL, APRN  09/06/2022         Dictated utilizing Dragon dictation

## 2022-09-07 RX ORDER — ATORVASTATIN CALCIUM 10 MG/1
10 TABLET, FILM COATED ORAL DAILY
Qty: 90 TABLET | Refills: 1 | Status: SHIPPED | OUTPATIENT
Start: 2022-09-07 | End: 2023-02-27

## 2022-10-18 RX ORDER — LEVOTHYROXINE SODIUM 0.15 MG/1
150 TABLET ORAL DAILY
Qty: 90 TABLET | Refills: 1 | Status: SHIPPED | OUTPATIENT
Start: 2022-10-18 | End: 2023-04-06

## 2022-10-18 RX ORDER — IRBESARTAN 300 MG/1
300 TABLET ORAL NIGHTLY
Qty: 90 TABLET | Refills: 1 | Status: SHIPPED | OUTPATIENT
Start: 2022-10-18 | End: 2023-04-06

## 2022-10-28 ENCOUNTER — TELEPHONE (OUTPATIENT)
Dept: FAMILY MEDICINE CLINIC | Facility: CLINIC | Age: 72
End: 2022-10-28

## 2022-10-28 DIAGNOSIS — J30.2 SEASONAL ALLERGIES: Primary | ICD-10-CM

## 2022-10-28 NOTE — TELEPHONE ENCOUNTER
Caller: Joaquin Peña    Relationship: Self    Best call back number: [phone]    What is the medical concern/diagnosis: RECURRING ALLERGIES    What specialty or service is being requested: ALLERGY SPECIALIST    Any additional details: PATIENT STATED HE HAS HAD RECURRING ALLERGIES WITH SYMPTOMS OF SINUS CONGESTION, SOMETIMES SEVERE, AND HEADACHES.     PATIENT STATED HE HAS BEEN TAKING MUCINEX SINUS TO HELP WITH THIS, HOWEVER WOULD LIKE TO BE SEEN BY A SPECIALIST FOR THIS.    PATIENT WOULD LIKE TO BE INFORMED WHEN A REFERRAL HAS BEEN SENT, AND WHO THE PROVIDER IS.

## 2022-11-21 ENCOUNTER — TELEPHONE (OUTPATIENT)
Dept: ENDOCRINOLOGY | Age: 72
End: 2022-11-21

## 2022-11-27 DIAGNOSIS — R79.89 LOW TESTOSTERONE: ICD-10-CM

## 2022-11-27 DIAGNOSIS — E78.49 OTHER HYPERLIPIDEMIA: ICD-10-CM

## 2022-11-27 DIAGNOSIS — R73.03 PREDIABETES: ICD-10-CM

## 2022-11-27 DIAGNOSIS — E55.9 VITAMIN D DEFICIENCY: ICD-10-CM

## 2022-11-27 DIAGNOSIS — E89.0 POSTABLATIVE HYPOTHYROIDISM: ICD-10-CM

## 2022-11-27 DIAGNOSIS — E05.00 GRAVES DISEASE: Primary | ICD-10-CM

## 2022-11-27 DIAGNOSIS — D75.1 SECONDARY POLYCYTHEMIA: ICD-10-CM

## 2022-12-06 ENCOUNTER — OFFICE VISIT (OUTPATIENT)
Dept: FAMILY MEDICINE CLINIC | Facility: CLINIC | Age: 72
End: 2022-12-06

## 2022-12-06 VITALS
TEMPERATURE: 98.7 F | HEIGHT: 75 IN | DIASTOLIC BLOOD PRESSURE: 80 MMHG | OXYGEN SATURATION: 99 % | BODY MASS INDEX: 31.51 KG/M2 | RESPIRATION RATE: 18 BRPM | WEIGHT: 253.4 LBS | HEART RATE: 53 BPM | SYSTOLIC BLOOD PRESSURE: 112 MMHG

## 2022-12-06 DIAGNOSIS — R60.0 LOWER EXTREMITY EDEMA: Primary | ICD-10-CM

## 2022-12-06 DIAGNOSIS — J30.2 SEASONAL ALLERGIES: ICD-10-CM

## 2022-12-06 PROCEDURE — 99213 OFFICE O/P EST LOW 20 MIN: CPT

## 2022-12-06 RX ORDER — HYDROCHLOROTHIAZIDE 12.5 MG/1
12.5 TABLET ORAL DAILY
Status: CANCELLED | OUTPATIENT
Start: 2022-12-06

## 2022-12-06 RX ORDER — FUROSEMIDE 20 MG/1
20 TABLET ORAL DAILY PRN
Qty: 14 TABLET | Refills: 0 | Status: SHIPPED | OUTPATIENT
Start: 2022-12-06 | End: 2023-01-10

## 2022-12-06 NOTE — PATIENT INSTRUCTIONS
Take 20 mg Lasix daily for the next 2 to 3 days to see if leg swelling improves.  Stop medication after swelling improves.  Return to office for lab only appointment in 1 to 2 weeks to check potassium levels and blood.    Start taking OTC allergy medication early spring and early fall to prevent sinus infections.  You may take an allergy medication daily along with Flonase 2 squirts each nostril at night.    Patient agrees with plan of care and understands instructions. Call if worsening symptoms or any problems or concerns.

## 2022-12-06 NOTE — PROGRESS NOTES
"Chief Complaint  ER Follow-Up (Left leg was very swollen and went to ER on 11/28. ER ran tests but could not determine issue. Swelling has gotten a little better since then.)    Subjective        Joaquin Peña presents to [facility]  History of Present Illness  Patient is a 72-year-old male, new patient to me, patient of Dr. Escobar last seen in office 7/1/2022.  Patient was seen at [facility] on 11/28/2022 for pedal edema and pain to bilateral lower extremities that was worse with movement and better with rest.  Venous Doppler performed on left lower extremity, no DVT found.  Patient reports he has had 3 surgeries on left knee and is due to have a knee replacement.  About 2 weeks ago, patient states he was having swelling and pain in left ankle which was worse when walking.  After few days, pain improved, but swelling persisted which led him to go to the emergency room.  Patient reports this is never happened to him in the past and denies any injury to leg.  Today in office, patient denies chest pain or shortness of air or pain in lower extremities.  With hypertension, patient takes irbesartan 300 mg tablet nightly, metoprolol 12.5 mg tablet twice daily.  Today's blood pressure 112/80.  BMP collected on 11/29/2022 reveals normal electrolytes and normal kidney function.  Since last leg, patient has been struggling with seasonal allergies.  Patient has seen an allergist and tested positive for several allergens.  It was recommended for patient to take allergy shots, but patient states he would not be able to comply with allergy shots due to travel.  Instructed patient to take allergy medications at the beginning of allergy season to prevent progression into bacterial infections.    Objective   Vital Signs:  /80 (BP Location: Left arm, Patient Position: Sitting)   Pulse 53   Temp 98.7 °F (37.1 °C) (Infrared)   Resp 18   Ht 190 cm (74.8\")   Wt 115 kg (253 lb " "6.4 oz)   SpO2 99%   BMI 31.84 kg/m²   Estimated body mass index is 31.84 kg/m² as calculated from the following:    Height as of this encounter: 190 cm (74.8\").    Weight as of this encounter: 115 kg (253 lb 6.4 oz).          Physical Exam  Constitutional:       General: He is awake.      Appearance: Normal appearance.   HENT:      Head: Normocephalic and atraumatic.      Nose: Nose normal.   Eyes:      Extraocular Movements: Extraocular movements intact.      Conjunctiva/sclera: Conjunctivae normal.      Pupils: Pupils are equal, round, and reactive to light.   Cardiovascular:      Rate and Rhythm: Normal rate and regular rhythm.      Pulses: Normal pulses.      Heart sounds: Normal heart sounds.   Pulmonary:      Effort: Pulmonary effort is normal.      Breath sounds: Normal breath sounds and air entry.   Skin:     General: Skin is warm and dry.   Neurological:      General: No focal deficit present.      Mental Status: He is alert and oriented to person, place, and time. Mental status is at baseline.   Psychiatric:         Attention and Perception: Attention normal.         Behavior: Behavior normal. Behavior is cooperative.        Result Review :                Assessment and Plan   Diagnoses and all orders for this visit:    1. Lower extremity edema (Primary)  -     furosemide (Lasix) 20 MG tablet; Take 1 tablet by mouth Daily As Needed (lower extremity edema).  Dispense: 14 tablet; Refill: 0  -     Basic metabolic panel; Future    2. Seasonal allergies    Take 20 mg Lasix daily for the next 2 to 3 days to see if leg swelling improves.  Stop medication after swelling improves.  Return to office for lab only appointment in 1 to 2 weeks to check potassium levels and blood.    Start taking OTC allergy medication early spring and early fall to prevent sinus infections.  You may take an allergy medication daily along with Flonase 2 squirts each nostril at night.    Patient agrees with plan of care and understands " instructions. Call if worsening symptoms or any problems or concerns.          Follow Up   Return if symptoms worsen or fail to improve.  Patient was given instructions and counseling regarding his condition or for health maintenance advice. Please see specific information pulled into the AVS if appropriate.

## 2022-12-07 ENCOUNTER — TELEPHONE (OUTPATIENT)
Dept: CARDIOLOGY | Facility: CLINIC | Age: 72
End: 2022-12-07
Payer: MEDICARE

## 2022-12-07 NOTE — TELEPHONE ENCOUNTER
Pt called and will be having total knee replacement on February 22nd and need a clearance note.      Please move up appointment with Dr Haque or QIAN to Jan 9th-20th range (will be out of town outside of these dates).      [place]: Am I correct on this or will you clear before?

## 2022-12-19 ENCOUNTER — PATIENT MESSAGE (OUTPATIENT)
Dept: ENDOCRINOLOGY | Age: 72
End: 2022-12-19

## 2022-12-27 ENCOUNTER — TELEPHONE (OUTPATIENT)
Dept: ENDOCRINOLOGY | Age: 72
End: 2022-12-27

## 2022-12-27 DIAGNOSIS — E55.9 VITAMIN D DEFICIENCY: ICD-10-CM

## 2022-12-27 DIAGNOSIS — D75.1 SECONDARY POLYCYTHEMIA: ICD-10-CM

## 2022-12-27 DIAGNOSIS — R79.89 LOW TESTOSTERONE: ICD-10-CM

## 2022-12-27 DIAGNOSIS — E78.49 OTHER HYPERLIPIDEMIA: ICD-10-CM

## 2022-12-27 DIAGNOSIS — R73.03 PREDIABETES: ICD-10-CM

## 2022-12-27 DIAGNOSIS — E89.0 POSTABLATIVE HYPOTHYROIDISM: ICD-10-CM

## 2022-12-31 LAB
25(OH)D3+25(OH)D2 SERPL-MCNC: 53.4 NG/ML (ref 30–100)
ALBUMIN SERPL-MCNC: 4 G/DL (ref 3.5–5.2)
ALBUMIN/GLOB SERPL: 1.5 G/DL
ALP SERPL-CCNC: 74 U/L (ref 39–117)
ALT SERPL-CCNC: 18 U/L (ref 1–41)
AST SERPL-CCNC: 13 U/L (ref 1–40)
BASOPHILS # BLD AUTO: 0.08 10*3/MM3 (ref 0–0.2)
BASOPHILS NFR BLD AUTO: 0.8 % (ref 0–1.5)
BILIRUB SERPL-MCNC: 0.6 MG/DL (ref 0–1.2)
BUN SERPL-MCNC: 21 MG/DL (ref 8–23)
BUN/CREAT SERPL: 19.4 (ref 7–25)
CALCIUM SERPL-MCNC: 10 MG/DL (ref 8.6–10.5)
CHLORIDE SERPL-SCNC: 102 MMOL/L (ref 98–107)
CHOLEST SERPL-MCNC: 133 MG/DL (ref 0–200)
CO2 SERPL-SCNC: 24.8 MMOL/L (ref 22–29)
CREAT SERPL-MCNC: 1.08 MG/DL (ref 0.76–1.27)
EGFRCR SERPLBLD CKD-EPI 2021: 73.8 ML/MIN/1.73
EOSINOPHIL # BLD AUTO: 0.13 10*3/MM3 (ref 0–0.4)
EOSINOPHIL NFR BLD AUTO: 1.3 % (ref 0.3–6.2)
ERYTHROCYTE [DISTWIDTH] IN BLOOD BY AUTOMATED COUNT: 14 % (ref 12.3–15.4)
GLOBULIN SER CALC-MCNC: 2.6 GM/DL
GLUCOSE SERPL-MCNC: 84 MG/DL (ref 65–99)
HBA1C MFR BLD: 5.9 % (ref 4.8–5.6)
HCT VFR BLD AUTO: 49.1 % (ref 37.5–51)
HDLC SERPL-MCNC: 35 MG/DL (ref 40–60)
HGB BLD-MCNC: 15.9 G/DL (ref 13–17.7)
IMM GRANULOCYTES # BLD AUTO: 0.04 10*3/MM3 (ref 0–0.05)
IMM GRANULOCYTES NFR BLD AUTO: 0.4 % (ref 0–0.5)
IMP & REVIEW OF LAB RESULTS: NORMAL
LDLC SERPL CALC-MCNC: 73 MG/DL (ref 0–100)
LYMPHOCYTES # BLD AUTO: 2.54 10*3/MM3 (ref 0.7–3.1)
LYMPHOCYTES NFR BLD AUTO: 26.2 % (ref 19.6–45.3)
MCH RBC QN AUTO: 27.8 PG (ref 26.6–33)
MCHC RBC AUTO-ENTMCNC: 32.4 G/DL (ref 31.5–35.7)
MCV RBC AUTO: 86 FL (ref 79–97)
MONOCYTES # BLD AUTO: 0.9 10*3/MM3 (ref 0.1–0.9)
MONOCYTES NFR BLD AUTO: 9.3 % (ref 5–12)
NEUTROPHILS # BLD AUTO: 6 10*3/MM3 (ref 1.7–7)
NEUTROPHILS NFR BLD AUTO: 62 % (ref 42.7–76)
NRBC BLD AUTO-RTO: 0 /100 WBC (ref 0–0.2)
PLATELET # BLD AUTO: 303 10*3/MM3 (ref 140–450)
POTASSIUM SERPL-SCNC: 4.9 MMOL/L (ref 3.5–5.2)
PROT SERPL-MCNC: 6.6 G/DL (ref 6–8.5)
RBC # BLD AUTO: 5.71 10*6/MM3 (ref 4.14–5.8)
SODIUM SERPL-SCNC: 138 MMOL/L (ref 136–145)
T4 FREE SERPL-MCNC: 1.27 NG/DL (ref 0.93–1.7)
TESTOST FREE MFR SERPL: 4.09 % (ref 1.5–4.2)
TESTOST FREE SERPL-MCNC: 15.54 NG/DL (ref 5–21)
TESTOST SERPL-MCNC: 380 NG/DL (ref 264–916)
TRIGL SERPL-MCNC: 141 MG/DL (ref 0–150)
TSH SERPL DL<=0.005 MIU/L-ACNC: 1.98 UIU/ML (ref 0.27–4.2)
VLDLC SERPL CALC-MCNC: 25 MG/DL (ref 5–40)
WBC # BLD AUTO: 9.69 10*3/MM3 (ref 3.4–10.8)

## 2023-01-10 ENCOUNTER — OFFICE VISIT (OUTPATIENT)
Dept: ENDOCRINOLOGY | Age: 73
End: 2023-01-10
Payer: MEDICARE

## 2023-01-10 VITALS
SYSTOLIC BLOOD PRESSURE: 120 MMHG | WEIGHT: 246.8 LBS | HEART RATE: 44 BPM | HEIGHT: 75 IN | TEMPERATURE: 97.7 F | DIASTOLIC BLOOD PRESSURE: 82 MMHG | OXYGEN SATURATION: 97 % | BODY MASS INDEX: 30.69 KG/M2

## 2023-01-10 DIAGNOSIS — I10 ESSENTIAL HYPERTENSION: ICD-10-CM

## 2023-01-10 DIAGNOSIS — R79.89 LOW TESTOSTERONE: ICD-10-CM

## 2023-01-10 DIAGNOSIS — G47.33 OSA (OBSTRUCTIVE SLEEP APNEA): ICD-10-CM

## 2023-01-10 DIAGNOSIS — E89.0 POSTABLATIVE HYPOTHYROIDISM: ICD-10-CM

## 2023-01-10 DIAGNOSIS — R73.03 PREDIABETES: ICD-10-CM

## 2023-01-10 DIAGNOSIS — R94.8 ABNORMAL RESULTS OF FUNCTION STUDIES OF OTHER ORGANS AND SYSTEMS: ICD-10-CM

## 2023-01-10 DIAGNOSIS — E05.00 GRAVES DISEASE: Primary | ICD-10-CM

## 2023-01-10 DIAGNOSIS — E78.49 OTHER HYPERLIPIDEMIA: ICD-10-CM

## 2023-01-10 DIAGNOSIS — E55.9 VITAMIN D DEFICIENCY: ICD-10-CM

## 2023-01-10 DIAGNOSIS — I25.10 CORONARY ARTERY DISEASE INVOLVING NATIVE CORONARY ARTERY OF NATIVE HEART WITHOUT ANGINA PECTORIS: ICD-10-CM

## 2023-01-10 DIAGNOSIS — D75.1 SECONDARY POLYCYTHEMIA: ICD-10-CM

## 2023-01-10 PROCEDURE — 99214 OFFICE O/P EST MOD 30 MIN: CPT | Performed by: INTERNAL MEDICINE

## 2023-01-10 NOTE — PROGRESS NOTES
Yonatan Peña is a 72 y.o. male.     History of Present Illness        Patient is a 72-year-old male who came in for follow-up.      He has Graves' disease and had radioactive iodine therapy.  He became hypothyroid and is on levothyroxine 150 mcg/day.  He has intentionally lost 10 lbs since 1/22.  He denies bowel changes, palpitations, heat or cold intolerance.  TSH done in 12/22 is normal at 1.98.     He has hyperlipidemia with elevated LDL.  He has been on Lipitor 10 mg/day and fish oil 1200 mg/day.  He denies myalgia.    Lipid panel done in December 2022 are as follows: Cholesterol 133.  HDL 35.  LDL 73.  Triglycerides 141.     He has history of low testosterone and was started on AndroGel by Dr. Smith in 2018.  He is on AndroGel 1.62% 1 pump on 1 shoulder once a day.  PSA done in April 2022 is normal at 0.9 ng/mL.  Total testosterone done in December 2022 is normal at 380 ng per DL.     He has voluntary phlebotomy every 2 months done at the [facility] and the last one was in November 2022.     He has good urinary stream.  He denies retention or dysuria.  He has occasional erectile dysfunction and uses Levitra 10 mg as needed. He has occasional headaches when he takes Levitra.     He is  and has fathered 1 child.  He is not planning any more children.      He has hypertension and is on Avapro 300 mg once a day and Lopressor 25 mg 1/2 tablet twice a day.  He has no history of heart attack or stroke.  He denies chest pain or SOB.  He had intermittent bradycardia with heart rates in the 40s.       He has prediabetes with hemoglobin A1c ranging from 5.4 to 5.9% since 2017.  Hemoglobin A1c done in  December 2022 is 5.9%.     He has vitamin D deficiency and is vit D3 5000 units a day and multivitamins 1 tablet/day.  25 hydroxy vitamin D done in  December 2022 is normal at 53.4 ng/mL.     He has osteopenia on bone density done in June 2019.    Bone density done in January 2022 showed osteopenia with  interval increase in the lumbar spine.  10-year risk of major osteoporotic fracture is 9.5% and of hip fracture is 1.7%.     He had moderate obstructive sleep apnea on home study done in November 2021.  He has no complaints of hypersomnolence.  He wakes up rested.  Dr. Aguiar advised consulting with his dentist about a dental appliance.     He was a [place] vet and was in a [employer].    The following portions of the patient's history were reviewed and updated as appropriate: allergies, current medications, past family history, past medical history, past social history, past surgical history and problem list.    Review of Systems   Eyes: Negative for visual disturbance.   Respiratory: Negative for shortness of breath.    Gastrointestinal: Negative.    Genitourinary: Negative.    Musculoskeletal: Negative for myalgias.   Neurological: Negative for numbness.     Vitals:    01/10/23 0932   BP: 120/82   Pulse: (!) 44   Temp: 97.7 °F (36.5 °C)   TempSrc: Temporal   SpO2: 97%   Weight: 112 kg (246 lb 12.8 oz)   Height: 190 cm (74.8\")      Objective   Physical Exam  Constitutional:       General: He is not in acute distress.     Appearance: Normal appearance. He is not ill-appearing.   Eyes:      General: No scleral icterus.        Right eye: No discharge.         Left eye: No discharge.      Extraocular Movements: Extraocular movements intact.      Conjunctiva/sclera: Conjunctivae normal.   Neck:      Vascular: No carotid bruit.   Cardiovascular:      Rate and Rhythm: Normal rate and regular rhythm.      Pulses: Normal pulses.      Heart sounds: Normal heart sounds.   Pulmonary:      Effort: Pulmonary effort is normal.      Breath sounds: Normal breath sounds. No rales.   Chest:      Chest wall: No tenderness.   Abdominal:      General: Bowel sounds are normal.      Palpations: Abdomen is soft.      Tenderness: There is no right CVA tenderness or left CVA tenderness.   Musculoskeletal:      Right lower leg: No edema.       Left lower leg: No edema.   Lymphadenopathy:      Cervical: No cervical adenopathy.   Neurological:      General: No focal deficit present.      Mental Status: He is alert and oriented to person, place, and time.       Orders Only on 12/27/2022   Component Date Value Ref Range Status   • 25 Hydroxy, Vitamin D 12/27/2022 53.4  30.0 - 100.0 ng/mL Final    Comment: Results may be falsely increased if patient taking Biotin.  Reference Range for Total Vitamin D 25(OH)  Deficiency <20.0 ng/mL  Insufficiency 21-29 ng/mL  Sufficiency  ng/mL  Toxicity >100 ng/ml     • Hemoglobin A1C 12/27/2022 5.90 (H)  4.80 - 5.60 % Final    Comment: Hemoglobin A1C Ranges:  Increased Risk for Diabetes  5.7% to 6.4%  Diabetes                     >= 6.5%  Diabetic Goal                < 7.0%     • WBC 12/27/2022 9.69  3.40 - 10.80 10*3/mm3 Final   • RBC 12/27/2022 5.71  4.14 - 5.80 10*6/mm3 Final   • Hemoglobin 12/27/2022 15.9  13.0 - 17.7 g/dL Final   • Hematocrit 12/27/2022 49.1  37.5 - 51.0 % Final   • MCV 12/27/2022 86.0  79.0 - 97.0 fL Final   • MCH 12/27/2022 27.8  26.6 - 33.0 pg Final   • MCHC 12/27/2022 32.4  31.5 - 35.7 g/dL Final   • RDW 12/27/2022 14.0  12.3 - 15.4 % Final   • Platelets 12/27/2022 303  140 - 450 10*3/mm3 Final   • Neutrophil Rel % 12/27/2022 62.0  42.7 - 76.0 % Final   • Lymphocyte Rel % 12/27/2022 26.2  19.6 - 45.3 % Final   • Monocyte Rel % 12/27/2022 9.3  5.0 - 12.0 % Final   • Eosinophil Rel % 12/27/2022 1.3  0.3 - 6.2 % Final   • Basophil Rel % 12/27/2022 0.8  0.0 - 1.5 % Final   • Neutrophils Absolute 12/27/2022 6.00  1.70 - 7.00 10*3/mm3 Final   • Lymphocytes Absolute 12/27/2022 2.54  0.70 - 3.10 10*3/mm3 Final   • Monocytes Absolute 12/27/2022 0.90  0.10 - 0.90 10*3/mm3 Final   • Eosinophils Absolute 12/27/2022 0.13  0.00 - 0.40 10*3/mm3 Final   • Basophils Absolute 12/27/2022 0.08  0.00 - 0.20 10*3/mm3 Final   • Immature Granulocyte Rel % 12/27/2022 0.4  0.0 - 0.5 % Final   • Immature Grans Absolute  12/27/2022 0.04  0.00 - 0.05 10*3/mm3 Final   • nRBC 12/27/2022 0.0  0.0 - 0.2 /100 WBC Final   • Testosterone, Total 12/27/2022 380  264 - 916 ng/dL Final    Comment: Adult male reference interval is based on a population of  healthy nonobese males (BMI <30) between 19 and 39 years old.  Brett et.al. JCEM 2017,102;9740-1617. PMID: 19201025.     • Testosterone, Free 12/27/2022 15.54  5.00 - 21.00 ng/dL Final   • Testosterone, Free % 12/27/2022 4.09  1.50 - 4.20 % Final   • Total Cholesterol 12/27/2022 133  0 - 200 mg/dL Final    Comment: Cholesterol Reference Ranges  (U.S. Department of Health and Human Services ATP III  Classifications)  Desirable          <200 mg/dL  Borderline High    200-239 mg/dL  High Risk          >240 mg/dL  Triglyceride Reference Ranges  (U.S. Department of Health and Human Services ATP III  Classifications)  Normal           <150 mg/dL  Borderline High  150-199 mg/dL  High             200-499 mg/dL  Very High        >500 mg/dL  HDL Reference Ranges  (U.S. Department of Health and Human Services ATP III  Classifications)  Low     <40 mg/dl (major risk factor for CHD)  High    >60 mg/dl ('negative' risk factor for CHD)  LDL Reference Ranges  (U.S. Department of Health and Human Services ATP III  Classifications)  Optimal          <100 mg/dL  Near Optimal     100-129 mg/dL  Borderline High  130-159 mg/dL  High             160-189 mg/dL  Very High        >189 mg/dL     • Triglycerides 12/27/2022 141  0 - 150 mg/dL Final   • HDL Cholesterol 12/27/2022 35 (L)  40 - 60 mg/dL Final   • VLDL Cholesterol Ricci 12/27/2022 25  5 - 40 mg/dL Final   • LDL Chol Calc (NIH) 12/27/2022 73  0 - 100 mg/dL Final   • Glucose 12/27/2022 84  65 - 99 mg/dL Final   • BUN 12/27/2022 21  8 - 23 mg/dL Final   • Creatinine 12/27/2022 1.08  0.76 - 1.27 mg/dL Final   • EGFR Result 12/27/2022 73.8  >60.0 mL/min/1.73 Final    Comment: National Kidney Foundation and American Society of  Nephrology (ASN) Task Force  recommended calculation based  on the Chronic Kidney Disease Epidemiology Collaboration  (CKD-EPI) equation refit without adjustment for race.  GFR Normal >60  Chronic Kidney Disease <60  Kidney Failure <15     • BUN/Creatinine Ratio 12/27/2022 19.4  7.0 - 25.0 Final   • Sodium 12/27/2022 138  136 - 145 mmol/L Final   • Potassium 12/27/2022 4.9  3.5 - 5.2 mmol/L Final   • Chloride 12/27/2022 102  98 - 107 mmol/L Final   • Total CO2 12/27/2022 24.8  22.0 - 29.0 mmol/L Final   • Calcium 12/27/2022 10.0  8.6 - 10.5 mg/dL Final   • Total Protein 12/27/2022 6.6  6.0 - 8.5 g/dL Final   • Albumin 12/27/2022 4.0  3.5 - 5.2 g/dL Final   • Globulin 12/27/2022 2.6  gm/dL Final   • A/G Ratio 12/27/2022 1.5  g/dL Final   • Total Bilirubin 12/27/2022 0.6  0.0 - 1.2 mg/dL Final   • Alkaline Phosphatase 12/27/2022 74  39 - 117 U/L Final   • AST (SGOT) 12/27/2022 13  1 - 40 U/L Final   • ALT (SGPT) 12/27/2022 18  1 - 41 U/L Final   • Free T4 12/27/2022 1.27  0.93 - 1.70 ng/dL Final    Results may be falsely increased if patient taking Biotin.   • TSH 12/27/2022 1.980  0.270 - 4.200 uIU/mL Final   • Interpretation 12/27/2022 Note   Final    Supplemental report is available.     Assessment & Plan   Diagnoses and all orders for this visit:    1. Graves disease (Primary)  -     TSH; Future  -     T4, Free; Future    2. Other hyperlipidemia  -     Lipid Panel; Future    3. Postablative hypothyroidism  -     TSH; Future  -     T4, Free; Future    4. Essential hypertension  -     Comprehensive Metabolic Panel; Future    5. JACQUELINE (obstructive sleep apnea)    6. Low testosterone  -     CBC & Differential; Future  -     PSA DIAGNOSTIC; Future  -     Testosterone, Free / Tot Equilib; Future    7. Vitamin D deficiency  -     Vitamin D,25-Hydroxy; Future    8. Coronary artery disease involving native coronary artery of native heart without angina pectoris  -     Lipid Panel; Future    9. Secondary polycythemia  -     CBC & Differential;  Future    10. Prediabetes  -     Hemoglobin A1c; Future    11. Abnormal results of function studies of other organs and systems  -     PSA DIAGNOSTIC; Future      Continue levothyroxine 150 mcg a day.  Continue Lipitor 10 mg/day and fish oil 1200 mg a day.  Continue AndroGel 1.62% 1 pump on 1 shoulder daily.  Prescription for AndroGel 1.62% 75 g with 1 refill was given to the patient.  Continue vitamin D3 5000 units/day multivitamins 1 tablet/day.  Repeat bone density after January 2024.    Copy of my note sent to Dr. Escobar.    RTC 6 mos.

## 2023-01-13 ENCOUNTER — OFFICE VISIT (OUTPATIENT)
Dept: FAMILY MEDICINE CLINIC | Facility: CLINIC | Age: 73
End: 2023-01-13
Payer: MEDICARE

## 2023-01-13 VITALS
TEMPERATURE: 99 F | HEIGHT: 75 IN | SYSTOLIC BLOOD PRESSURE: 116 MMHG | RESPIRATION RATE: 18 BRPM | WEIGHT: 249 LBS | BODY MASS INDEX: 30.96 KG/M2 | HEART RATE: 90 BPM | OXYGEN SATURATION: 96 % | DIASTOLIC BLOOD PRESSURE: 70 MMHG

## 2023-01-13 DIAGNOSIS — K21.00 GASTROESOPHAGEAL REFLUX DISEASE WITH ESOPHAGITIS WITHOUT HEMORRHAGE: ICD-10-CM

## 2023-01-13 DIAGNOSIS — I10 ESSENTIAL HYPERTENSION: Primary | ICD-10-CM

## 2023-01-13 DIAGNOSIS — R60.0 EDEMA LEG: ICD-10-CM

## 2023-01-13 PROCEDURE — 99214 OFFICE O/P EST MOD 30 MIN: CPT | Performed by: INTERNAL MEDICINE

## 2023-01-13 NOTE — PROGRESS NOTES
"Chief Complaint  Hypertension    Subjective        Joaquin Peña presents to [facility]  History of Present Illness patient was seen for hypertension.  Blood pressures been running 120s over 70s.  Patient will continue irbesartan 300 mg daily, metoprolol tartrate 25 mg 1/2 tablet twice daily.  Patient will continue monitoring blood pressure at home.  Patient's gastroesophageal reflux has been treated with over-the-counter medication.  Patient is stable with this treatment.  Patient did have a history of leg edema.  Was seen at [facility] and an 8 Doppler of the leg that was normal.  Patient was given DOMINICK stockings and asked to use of walking.  Patient's extremities were not swollen today.    Dictated utilizing Dragon dictation. If there are questions or for further clarification, please contact me.    Objective   Vital Signs:  Blood Pressure 116/70 (BP Location: Left arm, Patient Position: Sitting, Cuff Size: Large Adult)   Pulse 90   Temperature 99 °F (37.2 °C) (Infrared)   Respiration 18   Height 190 cm (74.8\")   Weight 113 kg (249 lb)   Oxygen Saturation 96%   Body Mass Index 31.29 kg/m²   Estimated body mass index is 31.29 kg/m² as calculated from the following:    Height as of this encounter: 190 cm (74.8\").    Weight as of this encounter: 113 kg (249 lb).             Physical Exam  Vitals and nursing note reviewed.   Constitutional:       Appearance: Normal appearance. He is well-developed.   HENT:      Head: Normocephalic and atraumatic.      Nose: Nose normal.      Mouth/Throat:      Mouth: Mucous membranes are moist.      Pharynx: Oropharynx is clear.   Eyes:      Extraocular Movements: Extraocular movements intact.      Conjunctiva/sclera: Conjunctivae normal.      Pupils: Pupils are equal, round, and reactive to light.   Cardiovascular:      Rate and Rhythm: Normal rate and regular rhythm.      Heart sounds: Normal heart sounds. No " murmur heard.    No friction rub. No gallop.   Pulmonary:      Effort: Pulmonary effort is normal. No respiratory distress.      Breath sounds: Normal breath sounds. No stridor. No wheezing, rhonchi or rales.   Chest:      Chest wall: No tenderness.   Abdominal:      General: Bowel sounds are normal.      Palpations: Abdomen is soft.   Musculoskeletal:         General: Normal range of motion.      Cervical back: Normal range of motion and neck supple.   Skin:     General: Skin is warm and dry.   Neurological:      General: No focal deficit present.      Mental Status: He is alert and oriented to person, place, and time. Mental status is at baseline.   Psychiatric:         Mood and Affect: Mood normal.         Behavior: Behavior normal.         Thought Content: Thought content normal.         Judgment: Judgment normal.        Result Review :                   Assessment and Plan  #1 DOMINICK stockings while ambulating #2 labs #3 follow-up in 6 months  Diagnoses and all orders for this visit:    1. Essential hypertension (Primary)    2. Gastroesophageal reflux disease with esophagitis without hemorrhage    3. Edema leg  -     Stocking DOMINICK Knee Long LG LF             Follow Up   Return in about 6 months (around 7/13/2023), or if symptoms worsen or fail to improve, for Recheck.  Patient was given instructions and counseling regarding his condition or for health maintenance advice. Please see specific information pulled into the AVS if appropriate.

## 2023-01-20 ENCOUNTER — OFFICE VISIT (OUTPATIENT)
Dept: CARDIOLOGY | Facility: CLINIC | Age: 73
End: 2023-01-20
Payer: MEDICARE

## 2023-01-20 VITALS
HEART RATE: 47 BPM | SYSTOLIC BLOOD PRESSURE: 138 MMHG | BODY MASS INDEX: 31.83 KG/M2 | HEIGHT: 74 IN | DIASTOLIC BLOOD PRESSURE: 84 MMHG | WEIGHT: 248 LBS

## 2023-01-20 DIAGNOSIS — G47.33 OSA (OBSTRUCTIVE SLEEP APNEA): ICD-10-CM

## 2023-01-20 DIAGNOSIS — I10 ESSENTIAL HYPERTENSION: Primary | ICD-10-CM

## 2023-01-20 DIAGNOSIS — I47.1 SUPRAVENTRICULAR TACHYCARDIA: ICD-10-CM

## 2023-01-20 DIAGNOSIS — Z01.810 PREOP CARDIOVASCULAR EXAM: ICD-10-CM

## 2023-01-20 DIAGNOSIS — I25.10 CORONARY ARTERY DISEASE INVOLVING NATIVE CORONARY ARTERY OF NATIVE HEART WITHOUT ANGINA PECTORIS: ICD-10-CM

## 2023-01-20 PROCEDURE — 93000 ELECTROCARDIOGRAM COMPLETE: CPT | Performed by: INTERNAL MEDICINE

## 2023-01-20 PROCEDURE — 99214 OFFICE O/P EST MOD 30 MIN: CPT | Performed by: INTERNAL MEDICINE

## 2023-01-20 RX ORDER — TIZANIDINE 4 MG/1
4 TABLET ORAL DAILY PRN
COMMUNITY
Start: 2023-01-18

## 2023-01-21 ENCOUNTER — TELEPHONE (OUTPATIENT)
Dept: CARDIOLOGY | Facility: CLINIC | Age: 73
End: 2023-01-21
Payer: MEDICARE

## 2023-01-21 DIAGNOSIS — Z13.6 ENCOUNTER FOR SCREENING FOR VASCULAR DISEASE: ICD-10-CM

## 2023-01-21 DIAGNOSIS — I65.23 CAROTID ARTERY PLAQUE, BILATERAL: ICD-10-CM

## 2023-01-21 DIAGNOSIS — I77.9 BILATERAL CAROTID ARTERY DISEASE, UNSPECIFIED TYPE: Primary | ICD-10-CM

## 2023-01-22 NOTE — TELEPHONE ENCOUNTER
Please let patient know that its been almost 2 years since her last carotid evaluation with showed plaque in both arteries.  I have placed an order for carotid Doppler to be done to follow-up on this.  Please let me know if he has issues doing this.

## 2023-01-24 NOTE — TELEPHONE ENCOUNTER
Reviewed recommendations with Joaquin Peña and the patient is agreeable to this, though he stated he has a very limited time frame to do it.  Patient is currently out of town, with plans to return on Feb. 20th.  Patient is then scheduled for knee surgery on Feb. 22nd.  Patient would like to have carotid doppler performed on Feb. 20th.    Dr. Haque,     I did not see order for Carotid Doppler.  Please place order so scheduling can arrange appointment.    Thank you,  Zully Garcia RN  Triage Nurse [facility]

## 2023-01-24 NOTE — TELEPHONE ENCOUNTER
Order is in. Please share hospital scheduling number with patient to maybe expedite him getting scheduled. [phone]

## 2023-01-24 NOTE — TELEPHONE ENCOUNTER
Can you please let the patient know this and then passed this on to schedulers to schedule.  Thank you

## 2023-02-16 ENCOUNTER — TRANSCRIBE ORDERS (OUTPATIENT)
Dept: ADMINISTRATIVE | Facility: HOSPITAL | Age: 73
End: 2023-02-16
Payer: OTHER MISCELLANEOUS

## 2023-02-16 ENCOUNTER — TRANSCRIBE ORDERS (OUTPATIENT)
Dept: CARDIOLOGY | Facility: HOSPITAL | Age: 73
End: 2023-02-16
Payer: OTHER MISCELLANEOUS

## 2023-02-16 ENCOUNTER — HOSPITAL ENCOUNTER (OUTPATIENT)
Dept: GENERAL RADIOLOGY | Facility: HOSPITAL | Age: 73
Discharge: HOME | End: 2023-02-16
Payer: OTHER MISCELLANEOUS

## 2023-02-16 ENCOUNTER — HOSPITAL ENCOUNTER (OUTPATIENT)
Dept: CARDIOLOGY | Facility: HOSPITAL | Age: 73
Discharge: HOME | End: 2023-02-16
Payer: OTHER MISCELLANEOUS

## 2023-02-16 ENCOUNTER — LAB (OUTPATIENT)
Dept: LAB | Facility: HOSPITAL | Age: 73
End: 2023-02-16
Payer: OTHER MISCELLANEOUS

## 2023-02-16 DIAGNOSIS — M17.12 UNILATERAL PRIMARY OSTEOARTHRITIS, LEFT KNEE: ICD-10-CM

## 2023-02-16 DIAGNOSIS — R79.1 ABNORMAL COAGULATION PROFILE: ICD-10-CM

## 2023-02-16 DIAGNOSIS — Z01.811 PRE-OP CHEST EXAM: Primary | ICD-10-CM

## 2023-02-16 DIAGNOSIS — Z01.818 PREOP EXAMINATION: ICD-10-CM

## 2023-02-16 DIAGNOSIS — R73.09 OTHER ABNORMAL GLUCOSE: ICD-10-CM

## 2023-02-16 DIAGNOSIS — M17.12 UNILATERAL PRIMARY OSTEOARTHRITIS, LEFT KNEE: Primary | ICD-10-CM

## 2023-02-16 LAB
ALBUMIN SERPL-MCNC: 3.6 G/DL (ref 3.5–5.2)
ALBUMIN/GLOB SERPL: 1.2 G/DL
ALP SERPL-CCNC: 77 U/L (ref 39–117)
ALT SERPL W P-5'-P-CCNC: 25 U/L (ref 1–41)
ANION GAP SERPL CALCULATED.3IONS-SCNC: 7.1 MMOL/L (ref 5–15)
AST SERPL-CCNC: 22 U/L (ref 1–40)
BILIRUB SERPL-MCNC: 0.6 MG/DL (ref 0–1.2)
BILIRUB UR QL STRIP: NEGATIVE
BUN SERPL-MCNC: 16 MG/DL (ref 8–23)
BUN/CREAT SERPL: 15.8 (ref 7–25)
CALCIUM SPEC-SCNC: 9.5 MG/DL (ref 8.6–10.5)
CHLORIDE SERPL-SCNC: 105 MMOL/L (ref 98–107)
CLARITY UR: CLEAR
CO2 SERPL-SCNC: 27.9 MMOL/L (ref 22–29)
COLOR UR: YELLOW
CREAT SERPL-MCNC: 1.01 MG/DL (ref 0.76–1.27)
DEPRECATED RDW RBC AUTO: 43.4 FL (ref 37–54)
EGFRCR SERPLBLD CKD-EPI 2021: 80 ML/MIN/1.73
ERYTHROCYTE [DISTWIDTH] IN BLOOD BY AUTOMATED COUNT: 14.5 % (ref 12.3–15.4)
GLOBULIN UR ELPH-MCNC: 3.1 GM/DL
GLUCOSE SERPL-MCNC: 85 MG/DL (ref 65–99)
GLUCOSE UR STRIP-MCNC: NEGATIVE MG/DL
HBA1C MFR BLD: 5.6 % (ref 4.8–5.6)
HCT VFR BLD AUTO: 47.1 % (ref 37.5–51)
HGB BLD-MCNC: 15.5 G/DL (ref 13–17.7)
HGB UR QL STRIP.AUTO: NEGATIVE
INR PPP: 1.11 (ref 0.9–1.1)
KETONES UR QL STRIP: NEGATIVE
LEUKOCYTE ESTERASE UR QL STRIP.AUTO: NEGATIVE
MCH RBC QN AUTO: 27.4 PG (ref 26.6–33)
MCHC RBC AUTO-ENTMCNC: 32.9 G/DL (ref 31.5–35.7)
MCV RBC AUTO: 83.2 FL (ref 79–97)
NITRITE UR QL STRIP: NEGATIVE
PH UR STRIP.AUTO: 5.5 [PH] (ref 5–8)
PLATELET # BLD AUTO: 241 10*3/MM3 (ref 140–450)
PMV BLD AUTO: 10.6 FL (ref 6–12)
POTASSIUM SERPL-SCNC: 4.2 MMOL/L (ref 3.5–5.2)
PROT SERPL-MCNC: 6.7 G/DL (ref 6–8.5)
PROT UR QL STRIP: NEGATIVE
PROTHROMBIN TIME: 14.4 SECONDS (ref 11.7–14.2)
QT INTERVAL: 435 MS
RBC # BLD AUTO: 5.66 10*6/MM3 (ref 4.14–5.8)
SODIUM SERPL-SCNC: 140 MMOL/L (ref 136–145)
SP GR UR STRIP: 1.02 (ref 1–1.03)
UROBILINOGEN UR QL STRIP: NORMAL
WBC NRBC COR # BLD: 6.99 10*3/MM3 (ref 3.4–10.8)

## 2023-02-16 PROCEDURE — 81003 URINALYSIS AUTO W/O SCOPE: CPT

## 2023-02-16 PROCEDURE — 80053 COMPREHEN METABOLIC PANEL: CPT

## 2023-02-16 PROCEDURE — 85027 COMPLETE CBC AUTOMATED: CPT

## 2023-02-16 PROCEDURE — 93005 ELECTROCARDIOGRAM TRACING: CPT

## 2023-02-16 PROCEDURE — 36415 COLL VENOUS BLD VENIPUNCTURE: CPT

## 2023-02-16 PROCEDURE — 83036 HEMOGLOBIN GLYCOSYLATED A1C: CPT

## 2023-02-16 PROCEDURE — 85610 PROTHROMBIN TIME: CPT

## 2023-02-16 PROCEDURE — 93010 ELECTROCARDIOGRAM REPORT: CPT | Performed by: INTERNAL MEDICINE

## 2023-02-16 PROCEDURE — 71046 X-RAY EXAM CHEST 2 VIEWS: CPT

## 2023-02-20 ENCOUNTER — HOSPITAL ENCOUNTER (OUTPATIENT)
Dept: CARDIOLOGY | Facility: HOSPITAL | Age: 73
Discharge: HOME | End: 2023-02-20
Admitting: NURSE PRACTITIONER
Payer: OTHER MISCELLANEOUS

## 2023-02-20 DIAGNOSIS — I65.23 CAROTID ARTERY PLAQUE, BILATERAL: ICD-10-CM

## 2023-02-20 DIAGNOSIS — Z13.6 ENCOUNTER FOR SCREENING FOR VASCULAR DISEASE: ICD-10-CM

## 2023-02-20 LAB
BH CV XLRA MEAS LEFT DIST CCA EDV: 18.4 CM/SEC
BH CV XLRA MEAS LEFT DIST CCA PSV: 79.4 CM/SEC
BH CV XLRA MEAS LEFT DIST ICA EDV: -30 CM/SEC
BH CV XLRA MEAS LEFT DIST ICA PSV: -93 CM/SEC
BH CV XLRA MEAS LEFT ICA/CCA RATIO: -1.36
BH CV XLRA MEAS LEFT MID CCA EDV: 24.2 CM/SEC
BH CV XLRA MEAS LEFT MID CCA PSV: 91 CM/SEC
BH CV XLRA MEAS LEFT MID ICA EDV: -38.7 CM/SEC
BH CV XLRA MEAS LEFT MID ICA PSV: -108.5 CM/SEC
BH CV XLRA MEAS LEFT PROX CCA EDV: 25.2 CM/SEC
BH CV XLRA MEAS LEFT PROX CCA PSV: 108.5 CM/SEC
BH CV XLRA MEAS LEFT PROX ECA PSV: -78.4 CM/SEC
BH CV XLRA MEAS LEFT PROX ICA EDV: -33.9 CM/SEC
BH CV XLRA MEAS LEFT PROX ICA PSV: -92 CM/SEC
BH CV XLRA MEAS LEFT PROX SCLA PSV: 112.2 CM/SEC
BH CV XLRA MEAS LEFT VERTEBRAL A PSV: -44.8 CM/SEC
BH CV XLRA MEAS RIGHT DIST CCA EDV: -19.9 CM/SEC
BH CV XLRA MEAS RIGHT DIST CCA PSV: -75.8 CM/SEC
BH CV XLRA MEAS RIGHT DIST ICA EDV: -24.1 CM/SEC
BH CV XLRA MEAS RIGHT DIST ICA PSV: -77 CM/SEC
BH CV XLRA MEAS RIGHT ICA/CCA RATIO: 1.1
BH CV XLRA MEAS RIGHT MID CCA EDV: -15.5 CM/SEC
BH CV XLRA MEAS RIGHT MID CCA PSV: -81.4 CM/SEC
BH CV XLRA MEAS RIGHT MID ICA EDV: -26.7 CM/SEC
BH CV XLRA MEAS RIGHT MID ICA PSV: -78 CM/SEC
BH CV XLRA MEAS RIGHT PROX CCA EDV: -16.2 CM/SEC
BH CV XLRA MEAS RIGHT PROX CCA PSV: -86.4 CM/SEC
BH CV XLRA MEAS RIGHT PROX ECA PSV: -126.1 CM/SEC
BH CV XLRA MEAS RIGHT PROX ICA EDV: -29.8 CM/SEC
BH CV XLRA MEAS RIGHT PROX ICA PSV: -83.2 CM/SEC
BH CV XLRA MEAS RIGHT PROX SCLA PSV: 175 CM/SEC
BH CV XLRA MEAS RIGHT VERTEBRAL A PSV: -45.2 CM/SEC
MAXIMAL PREDICTED HEART RATE: 150 BPM
STRESS TARGET HR: 128 BPM

## 2023-02-20 PROCEDURE — 93880 EXTRACRANIAL BILAT STUDY: CPT | Performed by: INTERNAL MEDICINE

## 2023-02-20 PROCEDURE — 93880 EXTRACRANIAL BILAT STUDY: CPT

## 2023-02-21 ENCOUNTER — OFFICE VISIT (OUTPATIENT)
Dept: FAMILY MEDICINE CLINIC | Facility: CLINIC | Age: 73
End: 2023-02-21
Payer: MEDICARE

## 2023-02-21 VITALS
OXYGEN SATURATION: 96 % | TEMPERATURE: 99.9 F | RESPIRATION RATE: 18 BRPM | BODY MASS INDEX: 30.71 KG/M2 | DIASTOLIC BLOOD PRESSURE: 68 MMHG | HEART RATE: 66 BPM | WEIGHT: 247 LBS | HEIGHT: 75 IN | SYSTOLIC BLOOD PRESSURE: 108 MMHG

## 2023-02-21 DIAGNOSIS — R11.2 NAUSEA AND VOMITING, UNSPECIFIED VOMITING TYPE: ICD-10-CM

## 2023-02-21 DIAGNOSIS — R50.9 SUBJECTIVE FEVER: Primary | ICD-10-CM

## 2023-02-21 DIAGNOSIS — I95.9 HYPOTENSION, UNSPECIFIED HYPOTENSION TYPE: ICD-10-CM

## 2023-02-21 LAB
ALBUMIN SERPL-MCNC: 4.2 G/DL (ref 3.5–5.2)
ALBUMIN/GLOB SERPL: 1.5 G/DL
ALP SERPL-CCNC: 84 U/L (ref 39–117)
ALT SERPL-CCNC: 18 U/L (ref 1–41)
AST SERPL-CCNC: 16 U/L (ref 1–40)
BASOPHILS # BLD AUTO: 0.07 10*3/MM3 (ref 0–0.2)
BASOPHILS NFR BLD AUTO: 0.4 % (ref 0–1.5)
BILIRUB SERPL-MCNC: 1 MG/DL (ref 0–1.2)
BUN SERPL-MCNC: 16 MG/DL (ref 8–23)
BUN/CREAT SERPL: 14 (ref 7–25)
CALCIUM SERPL-MCNC: 10.2 MG/DL (ref 8.6–10.5)
CHLORIDE SERPL-SCNC: 100 MMOL/L (ref 98–107)
CO2 SERPL-SCNC: 27.2 MMOL/L (ref 22–29)
CREAT SERPL-MCNC: 1.14 MG/DL (ref 0.76–1.27)
EGFRCR SERPLBLD CKD-EPI 2021: 69.2 ML/MIN/1.73
EOSINOPHIL # BLD AUTO: 0.07 10*3/MM3 (ref 0–0.4)
EOSINOPHIL NFR BLD AUTO: 0.4 % (ref 0.3–6.2)
ERYTHROCYTE [DISTWIDTH] IN BLOOD BY AUTOMATED COUNT: 14.4 % (ref 12.3–15.4)
EXPIRATION DATE: NORMAL
FLUAV AG UPPER RESP QL IA.RAPID: NOT DETECTED
FLUBV AG UPPER RESP QL IA.RAPID: NOT DETECTED
GLOBULIN SER CALC-MCNC: 2.8 GM/DL
GLUCOSE SERPL-MCNC: 95 MG/DL (ref 65–99)
HCT VFR BLD AUTO: 49.6 % (ref 37.5–51)
HGB BLD-MCNC: 15.7 G/DL (ref 13–17.7)
IMM GRANULOCYTES # BLD AUTO: 0.08 10*3/MM3 (ref 0–0.05)
IMM GRANULOCYTES NFR BLD AUTO: 0.5 % (ref 0–0.5)
INTERNAL CONTROL: NORMAL
LYMPHOCYTES # BLD AUTO: 0.91 10*3/MM3 (ref 0.7–3.1)
LYMPHOCYTES NFR BLD AUTO: 5.2 % (ref 19.6–45.3)
Lab: NORMAL
MCH RBC QN AUTO: 26.5 PG (ref 26.6–33)
MCHC RBC AUTO-ENTMCNC: 31.7 G/DL (ref 31.5–35.7)
MCV RBC AUTO: 83.8 FL (ref 79–97)
MONOCYTES # BLD AUTO: 1.13 10*3/MM3 (ref 0.1–0.9)
MONOCYTES NFR BLD AUTO: 6.4 % (ref 5–12)
NEUTROPHILS # BLD AUTO: 15.31 10*3/MM3 (ref 1.7–7)
NEUTROPHILS NFR BLD AUTO: 87.1 % (ref 42.7–76)
NRBC BLD AUTO-RTO: 0 /100 WBC (ref 0–0.2)
PLATELET # BLD AUTO: 265 10*3/MM3 (ref 140–450)
POTASSIUM SERPL-SCNC: 4.9 MMOL/L (ref 3.5–5.2)
PROT SERPL-MCNC: 7 G/DL (ref 6–8.5)
RBC # BLD AUTO: 5.92 10*6/MM3 (ref 4.14–5.8)
SARS-COV-2 AG UPPER RESP QL IA.RAPID: NOT DETECTED
SODIUM SERPL-SCNC: 136 MMOL/L (ref 136–145)
WBC # BLD AUTO: 17.57 10*3/MM3 (ref 3.4–10.8)

## 2023-02-21 PROCEDURE — 87428 SARSCOV & INF VIR A&B AG IA: CPT

## 2023-02-21 PROCEDURE — 99213 OFFICE O/P EST LOW 20 MIN: CPT

## 2023-02-21 PROCEDURE — 93000 ELECTROCARDIOGRAM COMPLETE: CPT

## 2023-02-21 RX ORDER — ONDANSETRON 4 MG/1
4 TABLET, FILM COATED ORAL EVERY 8 HOURS PRN
Qty: 30 TABLET | Refills: 0 | Status: SHIPPED | OUTPATIENT
Start: 2023-02-21

## 2023-02-21 NOTE — PROGRESS NOTES
"Chief Complaint  Chills (Back pain started at 5 am today, couldn't get warm nauseated and dizzy about 6 am Covid test at home this am negative b/p dropped to 85/54 P 82 felt like going to pass out//originally scheduled for knee surgery tomorrow)    Subjective        Joaquin Peña presents to [facility]  History of Present Illness  Patient is a 72-year-old male, patient of Dr. Escobar.  Patient here today with acute onset back pain with generalized body aches, and chills.  Patient reports symptom onset 6 AM this morning with hypotension and dizziness. Patient reports negative COVID test at home.  Today, blood pressure 108/68.  Patient is prescribed metoprolol 12.5 mg tablet 2 times daily and irbesartan 300 mg tablet nightly for hypertension irregular heartbeat.  Patient states he longer is having dizziness and denies chest pain or shortness of breath.  Patient request EKG today.  Patient has had continued nausea with vomiting but denies diarrhea. Patient reports he feels like he may have food poisoning.    Objective   Vital Signs:  /68 (BP Location: Left arm, Patient Position: Sitting, Cuff Size: Large Adult)   Pulse 66   Temp 99.9 °F (37.7 °C) (Oral)   Resp 18   Ht 190 cm (74.8\")   Wt 112 kg (247 lb)   SpO2 96%   BMI 31.04 kg/m²   Estimated body mass index is 31.04 kg/m² as calculated from the following:    Height as of this encounter: 190 cm (74.8\").    Weight as of this encounter: 112 kg (247 lb).             Physical Exam  Constitutional:       General: He is awake.      Appearance: Normal appearance.   HENT:      Head: Normocephalic and atraumatic.      Nose: Nose normal.   Eyes:      Extraocular Movements: Extraocular movements intact.      Conjunctiva/sclera: Conjunctivae normal.      Pupils: Pupils are equal, round, and reactive to light.   Cardiovascular:      Rate and Rhythm: Normal rate and regular rhythm.      Pulses: Normal pulses.      Heart sounds: Normal " heart sounds.   Pulmonary:      Effort: Pulmonary effort is normal.      Breath sounds: Normal breath sounds and air entry.   Skin:     General: Skin is warm and dry.   Neurological:      General: No focal deficit present.      Mental Status: He is alert and oriented to person, place, and time. Mental status is at baseline.   Psychiatric:         Attention and Perception: Attention normal.         Behavior: Behavior normal. Behavior is cooperative.        Result Review :              ECG 12 Lead    Date/Time: 2/21/2023 10:24 AM  Performed by: Alayna Melvin APRN  Authorized by: Alayna Melvin APRN   Comparison: compared with previous ECG from 2/16/2023  Similar to previous ECG  Rhythm: sinus rhythm  Rate: normal  BPM: 63  QRS axis: normal    Clinical impression: normal ECG              Assessment and Plan   Diagnoses and all orders for this visit:    1. Subjective fever (Primary)  -     COVID-19 RAPID AG,VERITOR,COR/RONY/PAD/ANGELITO/MAD/WESLEY/LAG/HUNTER/ IN-HOUSE,DRY SWAB, 1-2 HR TAT - Swab, Nasal Cavity; Future    2. Hypotension, unspecified hypotension type  -     COVID-19 RAPID AG,VERITOR,COR/RONY/PAD/ANGELITO/MAD/WESLEY/LAG/HUNTER/ IN-HOUSE,DRY SWAB, 1-2 HR TAT - Swab, Nasal Cavity; Future    3. Nausea and vomiting, unspecified vomiting type  -     CBC & Differential  -     Comprehensive Metabolic Panel    Other orders  -     ondansetron (Zofran) 4 MG tablet; Take 1 tablet by mouth Every 8 (Eight) Hours As Needed for Nausea or Vomiting.  Dispense: 30 tablet; Refill: 0  -     ECG 12 Lead    Start drinking water with some Gatorade and rest.  Take Zofran every 4 hours as needed for nausea.    Hold blood pressure medication tonight before bed.  Recheck blood pressure in morning before taking BP medication.    If blood pressure appears to be less than 100/60 again, please go to ER.    Our office will call you or you will see a note on your [employer] katya when your in-office blood work results.           Follow Up   Return if symptoms  worsen or fail to improve.  Patient was given instructions and counseling regarding his condition or for health maintenance advice. Please see specific information pulled into the AVS if appropriate.

## 2023-02-21 NOTE — PATIENT INSTRUCTIONS
Start drinking water with some Gatorade and rest.  Take Zofran every 4 hours as needed for nausea.    Hold blood pressure medication tonight before bed.  Recheck blood pressure in morning before taking BP medication.    If blood pressure appears to be less than 100/60 again, please go to ER.    Our office will call you or you will see a note on your [employer] katya when your in-office blood work results.

## 2023-02-22 ENCOUNTER — PATIENT MESSAGE (OUTPATIENT)
Dept: CARDIOLOGY | Facility: CLINIC | Age: 73
End: 2023-02-22
Payer: MEDICARE

## 2023-02-22 ENCOUNTER — TELEPHONE (OUTPATIENT)
Dept: CARDIOLOGY | Facility: CLINIC | Age: 73
End: 2023-02-22
Payer: MEDICARE

## 2023-02-22 NOTE — TELEPHONE ENCOUNTER
----- Message from Joaquin Peña sent at 2023 10:10 AM EST -----  Regardin2023 illness  Contact: [phone]  Awoke at 5 am on the 20th ill. Took a COVID test and it was negative. Took my blood pressure and at one point was 85/54. Became flush and faint. Dry heaved several times. Nothing came up. Got in to see my GP.  They monitored my heart. No change from previous tests. Flu test was negative. Personally, I believe issues were from a food poisoning issue. Was told if blood pressure was below 100 to go to the emergency room. Did NOT take my Irbesartan or metoprolol medications last night (2023) and blood pressure is still right around 100 to 110. Knee replacement surgery was postponed. Feeling a little better today. Simply wanted too make you aware of what had occurred.

## 2023-02-22 NOTE — TELEPHONE ENCOUNTER
Would have him continue to hold both metoprolol and irbesartan until BP >110/60. Then would restart metoprolol first. If BP gets above 120/60 on Metoprolol, can restart irbesartan. Try to stay hydrated with the stomach issues. If BP remains low <100/50 despite holding medications, would go to ER at that time.

## 2023-02-23 DIAGNOSIS — E78.2 MIXED HYPERLIPIDEMIA: ICD-10-CM

## 2023-02-23 DIAGNOSIS — D72.829 LEUKOCYTOSIS, UNSPECIFIED TYPE: Primary | ICD-10-CM

## 2023-02-24 ENCOUNTER — TELEPHONE (OUTPATIENT)
Dept: FAMILY MEDICINE CLINIC | Facility: CLINIC | Age: 73
End: 2023-02-24

## 2023-02-24 RX ORDER — BENZONATATE 100 MG/1
CAPSULE ORAL
Qty: 30 CAPSULE | Refills: 3 | Status: SHIPPED | OUTPATIENT
Start: 2023-02-24

## 2023-02-24 RX ORDER — AZITHROMYCIN 250 MG/1
TABLET, FILM COATED ORAL
Qty: 6 TABLET | Refills: 0 | Status: SHIPPED | OUTPATIENT
Start: 2023-02-24

## 2023-02-24 NOTE — TELEPHONE ENCOUNTER
Caller: Joaquin Peña    Relationship: Self    Best call back number: [phone]    What medication are you requesting: MEDICATION    What are your current symptoms: COUGH WITH PHLEGM    If a prescription is needed, what is your preferred pharmacy and phone number: [facility] [id number] - [address] - [phone] [facility] [phone] FX     Additional notes: PATIENT STATED THAT HE HAD A VISIT WITH LYNETTE GUARDADO ON 02/21/23 AND SHE PRESCRIBED SOME MEDICATION FOR NAUSEA.    HE STATED THAT HE STILL HAS A COUGH WITH PHLEGM AND WOULD LIKE TO KNOW IF A MEDICATION CAN BE SENT IN FOR THAT.    HE WOULD LIKE A CALL IF SO.

## 2023-02-27 RX ORDER — ATORVASTATIN CALCIUM 10 MG/1
TABLET, FILM COATED ORAL
Qty: 90 TABLET | Refills: 1 | Status: SHIPPED | OUTPATIENT
Start: 2023-02-27

## 2023-04-06 RX ORDER — LEVOTHYROXINE SODIUM 0.15 MG/1
TABLET ORAL
Qty: 90 TABLET | Refills: 1 | Status: SHIPPED | OUTPATIENT
Start: 2023-04-06

## 2023-04-06 RX ORDER — IRBESARTAN 300 MG/1
TABLET ORAL
Qty: 90 TABLET | Refills: 1 | Status: SHIPPED | OUTPATIENT
Start: 2023-04-06

## 2023-05-17 ENCOUNTER — TELEPHONE (OUTPATIENT)
Dept: CARDIOLOGY | Facility: CLINIC | Age: 73
End: 2023-05-17
Payer: MEDICARE

## 2023-05-17 NOTE — TELEPHONE ENCOUNTER
Spoke with pt.  He said a few months ago he lowered his Metoprolol 25mg BID to 12.5mg BID on his own because he felt his pulse was too low.  Advised him next time to call our office if this happens.      He also said he has had knee pain issues.  Explained to him that pain can contribute to his higher BP.    He will continue to monitor and call on Friday.

## 2023-05-17 NOTE — TELEPHONE ENCOUNTER
"  Caller: Joaquin Peña \"Bill\"    Relationship: Self    Best call back number: [phone]    What is the best time to reach you: ANY    Who are you requesting to speak with (clinical staff, provider,  specific staff member): ANY      What was the call regarding: PT STATES THAT HIS BLOOD PRESSURE HAS BEEN INCREASING SINCE 5-14. THIS MORNING IT /88 AND AT 9:56 AM IT /92. PT IS CONCERNED ABOUT IT CONTINUING TO ELEVATE.    Do you require a callback: YES          "

## 2023-05-17 NOTE — TELEPHONE ENCOUNTER
Current Meds:  Irbesartan 300mg Q HS  Metoprolol Tartrate 25mg BID    Pt said only thing different was a COVID booster about 2 weeks ago.      His BP came down to 129/73 compared this AM.    No palpitations  No tachycardia  No SOA  No fatigue  No edema  No chest pain

## 2023-05-19 ENCOUNTER — TELEPHONE (OUTPATIENT)
Dept: CARDIOLOGY | Facility: CLINIC | Age: 73
End: 2023-05-19
Payer: MEDICARE

## 2023-05-20 NOTE — TELEPHONE ENCOUNTER
Mr. Peña paged the answering service tonight.  He has concerns that his blood pressure is fluctuating.  This morning it was 136/73, later it was 105/65, and now 160/86.  He took his blood pressure medications 2 hours ago.  He is not having any symptoms.  I asked him just to relax and recheck his blood pressure in another hour.  If it still elevated to call the service.  He verbalized understanding.    FYI.  Please check on him next week.

## 2023-05-28 NOTE — PROGRESS NOTES
"Subjective   Joaquin Peña is a 69 y.o. male seen for follow up for goiter, hypothyroidism, graves, hyperlipidemia, HTN, lab review. Patient is requesting refills of Androgel. He denies any problems or concerns. He would like to discuss A1C.     History of Present Illness this is a 69-year-old gentleman known patient with history of Graves' disease a status post radioactive iodine therapy and consequent hypothyroidism as well as hypo-gonad is him and hypertension and dyslipidemia insulin resistance and hyperglycemia..  Over the course of last 6 months he has had no significant health problem for which to go to the emergency room or hospital.    /72   Resp 16   Ht 193 cm (76\")   Wt 112 kg (247 lb 6.4 oz)   BMI 30.11 kg/m²      Allergies   Allergen Reactions   • Latex Rash       Current Outpatient Medications:   •  ANDROGEL PUMP 20.25 MG/ACT (1.62%) gel, 1 pump actuation on each shoulder daily., Disp: 75 g, Rfl: 0  •  atorvastatin (LIPITOR) 10 MG tablet, Take one tablet by mouth daily, Disp: 90 tablet, Rfl: 1  •  Cholecalciferol (VITAMIN D3) 5000 units tablet tablet, Take 1 tablet by mouth Daily., Disp: 30 tablet, Rfl: 3  •  irbesartan (AVAPRO) 300 MG tablet, Take 1 tablet by mouth Daily., Disp: 90 tablet, Rfl: 3  •  ketotifen (ZADITOR) 0.025 % ophthalmic solution, Apply  to eye As Needed., Disp: , Rfl:   •  levothyroxine (SYNTHROID, LEVOTHROID) 150 MCG tablet, , Disp: , Rfl:   •  metoprolol tartrate (LOPRESSOR) 25 MG tablet, Take 1 tablet by mouth 2 (Two) Times a Day., Disp: 180 tablet, Rfl: 3  •  naproxen (NAPROSYN) 500 MG tablet, Take 1 tablet by mouth 2 (Two) Times a Day. (Patient taking differently: Take 500 mg by mouth 2 (Two) Times a Day. prn), Disp: 60 tablet, Rfl: 3      The following portions of the patient's history were reviewed and updated as appropriate: allergies, current medications, past family history, past medical history, past social history, past surgical history and problem " list.    Review of Systems   Constitutional: Negative.    HENT: Negative.    Eyes: Negative.    Respiratory: Negative.    Cardiovascular: Negative.    Gastrointestinal: Negative.    Endocrine: Negative.    Genitourinary: Negative.    Musculoskeletal: Negative.    Skin: Negative.    Allergic/Immunologic: Negative.    Neurological: Negative.    Hematological: Negative.    Psychiatric/Behavioral: Negative.        Objective   Physical Exam   Constitutional: He is oriented to person, place, and time. He appears well-developed and well-nourished. No distress.   HENT:   Head: Normocephalic and atraumatic.   Right Ear: External ear normal.   Left Ear: External ear normal.   Nose: Nose normal.   Mouth/Throat: Oropharynx is clear and moist. No oropharyngeal exudate.   Eyes: Conjunctivae and EOM are normal. Pupils are equal, round, and reactive to light. Right eye exhibits no discharge. Left eye exhibits no discharge. No scleral icterus.   Neck: Normal range of motion. Neck supple. No JVD present. No tracheal deviation present. No thyromegaly present.   Cardiovascular: Normal rate, regular rhythm, normal heart sounds and intact distal pulses. Exam reveals no gallop and no friction rub.   No murmur heard.  Pulmonary/Chest: Effort normal and breath sounds normal. No stridor. No respiratory distress. He has no wheezes. He has no rales. He exhibits no tenderness.   Abdominal: Soft. Bowel sounds are normal. He exhibits no distension and no mass. There is no tenderness. There is no rebound and no guarding. No hernia.   Musculoskeletal: Normal range of motion. He exhibits no edema, tenderness or deformity.   Lymphadenopathy:     He has no cervical adenopathy.   Neurological: He is alert and oriented to person, place, and time. He has normal reflexes. He displays normal reflexes. No cranial nerve deficit or sensory deficit. He exhibits normal muscle tone. Coordination normal.   Skin: Skin is warm and dry. No rash noted. No erythema.  No pallor.   Psychiatric: He has a normal mood and affect. His behavior is normal. Judgment and thought content normal.   Nursing note and vitals reviewed.       Lab Results   Component Value Date    GLUCOSE 109 (H) 02/27/2019    BUN 15 07/31/2019    CREATININE 1.14 07/31/2019    EGFRIFNONA 64 07/31/2019    EGFRIFAFRI 78 07/31/2019    BCR 13.2 07/31/2019    K 4.9 07/31/2019    CO2 25.9 07/31/2019    CALCIUM 10.0 07/31/2019    PROTENTOTREF 6.7 07/31/2019    ALBUMIN 4.10 07/31/2019    LABIL2 1.6 07/31/2019    AST 18 07/31/2019    ALT 20 07/31/2019     Lab Results   Component Value Date    TSH 2.410 07/31/2019     Lab Results   Component Value Date    HGBA1C 5.90 (H) 07/31/2019     Lab Results   Component Value Date    CHOL 126 05/30/2018    CHOL 132 05/09/2017    CHOL 174 09/22/2016    CHLPL 122 07/31/2019    CHLPL 134 04/12/2019    CHLPL 138 12/11/2018     Lab Results   Component Value Date    TRIG 93 07/31/2019    TRIG 104 04/12/2019    TRIG 144 12/11/2018     Lab Results   Component Value Date    HDL 38 (L) 07/31/2019    HDL 40 04/12/2019    HDL 35 (L) 12/11/2018     Lab Results   Component Value Date    LDL 65 07/31/2019    LDL 73 04/12/2019    LDL 74 12/11/2018           Assessment/Plan   Diagnoses and all orders for this visit:    Graves disease  -     T4 & TSH ([facility]); Future  -     T3, Free; Future  -     T4, Free; Future  -     Uric Acid; Future  -     Vitamin D 25 Hydroxy; Future  -     Comprehensive Metabolic Panel; Future  -     C-Peptide; Future  -     Hemoglobin A1c; Future  -     Lipid Panel; Future  -     Hemoglobin & Hematocrit, Blood; Future    Acquired spondylolisthesis  -     T4 & TSH ([facility]); Future  -     T3, Free; Future  -     T4, Free; Future  -     Uric Acid; Future  -     Vitamin D 25 Hydroxy; Future  -     Comprehensive Metabolic Panel; Future  -     C-Peptide; Future  -     Hemoglobin A1c; Future  -     Lipid Panel; Future  -     Hemoglobin & Hematocrit, Blood;  Future    Hyperglycemia  -     T4 & TSH ([facility]); Future  -     T3, Free; Future  -     T4, Free; Future  -     Uric Acid; Future  -     Vitamin D 25 Hydroxy; Future  -     Comprehensive Metabolic Panel; Future  -     C-Peptide; Future  -     Hemoglobin A1c; Future  -     Lipid Panel; Future  -     Hemoglobin & Hematocrit, Blood; Future    Insulin resistance  -     T4 & TSH ([facility]); Future  -     T3, Free; Future  -     T4, Free; Future  -     Uric Acid; Future  -     Vitamin D 25 Hydroxy; Future  -     Comprehensive Metabolic Panel; Future  -     C-Peptide; Future  -     Hemoglobin A1c; Future  -     Lipid Panel; Future  -     Hemoglobin & Hematocrit, Blood; Future    Low testosterone  -     Discontinue: ANDROGEL PUMP 20.25 MG/ACT (1.62%) gel; 1 pump actuation on each shoulder daily.  -     T4 & TSH ([facility]); Future  -     T3, Free; Future  -     T4, Free; Future  -     Uric Acid; Future  -     Vitamin D 25 Hydroxy; Future  -     Comprehensive Metabolic Panel; Future  -     C-Peptide; Future  -     Hemoglobin A1c; Future  -     Lipid Panel; Future  -     Hemoglobin & Hematocrit, Blood; Future  -     Testosterone (ANDROGEL PUMP) 20.25 MG/ACT (1.62%) gel; 1 pump actuation on each shoulder daily.    Mixed hyperlipidemia  -     atorvastatin (LIPITOR) 10 MG tablet; Take one tablet by mouth daily  -     T4 & TSH ([facility]); Future  -     T3, Free; Future  -     T4, Free; Future  -     Uric Acid; Future  -     Vitamin D 25 Hydroxy; Future  -     Comprehensive Metabolic Panel; Future  -     C-Peptide; Future  -     Hemoglobin A1c; Future  -     Lipid Panel; Future  -     Hemoglobin & Hematocrit, Blood; Future    Essential hypertension  -     T4 & TSH ([facility]); Future  -     T3, Free; Future  -     T4, Free; Future  -     Uric Acid; Future  -     Vitamin D 25 Hydroxy; Future  -     Comprehensive Metabolic Panel; Future  -     C-Peptide; Future  -     Hemoglobin A1c; Future  -     Lipid Panel; Future  -     Hemoglobin &  Hematocrit, Blood; Future    Vitamin D deficiency  -     T4 & TSH ([facility]); Future  -     T3, Free; Future  -     T4, Free; Future  -     Uric Acid; Future  -     Vitamin D 25 Hydroxy; Future  -     Comprehensive Metabolic Panel; Future  -     C-Peptide; Future  -     Hemoglobin A1c; Future  -     Lipid Panel; Future  -     Hemoglobin & Hematocrit, Blood; Future    Other orders  -     irbesartan (AVAPRO) 300 MG tablet; Take 1 tablet by mouth Daily.  -     levothyroxine (SYNTHROID, LEVOTHROID) 150 MCG tablet; Take 1 tablet by mouth Daily.  -     metoprolol tartrate (LOPRESSOR) 25 MG tablet; Take 1 tablet by mouth 2 (Two) Times a Day.      In summary I saw and examined this 69-year-old gentleman for above-mentioned problems.  I reviewed his laboratory evaluation of 7/31/2019 and provided him with a hard copy of it.  Overall he is clinically and metabolically stable and therefore we will go ahead and continue all his current prescriptions.  He will see Ms. Aimee Shane in 6 months or sooner if needed with laboratory evaluation prior to each office visit.            numerical 0-10

## 2023-08-08 ENCOUNTER — OFFICE VISIT (OUTPATIENT)
Dept: CARDIOLOGY | Facility: CLINIC | Age: 73
End: 2023-08-08
Payer: MEDICARE

## 2023-08-08 VITALS
DIASTOLIC BLOOD PRESSURE: 86 MMHG | HEIGHT: 75 IN | BODY MASS INDEX: 30.56 KG/M2 | WEIGHT: 245.8 LBS | HEART RATE: 52 BPM | SYSTOLIC BLOOD PRESSURE: 142 MMHG

## 2023-08-08 DIAGNOSIS — I47.1 SUPRAVENTRICULAR TACHYCARDIA: Primary | ICD-10-CM

## 2023-08-08 DIAGNOSIS — E78.49 OTHER HYPERLIPIDEMIA: ICD-10-CM

## 2023-08-08 DIAGNOSIS — I25.10 CORONARY ARTERY DISEASE INVOLVING NATIVE CORONARY ARTERY OF NATIVE HEART WITHOUT ANGINA PECTORIS: ICD-10-CM

## 2023-08-08 DIAGNOSIS — I10 ESSENTIAL HYPERTENSION: ICD-10-CM

## 2023-08-08 DIAGNOSIS — I49.3 PVC (PREMATURE VENTRICULAR CONTRACTION): ICD-10-CM

## 2023-08-08 DIAGNOSIS — I49.9 IRREGULAR HEART BEAT: ICD-10-CM

## 2023-08-08 PROCEDURE — 3077F SYST BP >= 140 MM HG: CPT | Performed by: NURSE PRACTITIONER

## 2023-08-08 PROCEDURE — 93000 ELECTROCARDIOGRAM COMPLETE: CPT | Performed by: NURSE PRACTITIONER

## 2023-08-08 PROCEDURE — 99214 OFFICE O/P EST MOD 30 MIN: CPT | Performed by: NURSE PRACTITIONER

## 2023-08-08 PROCEDURE — 1160F RVW MEDS BY RX/DR IN RCRD: CPT | Performed by: NURSE PRACTITIONER

## 2023-08-08 PROCEDURE — 3079F DIAST BP 80-89 MM HG: CPT | Performed by: NURSE PRACTITIONER

## 2023-08-08 PROCEDURE — 1159F MED LIST DOCD IN RCRD: CPT | Performed by: NURSE PRACTITIONER

## 2023-08-08 RX ORDER — AMLODIPINE BESYLATE 5 MG/1
TABLET ORAL
COMMUNITY
Start: 2023-07-12
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
## 2023-08-29 ENCOUNTER — OFFICE VISIT (OUTPATIENT)
Dept: ENDOCRINOLOGY | Age: 73
End: 2023-08-29
Payer: MEDICARE

## 2023-08-29 VITALS
WEIGHT: 248 LBS | OXYGEN SATURATION: 97 % | SYSTOLIC BLOOD PRESSURE: 136 MMHG | HEART RATE: 61 BPM | TEMPERATURE: 96.8 F | HEIGHT: 75 IN | DIASTOLIC BLOOD PRESSURE: 72 MMHG | BODY MASS INDEX: 30.84 KG/M2

## 2023-08-29 DIAGNOSIS — E78.49 OTHER HYPERLIPIDEMIA: ICD-10-CM

## 2023-08-29 DIAGNOSIS — I10 ESSENTIAL HYPERTENSION: ICD-10-CM

## 2023-08-29 DIAGNOSIS — E55.9 VITAMIN D DEFICIENCY: ICD-10-CM

## 2023-08-29 DIAGNOSIS — E05.00 GRAVES DISEASE: Primary | ICD-10-CM

## 2023-08-29 DIAGNOSIS — M85.852 OSTEOPENIA OF LEFT HIP: ICD-10-CM

## 2023-08-29 DIAGNOSIS — R79.89 LOW TESTOSTERONE: ICD-10-CM

## 2023-08-29 DIAGNOSIS — R73.03 PREDIABETES: ICD-10-CM

## 2023-08-29 RX ORDER — LIDOCAINE 50 MG/G
1 PATCH TOPICAL SEE ADMIN INSTRUCTIONS
COMMUNITY
Start: 2023-07-12

## 2023-08-29 RX ORDER — AMOXICILLIN 500 MG/1
CAPSULE ORAL
COMMUNITY
End: 2023-08-29

## 2023-08-29 RX ORDER — LIDOCAINE HYDROCHLORIDE 20 MG/ML
SOLUTION OROPHARYNGEAL
COMMUNITY
Start: 2023-08-14 | End: 2023-08-29

## 2023-08-29 RX ORDER — TADALAFIL 20 MG/1
TABLET ORAL
Qty: 30 TABLET | Refills: 0
Start: 2023-08-29

## 2023-08-29 NOTE — PROGRESS NOTES
Yonatan Peña is a 73 y.o. male.     History of Present Illness        He has Graves' disease and had radioactive iodine therapy.  He became hypothyroid and is on levothyroxine 150 mcg/day.  He has intentionally lost 10 lbs since 1/22.  He denies bowel changes, palpitations, heat or cold intolerance.  TSH done in 12/22 is normal at 1.98.     He has hyperlipidemia with elevated LDL.  He has been on Lipitor 10 mg/day and fish oil 1200 mg/day.  He denies myalgia.    Lipid panel done in December 2022 are as follows: Cholesterol 133.  HDL 35.  LDL 73.  Triglycerides 141.     He has history of low testosterone and was started on AndroGel by Dr. Smith in 2018.  He is on AndroGel 1.62% 1 pump on 1 shoulder once a day.  PSA done in April 2022 is normal at 0.9 ng/mL.  Total testosterone done in December 2022 is normal at 380 ng per DL.     He has voluntary phlebotomy every 2 months done at the [facility] and the last one was in November 2022.     He has good urinary stream.  He denies retention or dysuria.  He has occasional erectile dysfunction and uses Levitra 10 mg as needed. He has occasional headaches when he takes Levitra.     He is  and has fathered 1 child.  He is not planning any more children.      He has hypertension and is on Avapro 300 mg once a day and Lopressor 25 mg 1/2 tablet twice a day.  He has no history of heart attack or stroke.  He denies chest pain or SOB.  He had intermittent bradycardia with heart rates in the 40s.       He has prediabetes with hemoglobin A1c ranging from 5.4 to 5.9% since 2017.  Hemoglobin A1c done in  December 2022 is 5.9%.     He has vitamin D deficiency and is vit D3 5000 units a day and multivitamins 1 tablet/day.  25 hydroxy vitamin D done in  December 2022 is normal at 53.4 ng/mL.     He has osteopenia on bone density done in June 2019.    Bone density done in January 2022 showed osteopenia with interval increase in the lumbar spine.  10-year risk of major  osteoporotic fracture is 9.5% and of hip fracture is 1.7%.     He had moderate obstructive sleep apnea on home study done in November 2021.  He has no complaints of hypersomnolence.  He wakes up rested.  Dr. Aguiar advised consulting with his dentist about a dental appliance.     He was a [place] vet and was in a [employer].    {Common H&P Review Areas:95974}    Review of Systems  There were no vitals filed for this visit.   Objective   Physical Exam  Results Encounter on 08/10/2023   Component Date Value Ref Range Status    TSH 08/15/2023 1.970  0.270 - 4.200 uIU/mL Final    Free T4 08/15/2023 1.24  0.93 - 1.70 ng/dL Final    Results may be falsely increased if patient taking Biotin.    Glucose 08/15/2023 90  65 - 99 mg/dL Final    BUN 08/15/2023 20  8 - 23 mg/dL Final    Creatinine 08/15/2023 1.12  0.76 - 1.27 mg/dL Final    EGFR Result 08/15/2023 70.2  >60.0 mL/min/1.73 Final    Comment: GFR Normal >60  Chronic Kidney Disease <60  Kidney Failure <15  The GFR formula is only valid for adults with stable renal  function between ages 18 and 70.      BUN/Creatinine Ratio 08/15/2023 17.9  7.0 - 25.0 Final    Sodium 08/15/2023 140  136 - 145 mmol/L Final    Potassium 08/15/2023 4.8  3.5 - 5.2 mmol/L Final    Chloride 08/15/2023 106  98 - 107 mmol/L Final    Total CO2 08/15/2023 25.0  22.0 - 29.0 mmol/L Final    Calcium 08/15/2023 10.2  8.6 - 10.5 mg/dL Final    Total Protein 08/15/2023 6.6  6.0 - 8.5 g/dL Final    Albumin 08/15/2023 4.0  3.5 - 5.2 g/dL Final    Globulin 08/15/2023 2.6  gm/dL Final    A/G Ratio 08/15/2023 1.5  g/dL Final    Total Bilirubin 08/15/2023 0.5  0.0 - 1.2 mg/dL Final    Alkaline Phosphatase 08/15/2023 70  39 - 117 U/L Final    AST (SGOT) 08/15/2023 21  1 - 40 U/L Final    ALT (SGPT) 08/15/2023 27  1 - 41 U/L Final    Total Cholesterol 08/15/2023 132  0 - 200 mg/dL Final    Comment: Cholesterol Reference Ranges  (U.S. Department of Health and Human Services ATP III  Classifications)  Desirable           <200 mg/dL  Borderline High    200-239 mg/dL  High Risk          >240 mg/dL  Triglyceride Reference Ranges  (U.S. Department of Health and Human Services ATP III  Classifications)  Normal           <150 mg/dL  Borderline High  150-199 mg/dL  High             200-499 mg/dL  Very High        >500 mg/dL  HDL Reference Ranges  (U.S. Department of Health and Human Services ATP III  Classifications)  Low     <40 mg/dl (major risk factor for CHD)  High    >60 mg/dl ('negative' risk factor for CHD)  LDL Reference Ranges  (U.S. Department of Health and Human Services ATP III  Classifications)  Optimal          <100 mg/dL  Near Optimal     100-129 mg/dL  Borderline High  130-159 mg/dL  High             160-189 mg/dL  Very High        >189 mg/dL      Triglycerides 08/15/2023 156 (H)  0 - 150 mg/dL Final    HDL Cholesterol 08/15/2023 35 (L)  40 - 60 mg/dL Final    VLDL Cholesterol Ricci 08/15/2023 27  5 - 40 mg/dL Final    LDL Chol Calc (NIH) 08/15/2023 70  0 - 100 mg/dL Final    25 Hydroxy, Vitamin D 08/15/2023 49.9  30.0 - 100.0 ng/ml Final    Comment: Reference Range for Total Vitamin D 25(OH)  Deficiency <20.0 ng/mL  Insufficiency 21-29 ng/mL  Sufficiency  ng/mL  Toxicity >100 ng/ml      Hemoglobin A1C 08/15/2023 5.60  4.80 - 5.60 % Final    Comment: Hemoglobin A1C Ranges:  Increased Risk for Diabetes  5.7% to 6.4%  Diabetes                     >= 6.5%  Diabetic Goal                < 7.0%      WBC 08/15/2023 6.50  3.40 - 10.80 10*3/mm3 Final    RBC 08/15/2023 5.63  4.14 - 5.80 10*6/mm3 Final    Hemoglobin 08/15/2023 15.0  13.0 - 17.7 g/dL Final    Hematocrit 08/15/2023 46.7  37.5 - 51.0 % Final    MCV 08/15/2023 82.9  79.0 - 97.0 fL Final    MCH 08/15/2023 26.6  26.6 - 33.0 pg Final    MCHC 08/15/2023 32.1  31.5 - 35.7 g/dL Final    RDW 08/15/2023 14.5  12.3 - 15.4 % Final    Platelets 08/15/2023 278  140 - 450 10*3/mm3 Final    Neutrophil Rel % 08/15/2023 53.2  42.7 - 76.0 % Final    Lymphocyte Rel % 08/15/2023  32.5  19.6 - 45.3 % Final    Monocyte Rel % 08/15/2023 10.0  5.0 - 12.0 % Final    Eosinophil Rel % 08/15/2023 3.1  0.3 - 6.2 % Final    Basophil Rel % 08/15/2023 0.9  0.0 - 1.5 % Final    Neutrophils Absolute 08/15/2023 3.46  1.70 - 7.00 10*3/mm3 Final    Lymphocytes Absolute 08/15/2023 2.11  0.70 - 3.10 10*3/mm3 Final    Monocytes Absolute 08/15/2023 0.65  0.10 - 0.90 10*3/mm3 Final    Eosinophils Absolute 08/15/2023 0.20  0.00 - 0.40 10*3/mm3 Final    Basophils Absolute 08/15/2023 0.06  0.00 - 0.20 10*3/mm3 Final    Immature Granulocyte Rel % 08/15/2023 0.3  0.0 - 0.5 % Final    Immature Grans Absolute 08/15/2023 0.02  0.00 - 0.05 10*3/mm3 Final    nRBC 08/15/2023 0.0  0.0 - 0.2 /100 WBC Final    PSA 08/15/2023 1.110  0.000 - 4.000 ng/mL Final    Comment: Results may be falsely decreased if patient taking Biotin.  Testing Method: Roche Diagnostics Electrochemiluminescence  Immunoassay(ECLIA)  Values obtained with different assay methods or kits cannot  be used interchangeably.      Testosterone, Total 08/15/2023 451  264 - 916 ng/dL Final    Comment: Adult male reference interval is based on a population of  healthy nonobese males (BMI <30) between 19 and 39 years old.  Brett et.al. JCEM 2017,102;5210-1573. PMID: 60939799.      Testosterone, Free 08/15/2023 17.32  5.00 - 21.00 ng/dL Final    Testosterone, Free % 08/15/2023 3.84  1.50 - 4.20 % Final    Interpretation 08/15/2023 Note   Final    Supplemental report is available.     Assessment & Plan   {Assess/PlanSmartLinks:27922}

## 2023-08-29 NOTE — PROGRESS NOTES
Yonatan Peañ is a 73 y.o. male.     History of Present Illness        He has Graves' disease and had radioactive iodine therapy.  He became hypothyroid and is on levothyroxine 150 mcg/day.  He has gained 2 lbs since 1/23.  He denies bowel changes, palpitations, heat or cold intolerance.  TSH done in August 2023 is normal at 1.97.     He has hyperlipidemia with elevated LDL.  He has been on Lipitor 10 mg/day.  He denies myalgia.    Lipid panel done in 8/23 are as follows: Cholesterol 132.  HDL 35.  LDL 70.  Triglycerides 156.     He has history of low testosterone and was started on AndroGel by Dr. Smith in 2018.  He went off AndroGel 1.62% 1 pump on 1 shoulder once a day in 5/25/23.  PSA done in 8/23 is normal at 0.9 ng/mL.  Testosterone done in August 2023 3 months off AndroGel is normal at 451 ng per DL.     He does voluntary phlebotomy every 2 months done at the [facility] and the last one was 9 weeks ago     He has good urinary stream.  He denies retention or dysuria.  He has occasional erectile dysfunction and uses Levitra 10 mg as needed. He has occasional headaches when he takes Levitra.     He is  and has fathered 1 child.  He is not planning any more children.      He has hypertension and is on Avapro 300 mg once a day and Lopressor 25 mg 1/2 tablet every morning and 1 tablet every evening.  He has no history of heart attack or stroke.  He denies chest pain or SOB.       He has prediabetes with hemoglobin A1c ranging from 5.4 to 5.9% since 2017.  Hemoglobin A1c done in  December 2022 is 5.9%.  Hemoglobin A1c done in August 2023 is normal at 5.6%.     He has vitamin D deficiency and is vit D3 5000 units a day and multivitamins 1 tablet/day.  25 hydroxy vitamin D done in August 2023 is normal at 49.9 ng/mL.     He has osteopenia on bone density done in June 2019.    Bone density done in January 2022 showed osteopenia with interval increase in the lumbar spine.  10-year risk of major  "osteoporotic fracture is 9.5% and of hip fracture is 1.7%.     He had moderate obstructive sleep apnea on home study done in November 2021.  He has no complaints of hypersomnolence.  He wakes up rested.  Dr. Aguiar advised consulting with his dentist about a dental appliance.     He was a [place] vet and was in a [employer].    The following portions of the patient's history were reviewed and updated as appropriate: allergies, current medications, past family history, past medical history, past social history, past surgical history, and problem list.    Review of Systems   Respiratory:  Negative for shortness of breath and wheezing.    Cardiovascular:  Negative for chest pain and palpitations.   Gastrointestinal: Negative.    Endocrine: Negative for polyuria.   Genitourinary: Negative.    Musculoskeletal:  Negative for myalgias.   Neurological:  Negative for numbness.   Vitals:    08/29/23 1421   BP: 136/72   Pulse: 61   Temp: 96.8 øF (36 øC)   TempSrc: Temporal   SpO2: 97%   Weight: 112 kg (248 lb)   Height: 190 cm (74.8\")      Objective   Physical Exam  Results Encounter on 08/10/2023   Component Date Value Ref Range Status    TSH 08/15/2023 1.970  0.270 - 4.200 uIU/mL Final    Free T4 08/15/2023 1.24  0.93 - 1.70 ng/dL Final    Results may be falsely increased if patient taking Biotin.    Glucose 08/15/2023 90  65 - 99 mg/dL Final    BUN 08/15/2023 20  8 - 23 mg/dL Final    Creatinine 08/15/2023 1.12  0.76 - 1.27 mg/dL Final    EGFR Result 08/15/2023 70.2  >60.0 mL/min/1.73 Final    Comment: GFR Normal >60  Chronic Kidney Disease <60  Kidney Failure <15  The GFR formula is only valid for adults with stable renal  function between ages 18 and 70.      BUN/Creatinine Ratio 08/15/2023 17.9  7.0 - 25.0 Final    Sodium 08/15/2023 140  136 - 145 mmol/L Final    Potassium 08/15/2023 4.8  3.5 - 5.2 mmol/L Final    Chloride 08/15/2023 106  98 - 107 mmol/L Final    Total CO2 08/15/2023 25.0  22.0 - 29.0 mmol/L Final    Calcium " 08/15/2023 10.2  8.6 - 10.5 mg/dL Final    Total Protein 08/15/2023 6.6  6.0 - 8.5 g/dL Final    Albumin 08/15/2023 4.0  3.5 - 5.2 g/dL Final    Globulin 08/15/2023 2.6  gm/dL Final    A/G Ratio 08/15/2023 1.5  g/dL Final    Total Bilirubin 08/15/2023 0.5  0.0 - 1.2 mg/dL Final    Alkaline Phosphatase 08/15/2023 70  39 - 117 U/L Final    AST (SGOT) 08/15/2023 21  1 - 40 U/L Final    ALT (SGPT) 08/15/2023 27  1 - 41 U/L Final    Total Cholesterol 08/15/2023 132  0 - 200 mg/dL Final    Comment: Cholesterol Reference Ranges  (U.S. Department of Health and Human Services ATP III  Classifications)  Desirable          <200 mg/dL  Borderline High    200-239 mg/dL  High Risk          >240 mg/dL  Triglyceride Reference Ranges  (U.S. Department of Health and Human Services ATP III  Classifications)  Normal           <150 mg/dL  Borderline High  150-199 mg/dL  High             200-499 mg/dL  Very High        >500 mg/dL  HDL Reference Ranges  (U.S. Department of Health and Human Services ATP III  Classifications)  Low     <40 mg/dl (major risk factor for CHD)  High    >60 mg/dl ('negative' risk factor for CHD)  LDL Reference Ranges  (U.S. Department of Health and Human Services ATP III  Classifications)  Optimal          <100 mg/dL  Near Optimal     100-129 mg/dL  Borderline High  130-159 mg/dL  High             160-189 mg/dL  Very High        >189 mg/dL      Triglycerides 08/15/2023 156 (H)  0 - 150 mg/dL Final    HDL Cholesterol 08/15/2023 35 (L)  40 - 60 mg/dL Final    VLDL Cholesterol Ricci 08/15/2023 27  5 - 40 mg/dL Final    LDL Chol Calc (NIH) 08/15/2023 70  0 - 100 mg/dL Final    25 Hydroxy, Vitamin D 08/15/2023 49.9  30.0 - 100.0 ng/ml Final    Comment: Reference Range for Total Vitamin D 25(OH)  Deficiency <20.0 ng/mL  Insufficiency 21-29 ng/mL  Sufficiency  ng/mL  Toxicity >100 ng/ml      Hemoglobin A1C 08/15/2023 5.60  4.80 - 5.60 % Final    Comment: Hemoglobin A1C Ranges:  Increased Risk for Diabetes  5.7% to  6.4%  Diabetes                     >= 6.5%  Diabetic Goal                < 7.0%      WBC 08/15/2023 6.50  3.40 - 10.80 10*3/mm3 Final    RBC 08/15/2023 5.63  4.14 - 5.80 10*6/mm3 Final    Hemoglobin 08/15/2023 15.0  13.0 - 17.7 g/dL Final    Hematocrit 08/15/2023 46.7  37.5 - 51.0 % Final    MCV 08/15/2023 82.9  79.0 - 97.0 fL Final    MCH 08/15/2023 26.6  26.6 - 33.0 pg Final    MCHC 08/15/2023 32.1  31.5 - 35.7 g/dL Final    RDW 08/15/2023 14.5  12.3 - 15.4 % Final    Platelets 08/15/2023 278  140 - 450 10*3/mm3 Final    Neutrophil Rel % 08/15/2023 53.2  42.7 - 76.0 % Final    Lymphocyte Rel % 08/15/2023 32.5  19.6 - 45.3 % Final    Monocyte Rel % 08/15/2023 10.0  5.0 - 12.0 % Final    Eosinophil Rel % 08/15/2023 3.1  0.3 - 6.2 % Final    Basophil Rel % 08/15/2023 0.9  0.0 - 1.5 % Final    Neutrophils Absolute 08/15/2023 3.46  1.70 - 7.00 10*3/mm3 Final    Lymphocytes Absolute 08/15/2023 2.11  0.70 - 3.10 10*3/mm3 Final    Monocytes Absolute 08/15/2023 0.65  0.10 - 0.90 10*3/mm3 Final    Eosinophils Absolute 08/15/2023 0.20  0.00 - 0.40 10*3/mm3 Final    Basophils Absolute 08/15/2023 0.06  0.00 - 0.20 10*3/mm3 Final    Immature Granulocyte Rel % 08/15/2023 0.3  0.0 - 0.5 % Final    Immature Grans Absolute 08/15/2023 0.02  0.00 - 0.05 10*3/mm3 Final    nRBC 08/15/2023 0.0  0.0 - 0.2 /100 WBC Final    PSA 08/15/2023 1.110  0.000 - 4.000 ng/mL Final    Comment: Results may be falsely decreased if patient taking Biotin.  Testing Method: Roche Diagnostics Electrochemiluminescence  Immunoassay(ECLIA)  Values obtained with different assay methods or kits cannot  be used interchangeably.      Testosterone, Total 08/15/2023 451  264 - 916 ng/dL Final    Comment: Adult male reference interval is based on a population of  healthy nonobese males (BMI <30) between 19 and 39 years old.  Brett, et.al. JCEM 2017,102;8014-8993. PMID: [id number].      Testosterone, Free 08/15/2023 17.32  5.00 - 21.00 ng/dL Final    Testosterone,  Free % 08/15/2023 3.84  1.50 - 4.20 % Final    Interpretation 08/15/2023 Note   Final    Supplemental report is available.     Assessment & Plan   Diagnoses and all orders for this visit:    1. Graves disease (Primary)    2. Other hyperlipidemia    3. Low testosterone    4. Essential hypertension    5. Prediabetes    6. Vitamin D deficiency    7. Osteopenia of left hip      Continue levothyroxine 150 mcg a day.  Continue Lipitor 10 mg/day.  Continue no concentrated sweet low-fat diet.  Stay off AndroGel.  Continue irbesartan and Lopressor per Dr. Escobar.  Continue multivitamins 1 tablet daily and vitamin D3 5000 units daily.  Repeat bone density after January 2024.  May switch from Levitra to Cialis 20 mg 1/2 tablet once a day as needed.  Prescription given to patient.    Copy of my note sent to Dr. Escobar.    RTC 6 mos

## 2023-09-07 DIAGNOSIS — E78.2 MIXED HYPERLIPIDEMIA: ICD-10-CM

## 2023-09-07 RX ORDER — ATORVASTATIN CALCIUM 10 MG/1
TABLET, FILM COATED ORAL
Qty: 90 TABLET | Refills: 1 | Status: SHIPPED | OUTPATIENT
Start: 2023-09-07

## 2023-10-06 RX ORDER — LEVOTHYROXINE SODIUM 0.15 MG/1
TABLET ORAL
Qty: 90 TABLET | Refills: 1 | Status: SHIPPED | OUTPATIENT
Start: 2023-10-06

## 2023-10-06 RX ORDER — IRBESARTAN 300 MG/1
TABLET ORAL
Qty: 90 TABLET | Refills: 1 | Status: SHIPPED | OUTPATIENT
Start: 2023-10-06

## 2023-10-06 NOTE — TELEPHONE ENCOUNTER
Rx Refill Note  Requested Prescriptions     Pending Prescriptions Disp Refills    irbesartan (AVAPRO) 300 MG tablet [Pharmacy Med Name: IRBESARTAN 300 MG TABLET] 90 tablet 1     Sig: TAKE ONE TABLET BY MOUTH ONCE NIGHTLY    levothyroxine (SYNTHROID, LEVOTHROID) 150 MCG tablet [Pharmacy Med Name: LEVOTHYROXINE 150 MCG TABLET] 90 tablet 1     Sig: TAKE ONE TABLET BY MOUTH DAILY      Last office visit with prescribing clinician: 8/29/2023   Last telemedicine visit with prescribing clinician: Visit date not found   Next office visit with prescribing clinician: 3/11/2024                         Would you like a call back once the refill request has been completed: [] Yes [] No    If the office needs to give you a call back, can they leave a voicemail: [] Yes [] No    Ludy Santamaria MA  10/06/23, 09:16 EDT

## 2023-10-17 ENCOUNTER — TELEPHONE (OUTPATIENT)
Dept: CARDIOLOGY | Facility: CLINIC | Age: 73
End: 2023-10-17
Payer: MEDICARE

## 2023-10-17 NOTE — TELEPHONE ENCOUNTER
Pt needs to go off ASA 14 days.    Spoke with pt.  No palpitation  No SOA  No chest pain    Is this ok?

## 2023-10-17 NOTE — TELEPHONE ENCOUNTER
"    Caller: Joaquin Peña \"Bill\"    Relationship: Self    Best call back number: [phone] (home)       What form or medical record are you requesting: CARDIAC CLEARANCE    Who is requesting this form or medical record from you: ORTHOPEDIC SURGEON - [facility] - DR CASI HILL    How would you like to receive the form or medical records (pick-up, mail, fax): FAX  If fax, what is the fax number: [phone]    Timeframe paperwork needed: ASAP       "

## 2023-10-19 ENCOUNTER — HOSPITAL ENCOUNTER (OUTPATIENT)
Dept: CARDIOLOGY | Facility: HOSPITAL | Age: 73
Discharge: HOME | End: 2023-10-19
Payer: OTHER MISCELLANEOUS

## 2023-10-19 ENCOUNTER — LAB (OUTPATIENT)
Dept: LAB | Facility: HOSPITAL | Age: 73
End: 2023-10-19
Payer: OTHER MISCELLANEOUS

## 2023-10-19 ENCOUNTER — HOSPITAL ENCOUNTER (OUTPATIENT)
Dept: GENERAL RADIOLOGY | Facility: HOSPITAL | Age: 73
Discharge: HOME | End: 2023-10-19
Payer: OTHER MISCELLANEOUS

## 2023-10-19 ENCOUNTER — TRANSCRIBE ORDERS (OUTPATIENT)
Dept: LAB | Facility: HOSPITAL | Age: 73
End: 2023-10-19
Payer: MEDICARE

## 2023-10-19 ENCOUNTER — TRANSCRIBE ORDERS (OUTPATIENT)
Dept: CARDIOLOGY | Facility: HOSPITAL | Age: 73
End: 2023-10-19
Payer: MEDICARE

## 2023-10-19 DIAGNOSIS — M17.12 OSTEOARTHRITIS OF LEFT KNEE, UNSPECIFIED OSTEOARTHRITIS TYPE: ICD-10-CM

## 2023-10-19 DIAGNOSIS — Z01.811 PRE-OP CHEST EXAM: Primary | ICD-10-CM

## 2023-10-19 DIAGNOSIS — M17.12 ARTHRITIS OF LEFT KNEE: ICD-10-CM

## 2023-10-19 DIAGNOSIS — M17.12 ARTHRITIS OF LEFT KNEE: Primary | ICD-10-CM

## 2023-10-19 DIAGNOSIS — R79.1 ABNORMAL COAGULATION PROFILE: ICD-10-CM

## 2023-10-19 LAB
ALBUMIN SERPL-MCNC: 4.1 G/DL (ref 3.5–5.2)
ALBUMIN/GLOB SERPL: 1.5 G/DL
ALP SERPL-CCNC: 74 U/L (ref 39–117)
ALT SERPL W P-5'-P-CCNC: 15 U/L (ref 1–41)
ANION GAP SERPL CALCULATED.3IONS-SCNC: 9 MMOL/L (ref 5–15)
AST SERPL-CCNC: 12 U/L (ref 1–40)
BILIRUB SERPL-MCNC: 0.6 MG/DL (ref 0–1.2)
BILIRUB UR QL STRIP: NEGATIVE
BUN SERPL-MCNC: 28 MG/DL (ref 8–23)
BUN/CREAT SERPL: 23.3 (ref 7–25)
CALCIUM SPEC-SCNC: 10.2 MG/DL (ref 8.6–10.5)
CHLORIDE SERPL-SCNC: 103 MMOL/L (ref 98–107)
CLARITY UR: CLEAR
CO2 SERPL-SCNC: 26 MMOL/L (ref 22–29)
COLOR UR: YELLOW
CREAT SERPL-MCNC: 1.2 MG/DL (ref 0.76–1.27)
DEPRECATED RDW RBC AUTO: 47.2 FL (ref 37–54)
EGFRCR SERPLBLD CKD-EPI 2021: 64.7 ML/MIN/1.73
ERYTHROCYTE [DISTWIDTH] IN BLOOD BY AUTOMATED COUNT: 17.1 % (ref 12.3–15.4)
GLOBULIN UR ELPH-MCNC: 2.7 GM/DL
GLUCOSE SERPL-MCNC: 84 MG/DL (ref 65–99)
GLUCOSE UR STRIP-MCNC: NEGATIVE MG/DL
HCT VFR BLD AUTO: 48.4 % (ref 37.5–51)
HGB BLD-MCNC: 15.6 G/DL (ref 13–17.7)
HGB UR QL STRIP.AUTO: NEGATIVE
INR PPP: 1.1 (ref 0.9–1.1)
KETONES UR QL STRIP: NEGATIVE
LEUKOCYTE ESTERASE UR QL STRIP.AUTO: NEGATIVE
MCH RBC QN AUTO: 25.7 PG (ref 26.6–33)
MCHC RBC AUTO-ENTMCNC: 32.2 G/DL (ref 31.5–35.7)
MCV RBC AUTO: 79.6 FL (ref 79–97)
NITRITE UR QL STRIP: NEGATIVE
PH UR STRIP.AUTO: <=5 [PH] (ref 5–8)
PLATELET # BLD AUTO: 329 10*3/MM3 (ref 140–450)
PMV BLD AUTO: 9.9 FL (ref 6–12)
POTASSIUM SERPL-SCNC: 4.9 MMOL/L (ref 3.5–5.2)
PROT SERPL-MCNC: 6.8 G/DL (ref 6–8.5)
PROT UR QL STRIP: NEGATIVE
PROTHROMBIN TIME: 14.4 SECONDS (ref 11.7–14.2)
QT INTERVAL: 454 MS
QTC INTERVAL: 402 MS
RBC # BLD AUTO: 6.08 10*6/MM3 (ref 4.14–5.8)
SODIUM SERPL-SCNC: 138 MMOL/L (ref 136–145)
SP GR UR STRIP: 1.02 (ref 1–1.03)
UROBILINOGEN UR QL STRIP: NORMAL
WBC NRBC COR # BLD: 12.52 10*3/MM3 (ref 3.4–10.8)

## 2023-10-19 PROCEDURE — 81003 URINALYSIS AUTO W/O SCOPE: CPT

## 2023-10-19 PROCEDURE — 85610 PROTHROMBIN TIME: CPT

## 2023-10-19 PROCEDURE — 80053 COMPREHEN METABOLIC PANEL: CPT

## 2023-10-19 PROCEDURE — 85027 COMPLETE CBC AUTOMATED: CPT

## 2023-10-19 PROCEDURE — 93005 ELECTROCARDIOGRAM TRACING: CPT

## 2023-10-19 PROCEDURE — 71046 X-RAY EXAM CHEST 2 VIEWS: CPT

## 2023-10-19 PROCEDURE — 36415 COLL VENOUS BLD VENIPUNCTURE: CPT

## 2024-02-28 DIAGNOSIS — E89.0 POSTABLATIVE HYPOTHYROIDISM: ICD-10-CM

## 2024-02-28 DIAGNOSIS — R73.03 PREDIABETES: ICD-10-CM

## 2024-02-28 DIAGNOSIS — E05.00 GRAVES DISEASE: Primary | ICD-10-CM

## 2024-02-28 DIAGNOSIS — E78.5 HYPERLIPIDEMIA, UNSPECIFIED HYPERLIPIDEMIA TYPE: ICD-10-CM

## 2024-02-28 DIAGNOSIS — E55.9 VITAMIN D DEFICIENCY: ICD-10-CM

## 2024-02-28 DIAGNOSIS — M85.852 OSTEOPENIA OF LEFT HIP: ICD-10-CM

## 2024-03-06 DIAGNOSIS — E78.2 MIXED HYPERLIPIDEMIA: ICD-10-CM

## 2024-03-06 RX ORDER — ATORVASTATIN CALCIUM 10 MG/1
10 TABLET, FILM COATED ORAL DAILY
Qty: 90 TABLET | Refills: 1 | OUTPATIENT
Start: 2024-03-06

## 2024-03-06 NOTE — TELEPHONE ENCOUNTER
Rx Refill Note  Requested Prescriptions     Pending Prescriptions Disp Refills    atorvastatin (LIPITOR) 10 MG tablet [Pharmacy Med Name: ATORVASTATIN 10 MG TABLET] 90 tablet 1     Sig: TAKE 1 TABLET BY MOUTH DAILY      Last office visit with prescribing clinician: 8/29/2023   Last telemedicine visit with prescribing clinician: Visit date not found   Next office visit with prescribing clinician: Visit date not found                         Would you like a call back once the refill request has been completed: [] Yes [] No    If the office needs to give you a call back, can they leave a voicemail: [] Yes [] No    Ana Rodriguez  03/06/24, 07:44 EST

## 2024-03-08 DIAGNOSIS — E78.2 MIXED HYPERLIPIDEMIA: ICD-10-CM

## 2024-03-08 RX ORDER — ATORVASTATIN CALCIUM 10 MG/1
10 TABLET, FILM COATED ORAL DAILY
Qty: 90 TABLET | Refills: 1 | OUTPATIENT
Start: 2024-03-08

## 2024-03-08 NOTE — TELEPHONE ENCOUNTER
Rx Refill Note  Requested Prescriptions     Pending Prescriptions Disp Refills    atorvastatin (LIPITOR) 10 MG tablet [Pharmacy Med Name: ATORVASTATIN 10 MG TABLET] 90 tablet 1     Sig: TAKE 1 TABLET BY MOUTH DAILY      Last office visit with prescribing clinician: 8/29/2023   Last telemedicine visit with prescribing clinician: Visit date not found   Next office visit with prescribing clinician: Visit date not found                         Would you like a call back once the refill request has been completed: [] Yes [] No    If the office needs to give you a call back, can they leave a voicemail: [] Yes [] No    Jessica Rodriguez MA  03/08/24, 13:44 EST

## 2024-03-12 ENCOUNTER — OFFICE VISIT (OUTPATIENT)
Dept: ENDOCRINOLOGY | Age: 74
End: 2024-03-12
Payer: MEDICARE

## 2024-03-12 VITALS
HEIGHT: 75 IN | HEART RATE: 59 BPM | WEIGHT: 243.6 LBS | SYSTOLIC BLOOD PRESSURE: 124 MMHG | TEMPERATURE: 97.1 F | DIASTOLIC BLOOD PRESSURE: 76 MMHG | BODY MASS INDEX: 30.29 KG/M2 | OXYGEN SATURATION: 99 %

## 2024-03-12 DIAGNOSIS — M85.80 OSTEOPENIA, UNSPECIFIED LOCATION: ICD-10-CM

## 2024-03-12 DIAGNOSIS — E89.0 POSTABLATIVE HYPOTHYROIDISM: ICD-10-CM

## 2024-03-12 DIAGNOSIS — E05.00 GRAVES DISEASE: Primary | ICD-10-CM

## 2024-03-12 DIAGNOSIS — E78.2 MIXED HYPERLIPIDEMIA: ICD-10-CM

## 2024-03-12 DIAGNOSIS — R73.03 PREDIABETES: ICD-10-CM

## 2024-03-12 DIAGNOSIS — I10 ESSENTIAL HYPERTENSION: ICD-10-CM

## 2024-03-12 DIAGNOSIS — E55.9 VITAMIN D DEFICIENCY: ICD-10-CM

## 2024-03-12 DIAGNOSIS — E78.49 OTHER HYPERLIPIDEMIA: ICD-10-CM

## 2024-03-12 DIAGNOSIS — N52.9 ERECTILE DYSFUNCTION, UNSPECIFIED ERECTILE DYSFUNCTION TYPE: ICD-10-CM

## 2024-03-12 DIAGNOSIS — M85.88 OTHER SPECIFIED DISORDERS OF BONE DENSITY AND STRUCTURE, OTHER SITE: ICD-10-CM

## 2024-03-12 PROCEDURE — 3078F DIAST BP <80 MM HG: CPT | Performed by: INTERNAL MEDICINE

## 2024-03-12 PROCEDURE — 3074F SYST BP LT 130 MM HG: CPT | Performed by: INTERNAL MEDICINE

## 2024-03-12 PROCEDURE — 99214 OFFICE O/P EST MOD 30 MIN: CPT | Performed by: INTERNAL MEDICINE

## 2024-03-12 RX ORDER — LEVOTHYROXINE SODIUM 0.15 MG/1
150 TABLET ORAL DAILY
Qty: 90 TABLET | Refills: 2 | Status: SHIPPED | OUTPATIENT
Start: 2024-03-12

## 2024-03-12 RX ORDER — IRBESARTAN 300 MG/1
300 TABLET ORAL NIGHTLY
Qty: 90 TABLET | Refills: 1 | Status: SHIPPED | OUTPATIENT
Start: 2024-03-12

## 2024-03-12 RX ORDER — ATORVASTATIN CALCIUM 10 MG/1
10 TABLET, FILM COATED ORAL DAILY
Qty: 90 TABLET | Refills: 2 | Status: SHIPPED | OUTPATIENT
Start: 2024-03-12 | End: 2024-03-16

## 2024-03-12 NOTE — PROGRESS NOTES
Yonatan Peña is a 73 y.o. male.     History of Present Illness       He has Graves' disease and had radioactive iodine therapy.  He became hypothyroid and is on levothyroxine 150 mcg/day.  He has lost 5 lbs since 8/23.  He denies bowel changes, palpitations, heat or cold intolerance.       He has hyperlipidemia with elevated LDL.  He has been on Lipitor 10 mg/day.  He denies myalgia.  His last meal was yesterday afternoon.       He has history of low testosterone and was started on AndroGel by Dr. Smith in 2018.  He went off AndroGel 1.62% 1 pump on 1 shoulder once a day in 5/25/23.  PSA done in 8/23 is normal at 0.9 ng/mL.       He does voluntary phlebotomy every 2 months done at the [facility] and the last one was more than 6 months ago     He has slow urinary stream.  He denies retention, dribbling or dysuria.  He has occasional erectile dysfunction and uses Cialis 10 mg as needed.     He is  and has fathered 1 child.  He is not planning any more children.      He has hypertension and is on Avapro 300 mg once a day, amlodipine 5 mg once a day as needed and Lopressor 25 mg 1/2 tablet every morning and 1 tablet every evening.  He has no history of heart attack or stroke.  He denies chest pain or SOB.       He has prediabetes with hemoglobin A1c ranging from 5.4 to 5.9% since 2017.  Hemoglobin A1c done in  December 2022 is 5.9%.  Hemoglobin A1c done in August 2023 is normal at 5.6%.     He has vitamin D deficiency and is vit D3 5000 units a day and multivitamins 1 tablet/day.  25 hydroxy vitamin D done in August 2023 is normal at 49.9 ng/mL.     He has osteopenia on bone density done in June 2019.    Bone density done in January 2022 showed osteopenia with interval increase in the lumbar spine.  10-year risk of major osteoporotic fracture is 9.5% and of hip fracture is 1.7%.     He had moderate obstructive sleep apnea on home study done in November 2021.  He has no complaints of  "hypersomnolence.  Dr. Aguiar advised consulting with his dentist about a dental appliance which was not done.  He wakes up rested.     He was a [place] vet and was in a [employer].  He was recently treated for COVID infection with Paxlovid          The following portions of the patient's history were reviewed and updated as appropriate: allergies, current medications, past family history, past medical history, past social history, past surgical history, and problem list.    Review of Systems   Eyes:  Negative for visual disturbance.   Respiratory:  Negative for shortness of breath and wheezing.    Cardiovascular:  Negative for chest pain and palpitations.   Gastrointestinal: Negative.    Genitourinary:         Slow flow   Musculoskeletal:  Negative for myalgias.   Neurological:  Negative for numbness.     Vitals:    03/12/24 0740   BP: 124/76   Pulse: 59   Temp: 97.1 °F (36.2 °C)   TempSrc: Temporal   SpO2: 99%   Weight: 110 kg (243 lb 9.6 oz)   Height: 190 cm (74.8\")      Objective   Physical Exam  Constitutional:       General: He is not in acute distress.     Appearance: Normal appearance. He is not ill-appearing, toxic-appearing or diaphoretic.   Eyes:      General: No scleral icterus.        Right eye: No discharge.         Left eye: No discharge.      Extraocular Movements: Extraocular movements intact.      Conjunctiva/sclera: Conjunctivae normal.   Cardiovascular:      Rate and Rhythm: Normal rate and regular rhythm.      Heart sounds: Normal heart sounds. No murmur heard.     No friction rub. No gallop.   Pulmonary:      Breath sounds: Normal breath sounds. No rales.   Chest:      Chest wall: No tenderness.   Abdominal:      General: Bowel sounds are normal.      Palpations: Abdomen is soft.      Tenderness: There is no right CVA tenderness or left CVA tenderness.   Musculoskeletal:      Right lower leg: No edema.      Left lower leg: No edema.   Neurological:      Mental Status: He is alert and oriented to " person, place, and time.   Psychiatric:         Mood and Affect: Mood normal.         Behavior: Behavior normal.       Lab on 10/19/2023   Component Date Value Ref Range Status    Color, UA 10/19/2023 Yellow  Yellow, Straw Final    Appearance, UA 10/19/2023 Clear  Clear Final    pH, UA 10/19/2023 <=5.0  5.0 - 8.0 Final    Specific [place], UA 10/19/2023 1.024  1.005 - 1.030 Final    Glucose, UA 10/19/2023 Negative  Negative Final    Ketones, UA 10/19/2023 Negative  Negative Final    Bilirubin, UA 10/19/2023 Negative  Negative Final    Blood, UA 10/19/2023 Negative  Negative Final    Protein, UA 10/19/2023 Negative  Negative Final    Leuk Esterase, UA 10/19/2023 Negative  Negative Final    Nitrite, UA 10/19/2023 Negative  Negative Final    Urobilinogen, UA 10/19/2023 0.2 E.U./dL  0.2 - 1.0 E.U./dL Final    WBC 10/19/2023 12.52 (H)  3.40 - 10.80 10*3/mm3 Final    RBC 10/19/2023 6.08 (H)  4.14 - 5.80 10*6/mm3 Final    Hemoglobin 10/19/2023 15.6  13.0 - 17.7 g/dL Final    Hematocrit 10/19/2023 48.4  37.5 - 51.0 % Final    MCV 10/19/2023 79.6  79.0 - 97.0 fL Final    MCH 10/19/2023 25.7 (L)  26.6 - 33.0 pg Final    MCHC 10/19/2023 32.2  31.5 - 35.7 g/dL Final    RDW 10/19/2023 17.1 (H)  12.3 - 15.4 % Final    RDW-SD 10/19/2023 47.2  37.0 - 54.0 fl Final    MPV 10/19/2023 9.9  6.0 - 12.0 fL Final    Platelets 10/19/2023 329  140 - 450 10*3/mm3 Final    Glucose 10/19/2023 84  65 - 99 mg/dL Final    BUN 10/19/2023 28 (H)  8 - 23 mg/dL Final    Creatinine 10/19/2023 1.20  0.76 - 1.27 mg/dL Final    Sodium 10/19/2023 138  136 - 145 mmol/L Final    Potassium 10/19/2023 4.9  3.5 - 5.2 mmol/L Final    Chloride 10/19/2023 103  98 - 107 mmol/L Final    CO2 10/19/2023 26.0  22.0 - 29.0 mmol/L Final    Calcium 10/19/2023 10.2  8.6 - 10.5 mg/dL Final    Total Protein 10/19/2023 6.8  6.0 - 8.5 g/dL Final    Albumin 10/19/2023 4.1  3.5 - 5.2 g/dL Final    ALT (SGPT) 10/19/2023 15  1 - 41 U/L Final    AST (SGOT) 10/19/2023 12  1 - 40  U/L Final    Alkaline Phosphatase 10/19/2023 74  39 - 117 U/L Final    Total Bilirubin 10/19/2023 0.6  0.0 - 1.2 mg/dL Final    Globulin 10/19/2023 2.7  gm/dL Final    A/G Ratio 10/19/2023 1.5  g/dL Final    BUN/Creatinine Ratio 10/19/2023 23.3  7.0 - 25.0 Final    Anion Gap 10/19/2023 9.0  5.0 - 15.0 mmol/L Final    eGFR 10/19/2023 64.7  >60.0 mL/min/1.73 Final    Protime 10/19/2023 14.4 (H)  11.7 - 14.2 Seconds Final    INR 10/19/2023 1.10  0.90 - 1.10 Final     Assessment & Plan   Diagnoses and all orders for this visit:    1. Graves disease (Primary)  -     T4, Free  -     TSH  -     CBC & Differential    2. Postablative hypothyroidism  -     T4, Free  -     TSH    3. Other hyperlipidemia  -     Lipid Panel    4. Erectile dysfunction, unspecified erectile dysfunction type  -     Testosterone, Free / Tot Equilib  -     Luteinizing Hormone  -     Follicle Stimulating Hormone    5. Essential hypertension    6. Prediabetes  -     Comprehensive Metabolic Panel  -     Hemoglobin A1c    7. Vitamin D deficiency  -     Vitamin D,25-Hydroxy    8. Osteopenia, unspecified location  -     Vitamin D,25-Hydroxy  -     DEXA Bone Density Axial; Future    9. Other specified disorders of bone density and structure, other site  -     DEXA Bone Density Axial; Future      Continue levothyroxine 150 mcg/day.    Check thyroid function test and lipid panel.    Check testosterone, LH and FSH.  Check CBC.    Patient has symptoms of BPH with lower urinary tract symptoms.  Advised to discuss with Dr. Escobar about switching to Cialis 5 mg daily.      Continue irbesartan, metoprolol tartrate, and amlodipine per Dr. Escobar.  Check hemoglobin A1c.    Continue vitamin D3 5000 units/day and multivitamins 1 tablet/day.  Schedule bone density.    Advised to follow-up with Dr. Aguiar.    Copy of my note sent to Dr. Escobar asnd Dr. Aguiar.    RTC 6 mos

## 2024-03-15 LAB
25(OH)D3+25(OH)D2 SERPL-MCNC: 52.3 NG/ML (ref 30–100)
ALBUMIN SERPL-MCNC: 4.1 G/DL (ref 3.5–5.2)
ALBUMIN/GLOB SERPL: 1.5 G/DL
ALP SERPL-CCNC: 83 U/L (ref 39–117)
ALT SERPL-CCNC: 17 U/L (ref 1–41)
AST SERPL-CCNC: 12 U/L (ref 1–40)
BASOPHILS # BLD AUTO: 0.07 10*3/MM3 (ref 0–0.2)
BASOPHILS NFR BLD AUTO: 0.8 % (ref 0–1.5)
BILIRUB SERPL-MCNC: 0.4 MG/DL (ref 0–1.2)
BUN SERPL-MCNC: 24 MG/DL (ref 8–23)
BUN/CREAT SERPL: 20 (ref 7–25)
CALCIUM SERPL-MCNC: 10.3 MG/DL (ref 8.6–10.5)
CHLORIDE SERPL-SCNC: 102 MMOL/L (ref 98–107)
CHOLEST SERPL-MCNC: 178 MG/DL (ref 0–200)
CO2 SERPL-SCNC: 19.4 MMOL/L (ref 22–29)
CREAT SERPL-MCNC: 1.2 MG/DL (ref 0.76–1.27)
EGFRCR SERPLBLD CKD-EPI 2021: 64.7 ML/MIN/1.73
EOSINOPHIL # BLD AUTO: 0.17 10*3/MM3 (ref 0–0.4)
EOSINOPHIL NFR BLD AUTO: 2 % (ref 0.3–6.2)
ERYTHROCYTE [DISTWIDTH] IN BLOOD BY AUTOMATED COUNT: 16.5 % (ref 12.3–15.4)
FSH SERPL-ACNC: 4.3 MIU/ML (ref 1.5–12.4)
GLOBULIN SER CALC-MCNC: 2.7 GM/DL
GLUCOSE SERPL-MCNC: 90 MG/DL (ref 65–99)
HBA1C MFR BLD: 6.1 % (ref 4.8–5.6)
HCT VFR BLD AUTO: 48.2 % (ref 37.5–51)
HDLC SERPL-MCNC: 27 MG/DL (ref 40–60)
HGB BLD-MCNC: 15.2 G/DL (ref 13–17.7)
IMM GRANULOCYTES # BLD AUTO: 0.17 10*3/MM3 (ref 0–0.05)
IMM GRANULOCYTES NFR BLD AUTO: 2 % (ref 0–0.5)
IMP & REVIEW OF LAB RESULTS: NORMAL
LDLC SERPL CALC-MCNC: 104 MG/DL (ref 0–100)
LH SERPL-ACNC: 3.1 MIU/ML (ref 1.7–8.6)
LYMPHOCYTES # BLD AUTO: 1.88 10*3/MM3 (ref 0.7–3.1)
LYMPHOCYTES NFR BLD AUTO: 22.3 % (ref 19.6–45.3)
MCH RBC QN AUTO: 25.3 PG (ref 26.6–33)
MCHC RBC AUTO-ENTMCNC: 31.5 G/DL (ref 31.5–35.7)
MCV RBC AUTO: 80.3 FL (ref 79–97)
MONOCYTES # BLD AUTO: 1.07 10*3/MM3 (ref 0.1–0.9)
MONOCYTES NFR BLD AUTO: 12.7 % (ref 5–12)
NEUTROPHILS # BLD AUTO: 5.06 10*3/MM3 (ref 1.7–7)
NEUTROPHILS NFR BLD AUTO: 60.2 % (ref 42.7–76)
NRBC BLD AUTO-RTO: 0 /100 WBC (ref 0–0.2)
PLATELET # BLD AUTO: 333 10*3/MM3 (ref 140–450)
POTASSIUM SERPL-SCNC: 5.4 MMOL/L (ref 3.5–5.2)
PROT SERPL-MCNC: 6.8 G/DL (ref 6–8.5)
RBC # BLD AUTO: 6 10*6/MM3 (ref 4.14–5.8)
SODIUM SERPL-SCNC: 138 MMOL/L (ref 136–145)
T4 FREE SERPL-MCNC: 1.38 NG/DL (ref 0.93–1.7)
TESTOST FREE MFR SERPL: 3.11 % (ref 1.5–4.2)
TESTOST FREE SERPL-MCNC: 12.1 NG/DL (ref 5–21)
TESTOST SERPL-MCNC: 389 NG/DL (ref 264–916)
TRIGL SERPL-MCNC: 271 MG/DL (ref 0–150)
TSH SERPL DL<=0.005 MIU/L-ACNC: 3.34 UIU/ML (ref 0.27–4.2)
VLDLC SERPL CALC-MCNC: 47 MG/DL (ref 5–40)
WBC # BLD AUTO: 8.42 10*3/MM3 (ref 3.4–10.8)

## 2024-03-16 DIAGNOSIS — E78.2 MIXED HYPERLIPIDEMIA: ICD-10-CM

## 2024-03-16 RX ORDER — ATORVASTATIN CALCIUM 20 MG/1
20 TABLET, FILM COATED ORAL DAILY
Qty: 90 TABLET | Refills: 2 | Status: SHIPPED | OUTPATIENT
Start: 2024-03-16

## 2024-03-16 NOTE — PROGRESS NOTES
Hemoglobin A1c 6.1%.  LDL higher at 104.  HDL 27.  Triglycerides 271.  Increase atorvastatin to 20 mg/day.  Prescription sent to pharmacy.  Normal free T4.  Normal TSH at 3.34.  Normal thyroid function tests.  Continue levothyroxine 150 mcg/day.  Normal testosterone.  Patient has been off AndroGel since May 2023.  LH 3.1.  FSH 4.3.  Normal hemoglobin and hematocrit.  Normal vitamin D.  Continue vitamin D3 5000 units/day and multivitamins 1 tablet/day.  Send copy of labs to Dr. Escobar.  Please notify patient of results and instructions.

## 2024-03-18 ENCOUNTER — TELEPHONE (OUTPATIENT)
Dept: ENDOCRINOLOGY | Age: 74
End: 2024-03-18
Payer: MEDICARE

## 2024-03-18 NOTE — TELEPHONE ENCOUNTER
3/18 called and lm for pt to call reg his labs   Ok for hub to read to patient   Please schedule labs if needed or follow ups  Ok for  to read to patient   Please schedule labs if needed or follow ups       ----- Message from Oscar Stuart MD sent at 3/16/2024  3:04 PM EDT -----  Hemoglobin A1c 6.1%.  LDL higher at 104.  HDL 27.  Triglycerides 271.  Increase atorvastatin to 20 mg/day.  Prescription sent to pharmacy.  Normal free T4.  Normal TSH at 3.34.  Normal thyroid function tests.  Continue levothyroxine 150 mcg/day.  Normal testosterone.  Patient has been off AndroGel since May 2023.  LH 3.1.  FSH 4.3.  Normal hemoglobin and hematocrit.  Normal vitamin D.  Continue vitamin D3 5000 units/day and multivitamins 1 tablet/day.

## 2024-03-19 ENCOUNTER — OFFICE VISIT (OUTPATIENT)
Dept: CARDIOLOGY | Facility: CLINIC | Age: 74
End: 2024-03-19
Payer: MEDICARE

## 2024-03-19 VITALS
SYSTOLIC BLOOD PRESSURE: 130 MMHG | HEIGHT: 74 IN | BODY MASS INDEX: 31.95 KG/M2 | DIASTOLIC BLOOD PRESSURE: 70 MMHG | WEIGHT: 249 LBS | HEART RATE: 53 BPM

## 2024-03-19 DIAGNOSIS — R73.03 PREDIABETES: ICD-10-CM

## 2024-03-19 DIAGNOSIS — I47.10 SUPRAVENTRICULAR TACHYCARDIA: ICD-10-CM

## 2024-03-19 DIAGNOSIS — I65.23 CAROTID ARTERY PLAQUE, BILATERAL: ICD-10-CM

## 2024-03-19 DIAGNOSIS — G47.33 OSA (OBSTRUCTIVE SLEEP APNEA): ICD-10-CM

## 2024-03-19 DIAGNOSIS — I10 ESSENTIAL HYPERTENSION: ICD-10-CM

## 2024-03-19 DIAGNOSIS — I49.3 PVC (PREMATURE VENTRICULAR CONTRACTION): Primary | ICD-10-CM

## 2024-03-19 PROCEDURE — 99214 OFFICE O/P EST MOD 30 MIN: CPT | Performed by: INTERNAL MEDICINE

## 2024-03-19 PROCEDURE — 1160F RVW MEDS BY RX/DR IN RCRD: CPT | Performed by: INTERNAL MEDICINE

## 2024-03-19 PROCEDURE — 93000 ELECTROCARDIOGRAM COMPLETE: CPT | Performed by: INTERNAL MEDICINE

## 2024-03-19 PROCEDURE — 3075F SYST BP GE 130 - 139MM HG: CPT | Performed by: INTERNAL MEDICINE

## 2024-03-19 PROCEDURE — 3078F DIAST BP <80 MM HG: CPT | Performed by: INTERNAL MEDICINE

## 2024-03-19 PROCEDURE — 1159F MED LIST DOCD IN RCRD: CPT | Performed by: INTERNAL MEDICINE

## 2024-03-19 RX ORDER — ATORVASTATIN CALCIUM 10 MG/1
10 TABLET, FILM COATED ORAL DAILY
COMMUNITY

## 2024-03-19 NOTE — PROGRESS NOTES
Date of Office Visit: 2024  Encounter Provider: Muna Haque MD  Place of Service: [facility]  Patient Name: Joaquin Peña  :1950    Chief complaint  Supraventricular tachycardia, coronary artery disease    History of Present Illness  Patient is a 73-year-old gentleman with Graves' disease, dyslipidemia, renal calculi who in  developed brief episodes of supraventricular tachycardia in the setting of hypertension.  This was treated medically.  In 2014 had a stress echocardiogram that showed normal left ventricular size and function with ejection fraction 59% grade 1 diastolic dysfunction mild left ventricular hypertrophy with no significant valvular heart disease and no ischemia in May 2015 he had mild bradycardia in the 30s.  A follow-up Holter revealed frequent PVCs and brief bursts of atrial tachycardia for which metoprolol was increased and he has not had any recurrent bradycardia arrhythmia.  In 2021 with complaints of palpitations he had a 2-week Zio patch that showed sinus rhythm with PVCs 17 episodes of supraventricular tachycardia longest lasting 11 beats.  Ventricular bigeminy was present there was one episode of ventricular tachycardia lasting 9 beats.  There were episodes of idioventricular rhythm as well.  Echocardiogram showed normal left ventricular size systolic function ejection fraction was normal.  GLS was normal.  Diastolic function was normal.  There is no significant valvular heart disease or pulmonary hypertension.  Cardiac cath 2019 showed minimal coronary disease normal systolic function normal left ventricular filling pressures.  Echo showed ejection fraction 67% mild left atrial hypertrophy normal diastolic function normal right-sided pressures.  24 Holter was unremarkable.  Carotid Doppler on 2023 showed less than 50% stenosis left internal carotid artery.    Since last visit patient has had no chest pain palpitation  shortness of breath or dizziness.  Patient has had edema following knee surgery.  He is riding his bicycle for 30 minutes 2-3 times a week also playing golf twice a week.  Blood pressure upper normal.  He states intermittently heart rate is in the 40s in the morning.  In the situations we will decrease the p.m. dose of beta-blocker.  He also notes he has lost about 35 pounds in the past year.  He had COVID early March and had to take Paxlovid for which statin therapy was held.  Lipids in the system are reflective of this.  Not surprisingly they are worse.    Past Medical History:   Diagnosis Date    Abdominal obesity     Acquired spondylolisthesis     Acute low back pain     Acute right lumbar radiculopathy     Allergic     Basal cell carcinoma 10/2021    left cheek    Bulging lumbar disc     Concussion with no loss of consciousness     DDD (degenerative disc disease)     Erectile dysfunction     Essential hypertension     Fatigue     Graves disease     H/O disorder of nervous system and sense organs     Headache     Hyperlipidemia     Hypertensive heart disease     Hyperthyroidism 1984    Hypothyroidism     Insomnia     Irregular heart beat     Joint pain     Lumbar radiculopathy     Nephrolithiasis     JACQUELINE (obstructive sleep apnea) 11/19/2021    Home sleep study.  Weight 252 pounds.  Moderate JACQUELINE with AHI 16.8 events per hour.  No sleep-related hypoxia.  The patient snored 67.7% of total sleep time.    Paroxysmal nocturnal dyspnea     PVC (premature ventricular contraction)     Severe head trauma     Sleep-disordered breathing 06/03/2012    Overnight polysomnogram.  Weight 250 pounds.  AHI normal at 2.8 events per hour.  The patient did have 11 events per hour related to respiratory effort related arousals.  No sleep-related hypoxia.  Arousals associated with snoring abnormal.    SOB (shortness of breath)     Soemmering's ring     Spondylolisthesis     Subdural hematoma     Supraventricular tachycardia     Vitamin  D deficiency      Past Surgical History:   Procedure Laterality Date    BASAL CELL CARCINOMA EXCISION  10/2021    left cheek    CARDIAC CATHETERIZATION N/A 11/05/2019    Procedure: Coronary angiography;  Surgeon: Storm Marcano MD;  Location:  [facility];  Service: Cardiovascular    CARDIAC CATHETERIZATION N/A 11/05/2019    Procedure: Left heart cath;  Surgeon: Storm Marcano MD;  Location:  [facility];  Service: Cardiovascular    CARDIAC CATHETERIZATION N/A 11/05/2019    Procedure: Left ventriculography;  Surgeon: Storm Marcano MD;  Location:  [facility];  Service: Cardiovascular    COLONOSCOPY  03/2016    tics.  Dr. Schulte    INNER EAR SURGERY Right     nonmalignant tumor removal    KNEE ARTHROSCOPY Left     KNEE ARTHROSCOPY Left 2018    KNEE SURGERY      2008 and 2009    LITHOTRIPSY      OTHER SURGICAL HISTORY Left     ear surgery     Outpatient Medications Prior to Visit   Medication Sig Dispense Refill    amLODIPine (NORVASC) 5 MG tablet 1 po qday prn sys greater or equal to 150      aspirin 81 MG EC tablet Take 1 tablet by mouth Daily.      atorvastatin (LIPITOR) 10 MG tablet Take 1 tablet by mouth Daily.      Diclofenac Sodium (VOLTAREN) 1 % gel gel apply bid to affected area      fluticasone (Flonase) 50 MCG/ACT nasal spray 2 sprays into the nostril(s) as directed by provider Daily. (Patient taking differently: 2 sprays into the nostril(s) as directed by provider As Needed. Seasonal allergies PRN) 18.2 mL 11    irbesartan (AVAPRO) 300 MG tablet Take 1 tablet by mouth Every Night. 90 tablet 1    ketotifen (ZADITOR) 0.025 % ophthalmic solution Apply 1 drop to eye(s) as directed by provider As Needed. PRN for seasonal allergies      levothyroxine (SYNTHROID, LEVOTHROID) 150 MCG tablet Take 1 tablet by mouth Daily. 90 tablet 2    lidocaine (LIDODERM) 5 % Place 1 patch on the skin as directed by provider See Admin Instructions.      metoprolol  tartrate (LOPRESSOR) 25 MG tablet 1/2 tablet every morning and 1 tablet every evening 135 tablet 1    Multiple Vitamin (MULTI-VITAMIN DAILY PO) Take 1 tablet by mouth Daily.      naproxen (NAPROSYN) 500 MG tablet Take 1 tablet by mouth 2 (Two) Times a Day. (Patient taking differently: Take 1 tablet by mouth 2 (Two) Times a Day As Needed. Very rarely takes) 60 tablet 3    Natural Vitamin D-3 125 MCG (5000 UT) tablet TAKE ONE TABLET BY MOUTH DAILY (Patient taking differently: 1 tablet.) 30 tablet 12    tadalafil (CIALIS) 20 MG tablet 1/2 to 1 tablet daily as needed 30 tablet 0    tiZANidine (ZANAFLEX) 4 MG tablet Take 1 tablet by mouth Daily As Needed.      atorvastatin (LIPITOR) 20 MG tablet Take 1 tablet by mouth Daily. (Patient not taking: Reported on 3/19/2024) 90 tablet 2     No facility-administered medications prior to visit.       Allergies as of 2024 - Reviewed 2024   Allergen Reaction Noted    Hydrocodone Anaphylaxis 2022    Latex Rash 03/15/2016     Social History     Socioeconomic History    Marital status:    Tobacco Use    Smoking status: Former     Current packs/day: 0.00     Average packs/day: 0.3 packs/day for 2.1 years (0.5 ttl pk-yrs)     Types: Cigarettes     Start date: 1971     Quit date: 1973     Years since quittin.2    Smokeless tobacco: Never   Vaping Use    Vaping status: Never Used   Substance and Sexual Activity    Alcohol use: Yes     Alcohol/week: 2.0 standard drinks of alcohol     Types: 1 Glasses of wine, 1 Cans of beer per week     Comment: occasional/  occ caffeine use 90z frap daily     Drug use: No    Sexual activity: Yes     Partners: Female     Birth control/protection: None     Family History   Problem Relation Age of Onset    No Known Problems Mother     Cancer Father         malignant neoplasm    No Known Problems Sister     No Known Problems Sister     No Known Problems Brother     No Known Problems Brother     No Known Problems  "Brother     Diabetes Maternal Grandmother     Hypertension Other     Heart disease Other      Review of Systems   Constitutional: Negative for chills, fever, weight gain and weight loss.   Cardiovascular:  Negative for leg swelling.   Respiratory:  Negative for cough, snoring and wheezing.    Hematologic/Lymphatic: Negative for bleeding problem. Does not bruise/bleed easily.   Skin:  Negative for color change.   Musculoskeletal:  Negative for falls, joint pain and myalgias.   Gastrointestinal:  Negative for melena.   Genitourinary:  Negative for hematuria.   Neurological:  Negative for excessive daytime sleepiness.   Psychiatric/Behavioral:  Negative for depression. The patient is not nervous/anxious.         Objective:     Vitals:    03/19/24 1001   BP: 130/70   Pulse: 53   Weight: 113 kg (249 lb)   Height: 188 cm (74\")     Body mass index is 31.97 kg/m².    Vitals reviewed.   Constitutional:       Appearance: Well-developed. Obese.   Eyes:      General: No scleral icterus.        Right eye: No discharge.      Conjunctiva/sclera: Conjunctivae normal.      Pupils: Pupils are equal, round, and reactive to light.   HENT:      Head: Normocephalic.      Nose: Nose normal.   Neck:      Thyroid: No thyromegaly.      Vascular: No JVD.   Pulmonary:      Effort: Pulmonary effort is normal. No respiratory distress.      Breath sounds: Normal breath sounds. No wheezing. No rales.   Cardiovascular:      Normal rate. Regular rhythm. Normal S1. Normal S2.       Murmurs: There is no murmur.      No gallop.    Pulses:     Intact distal pulses.      Carotid: 2+ bilaterally.     Radial: 2+ bilaterally.     Femoral: 2+ bilaterally.     Popliteal: 2+ bilaterally.     Dorsalis pedis: 2+ bilaterally.     Posterior tibial: 2+ bilaterally.  Edema:     Peripheral edema absent.   Abdominal:      General: Bowel sounds are normal. There is no distension.      Palpations: Abdomen is soft.      Tenderness: There is no abdominal tenderness. There " is no rebound.   Musculoskeletal: Normal range of motion.         General: No tenderness.      Cervical back: Normal range of motion and neck supple. Skin:     General: Skin is warm and dry.      Findings: No erythema or rash.   Neurological:      Mental Status: Alert and oriented to person, place, and time.   Psychiatric:         Behavior: Behavior normal.         Thought Content: Thought content normal.         Judgment: Judgment normal.       Lab Review:   Lab Results - Last 18 Months   Lab Units 03/12/24  0916 10/19/23  1103 08/15/23  0757 02/21/23  1042 02/16/23  1117   WBC 10*3/mm3 8.42 12.52* 6.50   < > 6.99   RBC 10*6/mm3 6.00* 6.08* 5.63   < > 5.66   HEMOGLOBIN g/dL 15.2 15.6 15.0   < > 15.5   HEMATOCRIT % 48.2 48.4 46.7   < > 47.1   MCV fL 80.3 79.6 82.9   < > 83.2   MCH pg 25.3* 25.7* 26.6   < > 27.4   MCHC g/dL 31.5 32.2 32.1   < > 32.9   RDW % 16.5* 17.1* 14.5   < > 14.5   PLATELETS 10*3/mm3 333 329 278   < > 241   NEUTROPHIL % % 60.2  --  53.2   < >  --    LYMPHOCYTE % % 22.3  --  32.5   < >  --    MONOCYTES % % 12.7*  --  10.0   < >  --    EOSINOPHIL % % 2.0  --  3.1   < >  --    BASOPHIL % % 0.8  --  0.9   < >  --    NEUTROS ABS 10*3/mm3 5.06  --  3.46   < >  --    LYMPHS ABS 10*3/mm3 1.88  --  2.11   < >  --    MONOS ABS 10*3/mm3 1.07*  --  0.65   < >  --    EOS ABS 10*3/mm3 0.17  --  0.20   < >  --    BASOS ABS 10*3/mm3 0.07  --  0.06   < >  --    IMM GRAN % % 2.0*  --  0.3   < >  --    IMMATURE GRANS (ABS) 10*3/mm3 0.17*  --  0.02   < >  --    RDW-SD fl  --  47.2  --   --  43.4   MPV fL  --  9.9  --   --  10.6    < > = values in this interval not displayed.     Lab Results - Last 18 Months   Lab Units 03/12/24  0916 10/19/23  1103 02/21/23  1042 02/16/23  1117   GLUCOSE mg/dL 90 84   < > 85   BUN mg/dL 24* 28*   < > 16   CREATININE mg/dL 1.20 1.20   < > 1.01   SODIUM mmol/L 138 138   < > 140   POTASSIUM mmol/L 5.4* 4.9   < > 4.2   CHLORIDE mmol/L 102 103   < > 105   CO2 mmol/L 19.4* 26.0   < >  27.9   CALCIUM mg/dL 10.3 10.2   < > 9.5   BUN / CREAT RATIO  20.0 23.3   < > 15.8   ANION GAP mmol/L  --  9.0  --  7.1   EGFR mL/min/1.73  --  64.7  --  80.0    < > = values in this interval not displayed.     Lab Results - Last 18 Months   Lab Units 03/12/24  0916 10/19/23  1103 02/21/23  1042 02/16/23  1117   GLUCOSE mg/dL 90 84   < > 85   BUN mg/dL 24* 28*   < > 16   CREATININE mg/dL 1.20 1.20   < > 1.01   SODIUM mmol/L 138 138   < > 140   POTASSIUM mmol/L 5.4* 4.9   < > 4.2   CHLORIDE mmol/L 102 103   < > 105   CO2 mmol/L 19.4* 26.0   < > 27.9   CALCIUM mg/dL 10.3 10.2   < > 9.5   TOTAL PROTEIN g/dL  --  6.8  --  6.7   ALBUMIN g/dL 4.1 4.1   < > 3.6   ALT (SGPT) U/L 17 15   < > 25   AST (SGOT) U/L 12 12   < > 22   ALK PHOS U/L 83 74   < > 77   BILIRUBIN mg/dL 0.4 0.6   < > 0.6   GLOBULIN gm/dL  --  2.7  --  3.1   A/G RATIO g/dL  --  1.5  --  1.2   BUN / CREAT RATIO  20.0 23.3   < > 15.8   ANION GAP mmol/L  --  9.0  --  7.1   EGFR mL/min/1.73  --  64.7  --  80.0    < > = values in this interval not displayed.     Lab Results - Last 18 Months   Lab Units 03/12/24  0916 08/15/23  0757   TRIGLYCERIDES mg/dL 271* 156*   HDL CHOL mg/dL 27* 35*   LDL CHOL mg/dL 104* 70   VLDL CHOLESTEROL ALEX mg/dL 47* 27     Lab Results - Last 18 Months   Lab Units 03/12/24  0916 08/15/23  0757   TSH uIU/mL 3.340 1.970     Lab Results - Last 18 Months   Lab Units 10/19/23  1103 02/16/23  1117 11/29/22  0148   PROTIME Seconds 14.4* 14.4* 12.7*   APTT Second  --   --  32.8           ECG 12 Lead    Date/Time: 3/19/2024 10:16 AM  Performed by: Muna Haque MD    Authorized by: Muna Haque MD  Comparison: compared with previous ECG   Similar to previous ECG  Rhythm: sinus rhythm  Conduction: non-specific intraventricular conduction delay    Clinical impression: abnormal EKG          Diagnosis Plan   1. PVC (premature ventricular contraction)  ECG 12 Lead      2. Carotid artery plaque, bilateral  Duplex Carotid Ultrasound CAR      3. JACQUELINE  (obstructive sleep apnea)        4. Prediabetes        5. Essential hypertension        6. Supraventricular tachycardia          Plan:       1.  PVCs.  Cardiac cath 11/2019 without significant obstructive disease.  Appears to be fairly well-controlled on low-dose beta-blocker therapy.  No new anginal symptoms or findings of heart failure.   2.  Bradycardia.  Overall fairly well-controlled at this point though unclear if the bradycardia is truly bradycardia or PVCs.  Recommended he consider Apple Watch to monitor his arrhythmia long-term.  He has a history of SVT so I am hesitant to discontinue completely low-dose beta-blocker therapy as this appears to be well-controlled.  Unfortunately he is not interested in CPAP therapy  3.  Paroxysmal supraventricular tachycardia.  As above.   4.  Hypertension.  Overall controlled  5.  Dyslipidemia.  Plans to return back to his usual activities and diet.  Will recheck this with Dr. Escobar 3 months.  6.  Graves' disease.  Managed by Dr. Escobar tomorrow.  7.  Carotid artery stenosis less than 50% by Doppler 2/2023.  Check a follow-up carotid Doppler ultrasound  8.  Untreated sleep apnea.  He has lost significant amount of weight and he does not believe he has sleep apnea.  Not interested in additional treatment at this time      Time Spent: I spent 35 minutes caring for Joaquin on this date of service. This time includes time spent by me in the following activities: preparing for the visit, reviewing tests, obtaining and/or reviewing a separately obtained history, performing a medically appropriate examination and/or evaluation, counseling and educating the patient/family/caregiver, ordering medications, tests, or procedures, documenting information in the medical record, and independently interpreting results and communicating that information with the patient/family/caregiver.   I spent 1 minutes on the separately reported service of ECG. This time is not included in the time  used to support the E/M service also reported today.        Your medication list            Accurate as of March 19, 2024 11:59 PM. If you have any questions, ask your nurse or doctor.                CHANGE how you take these medications        Instructions Last Dose Given Next Dose Due   fluticasone 50 MCG/ACT nasal spray  Commonly known as: Flonase  What changed:   when to take this  reasons to take this  additional instructions      2 sprays into the nostril(s) as directed by provider Daily.       naproxen 500 MG tablet  Commonly known as: NAPROSYN  What changed:   when to take this  reasons to take this  additional instructions      Take 1 tablet by mouth 2 (Two) Times a Day.       Natural Vitamin D-3 125 MCG (5000 UT) tablet  Generic drug: Cholecalciferol  What changed:   how much to take  how to take this  when to take this      TAKE ONE TABLET BY MOUTH DAILY              CONTINUE taking these medications        Instructions Last Dose Given Next Dose Due   amLODIPine 5 MG tablet  Commonly known as: NORVASC      1 po qday prn sys greater or equal to 150       aspirin 81 MG EC tablet      Take 1 tablet by mouth Daily.       atorvastatin 10 MG tablet  Commonly known as: LIPITOR      Take 1 tablet by mouth Daily.       Diclofenac Sodium 1 % gel gel  Commonly known as: VOLTAREN      apply bid to affected area       irbesartan 300 MG tablet  Commonly known as: AVAPRO      Take 1 tablet by mouth Every Night.       ketotifen 0.025 % ophthalmic solution  Commonly known as: ZADITOR      Apply 1 drop to eye(s) as directed by provider As Needed. PRN for seasonal allergies       levothyroxine 150 MCG tablet  Commonly known as: SYNTHROID, LEVOTHROID      Take 1 tablet by mouth Daily.       lidocaine 5 %  Commonly known as: LIDODERM      Place 1 patch on the skin as directed by provider See Admin Instructions.       metoprolol tartrate 25 MG tablet  Commonly known as: LOPRESSOR      1/2 tablet every morning and 1 tablet  every evening       multivitamin tablet tablet  Commonly known as: THERAGRAN      Take 1 tablet by mouth Daily.       tadalafil 20 MG tablet  Commonly known as: CIALIS      1/2 to 1 tablet daily as needed       tiZANidine 4 MG tablet  Commonly known as: ZANAFLEX      Take 1 tablet by mouth Daily As Needed.                Patient is no longer taking -.  I corrected the med list to reflect this.  I did not stop these medications.      Dictated utilizing Dragon dictation

## 2024-04-11 ENCOUNTER — LAB (OUTPATIENT)
Dept: LAB | Facility: HOSPITAL | Age: 74
End: 2024-04-11
Payer: MEDICARE

## 2024-04-11 ENCOUNTER — TRANSCRIBE ORDERS (OUTPATIENT)
Dept: ADMINISTRATIVE | Facility: HOSPITAL | Age: 74
End: 2024-04-11
Payer: MEDICARE

## 2024-04-11 ENCOUNTER — HOSPITAL ENCOUNTER (OUTPATIENT)
Dept: CARDIOLOGY | Facility: HOSPITAL | Age: 74
Discharge: HOME | End: 2024-04-11
Payer: MEDICARE

## 2024-04-11 ENCOUNTER — HOSPITAL ENCOUNTER (OUTPATIENT)
Dept: GENERAL RADIOLOGY | Facility: HOSPITAL | Age: 74
Discharge: HOME | End: 2024-04-11
Payer: MEDICARE

## 2024-04-11 DIAGNOSIS — Z01.818 PRE-OP EXAM: Primary | ICD-10-CM

## 2024-04-11 DIAGNOSIS — Z01.818 PRE-OP EXAM: ICD-10-CM

## 2024-04-11 DIAGNOSIS — Z01.818 PREOP TESTING: ICD-10-CM

## 2024-04-11 LAB
ALBUMIN SERPL-MCNC: 3.9 G/DL (ref 3.5–5.2)
ALBUMIN/GLOB SERPL: 1.3 G/DL
ALP SERPL-CCNC: 84 U/L (ref 39–117)
ALT SERPL W P-5'-P-CCNC: 14 U/L (ref 1–41)
ANION GAP SERPL CALCULATED.3IONS-SCNC: 9.3 MMOL/L (ref 5–15)
AST SERPL-CCNC: 11 U/L (ref 1–40)
BASOPHILS # BLD AUTO: 0.06 10*3/MM3 (ref 0–0.2)
BASOPHILS NFR BLD AUTO: 0.5 % (ref 0–1.5)
BILIRUB SERPL-MCNC: 0.5 MG/DL (ref 0–1.2)
BUN SERPL-MCNC: 24 MG/DL (ref 8–23)
BUN/CREAT SERPL: 20.5 (ref 7–25)
CALCIUM SPEC-SCNC: 10.2 MG/DL (ref 8.6–10.5)
CHLORIDE SERPL-SCNC: 103 MMOL/L (ref 98–107)
CO2 SERPL-SCNC: 25.7 MMOL/L (ref 22–29)
CREAT SERPL-MCNC: 1.17 MG/DL (ref 0.76–1.27)
DEPRECATED RDW RBC AUTO: 49.2 FL (ref 37–54)
EGFRCR SERPLBLD CKD-EPI 2021: 66.6 ML/MIN/1.73
EOSINOPHIL # BLD AUTO: 0.04 10*3/MM3 (ref 0–0.4)
EOSINOPHIL NFR BLD AUTO: 0.3 % (ref 0.3–6.2)
ERYTHROCYTE [DISTWIDTH] IN BLOOD BY AUTOMATED COUNT: 17.2 % (ref 12.3–15.4)
GLOBULIN UR ELPH-MCNC: 3.1 GM/DL
GLUCOSE SERPL-MCNC: 100 MG/DL (ref 65–99)
HCT VFR BLD AUTO: 49.2 % (ref 37.5–51)
HGB BLD-MCNC: 14.5 G/DL (ref 13–17.7)
IMM GRANULOCYTES # BLD AUTO: 0.07 10*3/MM3 (ref 0–0.05)
IMM GRANULOCYTES NFR BLD AUTO: 0.5 % (ref 0–0.5)
LYMPHOCYTES # BLD AUTO: 1.59 10*3/MM3 (ref 0.7–3.1)
LYMPHOCYTES NFR BLD AUTO: 12.4 % (ref 19.6–45.3)
MCH RBC QN AUTO: 24 PG (ref 26.6–33)
MCHC RBC AUTO-ENTMCNC: 29.5 G/DL (ref 31.5–35.7)
MCV RBC AUTO: 81.5 FL (ref 79–97)
MONOCYTES # BLD AUTO: 0.72 10*3/MM3 (ref 0.1–0.9)
MONOCYTES NFR BLD AUTO: 5.6 % (ref 5–12)
NEUTROPHILS NFR BLD AUTO: 10.32 10*3/MM3 (ref 1.7–7)
NEUTROPHILS NFR BLD AUTO: 80.7 % (ref 42.7–76)
NRBC BLD AUTO-RTO: 0 /100 WBC (ref 0–0.2)
PLATELET # BLD AUTO: 336 10*3/MM3 (ref 140–450)
PMV BLD AUTO: 9.9 FL (ref 6–12)
POTASSIUM SERPL-SCNC: 4.4 MMOL/L (ref 3.5–5.2)
PROT SERPL-MCNC: 7 G/DL (ref 6–8.5)
QT INTERVAL: 438 MS
QTC INTERVAL: 416 MS
RBC # BLD AUTO: 6.04 10*6/MM3 (ref 4.14–5.8)
SODIUM SERPL-SCNC: 138 MMOL/L (ref 136–145)
WBC NRBC COR # BLD AUTO: 12.8 10*3/MM3 (ref 3.4–10.8)

## 2024-04-11 PROCEDURE — 80053 COMPREHEN METABOLIC PANEL: CPT

## 2024-04-11 PROCEDURE — 85025 COMPLETE CBC W/AUTO DIFF WBC: CPT

## 2024-04-11 PROCEDURE — 93005 ELECTROCARDIOGRAM TRACING: CPT | Performed by: ORTHOPAEDIC SURGERY

## 2024-04-11 PROCEDURE — 36415 COLL VENOUS BLD VENIPUNCTURE: CPT

## 2024-04-11 PROCEDURE — 71046 X-RAY EXAM CHEST 2 VIEWS: CPT

## 2024-05-21 ENCOUNTER — HOSPITAL ENCOUNTER (OUTPATIENT)
Dept: CARDIOLOGY | Facility: HOSPITAL | Age: 74
Discharge: HOME | End: 2024-05-21
Admitting: INTERNAL MEDICINE
Payer: MEDICARE

## 2024-05-21 ENCOUNTER — TELEPHONE (OUTPATIENT)
Dept: CARDIOLOGY | Facility: HOSPITAL | Age: 74
End: 2024-05-21
Payer: MEDICARE

## 2024-05-21 DIAGNOSIS — I65.23 CAROTID ARTERY PLAQUE, BILATERAL: ICD-10-CM

## 2024-05-21 LAB
BH CV XLRA MEAS LEFT DIST CCA EDV: 17.4 CM/SEC
BH CV XLRA MEAS LEFT DIST CCA PSV: 55.2 CM/SEC
BH CV XLRA MEAS LEFT DIST ICA EDV: -18.1 CM/SEC
BH CV XLRA MEAS LEFT DIST ICA PSV: -69.7 CM/SEC
BH CV XLRA MEAS LEFT ICA/CCA RATIO: -1.38
BH CV XLRA MEAS LEFT MID CCA EDV: 18.4 CM/SEC
BH CV XLRA MEAS LEFT MID CCA PSV: 66.8 CM/SEC
BH CV XLRA MEAS LEFT MID ICA EDV: -28.6 CM/SEC
BH CV XLRA MEAS LEFT MID ICA PSV: -76 CM/SEC
BH CV XLRA MEAS LEFT PROX CCA EDV: 21.3 CM/SEC
BH CV XLRA MEAS LEFT PROX CCA PSV: 92 CM/SEC
BH CV XLRA MEAS LEFT PROX ECA PSV: -66.8 CM/SEC
BH CV XLRA MEAS LEFT PROX ICA EDV: -26.1 CM/SEC
BH CV XLRA MEAS LEFT PROX ICA PSV: -72.6 CM/SEC
BH CV XLRA MEAS LEFT PROX SCLA PSV: 130.8 CM/SEC
BH CV XLRA MEAS LEFT VERTEBRAL A PSV: -88.1 CM/SEC
BH CV XLRA MEAS RIGHT DIST CCA EDV: -14.9 CM/SEC
BH CV XLRA MEAS RIGHT DIST CCA PSV: -47.8 CM/SEC
BH CV XLRA MEAS RIGHT DIST ICA EDV: -26.1 CM/SEC
BH CV XLRA MEAS RIGHT DIST ICA PSV: -81.4 CM/SEC
BH CV XLRA MEAS RIGHT ICA/CCA RATIO: 1.83
BH CV XLRA MEAS RIGHT MID CCA EDV: 13.7 CM/SEC
BH CV XLRA MEAS RIGHT MID CCA PSV: 64.6 CM/SEC
BH CV XLRA MEAS RIGHT MID ICA EDV: -27.3 CM/SEC
BH CV XLRA MEAS RIGHT MID ICA PSV: -87.6 CM/SEC
BH CV XLRA MEAS RIGHT PROX CCA EDV: 12.4 CM/SEC
BH CV XLRA MEAS RIGHT PROX CCA PSV: 64 CM/SEC
BH CV XLRA MEAS RIGHT PROX ECA PSV: -73.9 CM/SEC
BH CV XLRA MEAS RIGHT PROX ICA EDV: -29.2 CM/SEC
BH CV XLRA MEAS RIGHT PROX ICA PSV: -87 CM/SEC
BH CV XLRA MEAS RIGHT PROX SCLA PSV: 168.4 CM/SEC
BH CV XLRA MEAS RIGHT VERTEBRAL A PSV: -39.5 CM/SEC

## 2024-05-21 PROCEDURE — 93880 EXTRACRANIAL BILAT STUDY: CPT

## 2024-05-21 NOTE — TELEPHONE ENCOUNTER
Attempted to call patient with results.  No answer and no voicemail.  Will continue to try and reach the patient.    Felisa Dalton RN  Triage [facility] [place]

## 2024-05-21 NOTE — PROGRESS NOTES
I am covering Dr. Haque in basket.     Please call patient. Bilateral carotid plaque. Continue aspirin and atorvastatin.

## 2024-05-22 NOTE — TELEPHONE ENCOUNTER
Pt called back. Went over results and recommendations. Pt verbalized understanding.    Thank you,    Soledad Pratt, RN  Triage [facility] [place]  05/22/24 08:59 EDT

## 2024-05-22 NOTE — TELEPHONE ENCOUNTER
I tried to call Joaquin Peña but there was no answer.  Left a voicemail asking patient to call back.  Will continue to try to reach pt.    HUB- if pt calls back, please transfer through to triage.    Thank you,    Kayla BORREGO RN  Triage [facility] [place]  05/22/24 08:49 EDT

## 2024-07-29 ENCOUNTER — TELEPHONE (OUTPATIENT)
Dept: CARDIOLOGY | Facility: CLINIC | Age: 74
End: 2024-07-29
Payer: MEDICARE

## 2024-07-29 NOTE — TELEPHONE ENCOUNTER
Received [employer] message regarding edema.  Please reach out to patient and determine if there are any other symptoms other than swelling.  He had reported edema in March but none was present on exam per Dr. Haque's note.  If this is new edema, he needs appointment with first available APRN.

## 2024-07-29 NOTE — TELEPHONE ENCOUNTER
I called and spoke with pt.  He has had bilateral leg swelling from the knee down for 6-8 weeks.  He has had surgery on both knees, one last year and one in April.  The swelling is consistent and occurs daily.  It is improved overnight, but comes back and worsens as the day goes on and he is on his feet.  Denies any other symptoms like CP, SOA.  He takes amlodipine only PRN and he reports he has to take it very sparingly.    He is not available this week.  I scheduled him for 8/9 with LR.  He verbalizes understanding and agrees with plan.    Lisa Ernst RN  [facility] Triage  07/29/24 12:58 EDT

## 2024-08-07 ENCOUNTER — HOSPITAL ENCOUNTER (OUTPATIENT)
Dept: BONE DENSITY | Facility: HOSPITAL | Age: 74
Discharge: HOME | End: 2024-08-07
Admitting: INTERNAL MEDICINE
Payer: MEDICARE

## 2024-08-07 DIAGNOSIS — M85.88 OTHER SPECIFIED DISORDERS OF BONE DENSITY AND STRUCTURE, OTHER SITE: ICD-10-CM

## 2024-08-07 DIAGNOSIS — M85.80 OSTEOPENIA, UNSPECIFIED LOCATION: ICD-10-CM

## 2024-08-07 PROCEDURE — 77080 DXA BONE DENSITY AXIAL: CPT

## 2024-08-08 NOTE — PROGRESS NOTES
Bone density showed osteopenia with 4.5% increase bone density of the left hip.  Lumbar spine is unchanged.  10-year risk of major osteoporotic fracture is 5.5% and of hip fracture is 1.1%.  Maintain adequate dietary calcium and vitamin D intake.  Walk for 30 minutes 3-4 times a week.  Repeat bone density in 2 years.  Send copy of labs to Dr. Escobar.  Copy of labs sent to patient through [employer].

## 2024-08-09 ENCOUNTER — OFFICE VISIT (OUTPATIENT)
Dept: CARDIOLOGY | Facility: CLINIC | Age: 74
End: 2024-08-09
Payer: MEDICARE

## 2024-08-09 VITALS
HEART RATE: 51 BPM | OXYGEN SATURATION: 98 % | HEIGHT: 74 IN | WEIGHT: 255 LBS | SYSTOLIC BLOOD PRESSURE: 130 MMHG | BODY MASS INDEX: 32.73 KG/M2 | DIASTOLIC BLOOD PRESSURE: 70 MMHG

## 2024-08-09 DIAGNOSIS — I10 ESSENTIAL HYPERTENSION: Primary | ICD-10-CM

## 2024-08-09 DIAGNOSIS — I49.3 VENTRICULAR PREMATURE DEPOLARIZATION: ICD-10-CM

## 2024-08-09 PROCEDURE — 1160F RVW MEDS BY RX/DR IN RCRD: CPT | Performed by: NURSE PRACTITIONER

## 2024-08-09 PROCEDURE — 93000 ELECTROCARDIOGRAM COMPLETE: CPT | Performed by: NURSE PRACTITIONER

## 2024-08-09 PROCEDURE — 3078F DIAST BP <80 MM HG: CPT | Performed by: NURSE PRACTITIONER

## 2024-08-09 PROCEDURE — 3075F SYST BP GE 130 - 139MM HG: CPT | Performed by: NURSE PRACTITIONER

## 2024-08-09 PROCEDURE — 99214 OFFICE O/P EST MOD 30 MIN: CPT | Performed by: NURSE PRACTITIONER

## 2024-08-09 PROCEDURE — 1159F MED LIST DOCD IN RCRD: CPT | Performed by: NURSE PRACTITIONER

## 2024-08-09 NOTE — PROGRESS NOTES
Date of Office Visit: 2024  Encounter Provider: QIAN Mann  Place of Service: [facility] CARDIOLOGY  Patient Name: Joaquin Peña  :1950    Chief complaint:   Coronary artery disease and palpitations.    HPI: Joaquin Peña is a 74 y.o. male who is a patient of Dr. Haque and is new to me today.  He has a history of Graves' disease, dyslipidemia, renal stones, brief episodes of SVT in  in the setting of hypertension.  He was treated medically.  He had a stress echo in  that was unremarkable.   he had some bradycardia with heart rates in the 30s Holter revealed frequent PVCs and for brief bursts of atrial tachycardia his metoprolol was actually increased and he did not have any recurrent bradycardia arrhythmias.  In 2021 he complained of palpitations had a 2-week ZIO that showed's 17 episodes of PVCs and SVT the longest being 11 beats.  Bigeminy was present there was 1 episode of ventricular tachycardia.  Echocardiogram showed normal LV size and function.  Diastolic dysfunction was normal.  He underwent a cardiac cath in  that showed mild coronary artery disease.  Last year he had a carotid duplex that showed less than  50% stenosis of the left internal carotid artery. heart rate intermittently in the 40s he had also lost about 35 pounds he had had COVID earlier in the year and was treated with Paxlovid.  He was last in the office in March he has not had any symptoms.  He was riding his bike for 30 minutes 2 to 3 days a week he is here for routine follow-up today.      He comes in today he complains of some swelling in his lower extremities.  In April he had a right knee surgery for torn meniscus.  He also is having some trouble with his left knee now.  He felt at a drive-through etc. at least once a day.  He recently had some labs done there is some abnormalities in his RDW and MCH and he plans to follow with hematology.  Recent lipid panel  was at goal with HDL of 33 and LDL of 64.  He plays golf 2 days a week.  Past Medical History:   Diagnosis Date    Abdominal obesity     Acquired spondylolisthesis     Acute low back pain     Acute right lumbar radiculopathy     Allergic     Basal cell carcinoma 10/2021    left cheek    Bulging lumbar disc     Concussion with no loss of consciousness     DDD (degenerative disc disease)     Erectile dysfunction     Essential hypertension     Fatigue     Graves disease     H/O disorder of nervous system and sense organs     Headache     Hyperlipidemia     Hypertensive heart disease     Hyperthyroidism 1984    Hypothyroidism     Insomnia     Irregular heart beat     Joint pain     Lumbar radiculopathy     Nephrolithiasis     JACQUELINE (obstructive sleep apnea) 11/19/2021    Home sleep study.  Weight 252 pounds.  Moderate JACQUELINE with AHI 16.8 events per hour.  No sleep-related hypoxia.  The patient snored 67.7% of total sleep time.    Paroxysmal nocturnal dyspnea     PVC (premature ventricular contraction)     Severe head trauma     Sleep-disordered breathing 06/03/2012    Overnight polysomnogram.  Weight 250 pounds.  AHI normal at 2.8 events per hour.  The patient did have 11 events per hour related to respiratory effort related arousals.  No sleep-related hypoxia.  Arousals associated with snoring abnormal.    SOB (shortness of breath)     Soemmering's ring     Spondylolisthesis     Subdural hematoma     Supraventricular tachycardia     Vitamin D deficiency        Past Surgical History:   Procedure Laterality Date    BASAL CELL CARCINOMA EXCISION  10/2021    left cheek    CARDIAC CATHETERIZATION N/A 11/05/2019    Procedure: Coronary angiography;  Surgeon: Storm Marcano MD;  Location: [facility] INVASIVE LOCATION;  Service: Cardiovascular    CARDIAC CATHETERIZATION N/A 11/05/2019    Procedure: Left heart cath;  Surgeon: Storm Marcano MD;  Location: [facility] CATH INVASIVE LOCATION;  Service: Cardiovascular    CARDIAC  CATHETERIZATION N/A 2019    Procedure: Left ventriculography;  Surgeon: Storm Marcano MD;  Location: [facility] CATH INVASIVE LOCATION;  Service: Cardiovascular    COLONOSCOPY  2016    theo.  Dr. Schulte    INNER EAR SURGERY Right     nonmalignant tumor removal    KNEE ARTHROSCOPY Left     KNEE ARTHROSCOPY Left 2018    KNEE SURGERY       and 2009    LITHOTRIPSY      OTHER SURGICAL HISTORY Left     ear surgery       Social History     Socioeconomic History    Marital status:    Tobacco Use    Smoking status: Former     Current packs/day: 0.00     Average packs/day: 0.3 packs/day for 2.1 years (0.5 ttl pk-yrs)     Types: Cigarettes     Start date: 1971     Quit date: 1973     Years since quittin.6    Smokeless tobacco: Never   Vaping Use    Vaping status: Never Used   Substance and Sexual Activity    Alcohol use: Yes     Alcohol/week: 2.0 standard drinks of alcohol     Types: 1 Glasses of wine, 1 Cans of beer per week     Comment: occasional/  occ caffeine use 90z frap daily     Drug use: No    Sexual activity: Yes     Partners: Female     Birth control/protection: None       Family History   Problem Relation Age of Onset    No Known Problems Mother     Cancer Father         malignant neoplasm    No Known Problems Sister     No Known Problems Sister     No Known Problems Brother     No Known Problems Brother     No Known Problems Brother     Diabetes Maternal Grandmother     Hypertension Other     Heart disease Other        Review of Systems   Constitutional: Negative for diaphoresis and malaise/fatigue.   Cardiovascular:  Positive for leg swelling. Negative for chest pain, claudication, dyspnea on exertion, irregular heartbeat, near-syncope, orthopnea, palpitations, paroxysmal nocturnal dyspnea and syncope.   Respiratory:  Negative for cough, shortness of breath and sleep disturbances due to breathing.    Musculoskeletal:  Negative for falls.   Neurological:  Negative for  dizziness and weakness.   Psychiatric/Behavioral:  Negative for altered mental status and substance abuse.        Allergies   Allergen Reactions    Hydrocodone Anaphylaxis     Patient states that when taking it for pain     Latex Rash         Current Outpatient Medications:     amLODIPine (NORVASC) 5 MG tablet, 1 po qday prn sys greater or equal to 150, Disp: , Rfl:     aspirin 81 MG EC tablet, Take 1 tablet by mouth Daily., Disp: , Rfl:     atorvastatin (LIPITOR) 10 MG tablet, Take 1 tablet by mouth Daily., Disp: , Rfl:     Diclofenac Sodium (VOLTAREN) 1 % gel gel, apply bid to affected area, Disp: , Rfl:     fluticasone (Flonase) 50 MCG/ACT nasal spray, 2 sprays into the nostril(s) as directed by provider Daily. (Patient taking differently: 2 sprays into the nostril(s) as directed by provider As Needed. Seasonal allergies PRN), Disp: 18.2 mL, Rfl: 11    irbesartan (AVAPRO) 300 MG tablet, Take 1 tablet by mouth Every Night., Disp: 90 tablet, Rfl: 1    ketotifen (ZADITOR) 0.025 % ophthalmic solution, Apply 1 drop to eye(s) as directed by provider As Needed. PRN for seasonal allergies, Disp: , Rfl:     levothyroxine (SYNTHROID, LEVOTHROID) 150 MCG tablet, Take 1 tablet by mouth Daily., Disp: 90 tablet, Rfl: 2    lidocaine (LIDODERM) 5 %, Place 1 patch on the skin as directed by provider See Admin Instructions., Disp: , Rfl:     metoprolol tartrate (LOPRESSOR) 25 MG tablet, 1/2 tablet every morning and 1 tablet every evening, Disp: 135 tablet, Rfl: 1    Multiple Vitamin (MULTI-VITAMIN DAILY PO), Take 1 tablet by mouth Daily., Disp: , Rfl:     naproxen (NAPROSYN) 500 MG tablet, Take 1 tablet by mouth 2 (Two) Times a Day. (Patient taking differently: Take 1 tablet by mouth 2 (Two) Times a Day As Needed. Very rarely takes), Disp: 60 tablet, Rfl: 3    Natural Vitamin D-3 125 MCG (5000 UT) tablet, TAKE ONE TABLET BY MOUTH DAILY (Patient taking differently: 1 tablet.), Disp: 30 tablet, Rfl: 12    tadalafil (CIALIS) 20 MG  "tablet, 1/2 to 1 tablet daily as needed, Disp: 30 tablet, Rfl: 0    tiZANidine (ZANAFLEX) 4 MG tablet, Take 1 tablet by mouth Daily As Needed., Disp: , Rfl:       Objective:     Vitals:    08/09/24 1309   BP: 130/70   BP Location: Left arm   Patient Position: Sitting   Pulse: 51   SpO2: 98%   Weight: 116 kg (255 lb)   Height: 188 cm (74\")     Body mass index is 32.74 kg/m².    PHYSICAL EXAM:    Constitutional:       General: Not in acute distress.     Appearance: Normal appearance. Well-developed.   Eyes:      Pupils: Pupils are equal, round, and reactive to light.   HENT:      Head: Normocephalic.   Neck:      Vascular: No carotid bruit or JVD.   Pulmonary:      Effort: Pulmonary effort is normal. No tachypnea.      Breath sounds: Normal breath sounds. No wheezing. No rales.   Cardiovascular:      Normal rate. Regular rhythm.      No gallop.    Pulses:     Intact distal pulses.   Edema:     Peripheral edema present.     Ankle: bilateral 1+ edema of the ankle.  Abdominal:      General: Bowel sounds are normal.      Palpations: Abdomen is soft.      Tenderness: There is no abdominal tenderness.   Musculoskeletal: Normal range of motion.      Cervical back: Normal range of motion and neck supple. No edema. Skin:     General: Skin is warm and dry.   Neurological:      Mental Status: Alert and oriented to person, place, and time.           ECG 12 Lead    Date/Time: 8/9/2024 1:51 PM  Performed by: Gladis Villa APRN    Authorized by: Gladis Villa APRN  Comparison: compared with previous ECG from 4/11/2024  Similar to previous ECG  Rhythm: sinus rhythm  Rate: normal  Conduction: non-specific intraventricular conduction delay  QRS axis: normal    Clinical impression: non-specific ECG            Assessment:   1-history of palpitations-stable on current dose of beta-blocker  2.  Essential hypertension.  Used to take amlodipine as needed but is only used it twice in the last year will discontinue and continue on " irbesartan  3.  Peripheral edema.  Will get echocardiogram it has been 3 years I think this is likely multifactorial due to the summer being high, dietary and decreased activity due to knee surgery.       Plan:       Will call with echo results follow-up in 3 months.         Your medication list            Accurate as of August 9, 2024  1:21 PM. If you have any questions, ask your nurse or doctor.                CHANGE how you take these medications        Instructions Last Dose Given Next Dose Due   fluticasone 50 MCG/ACT nasal spray  Commonly known as: Flonase  What changed:   when to take this  reasons to take this  additional instructions      2 sprays into the nostril(s) as directed by provider Daily.       naproxen 500 MG tablet  Commonly known as: NAPROSYN  What changed:   when to take this  reasons to take this  additional instructions      Take 1 tablet by mouth 2 (Two) Times a Day.       Natural Vitamin D-3 125 MCG (5000 UT) tablet  Generic drug: Cholecalciferol  What changed:   how much to take  how to take this  when to take this      TAKE ONE TABLET BY MOUTH DAILY              CONTINUE taking these medications        Instructions Last Dose Given Next Dose Due   amLODIPine 5 MG tablet  Commonly known as: NORVASC      1 po qday prn sys greater or equal to 150       aspirin 81 MG EC tablet      Take 1 tablet by mouth Daily.       atorvastatin 10 MG tablet  Commonly known as: LIPITOR      Take 1 tablet by mouth Daily.       Diclofenac Sodium 1 % gel gel  Commonly known as: VOLTAREN      apply bid to affected area       irbesartan 300 MG tablet  Commonly known as: AVAPRO      Take 1 tablet by mouth Every Night.       ketotifen 0.025 % ophthalmic solution  Commonly known as: ZADITOR      Apply 1 drop to eye(s) as directed by provider As Needed. PRN for seasonal allergies       levothyroxine 150 MCG tablet  Commonly known as: SYNTHROID, LEVOTHROID      Take 1 tablet by mouth Daily.       lidocaine 5  %  Commonly known as: LIDODERM      Place 1 patch on the skin as directed by provider See Admin Instructions.       metoprolol tartrate 25 MG tablet  Commonly known as: LOPRESSOR      1/2 tablet every morning and 1 tablet every evening       multivitamin tablet tablet  Commonly known as: THERAGRAN      Take 1 tablet by mouth Daily.       tadalafil 20 MG tablet  Commonly known as: CIALIS      1/2 to 1 tablet daily as needed       tiZANidine 4 MG tablet  Commonly known as: ZANAFLEX      Take 1 tablet by mouth Daily As Needed.                  As always, it has been a pleasure to participate in your patient's care.      Sincerely,     Gladis LAUGHLIN

## 2024-08-16 ENCOUNTER — HOSPITAL ENCOUNTER (OUTPATIENT)
Dept: CARDIOLOGY | Facility: HOSPITAL | Age: 74
Discharge: HOME | End: 2024-08-16
Payer: MEDICARE

## 2024-08-16 ENCOUNTER — TELEPHONE (OUTPATIENT)
Dept: CARDIOLOGY | Facility: CLINIC | Age: 74
End: 2024-08-16
Payer: MEDICARE

## 2024-08-16 VITALS
BODY MASS INDEX: 32.73 KG/M2 | DIASTOLIC BLOOD PRESSURE: 87 MMHG | HEIGHT: 74 IN | SYSTOLIC BLOOD PRESSURE: 140 MMHG | WEIGHT: 255 LBS

## 2024-08-16 DIAGNOSIS — I10 ESSENTIAL HYPERTENSION: ICD-10-CM

## 2024-08-16 DIAGNOSIS — I49.3 VENTRICULAR PREMATURE DEPOLARIZATION: ICD-10-CM

## 2024-08-16 LAB
AORTIC ARCH: 3.1 CM
AORTIC DIMENSIONLESS INDEX: 0.9 (DI)
ASCENDING AORTA: 3.8 CM
BH CV ECHO LEFT VENTRICLE GLOBAL LONGITUDINAL STRAIN: -23.5 %
BH CV ECHO MEAS - ACS: 2.42 CM
BH CV ECHO MEAS - AO MAX PG: 6.3 MMHG
BH CV ECHO MEAS - AO MEAN PG: 3 MMHG
BH CV ECHO MEAS - AO ROOT DIAM: 4.1 CM
BH CV ECHO MEAS - AO V2 MAX: 125 CM/SEC
BH CV ECHO MEAS - AO V2 VTI: 29.9 CM
BH CV ECHO MEAS - AVA(I,D): 3.1 CM2
BH CV ECHO MEAS - EDV(CUBED): 119.9 ML
BH CV ECHO MEAS - EDV(MOD-SP2): 115 ML
BH CV ECHO MEAS - EDV(MOD-SP4): 137 ML
BH CV ECHO MEAS - EF(MOD-BP): 60.7 %
BH CV ECHO MEAS - EF(MOD-SP2): 62.6 %
BH CV ECHO MEAS - EF(MOD-SP4): 62.8 %
BH CV ECHO MEAS - ESV(CUBED): 26.1 ML
BH CV ECHO MEAS - ESV(MOD-SP2): 43 ML
BH CV ECHO MEAS - ESV(MOD-SP4): 51 ML
BH CV ECHO MEAS - FS: 39.8 %
BH CV ECHO MEAS - IVS/LVPW: 1.09 CM
BH CV ECHO MEAS - IVSD: 1.64 CM
BH CV ECHO MEAS - LAT PEAK E' VEL: 13.1 CM/SEC
BH CV ECHO MEAS - LV DIASTOLIC VOL/BSA (35-75): 56.9 CM2
BH CV ECHO MEAS - LV MASS(C)D: 339 GRAMS
BH CV ECHO MEAS - LV MAX PG: 4.5 MMHG
BH CV ECHO MEAS - LV MEAN PG: 2 MMHG
BH CV ECHO MEAS - LV SYSTOLIC VOL/BSA (12-30): 21.2 CM2
BH CV ECHO MEAS - LV V1 MAX: 106 CM/SEC
BH CV ECHO MEAS - LV V1 VTI: 26.7 CM
BH CV ECHO MEAS - LVIDD: 4.9 CM
BH CV ECHO MEAS - LVIDS: 3 CM
BH CV ECHO MEAS - LVOT AREA: 3.4 CM2
BH CV ECHO MEAS - LVOT DIAM: 2.09 CM
BH CV ECHO MEAS - LVPWD: 1.51 CM
BH CV ECHO MEAS - MED PEAK E' VEL: 6.7 CM/SEC
BH CV ECHO MEAS - MR MAX PG: 81.5 MMHG
BH CV ECHO MEAS - MR MAX VEL: 451.5 CM/SEC
BH CV ECHO MEAS - MV A DUR: 0.15 SEC
BH CV ECHO MEAS - MV A MAX VEL: 33.8 CM/SEC
BH CV ECHO MEAS - MV DEC SLOPE: 273.2 CM/SEC2
BH CV ECHO MEAS - MV DEC TIME: 0.19 SEC
BH CV ECHO MEAS - MV E MAX VEL: 64.7 CM/SEC
BH CV ECHO MEAS - MV E/A: 1.91
BH CV ECHO MEAS - MV MAX PG: 3.8 MMHG
BH CV ECHO MEAS - MV MEAN PG: 1.15 MMHG
BH CV ECHO MEAS - MV P1/2T: 111.4 MSEC
BH CV ECHO MEAS - MV V2 VTI: 36.4 CM
BH CV ECHO MEAS - MVA(P1/2T): 1.97 CM2
BH CV ECHO MEAS - MVA(VTI): 2.5 CM2
BH CV ECHO MEAS - PA ACC TIME: 0.14 SEC
BH CV ECHO MEAS - PA V2 MAX: 101 CM/SEC
BH CV ECHO MEAS - PI END-D VEL: 83.6 CM/SEC
BH CV ECHO MEAS - PULM A REVS DUR: 0.11 SEC
BH CV ECHO MEAS - PULM A REVS VEL: 27.9 CM/SEC
BH CV ECHO MEAS - PULM DIAS VEL: 65.1 CM/SEC
BH CV ECHO MEAS - PULM S/D: 1.05
BH CV ECHO MEAS - PULM SYS VEL: 68.6 CM/SEC
BH CV ECHO MEAS - QP/QS: 0.93
BH CV ECHO MEAS - RAP SYSTOLE: 3 MMHG
BH CV ECHO MEAS - RV MAX PG: 2.02 MMHG
BH CV ECHO MEAS - RV V1 MAX: 71.1 CM/SEC
BH CV ECHO MEAS - RV V1 VTI: 16.9 CM
BH CV ECHO MEAS - RVOT DIAM: 2.5 CM
BH CV ECHO MEAS - RVSP: 31 MMHG
BH CV ECHO MEAS - SUP REN AO DIAM: 2.8 CM
BH CV ECHO MEAS - SV(LVOT): 91.9 ML
BH CV ECHO MEAS - SV(MOD-SP2): 72 ML
BH CV ECHO MEAS - SV(MOD-SP4): 86 ML
BH CV ECHO MEAS - SV(RVOT): 85.5 ML
BH CV ECHO MEAS - SVI(LVOT): 38.1 ML/M2
BH CV ECHO MEAS - SVI(MOD-SP2): 29.9 ML/M2
BH CV ECHO MEAS - SVI(MOD-SP4): 35.7 ML/M2
BH CV ECHO MEAS - TAPSE (>1.6): 3.2 CM
BH CV ECHO MEAS - TR MAX PG: 28.4 MMHG
BH CV ECHO MEAS - TR MAX VEL: 266.6 CM/SEC
BH CV ECHO MEASUREMENTS AVERAGE E/E' RATIO: 6.54
BH CV ECHO SHUNT ASSESSMENT PERFORMED (HIDDEN SCRIPTING): 1
BH CV XLRA - RV BASE: 4.3 CM
BH CV XLRA - RV LENGTH: 8.9 CM
BH CV XLRA - RV MID: 3 CM
BH CV XLRA - TDI S': 16.9 CM/SEC
LEFT ATRIUM VOLUME INDEX: 49 ML/M2
SINUS: 3.6 CM
STJ: 3.1 CM

## 2024-08-16 PROCEDURE — 93306 TTE W/DOPPLER COMPLETE: CPT

## 2024-08-16 PROCEDURE — 93356 MYOCRD STRAIN IMG SPCKL TRCK: CPT

## 2024-08-16 NOTE — TELEPHONE ENCOUNTER
Called patient with echocardiogram results. Edema has improved. Monitor aortic root with echo in 1-2 years

## 2024-09-09 RX ORDER — METOPROLOL TARTRATE 25 MG/1
TABLET, FILM COATED ORAL
Qty: 135 TABLET | Refills: 0 | Status: SHIPPED | OUTPATIENT
Start: 2024-09-09

## 2024-09-09 RX ORDER — IRBESARTAN 300 MG/1
300 TABLET ORAL NIGHTLY
Qty: 90 TABLET | Refills: 0 | Status: SHIPPED | OUTPATIENT
Start: 2024-09-09

## 2024-09-09 NOTE — TELEPHONE ENCOUNTER
Rx Refill Note  Requested Prescriptions     Pending Prescriptions Disp Refills    irbesartan (AVAPRO) 300 MG tablet [Pharmacy Med Name: IRBESARTAN 300 MG TABLET] 90 tablet 1     Sig: TAKE ONE TABLET BY MOUTH ONCE NIGHTLY    metoprolol tartrate (LOPRESSOR) 25 MG tablet [Pharmacy Med Name: METOPROLOL TARTRATE 25 MG TAB] 135 tablet 1     Sig: TAKE 1/2 TABLET BY MOUTH EVERY MORNING AND TAKE ONE TABLET BY MOUTH EVERY EVENING      Last office visit with prescribing clinician: 3/12/2024   Last telemedicine visit with prescribing clinician: Visit date not found   Next office visit with prescribing clinician: 9/24/2024                         Would you like a call back once the refill request has been completed: [] Yes [] No    If the office needs to give you a call back, can they leave a voicemail: [] Yes [] No    Jessica Rodriguez MA  09/09/24, 07:39 EDT

## 2024-09-24 ENCOUNTER — OFFICE VISIT (OUTPATIENT)
Dept: ENDOCRINOLOGY | Age: 74
End: 2024-09-24
Payer: MEDICARE

## 2024-09-24 VITALS
WEIGHT: 256.8 LBS | HEIGHT: 74 IN | HEART RATE: 50 BPM | SYSTOLIC BLOOD PRESSURE: 122 MMHG | OXYGEN SATURATION: 99 % | DIASTOLIC BLOOD PRESSURE: 78 MMHG | BODY MASS INDEX: 32.96 KG/M2

## 2024-09-24 DIAGNOSIS — I10 ESSENTIAL HYPERTENSION: ICD-10-CM

## 2024-09-24 DIAGNOSIS — E78.5 HYPERLIPIDEMIA, UNSPECIFIED HYPERLIPIDEMIA TYPE: ICD-10-CM

## 2024-09-24 DIAGNOSIS — E05.00 GRAVES DISEASE: Primary | ICD-10-CM

## 2024-09-24 DIAGNOSIS — R73.03 PREDIABETES: ICD-10-CM

## 2024-09-24 DIAGNOSIS — N40.1 BENIGN PROSTATIC HYPERPLASIA WITH WEAK URINARY STREAM: ICD-10-CM

## 2024-09-24 DIAGNOSIS — M85.852 OSTEOPENIA OF LEFT HIP: ICD-10-CM

## 2024-09-24 DIAGNOSIS — E89.0 POSTABLATIVE HYPOTHYROIDISM: ICD-10-CM

## 2024-09-24 DIAGNOSIS — R39.12 BENIGN PROSTATIC HYPERPLASIA WITH WEAK URINARY STREAM: ICD-10-CM

## 2024-09-24 DIAGNOSIS — E55.9 VITAMIN D DEFICIENCY: ICD-10-CM

## 2024-09-24 PROCEDURE — G2211 COMPLEX E/M VISIT ADD ON: HCPCS | Performed by: INTERNAL MEDICINE

## 2024-09-24 PROCEDURE — 99214 OFFICE O/P EST MOD 30 MIN: CPT | Performed by: INTERNAL MEDICINE

## 2024-09-24 PROCEDURE — 3078F DIAST BP <80 MM HG: CPT | Performed by: INTERNAL MEDICINE

## 2024-09-24 PROCEDURE — 3074F SYST BP LT 130 MM HG: CPT | Performed by: INTERNAL MEDICINE

## 2024-09-24 RX ORDER — PANTOPRAZOLE SODIUM 40 MG/1
40 TABLET, DELAYED RELEASE ORAL DAILY
COMMUNITY
Start: 2024-04-24 | End: 2025-02-10

## 2024-10-17 ENCOUNTER — PATIENT MESSAGE (OUTPATIENT)
Dept: ENDOCRINOLOGY | Age: 74
End: 2024-10-17
Payer: MEDICARE

## 2024-11-12 ENCOUNTER — OFFICE VISIT (OUTPATIENT)
Dept: CARDIOLOGY | Facility: CLINIC | Age: 74
End: 2024-11-12
Payer: MEDICARE

## 2024-11-12 VITALS
BODY MASS INDEX: 31.95 KG/M2 | HEART RATE: 46 BPM | DIASTOLIC BLOOD PRESSURE: 76 MMHG | WEIGHT: 249 LBS | HEIGHT: 74 IN | SYSTOLIC BLOOD PRESSURE: 134 MMHG

## 2024-11-12 DIAGNOSIS — I25.10 CORONARY ARTERY DISEASE INVOLVING NATIVE CORONARY ARTERY OF NATIVE HEART WITHOUT ANGINA PECTORIS: ICD-10-CM

## 2024-11-12 DIAGNOSIS — I51.7 LVH (LEFT VENTRICULAR HYPERTROPHY): Primary | ICD-10-CM

## 2024-11-12 DIAGNOSIS — I10 ESSENTIAL HYPERTENSION: ICD-10-CM

## 2024-11-12 DIAGNOSIS — G47.33 OSA (OBSTRUCTIVE SLEEP APNEA): ICD-10-CM

## 2024-11-12 DIAGNOSIS — E78.00 PURE HYPERCHOLESTEROLEMIA: ICD-10-CM

## 2024-11-12 DIAGNOSIS — I49.3 PVC (PREMATURE VENTRICULAR CONTRACTION): ICD-10-CM

## 2024-11-12 RX ORDER — FAMOTIDINE 10 MG
10 TABLET ORAL 2 TIMES DAILY
COMMUNITY

## 2024-11-12 RX ORDER — METOPROLOL TARTRATE 25 MG/1
12.5 TABLET, FILM COATED ORAL 2 TIMES DAILY
Qty: 90 TABLET | Refills: 3 | Status: SHIPPED | OUTPATIENT
Start: 2024-11-12

## 2024-11-12 RX ORDER — FERROUS GLUCONATE 324(38)MG
324 TABLET ORAL DAILY
COMMUNITY

## 2024-11-12 NOTE — PROGRESS NOTES
Date of Office Visit: 2024  Encounter Provider: Muna Haque MD  Place of Service: [facility] CARDIOLOGY  Patient Name: Joaquin Peña  :1950    Chief complaint  Supraventricular tachycardia, coronary artery disease    History of Present Illness  Patient is a 74-year-old gentleman with Graves' disease, dyslipidemia, renal calculi who in  developed brief episodes of supraventricular tachycardia in the setting of hypertension.  This was treated medically.  In 2014 had a stress echocardiogram that showed normal left ventricular size and function with ejection fraction 59% grade 1 diastolic dysfunction mild left ventricular hypertrophy with no significant valvular heart disease and no ischemia in May 2015 he had mild bradycardia in the 30s.  A follow-up Holter revealed frequent PVCs and brief bursts of atrial tachycardia for which metoprolol was increased and he has not had any recurrent bradycardia arrhythmia.  In 2021 with complaints of palpitations he had a 2-week Zio patch that showed sinus rhythm with PVCs 17 episodes of supraventricular tachycardia longest lasting 11 beats.  Ventricular bigeminy was present there was one episode of ventricular tachycardia lasting 9 beats.  There were episodes of idioventricular rhythm as well.  Echocardiogram showed normal left ventricular size systolic function ejection fraction was normal.  GLS was normal.  Diastolic function was normal.  There is no significant valvular heart disease or pulmonary hypertension.  Cardiac cath 2019 showed minimal coronary disease normal systolic function normal left ventricular filling pressures.  Echo showed ejection fraction 67% mild left atrial hypertrophy normal diastolic function normal right-sided pressures.  24 Holter was unremarkable.  Carotid Doppler on 2023 showed less than 50% stenosis left internal carotid artery.  Follow-up carotid Doppler on 2024 showed bilateral plaque  without stenosis.  Echo on 8/2024 showed an ejection fraction of 61%, normal global longitudinal LV strain, normal diastolic function, moderate left atrial enlargement, negative saline injection, regurgitation.  No significant valvular heart disease with mild mitral RVSP was normal.    Since last visit patient's had no palpitation shortness of breath dizziness chest pain.  Edema has improved though still present bilaterally left greater than right..  He is active playing golf 3 times a week and doing yard work.  Blood pressure slightly elevated but at home it is lower typically in the 1 teens to 120s..  Patient states that he was told many years ago he had sleep apnea that it was mild on his last weight in the interim.  Therefore he is not treating sleep apnea.  Heart rate remains somewhat low.    Past Medical History:   Diagnosis Date    Abdominal obesity     Acquired spondylolisthesis     Acute low back pain     Acute right lumbar radiculopathy     Allergic     Basal cell carcinoma 10/2021    left cheek    Bulging lumbar disc     Concussion with no loss of consciousness     DDD (degenerative disc disease)     Erectile dysfunction     Essential hypertension     Fatigue     Graves disease     H/O disorder of nervous system and sense organs     Headache     Hyperlipidemia     Hypertensive heart disease     Hyperthyroidism 1984    Hypothyroidism     Insomnia     Irregular heart beat     Joint pain     Lumbar radiculopathy     Nephrolithiasis     JACQUELINE (obstructive sleep apnea) 11/19/2021    Home sleep study.  Weight 252 pounds.  Moderate JACQUELINE with AHI 16.8 events per hour.  No sleep-related hypoxia.  The patient snored 67.7% of total sleep time.    Paroxysmal nocturnal dyspnea     PVC (premature ventricular contraction)     Severe head trauma     Sleep-disordered breathing 06/03/2012    Overnight polysomnogram.  Weight 250 pounds.  AHI normal at 2.8 events per hour.  The patient did have 11 events per hour related to  respiratory effort related arousals.  No sleep-related hypoxia.  Arousals associated with snoring abnormal.    SOB (shortness of breath)     Soemmering's ring     Spondylolisthesis     Subdural hematoma     Supraventricular tachycardia     Vitamin D deficiency      Past Surgical History:   Procedure Laterality Date    BASAL CELL CARCINOMA EXCISION  10/2021    left cheek    CARDIAC CATHETERIZATION N/A 11/05/2019    Procedure: Coronary angiography;  Surgeon: Storm Marcano MD;  Location:  [facility];  Service: Cardiovascular    CARDIAC CATHETERIZATION N/A 11/05/2019    Procedure: Left heart cath;  Surgeon: Storm Marcano MD;  Location:  [facility];  Service: Cardiovascular    CARDIAC CATHETERIZATION N/A 11/05/2019    Procedure: Left ventriculography;  Surgeon: Storm Marcano MD;  Location:  [facility];  Service: Cardiovascular    COLONOSCOPY  03/2016    tics.  Dr. Schulte    INNER EAR SURGERY Right     nonmalignant tumor removal    KNEE ARTHROSCOPY Left     KNEE ARTHROSCOPY Left 2018    KNEE SURGERY      2008 and 2009    LITHOTRIPSY      OTHER SURGICAL HISTORY Left     ear surgery     Outpatient Medications Prior to Visit   Medication Sig Dispense Refill    aspirin 81 MG EC tablet Take 1 tablet by mouth Daily.      atorvastatin (LIPITOR) 10 MG tablet Take 1 tablet by mouth Daily.      Diclofenac Sodium (VOLTAREN) 1 % gel gel apply bid to affected area      famotidine (PEPCID) 10 MG tablet Take 1 tablet by mouth 2 (Two) Times a Day.      ferrous gluconate (FERGON) 324 MG tablet Take 1 tablet by mouth Daily.      fluticasone (Flonase) 50 MCG/ACT nasal spray 2 sprays into the nostril(s) as directed by provider Daily. (Patient taking differently: Administer 2 sprays into the nostril(s) as directed by provider As Needed. Seasonal allergies PRN) 18.2 mL 11    irbesartan (AVAPRO) 300 MG tablet Take 1 tablet by mouth Every Night. Please obtain future refills  from cardiologist. 90 tablet 0    ketotifen (ZADITOR) 0.025 % ophthalmic solution Apply 1 drop to eye(s) as directed by provider As Needed. PRN for seasonal allergies      levothyroxine (SYNTHROID, LEVOTHROID) 150 MCG tablet Take 1 tablet by mouth Daily. 90 tablet 2    lidocaine (LIDODERM) 5 % Place 1 patch on the skin as directed by provider See Admin Instructions.      Multiple Vitamin (MULTI-VITAMIN DAILY PO) Take 1 tablet by mouth Daily.      naproxen (NAPROSYN) 500 MG tablet Take 1 tablet by mouth 2 (Two) Times a Day. (Patient taking differently: Take 1 tablet by mouth 2 (Two) Times a Day As Needed. Very rarely takes) 60 tablet 3    Natural Vitamin D-3 125 MCG (5000 UT) tablet TAKE ONE TABLET BY MOUTH DAILY (Patient taking differently: 1 tablet.) 30 tablet 12    tadalafil (CIALIS) 20 MG tablet 1/2 to 1 tablet daily as needed 30 tablet 0    metoprolol tartrate (LOPRESSOR) 25 MG tablet TAKE 1/2 TABLET BY MOUTH EVERY MORNING AND TAKE ONE TABLET BY MOUTH EVERY EVENING. Please obtain future refills from cardiologist. (Patient taking differently: Take 0.5 tablets by mouth 2 (Two) Times a Day. Patient takes a whole tablet in the PM if pulse is above 60 in the afternoon or in the AM) 135 tablet 0    pantoprazole (PROTONIX) 40 MG EC tablet Take 1 tablet by mouth Daily. (Patient not taking: Reported on 2024)       No facility-administered medications prior to visit.       Allergies as of 2024 - Reviewed 2024   Allergen Reaction Noted    Hydrocodone Anaphylaxis 2022    Latex Rash 03/15/2016     Social History     Socioeconomic History    Marital status:    Tobacco Use    Smoking status: Former     Current packs/day: 0.00     Average packs/day: 0.3 packs/day for 2.1 years (0.5 ttl pk-yrs)     Types: Cigarettes     Start date: 1971     Quit date: 1973     Years since quittin.9    Smokeless tobacco: Never   Vaping Use    Vaping status: Never Used   Substance and Sexual Activity  "   Alcohol use: Yes     Alcohol/week: 2.0 standard drinks of alcohol     Types: 1 Glasses of wine, 1 Cans of beer per week     Comment: occasional/  occ caffeine use 90z frap daily     Drug use: No    Sexual activity: Yes     Partners: Female     Birth control/protection: None     Family History   Problem Relation Age of Onset    No Known Problems Mother     Cancer Father         malignant neoplasm    No Known Problems Sister     No Known Problems Sister     No Known Problems Brother     No Known Problems Brother     No Known Problems Brother     Diabetes Maternal Grandmother     Hypertension Other     Heart disease Other      Review of Systems   Constitutional: Negative for chills, fever, weight gain and weight loss.   Cardiovascular:  Positive for leg swelling.   Respiratory:  Negative for cough, snoring and wheezing.    Hematologic/Lymphatic: Negative for bleeding problem. Does not bruise/bleed easily.   Skin:  Negative for color change.   Musculoskeletal:  Negative for falls, joint pain and myalgias.   Gastrointestinal:  Negative for melena.   Genitourinary:  Negative for hematuria.   Neurological:  Negative for excessive daytime sleepiness.   Psychiatric/Behavioral:  Negative for depression. The patient is not nervous/anxious.         Objective:     Vitals:    11/12/24 0921   BP: 134/76   Pulse: (!) 46   Weight: 113 kg (249 lb)   Height: 188 cm (74\")     Body mass index is 31.97 kg/m².    Vitals reviewed.   Constitutional:       Appearance: Well-developed. Obese.   Eyes:      General: No scleral icterus.        Right eye: No discharge.      Conjunctiva/sclera: Conjunctivae normal.      Pupils: Pupils are equal, round, and reactive to light.   HENT:      Head: Normocephalic.      Nose: Nose normal.   Neck:      Thyroid: No thyromegaly.      Vascular: No JVD.   Pulmonary:      Effort: Pulmonary effort is normal. No respiratory distress.      Breath sounds: Normal breath sounds. No wheezing. No rales. "   Cardiovascular:      Bradycardia present. Regular rhythm. Normal S1. Normal S2.       Murmurs: There is no murmur.      No gallop.    Pulses:     Intact distal pulses.      Carotid: 2+ bilaterally.     Radial: 2+ bilaterally.     Femoral: 2+ bilaterally.     Popliteal: 2+ bilaterally.     Dorsalis pedis: 2+ bilaterally.     Posterior tibial: 2+ bilaterally.  Edema:     Peripheral edema present.     Pretibial: bilateral trace edema of the pretibial area.     Ankle: 1+ edema of the left ankle and trace edema of the right ankle.  Abdominal:      General: Bowel sounds are normal. There is no distension.      Palpations: Abdomen is soft.      Tenderness: There is no abdominal tenderness. There is no rebound.   Musculoskeletal: Normal range of motion.         General: No tenderness.      Cervical back: Normal range of motion and neck supple. Skin:     General: Skin is warm and dry.      Findings: No erythema or rash.   Neurological:      Mental Status: Alert and oriented to person, place, and time.   Psychiatric:         Behavior: Behavior normal.         Thought Content: Thought content normal.         Judgment: Judgment normal.       Lab Review:   Lab Results - Last 18 Months   Lab Units 10/01/24  0926 04/11/24  1159 03/12/24  0916 10/19/23  1103 08/15/23  0757 08/15/23  0757   WBC K/uL 7.4 12.80* 8.42 12.52*   < > 6.50   RBC x10(6)/uL 5.4 6.04* 6.00* 6.08*   < > 5.63   HEMOGLOBIN Gram/dL 12.9* 14.5 15.2 15.6   < > 15.0   HEMATOCRIT % 40.2 49.2 48.2 48.4   < > 46.7   MCV fL 73.9* 81.5 80.3 79.6   < > 82.9   MCH pg 23.7* 24.0* 25.3* 25.7*   < > 26.6   MCHC Gram/dL 32 29.5* 31.5 32.2   < > 32.1   RDW % 18.5* 17.2* 16.5* 17.1*   < > 14.5   PLATELETS 10*3/mm3  --  336 333 329   < > 278   EXTERNAL PLATELETS x10(3)/uL 262  --   --   --   --   --    NEUTROPHIL % %  --  80.7* 60.2  --   --  53.2   LYMPHOCYTE % %  --  12.4* 22.3  --   --  32.5   MONOCYTES % %  --  5.6 12.7*  --   --  10.0   EOSINOPHIL % %  --  0.3 2.0  --   --   3.1   BASOPHIL % %  --  0.5 0.8  --   --  0.9   NEUTROS ABS 10*3/mm3  --  10.32* 5.06  --   --  3.46   LYMPHS ABS 10*3/mm3  --  1.59 1.88  --   --  2.11   MONOS ABS 10*3/mm3  --  0.72 1.07*  --   --  0.65   EOS ABS 10*3/mm3  --  0.04 0.17  --   --  0.20   BASOS ABS 10*3/mm3  --  0.06 0.07  --   --  0.06   IMM GRAN % %  --   --  2.0*  --   --  0.3   IMMATURE GRANS (ABS) 10*3/mm3  --   --  0.17*  --   --  0.02   RDW-SD fl  --  49.2  --  47.2  --   --    MPV fL  --  9.9  --  9.9  --   --     < > = values in this interval not displayed.       Lab Results - Last 18 Months   Lab Units 04/11/24  1159 03/12/24  0916 10/19/23  1103   GLUCOSE mg/dL 100* 90 84   BUN mg/dL 24* 24* 28*   CREATININE mg/dL 1.17 1.20 1.20   SODIUM mmol/L 138 138 138   POTASSIUM mmol/L 4.4 5.4* 4.9   CHLORIDE mmol/L 103 102 103   CO2 mmol/L 25.7 19.4* 26.0   CALCIUM mg/dL 10.2 10.3 10.2   TOTAL PROTEIN g/dL 7.0  --  6.8   ALBUMIN g/dL 3.9 4.1 4.1   ALT (SGPT) U/L 14 17 15   AST (SGOT) U/L 11 12 12   ALK PHOS U/L 84 83 74   BILIRUBIN mg/dL 0.5 0.4 0.6   GLOBULIN gm/dL 3.1  --  2.7   A/G RATIO g/dL 1.3  --  1.5   BUN / CREAT RATIO  20.5 20.0 23.3   ANION GAP mmol/L 9.3  --  9.0   EGFR mL/min/1.73 66.6  --  64.7     Lab Results - Last 18 Months   Lab Units 10/08/24  0938 07/26/24  1031 03/12/24  0916 08/15/23  0757   TRIGLYCERIDES mg/dL  --  151* 271* 156*   HDL CHOL mg/dL 33* 33* 27* 35*   LDL CHOL mg/dL 55 64 104* 70   VLDL CHOLESTEROL ALEX mg/dL  --   --  47* 27     Lab Results - Last 18 Months   Lab Units 10/01/24  0926 07/26/24  1031   TSH mIU/L 2.39 1.26     Lab Results - Last 18 Months   Lab Units 10/19/23  1103   PROTIME Seconds 14.4*       ECG 12 Lead    Date/Time: 11/12/2024 9:24 AM  Performed by: Muna Haque MD    Authorized by: Muna Haque MD  Comparison: compared with previous ECG   Comparison to previous ECG: Heart rate is lower  Rhythm: sinus bradycardia  Conduction: 1st degree AV block and non-specific intraventricular conduction  delay            Diagnosis Plan   1. LVH (left ventricular hypertrophy)  ECG 12 Lead      2. JACQUELINE (obstructive sleep apnea)  ECG 12 Lead    Home Sleep Study      3. Coronary artery disease involving native coronary artery of native heart without angina pectoris        4. Essential hypertension        5. Pure hypercholesterolemia        6. PVC (premature ventricular contraction)          Plan:       1.  PVCs.  Cardiac cath 11/2019 without significant obstructive disease.  Appears to be fairly well-controlled on low-dose beta-blocker therapy.  No anginal symptoms.    2.  Sinus bradycardia with heart rate 46 bpm today.  I am concerned this may be also contributing to edema.  I recommended he discontinue metoprolol but he would prefer to decrease this to 12.5 mg twice daily.  If heart rate remains below 50 bpm in 2 weeks he will discontinue metoprolol and further monitor heart rate  3.  Paroxysmal supraventricular tachycardia.  As above.  In addition he will decrease stimulant  4.  Hypertension.  Slightly elevated today but afebrile lower at home.  He will continue with regular exercise.  5.  Edema.  Suspect low cardiac output from bradycardia may be contributing.  Glucose also elevated and he will discuss this further with Dr. Escobar.  6.  Left ventricular hypertrophy.  This appeared to be more pronounced on last echo 8/2024 despite reasonable blood pressure control.  Would like to check MRI which would also look at the aortic root however he is somewhat claustrophobic of the closed MRI machine.  Will check with radiology to see if we now can do cardiac MRIs open scanner with recent upgrade  7.  Borderline dilated aortic root.  Noted on echo 8/2024.  As above try to get MRI imaging  6.  Dyslipidemia.  Recommendations per Dr. Escobar with goal LDL less than 70, HDL greater than 40, triglycerides less than 150   7.  Graves' disease.  Managed by Dr. Escobar tomorrow.  8.  Carotid artery stenosis less than 50% by Doppler  2/2023.  Improved on Doppler 4/2024.  9.  Untreated sleep apnea.  He has lost significant amount of weight and he does not believe he has sleep apnea.  Not interested in additional treatment at this time      Time Spent: I spent 35 minutes caring for Joaquin on this date of service. This time includes time spent by me in the following activities: preparing for the visit, reviewing tests, obtaining and/or reviewing a separately obtained history, performing a medically appropriate examination and/or evaluation, counseling and educating the patient/family/caregiver, ordering medications, tests, or procedures, documenting information in the medical record, and independently interpreting results and communicating that information with the patient/family/caregiver.   I spent 1 minutes on the separately reported service of ECG. This time is not included in the time used to support the E/M service also reported today.        Your medication list            Accurate as of November 12, 2024 11:59 PM. If you have any questions, ask your nurse or doctor.                CHANGE how you take these medications        Instructions Last Dose Given Next Dose Due   fluticasone 50 MCG/ACT nasal spray  Commonly known as: Flonase  What changed:   when to take this  reasons to take this  additional instructions      2 sprays into the nostril(s) as directed by provider Daily.       metoprolol tartrate 25 MG tablet  Commonly known as: LOPRESSOR  What changed:   how much to take  how to take this  when to take this  additional instructions  Changed by: Mini Haque      Take 0.5 tablets by mouth 2 (Two) Times a Day.       naproxen 500 MG tablet  Commonly known as: NAPROSYN  What changed:   when to take this  reasons to take this  additional instructions      Take 1 tablet by mouth 2 (Two) Times a Day.       Natural Vitamin D-3 125 MCG (5000 UT) tablet  Generic drug: Cholecalciferol  What changed:   how much to take  how to take this  when to take  this      TAKE ONE TABLET BY MOUTH DAILY              CONTINUE taking these medications        Instructions Last Dose Given Next Dose Due   aspirin 81 MG EC tablet      Take 1 tablet by mouth Daily.       atorvastatin 10 MG tablet  Commonly known as: LIPITOR      Take 1 tablet by mouth Daily.       Diclofenac Sodium 1 % gel gel  Commonly known as: VOLTAREN      apply bid to affected area       famotidine 10 MG tablet  Commonly known as: PEPCID      Take 1 tablet by mouth 2 (Two) Times a Day.       ferrous gluconate 324 MG tablet  Commonly known as: FERGON      Take 1 tablet by mouth Daily.       irbesartan 300 MG tablet  Commonly known as: AVAPRO      Take 1 tablet by mouth Every Night. Please obtain future refills from cardiologist.       ketotifen 0.025 % ophthalmic solution  Commonly known as: ZADITOR      Apply 1 drop to eye(s) as directed by provider As Needed. PRN for seasonal allergies       levothyroxine 150 MCG tablet  Commonly known as: SYNTHROID, LEVOTHROID      Take 1 tablet by mouth Daily.       lidocaine 5 %  Commonly known as: LIDODERM      Place 1 patch on the skin as directed by provider See Admin Instructions.       multivitamin tablet tablet  Commonly known as: THERAGRAN      Take 1 tablet by mouth Daily.       tadalafil 20 MG tablet  Commonly known as: CIALIS      1/2 to 1 tablet daily as needed                 Where to Get Your Medications        These medications were sent to [facility] [id number] - [address] - [phone]  - [phone] [place] [address]      Phone: [phone]   metoprolol tartrate 25 MG tablet         Patient is no longer taking -.  I corrected the med list to reflect this.  I did not stop these medications.      Dictated utilizing Dragon dictation

## 2024-11-20 ENCOUNTER — PATIENT MESSAGE (OUTPATIENT)
Dept: CARDIOLOGY | Facility: CLINIC | Age: 74
End: 2024-11-20
Payer: MEDICARE

## 2024-11-20 NOTE — TELEPHONE ENCOUNTER
There is a staff message about this that said cardiac MRI cannot be open due to image quality. I believe that Dr. Haque was looking in to alternate options.

## 2024-11-25 ENCOUNTER — HOSPITAL ENCOUNTER (OUTPATIENT)
Dept: SLEEP MEDICINE | Facility: HOSPITAL | Age: 74
Discharge: HOME | End: 2024-11-25
Admitting: INTERNAL MEDICINE
Payer: MEDICARE

## 2024-11-25 DIAGNOSIS — G47.33 OSA (OBSTRUCTIVE SLEEP APNEA): ICD-10-CM

## 2024-11-25 PROCEDURE — G0399 HOME SLEEP TEST/TYPE 3 PORTA: HCPCS

## 2024-11-27 ENCOUNTER — TELEPHONE (OUTPATIENT)
Dept: CARDIOLOGY | Facility: CLINIC | Age: 74
End: 2024-11-27
Payer: MEDICARE

## 2024-11-27 NOTE — TELEPHONE ENCOUNTER
Please let him know that home sleep study did show evidence of sleep apnea.  Sleep medicine should be reaching out to him to make an appointment to discuss results and recommendations for treatment.

## 2024-11-27 NOTE — TELEPHONE ENCOUNTER
Called and left VM, will continue to try to reach pt.    HUB- please put patient straight through to triage    Kym Pantoja, RN  Triage RN  11/27/24 09:22 EST

## 2024-11-27 NOTE — TELEPHONE ENCOUNTER
Pt called back. Went over results and recommendations. He is scheduled to see Sleep Medicine next Tuesday.    Thank you,    Soledad Pratt, RN  Triage LC  11/27/24 09:33 EST

## 2024-12-03 ENCOUNTER — OFFICE VISIT (OUTPATIENT)
Dept: SLEEP MEDICINE | Facility: HOSPITAL | Age: 74
End: 2024-12-03
Payer: MEDICARE

## 2024-12-03 VITALS — OXYGEN SATURATION: 96 % | BODY MASS INDEX: 32.47 KG/M2 | HEIGHT: 74 IN | WEIGHT: 253 LBS | HEART RATE: 63 BPM

## 2024-12-03 DIAGNOSIS — G47.33 OSA (OBSTRUCTIVE SLEEP APNEA): Primary | ICD-10-CM

## 2024-12-03 PROCEDURE — G0463 HOSPITAL OUTPT CLINIC VISIT: HCPCS

## 2024-12-03 NOTE — PROGRESS NOTES
"[facility]Consultation       Reason for Consultation: ***    Patient Care Team:  Tanvi Garber APRN as PCP - General (Nurse Practitioner)  Erwin Escobar MD as PCP - Family Medicine  Muna Haque MD as Consulting Physician (Cardiology)  Oscar Stuart MD as Consulting Physician (Endocrinology)  Horace Casper MD as Consulting Physician (Dermatology)  Horace Terry MD as Consulting Physician (Hematology and Oncology)  Oscar Stuart MD as Referring Physician (Endocrinology)  Erwin Quevedo MD as Consulting Physician (Orthopedic Surgery)  Rob Aguiar MD as Consulting Physician (Sleep Medicine)    Chief complaint:  ***    History of present illness:    Thank you for asking me to see your patient.  The patient is a 74 y.o. male     Review of Systems:    A complete review of systems was done and all were negative with the exception of ***    History:  {History:59438} Social History:  ***    Allergies:  Hydrocodone and Latex     Medication Review: ***    Vital Signs:    Vitals:    12/03/24 0941   Pulse: 63   SpO2: 96%   Weight: 115 kg (253 lb)   Height: 188 cm (74\")      Body mass index is 32.48 kg/m².  Neck Circumference: 18 inches      Physical Exam:    Constitutional:  Well developed 74 y.o. male that appears in no apparent distress.  Awake & oriented times 3.  Normal mood with normal recent and remote memory and normal judgement.  Eyes:  Conjunctivae normal.  Oropharynx: Moist mucous membranes without exudate and ***.    Neck: Trachea midline  Respiratory: Effort is not labored  Cardiovascular: Radial pulse regular  Musculoskeletal: Gait appears normal, no digital clubbing evident, no pre-tibial edema    Results Review:  ***    Impression:   ***    Plan:  Good sleep hygiene measures should be maintained.  Weight loss would be beneficial in this patient who *** by Body mass index is 32.48 kg/m².    After evaluating the patient and assessing results " available, the patient is benefiting from the treatment being provided.     Pathophysiology of JACQUELINE described to the patient.  Cardiovascular complications of untreated JACQUELINE also reviewed.      ***     Thank you for requesting me to assist in this patient's care.    Rob Aguiar MD  Sleep Medicine  12/03/24  09:44 EST

## 2024-12-04 ENCOUNTER — TELEPHONE (OUTPATIENT)
Dept: CARDIOLOGY | Facility: CLINIC | Age: 74
End: 2024-12-04
Payer: MEDICARE

## 2024-12-04 DIAGNOSIS — I77.810 THORACIC AORTIC ECTASIA: Primary | ICD-10-CM

## 2024-12-04 PROBLEM — G47.33 OSA (OBSTRUCTIVE SLEEP APNEA): Status: ACTIVE | Noted: 2024-11-25

## 2024-12-04 NOTE — TELEPHONE ENCOUNTER
Then I guess we will have to wait until Dr. Haque checks with radiology.  If he changes his mind, please let me know

## 2024-12-04 NOTE — PROGRESS NOTES
[facility] Note     Chief Complaint:  JACQUELINE     Primary Care Physician: Tanvi Garber APRN    Interval History:   The patient is a 74 y.o. male who I last saw 2022 and that note was reviewed.  The patient had a home sleep study performed 2021, weight 252 pounds.   Moderate obstructive sleep apnea with AHI 16.8 events per hour noted.  No sleep-related hypoxia noted.  Patient snored 67.7% of total monitoring time.     Cardiology requested repeat home sleep study.  Repeat home sleep study performed 2024.  Mild obstructive sleep apnea with AHI of 7.1 events per hour.  No sleep-related hypoxia.  Percent time snoring 49.8% of total monitoring time.  Sleep evaluation requested.     As stated, I last saw the patient 2022.  At that time, he was obtaining dental implants.  Presently the patient reports no problems.  He states there is no complaints this morning.  It is rare for him to have a dry mouth.  He uses the restroom twice during the nighttime.  He goes to bed at 9 PM gets up at 6 AM.  He falls asleep within 15 minutes.  He states he feels fine upon arising.  He does not take naps.  [place] Sleepiness Scale is normal at 0.  He will use the restroom twice during the nighttime.    Review of Systems:    A complete review of systems was done and all were negative with the exception of the above    Social History:    Social History     Socioeconomic History    Marital status:    Tobacco Use    Smoking status: Former     Current packs/day: 0.00     Average packs/day: 0.3 packs/day for 2.1 years (0.5 ttl pk-yrs)     Types: Cigarettes     Start date: 1971     Quit date: 1973     Years since quittin.9    Smokeless tobacco: Never   Vaping Use    Vaping status: Never Used   Substance and Sexual Activity    Alcohol use: Yes     Alcohol/week: 2.0 standard drinks of alcohol     Types: 1 Glasses of wine, 1 Cans of beer per week     Comment: occasional/  occ caffeine  "use 90z frap daily     Drug use: No    Sexual activity: Yes     Partners: Female     Birth control/protection: None       Allergies:  Hydrocodone and Latex     Medication Review:  Reviewed.      Vital Signs:    Vitals:    12/03/24 0941   Pulse: 63   SpO2: 96%   Weight: 115 kg (253 lb)   Height: 188 cm (74\")     Body mass index is 32.48 kg/m².    Physical Exam:    Constitutional:  Well developed 74 y.o. male that appears in no apparent distress.  Awake & oriented times 3.  Normal mood with normal recent and remote memory and normal judgement.  Eyes:  Conjunctivae normal.  Oropharynx: Previously, moist mucous membranes without exudate and a large tongue with scalloped margins and a normal uvula and moderate-severe narrowing of the posterior pharyngeal opening and class II-3 Mallampati airway.    Self-administered [place] Sleepiness Scale test results: 0  0-5 Lower normal daytime sleepiness  6-10 Higher normal daytime sleepiness  11-12 Mild, 13-15 Moderate, & 16-24 Severe excessive daytime sleepiness     Impression:   History of obstructive sleep apnea.  Home sleep study 11/25/2024, weight 249 pounds, mild obstructive sleep apnea with AHI 7.1 events per hour.  No sleep-related hypoxia.  Patient has no complaints of hypersomnolence.    Plan:  Good sleep hygiene measures should be maintained.  Weight loss would be beneficial in this patient who is obese by Body mass index is 32.48 kg/m².      I reviewed all with the patient.  Obstructive sleep apnea still identified.  Present home sleep study does not demonstrated to be as severe as his last home sleep study.  When last seen, patient was completing dental implants.  Presently all dental work is completed.  I reviewed with the patient that a mandibular advancement device would be adequate treatment for his obstructive sleep apnea reidentified on his most recent home sleep study.  I explained all to him and provided him information in obtaining a mandibular advancement " device.  Once the patient has obtained a mandibular advancement device, repeat home sleep study will be performed to prove efficacy of the appliance.    I answered all of the patient's questions.  The patient will call the [facility] for any problems and will follow up after obtaining an oral appliance       Rob Aguiar MD  Sleep Medicine  12/04/24  17:22 EST

## 2024-12-14 ENCOUNTER — PATIENT MESSAGE (OUTPATIENT)
Dept: CARDIOLOGY | Facility: CLINIC | Age: 74
End: 2024-12-14
Payer: MEDICARE

## 2024-12-16 RX ORDER — IRBESARTAN 300 MG/1
300 TABLET ORAL NIGHTLY
Qty: 90 TABLET | Refills: 1 | OUTPATIENT
Start: 2024-12-16

## 2024-12-16 RX ORDER — METOPROLOL TARTRATE 25 MG/1
12.5 TABLET, FILM COATED ORAL 2 TIMES DAILY
Qty: 90 TABLET | Refills: 1 | OUTPATIENT
Start: 2024-12-16

## 2024-12-17 RX ORDER — METOPROLOL TARTRATE 25 MG/1
12.5 TABLET, FILM COATED ORAL 2 TIMES DAILY
Qty: 90 TABLET | Refills: 1 | Status: SHIPPED | OUTPATIENT
Start: 2024-12-17

## 2024-12-17 RX ORDER — IRBESARTAN 300 MG/1
300 TABLET ORAL NIGHTLY
Qty: 90 TABLET | Refills: 1 | Status: SHIPPED | OUTPATIENT
Start: 2024-12-17

## 2024-12-18 NOTE — TELEPHONE ENCOUNTER
Please let the patient know I checked with individual/provider this cardiac MRIs and confirmed that there is no open cardiac MRI to look at the heart muscle concern.  Therefore would proceed with CT angiogram to look at the aortic aneurysm.  Will have to follow the heart muscle thickness by echo.

## 2025-01-16 ENCOUNTER — HOSPITAL ENCOUNTER (OUTPATIENT)
Dept: CT IMAGING | Facility: HOSPITAL | Age: 75
Discharge: HOME | End: 2025-01-16
Admitting: NURSE PRACTITIONER
Payer: MEDICARE

## 2025-01-16 DIAGNOSIS — I77.810 THORACIC AORTIC ECTASIA: ICD-10-CM

## 2025-01-16 PROCEDURE — 25510000001 IOPAMIDOL PER 1 ML: Performed by: NURSE PRACTITIONER

## 2025-01-16 PROCEDURE — 71275 CT ANGIOGRAPHY CHEST: CPT

## 2025-01-16 RX ORDER — IOPAMIDOL 755 MG/ML
100 INJECTION, SOLUTION INTRAVASCULAR
Status: COMPLETED | OUTPATIENT
Start: 2025-01-16 | End: 2025-01-16

## 2025-01-16 RX ADMIN — IOPAMIDOL 94 ML: 755 INJECTION, SOLUTION INTRAVENOUS at 17:24

## 2025-01-22 ENCOUNTER — TELEPHONE (OUTPATIENT)
Age: 75
End: 2025-01-22
Payer: MEDICARE

## 2025-01-22 NOTE — TELEPHONE ENCOUNTER
Reviewed recommendations with patient, verbalized understanding, will call with any further questions or complaints.    Kym Pantoja RN  Triage Nurse  01/22/25 11:17 EST

## 2025-01-22 NOTE — TELEPHONE ENCOUNTER
Please let him know that CTA of the chest shows only a very mild dilation of his aorta (maximum diameter 3.8 cm).  Would recommend good blood pressure and heart rate control as well as mild lifting restriction of no more than 30 pounds repetitively.  No intervention is needed for this at this time and is not needed until closer to 5 cm.  Would recommend CT scan of the chest in 6 months to assess for stability of the aorta.  If this is all stable would recommend annual CT scans.    In his lungs there were numerous small pulmonary nodules seen.  Some of these could be infectious or inflammatory however others were not described as such.  Radiology recommended 3-month follow-up CT scan of the chest to reassess this.  There was also a 4 cm right renal cyst.  Noncardiac findings should be discussed with PCP as to need for further evaluation.

## 2025-01-22 NOTE — TELEPHONE ENCOUNTER
Results and recommendations called to pt.  Instructed to call with any further questions or concerns.  Verbalized understanding.    Muna- pt asked if the lifting restrictions mean that he can't lift weights, and if so, would this be a life long restriction?  He also asked how to schedule the 6 month follow up CTA.  Will this be ordered at his follow up with Dr. Haque?    Kym Pantoja, RN  Triage Nurse, [facility] [place]  01/22/25 11:07 EST

## 2025-01-22 NOTE — TELEPHONE ENCOUNTER
The restrictions do not mean that he cannot lift weights just not more than 30 pounds repetitively.  He can lift lighter weights.  Yes, this restriction is typically lifelong due to aortic dilation to keep this from progressing.  CT scan will be ordered at follow-up appointment with Dr. Haque.

## 2025-02-17 NOTE — TELEPHONE ENCOUNTER
PT called back went over study results and confirmed DME and F/U appt time and date. MAB   This patient was seen by Dr. Babak Puckett on 2/7/2025 and was instructed to follow up in 6 weeks with him.  Is there a reason patient wants to see someone else? Otherwise, I would schedule back with the physician who saw her , Dr. Puckett.

## 2025-03-08 DIAGNOSIS — E78.2 MIXED HYPERLIPIDEMIA: ICD-10-CM

## 2025-03-10 RX ORDER — ATORVASTATIN CALCIUM 20 MG/1
20 TABLET, FILM COATED ORAL DAILY
Qty: 90 TABLET | Refills: 2 | OUTPATIENT
Start: 2025-03-10

## 2025-03-10 NOTE — TELEPHONE ENCOUNTER
Rx Refill Note  Requested Prescriptions     Pending Prescriptions Disp Refills    atorvastatin (LIPITOR) 20 MG tablet [Pharmacy Med Name: ATORVASTATIN 20 MG TABLET] 90 tablet 2     Sig: TAKE 1 TABLET BY MOUTH DAILY      Last office visit with prescribing clinician: 9/24/2024   Last telemedicine visit with prescribing clinician: Visit date not found   Next office visit with prescribing clinician: 3/26/2025                         Would you like a call back once the refill request has been completed: [] Yes [] No    If the office needs to give you a call back, can they leave a voicemail: [] Yes [] No    Jessica Rodriguez MA  03/10/25, 08:04 EDT

## 2025-03-21 ENCOUNTER — OFFICE VISIT (OUTPATIENT)
Dept: CARDIOLOGY | Age: 75
End: 2025-03-21
Payer: MEDICARE

## 2025-03-21 VITALS
DIASTOLIC BLOOD PRESSURE: 68 MMHG | HEIGHT: 74 IN | HEART RATE: 46 BPM | BODY MASS INDEX: 32.08 KG/M2 | WEIGHT: 250 LBS | SYSTOLIC BLOOD PRESSURE: 130 MMHG

## 2025-03-21 DIAGNOSIS — I25.10 CORONARY ARTERY DISEASE INVOLVING NATIVE CORONARY ARTERY OF NATIVE HEART WITHOUT ANGINA PECTORIS: ICD-10-CM

## 2025-03-21 DIAGNOSIS — G47.33 OSA (OBSTRUCTIVE SLEEP APNEA): ICD-10-CM

## 2025-03-21 DIAGNOSIS — I51.7 LVH (LEFT VENTRICULAR HYPERTROPHY): ICD-10-CM

## 2025-03-21 DIAGNOSIS — I77.810 THORACIC AORTIC ECTASIA: Primary | ICD-10-CM

## 2025-03-21 PROCEDURE — 93000 ELECTROCARDIOGRAM COMPLETE: CPT | Performed by: INTERNAL MEDICINE

## 2025-03-21 PROCEDURE — 3075F SYST BP GE 130 - 139MM HG: CPT | Performed by: INTERNAL MEDICINE

## 2025-03-21 PROCEDURE — 3078F DIAST BP <80 MM HG: CPT | Performed by: INTERNAL MEDICINE

## 2025-03-21 PROCEDURE — 99214 OFFICE O/P EST MOD 30 MIN: CPT | Performed by: INTERNAL MEDICINE

## 2025-03-21 RX ORDER — ACETAMINOPHEN 500 MG
500 TABLET ORAL EVERY 6 HOURS PRN
COMMUNITY

## 2025-03-21 NOTE — PROGRESS NOTES
Date of Office Visit: 2025  Encounter Provider: Jeanine Gutierrez CMA  Place of Service: [facility] CARDIOLOG[facility]  Patient Name: Joaquin Peña  :1950    Chief complaint  Supraventricular tachycardia, coronary disease    History of Present Illness  Patient is a 74-year-old gentleman with Graves' disease, dyslipidemia, renal calculi who in  developed brief episodes of supraventricular tachycardia in the setting of hypertension.  This was treated medically.  In 2014 had a stress echocardiogram that showed normal left ventricular size and function with ejection fraction 59% grade 1 diastolic dysfunction mild left ventricular hypertrophy with no significant valvular heart disease and no ischemia in May 2015 he had mild bradycardia in the 30s.  A follow-up Holter revealed frequent PVCs and brief bursts of atrial tachycardia for which metoprolol was increased and he has not had any recurrent bradycardia arrhythmia.  In 2021 with complaints of palpitations he had a 2-week Zio patch that showed sinus rhythm with PVCs 17 episodes of supraventricular tachycardia longest lasting 11 beats.  Ventricular bigeminy was present there was one episode of ventricular tachycardia lasting 9 beats.  There were episodes of idioventricular rhythm as well.  Echocardiogram showed normal left ventricular size systolic function ejection fraction was normal.  GLS was normal.  Diastolic function was normal.  There is no significant valvular heart disease or pulmonary hypertension.  Cardiac cath 2019 showed minimal coronary disease normal systolic function normal left ventricular filling pressures.  Echo showed ejection fraction 67% mild left atrial hypertrophy normal diastolic function normal right-sided pressures.  24 Holter was unremarkable.  Carotid Doppler on 2023 showed less than 50% stenosis left internal carotid artery.  Follow-up carotid Doppler on 2024 showed bilateral plaque  without stenosis.  Echo on 8/2024 showed an ejection fraction of 61%, normal global longitudinal LV strain, normal diastolic function, moderate left atrial enlargement, negative saline injection, regurgitation.  No significant valvular heart disease with mild mitral RVSP was normal.      Since last visit patient's had no chest pain shortness of breath palpitations syncope near syncope.  He is not using CPAP.  He is riding the bicycle 4 times a week for 20 minutes and doing weights for 20 minutes.Blood pressure has been lower typically in the 1 teens over 60s 70s.  Patient is awaiting pulmonary consult to assess abnormal CT chest/pulmonary mass    Past Medical History:   Diagnosis Date    Abdominal obesity     Acquired spondylolisthesis     Acute low back pain     Acute right lumbar radiculopathy     Allergic     Basal cell carcinoma 10/2021    left cheek    Bulging lumbar disc     Concussion with no loss of consciousness     DDD (degenerative disc disease)     Erectile dysfunction     Essential hypertension     Fatigue     Graves disease     H/O disorder of nervous system and sense organs     Headache     Hyperlipidemia     Hypertensive heart disease     Hyperthyroidism 1984    Hypothyroidism     Insomnia     Irregular heart beat     Joint pain     Lumbar radiculopathy     Nephrolithiasis     JACQUELINE (obstructive sleep apnea) 11/19/2021    Home sleep study.  Weight 252 pounds.  Moderate JACQUELINE with AHI 16.8 events per hour.  No sleep-related hypoxia.  The patient snored 67.7% of total sleep time.    JACQUELINE (obstructive sleep apnea) 11/25/2024    Home sleep study.  Weight 249 pounds.  Mild JACQUELINE with AHI 7.1 events per hour.  No sleep-related hypoxia.    Paroxysmal nocturnal dyspnea     PVC (premature ventricular contraction)     Severe head trauma     Sleep-disordered breathing 06/03/2012    Overnight polysomnogram.  Weight 250 pounds.  AHI normal at 2.8 events per hour.  The patient did have 11 events per hour related to  respiratory effort related arousals.  No sleep-related hypoxia.  Arousals associated with snoring abnormal.    SOB (shortness of breath)     Soemmering's ring     Spondylolisthesis     Subdural hematoma     Supraventricular tachycardia     Vitamin D deficiency      Past Surgical History:   Procedure Laterality Date    BASAL CELL CARCINOMA EXCISION  10/2021    left cheek    CARDIAC CATHETERIZATION N/A 11/05/2019    Procedure: Coronary angiography;  Surgeon: Storm Marcano MD;  Location:  [facility];  Service: Cardiovascular    CARDIAC CATHETERIZATION N/A 11/05/2019    Procedure: Left heart cath;  Surgeon: Storm Marcano MD;  Location:  [facility];  Service: Cardiovascular    CARDIAC CATHETERIZATION N/A 11/05/2019    Procedure: Left ventriculography;  Surgeon: Storm Marcano MD;  Location:  [facility];  Service: Cardiovascular    COLONOSCOPY  03/2016    tics.  Dr. Schulte    INNER EAR SURGERY Right     nonmalignant tumor removal    KNEE ARTHROSCOPY Left     KNEE ARTHROSCOPY Left 2018    KNEE SURGERY      2008 and 2009    LITHOTRIPSY      OTHER SURGICAL HISTORY Left     ear surgery     Outpatient Medications Prior to Visit   Medication Sig Dispense Refill    acetaminophen (TYLENOL) 500 MG tablet Take 1 tablet by mouth Every 6 (Six) Hours As Needed for Mild Pain.      aspirin 81 MG EC tablet Take 1 tablet by mouth. Monday and Friday only secondary to ulcer      atorvastatin (LIPITOR) 10 MG tablet Take 1 tablet by mouth Daily.      Diclofenac Sodium (VOLTAREN) 1 % gel gel apply bid to affected area      famotidine (PEPCID) 10 MG tablet Take 1 tablet by mouth 2 (Two) Times a Day.      Ferrous Gluconate (IRON 27 PO) Take  by mouth Every Other Day.      fluticasone (Flonase) 50 MCG/ACT nasal spray 2 sprays into the nostril(s) as directed by provider Daily. (Patient taking differently: Administer 2 sprays into the nostril(s) as directed by provider As  Needed. Seasonal allergies PRN) 18.2 mL 11    irbesartan (AVAPRO) 300 MG tablet Take 1 tablet by mouth Every Night. Please obtain future refills from cardiologist. 90 tablet 1    ketotifen (ZADITOR) 0.025 % ophthalmic solution Apply 1 drop to eye(s) as directed by provider As Needed. PRN for seasonal allergies      levothyroxine (SYNTHROID, LEVOTHROID) 150 MCG tablet Take 1 tablet by mouth Daily. 90 tablet 2    lidocaine (LIDODERM) 5 % Place 1 patch on the skin as directed by provider See Admin Instructions.      metoprolol tartrate (LOPRESSOR) 25 MG tablet Take 0.5 tablets by mouth 2 (Two) Times a Day. 90 tablet 1    Multiple Vitamin (MULTI-VITAMIN DAILY PO) Take 1 tablet by mouth Every Other Day.      Natural Vitamin D-3 125 MCG (5000 UT) tablet TAKE ONE TABLET BY MOUTH DAILY (Patient taking differently: 1 tablet.) 30 tablet 12    tadalafil (CIALIS) 20 MG tablet 1/2 to 1 tablet daily as needed 30 tablet 0    ferrous gluconate (FERGON) 324 MG tablet Take 1 tablet by mouth Daily.      naproxen (NAPROSYN) 500 MG tablet Take 1 tablet by mouth 2 (Two) Times a Day. (Patient not taking: Reported on 3/21/2025) 60 tablet 3     No facility-administered medications prior to visit.       Allergies as of 2025 - Reviewed 2025   Allergen Reaction Noted    Hydrocodone Anaphylaxis 2022    Latex Rash 03/15/2016     Social History     Socioeconomic History    Marital status:    Tobacco Use    Smoking status: Former     Current packs/day: 0.00     Average packs/day: 0.3 packs/day for 2.1 years (0.5 ttl pk-yrs)     Types: Cigarettes     Start date: 1971     Quit date: 1973     Years since quittin.2    Smokeless tobacco: Never   Vaping Use    Vaping status: Never Used   Substance and Sexual Activity    Alcohol use: Yes     Alcohol/week: 2.0 standard drinks of alcohol     Types: 1 Glasses of wine, 1 Cans of beer per week     Comment: occasional/  occ caffeine use 90z frap daily     Drug use: No  "   Sexual activity: Yes     Partners: Female     Birth control/protection: None     Family History   Problem Relation Age of Onset    No Known Problems Mother     Cancer Father         malignant neoplasm    No Known Problems Sister     No Known Problems Sister     No Known Problems Brother     No Known Problems Brother     No Known Problems Brother     Diabetes Maternal Grandmother     Hypertension Other     Heart disease Other      Review of Systems   Constitutional: Negative for chills, fever, weight gain and weight loss.   Cardiovascular:  Negative for leg swelling.   Respiratory:  Negative for cough, snoring and wheezing.    Hematologic/Lymphatic: Negative for bleeding problem. Does not bruise/bleed easily.   Skin:  Negative for color change.   Musculoskeletal:  Negative for falls, joint pain and myalgias.   Gastrointestinal:  Negative for melena.   Genitourinary:  Negative for hematuria.   Neurological:  Negative for excessive daytime sleepiness.   Psychiatric/Behavioral:  Negative for depression. The patient is not nervous/anxious.         Objective:     Vitals:    03/21/25 1019   BP: 130/68   Pulse: (!) 46   Weight: 113 kg (250 lb)   Height: 188 cm (74\")     Body mass index is 32.1 kg/m².    Vitals reviewed.   Constitutional:       Appearance: Well-developed. Obese.   Eyes:      General: No scleral icterus.        Right eye: No discharge.      Conjunctiva/sclera: Conjunctivae normal.      Pupils: Pupils are equal, round, and reactive to light.   HENT:      Head: Normocephalic.      Nose: Nose normal.   Neck:      Thyroid: No thyromegaly.      Vascular: No JVD.   Pulmonary:      Effort: Pulmonary effort is normal. No respiratory distress.      Breath sounds: Normal breath sounds. No wheezing. No rales.   Cardiovascular:      Normal rate. Regular rhythm. Normal S1. Normal S2.       Murmurs: There is no murmur.      No gallop.    Pulses:     Intact distal pulses.      Carotid: 2+ bilaterally.     Radial: 2+ " bilaterally.     Femoral: 2+ bilaterally.     Popliteal: 2+ bilaterally.     Dorsalis pedis: 2+ bilaterally.     Posterior tibial: 2+ bilaterally.  Edema:     Peripheral edema absent.   Abdominal:      General: Bowel sounds are normal. There is no distension.      Palpations: Abdomen is soft.      Tenderness: There is no abdominal tenderness. There is no rebound.   Musculoskeletal: Normal range of motion.         General: No tenderness.      Cervical back: Normal range of motion and neck supple. Skin:     General: Skin is warm and dry.      Findings: No erythema or rash.   Neurological:      Mental Status: Alert and oriented to person, place, and time.   Psychiatric:         Behavior: Behavior normal.         Thought Content: Thought content normal.         Judgment: Judgment normal.       Lab Review:   Lab Results - Last 18 Months   Lab Units 01/22/25  1430 10/01/24  0926 04/11/24  1159 03/12/24  0916 10/19/23  1103   WBC K/uL  --  7.4 12.80* 8.42 12.52*   RBC x10(6)/uL  --  5.4 6.04* 6.00* 6.08*   HEMOGLOBIN g/dL 14.1 12.9* 14.5 15.2 15.6   HEMATOCRIT % 45.8 40.2 49.2 48.2 48.4   MCV fL  --  73.9* 81.5 80.3 79.6   MCH pg  --  23.7* 24.0* 25.3* 25.7*   MCHC Gram/dL  --  32 29.5* 31.5 32.2   RDW %  --  18.5* 17.2* 16.5* 17.1*   PLATELETS 10*3/mm3  --   --  336 333 329   EXTERNAL PLATELETS x10(3)/uL  --  262  --   --   --    NEUTROPHIL % %  --   --  80.7* 60.2  --    LYMPHOCYTE % %  --   --  12.4* 22.3  --    MONOCYTES % %  --   --  5.6 12.7*  --    EOSINOPHIL % %  --   --  0.3 2.0  --    BASOPHIL % %  --   --  0.5 0.8  --    NEUTROS ABS 10*3/mm3  --   --  10.32* 5.06  --    LYMPHS ABS 10*3/mm3  --   --  1.59 1.88  --    MONOS ABS 10*3/mm3  --   --  0.72 1.07*  --    EOS ABS 10*3/mm3  --   --  0.04 0.17  --    BASOS ABS 10*3/mm3  --   --  0.06 0.07  --    IMM GRAN % %  --   --   --  2.0*  --    IMMATURE GRANS (ABS) 10*3/mm3  --   --   --  0.17*  --    RDW-SD fl  --   --  49.2  --  47.2   MPV fL  --   --  9.9  --  9.9        Lab Results - Last 18 Months   Lab Units 04/11/24  1159 03/12/24  0916 10/19/23  1103   GLUCOSE mg/dL 100* 90 84   BUN mg/dL 24* 24* 28*   CREATININE mg/dL 1.17 1.20 1.20   SODIUM mmol/L 138 138 138   POTASSIUM mmol/L 4.4 5.4* 4.9   CHLORIDE mmol/L 103 102 103   CO2 mmol/L 25.7 19.4* 26.0   CALCIUM mg/dL 10.2 10.3 10.2   TOTAL PROTEIN g/dL 7.0 6.8 6.8   ALBUMIN g/dL 3.9 4.1 4.1   ALT (SGPT) U/L 14 17 15   AST (SGOT) U/L 11 12 12   ALK PHOS U/L 84 83 74   BILIRUBIN mg/dL 0.5 0.4 0.6   GLOBULIN gm/dL 3.1  --  2.7   GLOBULINREF gm/dL  --  2.7  --    A/G RATIO g/dL 1.3 1.5 1.5   BUN / CREAT RATIO  20.5 20.0 23.3   ANION GAP mmol/L 9.3  --  9.0   EGFR mL/min/1.73 66.6  --  64.7   EGFR RESULT mL/min/1.73  --  64.7  --      Lab Results - Last 18 Months   Lab Units 10/08/24  0938 07/26/24  1031 03/12/24  0916   TRIGLYCERIDES mg/dL  --  151* 271*   HDL CHOL mg/dL 33* 33* 27*   LDL CHOL mg/dL 55 64 104*   VLDL CHOLESTEROL ALEX mg/dL  --   --  47*     Lab Results - Last 18 Months   Lab Units 10/01/24  0926 07/26/24  1031   TSH mIU/L 2.39 1.26     Lab Results - Last 18 Months   Lab Units 10/19/23  1103   PROTIME Seconds 14.4*       ECG 12 Lead    Date/Time: 3/21/2025 10:43 AM  Performed by: Muna Haque MD    Authorized by: Muna Haque MD  Comparison: compared with previous ECG   Similar to previous ECG  Rhythm: sinus bradycardia  Conduction: 1st degree AV block and non-specific intraventricular conduction delay    Clinical impression: abnormal EKG         No diagnosis found.  Plan:       1.  PVCs.  Cardiac cath 11/2019 without significant obstructive disease.  Appears to be fairly well-controlled on low-dose beta-blocker therapy.  No anginal symptoms.    2.  Sinus bradycardia with heart rate 46 bpm today on lopressor 12.5 mg BID  3.  Paroxysmal supraventricular tachycardia.  As above.  In addition he will decrease stimulant  4.  Hypertension.  Controlled  5.  Edema.  Resolved with resolution of memia  6.  Left  ventricular hypertrophy.  Likely due to hypertension.  Global LV strain was normal.  7.  Borderline dilated aortic root.  Check CT angiogram of the chest in 6 months if not done earlier by pulmonary service.  6.  Dyslipidemia.  Recommendations per Dr. Escobar with goal LDL less than 70, HDL greater than 40, triglycerides less than 150   7.  Graves' disease.  Managed by Dr. Escobar tomorrow.  8.  Carotid artery stenosis less than 50% by Doppler 2/2023.  Improved on Doppler 4/2024.  9.  Untreated sleep apnea.  He has lost significant amount of weight and he does not believe he has sleep apnea.  Not interested in additional treatment at this time                Your medication list            Accurate as of March 21, 2025 10:43 AM. If you have any questions, ask your nurse or doctor.                CHANGE how you take these medications        Instructions Last Dose Given Next Dose Due   fluticasone 50 MCG/ACT nasal spray  Commonly known as: Flonase  What changed:   when to take this  reasons to take this  additional instructions      2 sprays into the nostril(s) as directed by provider Daily.       Natural Vitamin D-3 125 MCG (5000 UT) tablet  Generic drug: Cholecalciferol  What changed:   how much to take  how to take this  when to take this      TAKE ONE TABLET BY MOUTH DAILY              CONTINUE taking these medications        Instructions Last Dose Given Next Dose Due   acetaminophen 500 MG tablet  Commonly known as: TYLENOL      Take 1 tablet by mouth Every 6 (Six) Hours As Needed for Mild Pain.       aspirin 81 MG EC tablet      Take 1 tablet by mouth. Monday and Friday only secondary to ulcer       atorvastatin 10 MG tablet  Commonly known as: LIPITOR      Take 1 tablet by mouth Daily.       Diclofenac Sodium 1 % gel gel  Commonly known as: VOLTAREN      apply bid to affected area       famotidine 10 MG tablet  Commonly known as: PEPCID      Take 1 tablet by mouth 2 (Two) Times a Day.       irbesartan 300 MG  tablet  Commonly known as: AVAPRO      Take 1 tablet by mouth Every Night. Please obtain future refills from cardiologist.       IRON 27 PO      Take  by mouth Every Other Day.       ketotifen 0.025 % ophthalmic solution  Commonly known as: ZADITOR      Apply 1 drop to eye(s) as directed by provider As Needed. PRN for seasonal allergies       levothyroxine 150 MCG tablet  Commonly known as: SYNTHROID, LEVOTHROID      Take 1 tablet by mouth Daily.       lidocaine 5 %  Commonly known as: LIDODERM      Place 1 patch on the skin as directed by provider See Admin Instructions.       metoprolol tartrate 25 MG tablet  Commonly known as: LOPRESSOR      Take 0.5 tablets by mouth 2 (Two) Times a Day.       multivitamin tablet tablet  Commonly known as: THERAGRAN      Take 1 tablet by mouth Every Other Day.       tadalafil 20 MG tablet  Commonly known as: CIALIS      1/2 to 1 tablet daily as needed                Patient is no longer taking -.  I corrected the med list to reflect this.  I did not stop these medications.      Dictated utilizing Dragon dictation

## 2025-03-26 ENCOUNTER — OFFICE VISIT (OUTPATIENT)
Dept: ENDOCRINOLOGY | Age: 75
End: 2025-03-26
Payer: MEDICARE

## 2025-03-26 VITALS
WEIGHT: 248.8 LBS | OXYGEN SATURATION: 100 % | SYSTOLIC BLOOD PRESSURE: 122 MMHG | BODY MASS INDEX: 31.93 KG/M2 | TEMPERATURE: 97.6 F | DIASTOLIC BLOOD PRESSURE: 58 MMHG | HEART RATE: 49 BPM | HEIGHT: 74 IN

## 2025-03-26 DIAGNOSIS — M85.852 OSTEOPENIA OF LEFT HIP: ICD-10-CM

## 2025-03-26 DIAGNOSIS — Z87.898 H/O MULTIPLE PULMONARY NODULES: ICD-10-CM

## 2025-03-26 DIAGNOSIS — R73.03 PREDIABETES: ICD-10-CM

## 2025-03-26 DIAGNOSIS — Z87.19 HISTORY OF DUODENAL ULCER: ICD-10-CM

## 2025-03-26 DIAGNOSIS — E55.9 VITAMIN D DEFICIENCY: ICD-10-CM

## 2025-03-26 DIAGNOSIS — I77.810 ASCENDING AORTA DILATATION: ICD-10-CM

## 2025-03-26 DIAGNOSIS — E78.5 HYPERLIPIDEMIA, UNSPECIFIED HYPERLIPIDEMIA TYPE: ICD-10-CM

## 2025-03-26 DIAGNOSIS — E89.0 POSTABLATIVE HYPOTHYROIDISM: ICD-10-CM

## 2025-03-26 DIAGNOSIS — E05.00 GRAVES DISEASE: Primary | ICD-10-CM

## 2025-03-26 DIAGNOSIS — I10 ESSENTIAL HYPERTENSION: ICD-10-CM

## 2025-03-26 DIAGNOSIS — K21.9 GASTROESOPHAGEAL REFLUX DISEASE, UNSPECIFIED WHETHER ESOPHAGITIS PRESENT: ICD-10-CM

## 2025-03-26 DIAGNOSIS — G47.33 OSA (OBSTRUCTIVE SLEEP APNEA): ICD-10-CM

## 2025-03-26 LAB
25(OH)D3+25(OH)D2 SERPL-MCNC: 64.4 NG/ML (ref 30–100)
ALBUMIN SERPL-MCNC: 3.4 G/DL (ref 3.5–5.2)
ALBUMIN/GLOB SERPL: 1.2 G/DL
ALP SERPL-CCNC: 86 U/L (ref 39–117)
ALT SERPL-CCNC: 24 U/L (ref 1–41)
AST SERPL-CCNC: 18 U/L (ref 1–40)
BASOPHILS # BLD AUTO: 0.07 10*3/MM3 (ref 0–0.2)
BASOPHILS NFR BLD AUTO: 0.8 % (ref 0–1.5)
BILIRUB SERPL-MCNC: 0.4 MG/DL (ref 0–1.2)
BUN SERPL-MCNC: 19 MG/DL (ref 8–23)
BUN/CREAT SERPL: 16.2 (ref 7–25)
CALCIUM SERPL-MCNC: 9.8 MG/DL (ref 8.6–10.5)
CHLORIDE SERPL-SCNC: 104 MMOL/L (ref 98–107)
CHOLEST SERPL-MCNC: 148 MG/DL (ref 0–200)
CO2 SERPL-SCNC: 25.5 MMOL/L (ref 22–29)
CREAT SERPL-MCNC: 1.17 MG/DL (ref 0.76–1.27)
EGFRCR SERPLBLD CKD-EPI 2021: 66.2 ML/MIN/1.73
EOSINOPHIL # BLD AUTO: 0.24 10*3/MM3 (ref 0–0.4)
EOSINOPHIL NFR BLD AUTO: 2.7 % (ref 0.3–6.2)
ERYTHROCYTE [DISTWIDTH] IN BLOOD BY AUTOMATED COUNT: 15.4 % (ref 12.3–15.4)
GLOBULIN SER CALC-MCNC: 2.9 GM/DL
GLUCOSE SERPL-MCNC: 90 MG/DL (ref 65–99)
HCT VFR BLD AUTO: 48 % (ref 37.5–51)
HDLC SERPL-MCNC: 35 MG/DL (ref 40–60)
HGB BLD-MCNC: 15.2 G/DL (ref 13–17.7)
IMM GRANULOCYTES # BLD AUTO: 0.03 10*3/MM3 (ref 0–0.05)
IMM GRANULOCYTES NFR BLD AUTO: 0.3 % (ref 0–0.5)
IMP & REVIEW OF LAB RESULTS: NORMAL
LDLC SERPL CALC-MCNC: 91 MG/DL (ref 0–100)
LYMPHOCYTES # BLD AUTO: 2.12 10*3/MM3 (ref 0.7–3.1)
LYMPHOCYTES NFR BLD AUTO: 24.1 % (ref 19.6–45.3)
MCH RBC QN AUTO: 26.3 PG (ref 26.6–33)
MCHC RBC AUTO-ENTMCNC: 31.7 G/DL (ref 31.5–35.7)
MCV RBC AUTO: 82.9 FL (ref 79–97)
MONOCYTES # BLD AUTO: 0.74 10*3/MM3 (ref 0.1–0.9)
MONOCYTES NFR BLD AUTO: 8.4 % (ref 5–12)
NEUTROPHILS # BLD AUTO: 5.59 10*3/MM3 (ref 1.7–7)
NEUTROPHILS NFR BLD AUTO: 63.7 % (ref 42.7–76)
NRBC BLD AUTO-RTO: 0 /100 WBC (ref 0–0.2)
PLATELET # BLD AUTO: 311 10*3/MM3 (ref 140–450)
POTASSIUM SERPL-SCNC: 5 MMOL/L (ref 3.5–5.2)
PROT SERPL-MCNC: 6.3 G/DL (ref 6–8.5)
RBC # BLD AUTO: 5.79 10*6/MM3 (ref 4.14–5.8)
SODIUM SERPL-SCNC: 138 MMOL/L (ref 136–145)
TRIGL SERPL-MCNC: 119 MG/DL (ref 0–150)
TSH SERPL DL<=0.005 MIU/L-ACNC: 2.44 UIU/ML (ref 0.27–4.2)
VLDLC SERPL CALC-MCNC: 22 MG/DL (ref 5–40)
WBC # BLD AUTO: 8.79 10*3/MM3 (ref 3.4–10.8)

## 2025-03-26 NOTE — PROGRESS NOTES
Yonatan Peña is a 74 y.o. male.     History of Present Illness     He has Graves' disease and had radioactive iodine therapy.  He became hypothyroid and is on levothyroxine 150 mcg/day.  He has lost 8 pounds since September 2024.  He denies bowel changes, palpitations, heat or cold intolerance.       He has hyperlipidemia with elevated LDL.  He has been on Lipitor 10 mg/day.  He denies myalgia.  His last meal was yesterday evening.  Lipid panel done in October 2024 as follows: Cholesterol 107.  HDL 33.  LDL 55.  Triglycerides 151.     He has a 4 cm right renal cyst and slow urinary stream.  He denies retention, dribbling or dysuria.  He has occasional erectile dysfunction and uses Cialis 10 mg as needed.  PSA done in October 2024 was normal at 0.69 ng/mL.  He will be followed by Dr. Tillman     He has hypertension and is on Avapro 300 mg once a day, and Lopressor 25 mg 1/2 tablet every morning and 1 tablet every evening.  He has no history of heart attack or stroke.  He denies chest pain or SOB.  He follows with Dr. Haque.    CT angiogram done in January 2025 showed 3.7 cm ascending thoracic aorta dilatation, coronary artery calcifications, noncalcified pulmonary nodules and 4 cm right renal cyst.  He will be seeing a pulmonologist for further evaluation of pulmonary nodules.     He has prediabetes with hemoglobin A1c ranging from 5.4 to 5.9% since 2017.  Hemoglobin A1c done in  December 2022 is 5.9%.  Hemoglobin A1c done in March 2024 is 6.1%.  Hemoglobin A1c done in March 2025 is 5.7%.     He has vitamin D deficiency and is vit D3 5000 units a day and multivitamins 1 tablet/day.      He has osteopenia on bone density done in June 2019.    Bone density done in January 2022 showed osteopenia with interval increase in the lumbar spine.       Bone density done in August 2024 showed osteopenia with a 4.5% increase in bone density on the left hip.  Lumbar spine density is unchanged.  10-year probability  "of major osteoporotic fracture is 5.5% and of hip fracture is 1.1%     He had moderate obstructive sleep apnea on home study done in November 2021.  He has no complaints of hypersomnolence.  He wakes up rested.  He is planning to see a dentist for possible dental appliance.    He has acid reflux disease and peptic ulcer disease.  EGD done in December 2024 by Dr. Ha showed a duodenal ulcer.  A hyperplastic polyp was removed by colonoscopy in December 2024.  He has colonic diverticulosis.  He is on pantoprazole.  He denies abdominal pain, melena or hematochezia.    He was a [employer] vet and was in a [employer].  He was exposed to asbestos  while in the [employer].  He is not aware of agent orange exposure.  He smoked for 1/2 ppd for 2 years.  He quit smoking at age 23.    The following portions of the patient's history were reviewed and updated as appropriate: allergies, current medications, past family history, past medical history, past social history, past surgical history, and problem list.    Review of Systems   Respiratory:  Negative for shortness of breath and wheezing.    Cardiovascular:  Negative for chest pain and palpitations.   Gastrointestinal: Negative.    Genitourinary: Negative.    Musculoskeletal:  Negative for myalgias.   Neurological:  Negative for numbness.     Vitals:    03/26/25 0828   BP: 122/58   Pulse: (!) 49   Temp: 97.6 °F (36.4 °C)   TempSrc: Oral   SpO2: 100%   Weight: 113 kg (248 lb 12.8 oz)   Height: 188 cm (74.02\")      Objective   Physical Exam  Constitutional:       General: He is not in acute distress.     Appearance: Normal appearance. He is not ill-appearing or toxic-appearing.   Eyes:      General: No scleral icterus.        Right eye: No discharge.         Left eye: No discharge.   Neck:      Vascular: No carotid bruit.   Cardiovascular:      Rate and Rhythm: Normal rate and regular rhythm.      Heart sounds: Normal heart sounds. No murmur heard.     No friction rub. No gallop. "   Pulmonary:      Breath sounds: Normal breath sounds. No rales.   Chest:      Chest wall: No tenderness.   Abdominal:      General: Bowel sounds are normal.      Palpations: Abdomen is soft.      Tenderness: There is no right CVA tenderness or left CVA tenderness.   Musculoskeletal:      Right lower leg: No edema.      Left lower leg: No edema.   Lymphadenopathy:      Cervical: No cervical adenopathy.   Neurological:      Mental Status: He is alert and oriented to person, place, and time.       Hospital Outpatient Visit on 08/16/2024   Component Date Value Ref Range Status    EF(MOD-bp) 08/16/2024 60.7  % Final    LV GLOBAL STRAIN  08/16/2024 -23.5  % Final    LVIDd 08/16/2024 4.9  cm Final    LVIDs 08/16/2024 3.0  cm Final    IVSd 08/16/2024 1.64  cm Final    LVPWd 08/16/2024 1.51  cm Final    FS 08/16/2024 39.8  % Final    IVS/LVPW 08/16/2024 1.09  cm Final    ESV(cubed) 08/16/2024 26.1  ml Final    LV Sys Vol (BSA corrected) 08/16/2024 21.2  cm2 Final    EDV(cubed) 08/16/2024 119.9  ml Final    LV Smith Vol (BSA corrected) 08/16/2024 56.9  cm2 Final    LV mass(C)d 08/16/2024 339.0  grams Final    LVOT area 08/16/2024 3.4  cm2 Final    LVOT diam 08/16/2024 2.09  cm Final    EDV(MOD-sp2) 08/16/2024 115.0  ml Final    EDV(MOD-sp4) 08/16/2024 137.0  ml Final    ESV(MOD-sp2) 08/16/2024 43.0  ml Final    ESV(MOD-sp4) 08/16/2024 51.0  ml Final    SV(MOD-sp2) 08/16/2024 72.0  ml Final    SV(MOD-sp4) 08/16/2024 86.0  ml Final    SVi(MOD-SP2) 08/16/2024 29.9  ml/m2 Final    SVi(MOD-SP4) 08/16/2024 35.7  ml/m2 Final    SVi (LVOT) 08/16/2024 38.1  ml/m2 Final    EF(MOD-sp2) 08/16/2024 62.6  % Final    EF(MOD-sp4) 08/16/2024 62.8  % Final    MV E max emily 08/16/2024 64.7  cm/sec Final    MV A max emily 08/16/2024 33.8  cm/sec Final    MV dec time 08/16/2024 0.19  sec Final    MV E/A 08/16/2024 1.91   Final    Pulm A Revs Dur 08/16/2024 0.11  sec Final    MV A dur 08/16/2024 0.15  sec Final    LA ESV Index (BP) 08/16/2024 49.0   ml/m2 Final    Med Peak E' Alton 08/16/2024 6.7  cm/sec Final    Lat Peak E' Alton 08/16/2024 13.1  cm/sec Final    TR max alton 08/16/2024 266.6  cm/sec Final    Avg E/e' ratio 08/16/2024 6.54   Final    SV(LVOT) 08/16/2024 91.9  ml Final    SV(RVOT) 08/16/2024 85.5  ml Final    Qp/Qs 08/16/2024 0.93   Final    RV Base 08/16/2024 4.3  cm Final    RV Mid 08/16/2024 3.0  cm Final    RV Length 08/16/2024 8.9  cm Final    TAPSE (>1.6) 08/16/2024 3.2  cm Final    RV S' 08/16/2024 16.9  cm/sec Final    Pulm Sys Alton 08/16/2024 68.6  cm/sec Final    Pulm Smith Alton 08/16/2024 65.1  cm/sec Final    Pulm S/D 08/16/2024 1.05   Final    Pulm A Revs Al[place] 08/16/2024 27.9  cm/sec Final    LV V1 max 08/16/2024 106.0  cm/sec Final    LV V1 max PG 08/16/2024 4.5  mmHg Final    LV V1 mean PG 08/16/2024 2.00  mmHg Final    LV V1 VTI 08/16/2024 26.7  cm Final    Ao pk alton 08/16/2024 125.0  cm/sec Final    Ao max PG 08/16/2024 6.3  mmHg Final    Ao mean PG 08/16/2024 3.0  mmHg Final    Ao V2 VTI 08/16/2024 29.9  cm Final    AIDEE(I,D) 08/16/2024 3.1  cm2 Final    MV max PG 08/16/2024 3.8  mmHg Final    MV mean PG 08/16/2024 1.15  mmHg Final    MV V2 VTI 08/16/2024 36.4  cm Final    MV P1/2t 08/16/2024 111.4  msec Final    MVA(P1/2t) 08/16/2024 1.97  cm2 Final    MVA(VTI) 08/16/2024 2.5  cm2 Final    MV dec slope 08/16/2024 273.2  cm/sec2 Final    MR max alton 08/16/2024 451.5  cm/sec Final    MR max PG 08/16/2024 81.5  mmHg Final    TR max PG 08/16/2024 28.4  mmHg Final    RVOT diam 08/16/2024 2.5  cm Final    RV V1 max PG 08/16/2024 2.02  mmHg Final    RV V1 max 08/16/2024 71.1  cm/sec Final    RV V1 VTI 08/16/2024 16.9  cm Final    PA V2 max 08/16/2024 101.0  cm/sec Final    PA acc time 08/16/2024 0.14  sec Final    PI end-d alton 08/16/2024 83.6  cm/sec Final    Ao root diam 08/16/2024 4.1  cm Final    ACS 08/16/2024 2.42  cm Final    Sinus 08/16/2024 3.6  cm Final    STJ 08/16/2024 3.1  cm Final    BH CV ECHO SHUNT ASSESSMENT PERFOR* 08/16/2024 1    Final    Dimensionless Index 08/16/2024 0.90  (DI) Final    RVSP(TR) 08/16/2024 31  mmHg Final    RAP systole 08/16/2024 3  mmHg Final    Ascending aorta 08/16/2024 3.8  cm Final    Aortic arch 08/16/2024 3.1  cm Final    Abdo Ao Diam 08/16/2024 2.8  cm Final     Assessment & Plan   Diagnoses and all orders for this visit:    1. Graves disease (Primary)  -     TSH Rfx On Abnormal To Free T4    2. Postablative hypothyroidism  -     TSH Rfx On Abnormal To Free T4    3. Hyperlipidemia, unspecified hyperlipidemia type  -     Lipid Panel    4. Essential hypertension    5. H/O multiple pulmonary nodules    6. Prediabetes  -     Comprehensive Metabolic Panel    7. Ascending aorta dilatation    8. Vitamin D deficiency  -     Vitamin D,25-Hydroxy    9. Osteopenia of left hip  -     Vitamin D,25-Hydroxy    10. JACQUELINE (obstructive sleep apnea)  -     CBC & Differential    11. Gastroesophageal reflux disease, unspecified whether esophagitis present  -     CBC & Differential    12. History of duodenal ulcer  -     CBC & Differential      Continue levothyroxine 150 mcg a day.    Continue Lipitor 10 mg/day.  Continue no concentrated sweet low-fat diet.    Continue irbesartan and metoprolol tartrate.  Will defer treatment of hypertension and follow-up of aortic dilatation to Dr. Haque.    Continue vitamin D3 5000 units/day and multivitamins 1 tablet/day.  Repeat bone density in August 2026.    Continue pantoprazole per Dr. Escobar.    Copy my note sent to Dr. Escobar, Dr. Tillman and Dr. Haque.    RTC 6 mos

## 2025-03-30 ENCOUNTER — RESULTS FOLLOW-UP (OUTPATIENT)
Dept: ENDOCRINOLOGY | Age: 75
End: 2025-03-30
Payer: MEDICARE

## 2025-03-30 RX ORDER — ATORVASTATIN CALCIUM 20 MG/1
20 TABLET, FILM COATED ORAL DAILY
Qty: 90 TABLET | Refills: 2 | Status: SHIPPED | OUTPATIENT
Start: 2025-03-30

## 2025-03-30 NOTE — PROGRESS NOTES
Normal hemoglobin and hematocrit.  LDL higher at 91.  HDL 35.  Triglycerides 119.  Increase Lipitor to 20 mg/day.  Prescription sent to pharmacy.  Normal thyroid function test.  Continue levothyroxine 150 mcg/day.  Normal vitamin D.  Continue vitamin D3 5000 units/day and multivitamins 1 tablet/day.  Send copy of labs to Dr. Escobar.  Please notify patient of results and instructions.

## 2025-03-31 DIAGNOSIS — E05.00 GRAVES DISEASE: ICD-10-CM

## 2025-03-31 DIAGNOSIS — E89.0 POSTABLATIVE HYPOTHYROIDISM: Primary | ICD-10-CM

## 2025-03-31 RX ORDER — LEVOTHYROXINE SODIUM 150 UG/1
150 TABLET ORAL DAILY
Qty: 90 TABLET | Refills: 2 | Status: SHIPPED | OUTPATIENT
Start: 2025-03-31

## 2025-04-02 RX ORDER — LEVOTHYROXINE SODIUM 150 UG/1
150 TABLET ORAL DAILY
Qty: 90 TABLET | Refills: 2 | Status: SHIPPED | OUTPATIENT
Start: 2025-04-02

## 2025-04-15 ENCOUNTER — TRANSCRIBE ORDERS (OUTPATIENT)
Dept: ADMINISTRATIVE | Facility: HOSPITAL | Age: 75
End: 2025-04-15
Payer: MEDICARE

## 2025-04-15 DIAGNOSIS — R91.1 LUNG NODULE: Primary | ICD-10-CM

## 2025-04-18 ENCOUNTER — OFFICE VISIT (OUTPATIENT)
Dept: FAMILY MEDICINE CLINIC | Facility: CLINIC | Age: 75
End: 2025-04-18
Payer: MEDICARE

## 2025-04-18 ENCOUNTER — PATIENT ROUNDING (BHMG ONLY) (OUTPATIENT)
Dept: FAMILY MEDICINE CLINIC | Facility: CLINIC | Age: 75
End: 2025-04-18

## 2025-04-18 VITALS
SYSTOLIC BLOOD PRESSURE: 114 MMHG | HEIGHT: 75 IN | OXYGEN SATURATION: 93 % | BODY MASS INDEX: 30.74 KG/M2 | HEART RATE: 56 BPM | WEIGHT: 247.2 LBS | DIASTOLIC BLOOD PRESSURE: 62 MMHG

## 2025-04-18 DIAGNOSIS — N23 KIDNEY PAIN: ICD-10-CM

## 2025-04-18 DIAGNOSIS — E78.2 MIXED HYPERLIPIDEMIA: ICD-10-CM

## 2025-04-18 DIAGNOSIS — Z13.1 DIABETES MELLITUS SCREENING: Primary | ICD-10-CM

## 2025-04-18 DIAGNOSIS — Z23 NEED FOR COVID-19 VACCINE: ICD-10-CM

## 2025-04-18 LAB
BILIRUB BLD-MCNC: NEGATIVE MG/DL
CLARITY, POC: CLEAR
COLOR UR: YELLOW
GLUCOSE UR STRIP-MCNC: NEGATIVE MG/DL
KETONES UR QL: NEGATIVE
LEUKOCYTE EST, POC: NEGATIVE
NITRITE UR-MCNC: NEGATIVE MG/ML
PH UR: 6 [PH] (ref 5–8)
PROT UR STRIP-MCNC: NEGATIVE MG/DL
RBC # UR STRIP: NEGATIVE /UL
SP GR UR: 1.02 (ref 1–1.03)
UROBILINOGEN UR QL: NORMAL

## 2025-04-18 NOTE — PROGRESS NOTES
A Style[employer] message has been sent to the patient for PATIENT ROUNDING with [facility].

## 2025-04-18 NOTE — PROGRESS NOTES
Subjective   The ABCs of the Annual Wellness Visit  Medicare Wellness Visit      Joaquin Peña is a 74 y.o. patient who presents for a Medicare Wellness Visit.    The following portions of the patient's history were reviewed and   updated as appropriate: allergies, current medications, past family history, past medical history, past social history, past surgical history, and problem list.    Compared to one year ago, the patient's physical   health is the same.  Compared to one year ago, the patient's mental   health is the same.    Recent Hospitalizations:  He was not admitted to the hospital during the last year.     Current Medical Providers:  Patient Care Team:  Yolanda Luis APRN as PCP - General (Family Medicine)  Muna Haque MD as Consulting Physician (Cardiology)  Horace Casper MD as Consulting Physician (Dermatology)  Horace Terry MD as Consulting Physician (Hematology and Oncology)  Erwin Quevedo MD as Consulting Physician (Orthopedic Surgery)  Oscar Stuart MD as Consulting Physician (Endocrinology)    Outpatient Medications Prior to Visit   Medication Sig Dispense Refill    acetaminophen (TYLENOL) 500 MG tablet Take 1 tablet by mouth Every 6 (Six) Hours As Needed for Mild Pain.      aspirin 81 MG EC tablet Take 1 tablet by mouth. Monday and Friday only secondary to ulcer      atorvastatin (LIPITOR) 20 MG tablet Take 1 tablet by mouth Daily. 90 tablet 2    Diclofenac Sodium (VOLTAREN) 1 % gel gel apply bid to affected area      famotidine (PEPCID) 10 MG tablet Take 1 tablet by mouth 2 (Two) Times a Day.      Ferrous Gluconate (IRON 27 PO) Take  by mouth Every Other Day.      fluticasone (Flonase) 50 MCG/ACT nasal spray 2 sprays into the nostril(s) as directed by provider Daily. (Patient taking differently: Administer 2 sprays into the nostril(s) as directed by provider As Needed. Seasonal allergies PRN) 18.2 mL 11    irbesartan (AVAPRO) 300 MG tablet Take 1 tablet by mouth  Every Night. Please obtain future refills from cardiologist. 90 tablet 1    ketotifen (ZADITOR) 0.025 % ophthalmic solution Apply 1 drop to eye(s) as directed by provider As Needed. PRN for seasonal allergies      levothyroxine (SYNTHROID, LEVOTHROID) 150 MCG tablet Take 1 tablet by mouth Daily. Indications: Underactive Thyroid 90 tablet 2    levothyroxine (SYNTHROID, LEVOTHROID) 150 MCG tablet Take 1 tablet by mouth Daily. Indications: Underactive Thyroid 90 tablet 2    lidocaine (LIDODERM) 5 % Place 1 patch on the skin as directed by provider See Admin Instructions.      metoprolol tartrate (LOPRESSOR) 25 MG tablet Take 0.5 tablets by mouth 2 (Two) Times a Day. 90 tablet 1    Multiple Vitamin (MULTI-VITAMIN DAILY PO) Take 1 tablet by mouth Every Other Day.      Natural Vitamin D-3 125 MCG (5000 UT) tablet TAKE ONE TABLET BY MOUTH DAILY (Patient taking differently: 1 tablet.) 30 tablet 12    tadalafil (CIALIS) 20 MG tablet 1/2 to 1 tablet daily as needed 30 tablet 0     No facility-administered medications prior to visit.     No opioid medication identified on active medication list. I have reviewed chart for other potential  high risk medication/s and harmful drug interactions in the elderly.      Aspirin is on active medication list. Aspirin use is indicated based on review of current medical condition/s. Pros and cons of this therapy have been discussed today. Benefits of this medication outweigh potential harm.  Patient has been encouraged to continue taking this medication.  .      Patient Active Problem List   Diagnosis    Graves disease    Supraventricular tachycardia    PVC (premature ventricular contraction)    Hyperlipidemia    Acute bilateral thoracic back pain    Dizziness    Irregular heart beat    Seasonal allergic rhinitis due to pollen    Hyperglycemia    Erectile dysfunction    Postablative hypothyroidism    Insulin resistance    Acquired spondylolisthesis    Lumbar radiculopathy    Degeneration of  "intervertebral disc of lumbar region    Essential hypertension    PND (paroxysmal nocturnal dyspnea)    Right foot pain    Pain in both lower extremities    JACQUELINE (obstructive sleep apnea)    Snoring    H/O multiple concussions    Low testosterone    Vitamin D deficiency    Preop cardiovascular exam    Abnormal stress test    Benign prostatic hyperplasia without lower urinary tract symptoms    Coronary artery disease involving native coronary artery of native heart without angina pectoris    Gastroesophageal reflux disease    Acute right ankle pain    Secondary polycythemia    Prediabetes    Benign prostatic hyperplasia with weak urinary stream    Osteopenia of left hip    JACQUELINE (obstructive sleep apnea)     Advance Care Planning Advance Directive is on file.  ACP discussion was declined by the patient.              Objective   Vitals:    25 0833   BP: 114/62   Pulse: 56   SpO2: 93%   Weight: 112 kg (247 lb 3.2 oz)   Height: 190.5 cm (75\")       Estimated body mass index is 30.9 kg/m² as calculated from the following:    Height as of this encounter: 190.5 cm (75\").    Weight as of this encounter: 112 kg (247 lb 3.2 oz).               Does the patient have evidence of cognitive impairment? No  Lab Results   Component Value Date    CHLPL 148 2025    TRIG 119 2025    HDL 35 (L) 2025    LDL 91 2025    VLDL 22 2025    HGBA1C 5.70 (H) 2025                                                                                                Health  Risk Assessment    Smoking Status:  Social History     Tobacco Use   Smoking Status Former    Current packs/day: 0.00    Average packs/day: 0.3 packs/day for 2.1 years (0.5 ttl pk-yrs)    Types: Cigarettes    Start date: 1971    Quit date: 1973    Years since quittin.3   Smokeless Tobacco Never     Alcohol Consumption:  Social History     Substance and Sexual Activity   Alcohol Use Yes    Alcohol/week: 2.0 standard drinks of alcohol "    Types: 1 Glasses of wine, 1 Cans of beer per week    Comment: occasional/  occ caffeine use 90z frap daily        Fall Risk Screen  JUSTINADI Fall Risk Assessment was completed, and patient is at LOW risk for falls.Assessment completed on:2025    Depression Screening   Little interest or pleasure in doing things? Not at all   Feeling down, depressed, or hopeless? Not at all   PHQ-2 Total Score 0      Health Habits and Functional and Cognitive Screenin/18/2025     8:00 AM   Functional & Cognitive Status   Do you have difficulty preparing food and eating? No   Do you have difficulty bathing yourself, getting dressed or grooming yourself? No   Do you have difficulty using the toilet? No   Do you have difficulty moving around from place to place? No   Do you have trouble with steps or getting out of a bed or a chair? No   Current Diet Well Balanced Diet   Dental Exam Up to date   Eye Exam Up to date   Exercise (times per week) 5 times per week   Current Exercises Include Light Weights   Do you need help using the phone?  No   Are you deaf or do you have serious difficulty hearing?  No   Do you need help to go to places out of walking distance? No   Do you need help shopping? No   Do you need help preparing meals?  No   Do you need help with housework?  No   Do you need help with laundry? No   Do you need help taking your medications? No   Do you need help managing money? No   Do you ever drive or ride in a car without wearing a seat belt? No   Have you felt unusual stress, anger or loneliness in the last month? No   Who do you live with? Spouse   If you need help, do you have trouble finding someone available to you? No   Have you been bothered in the last four weeks by sexual problems? No   Do you have difficulty concentrating, remembering or making decisions? No           Age-appropriate Screening Schedule:  Refer to the list below for future screening recommendations based on patient's age, sex and/or  medical conditions. Orders for these recommended tests are listed in the plan section. The patient has been provided with a written plan.    Health Maintenance List  Health Maintenance   Topic Date Due    COVID-19 Vaccine (9 - 2024-25 season) 05/02/2025 (Originally 3/20/2025)    TDAP/TD VACCINES (2 - Td or Tdap) 05/02/2025 (Originally 1/1/2022)    INFLUENZA VACCINE  07/01/2025    LIPID PANEL  03/26/2026    ANNUAL WELLNESS VISIT  04/18/2026    COLORECTAL CANCER SCREENING  12/12/2034    HEPATITIS C SCREENING  Completed    Pneumococcal Vaccine 50+  Completed    AAA SCREEN ONCE  Completed    ZOSTER VACCINE  Completed                                                                                                                                                CMS Preventative Services Quick Reference  Risk Factors Identified During Encounter  None Identified    The above risks/problems have been discussed with the patient.  Pertinent information has been shared with the patient in the After Visit Summary.  An After Visit Summary and PPPS were made available to the patient.    Follow Up:   Next Medicare Wellness visit to be scheduled in 1 year.         Additional E&M Note during same encounter follows:  Patient has additional, significant, and separately identifiable condition(s)/problem(s) that require work above and beyond the Medicare Wellness Visit     Chief Complaint  Annual Exam and Back Pain (Left side   X's 3 weeks not improving    Worse with certain movements  Rt side)    Subjective    HPI Joaquin Peña is a 74-year-old male presenting today as a new patient to establish care and to receive an annual wellness exam and to discuss his back pain, no history of injury  - has cyst on kidney         The patient is a 74-year-old male presenting today for an annual wellness visit and to discuss his back pain.    He reports experiencing right-sided back pain, which he does not attribute to a muscle strain. The pain is  "localized at the base of his last rib on the right side. He has been engaging in landscaping and yard work and assisting his 90-year-old mother with mobility, but these activities have not exacerbated his pain. He maintains an active lifestyle, including golfing twice a week and weightlifting, without any associated discomfort. He reports no recent injuries or incidents that could have precipitated the pain. He recalls a similar episode of pain in the same area approximately 25 years ago, which was diagnosed as kidney stones. However, he notes that the current pain is less severe than his previous experience with kidney stones. He has a known 4 cm cyst on his right kidney, but his urologist has ruled this out as the cause of his pain. He also has lung nodules, but his pulmonologist has similarly dismissed these as potential causes. The pain is often triggered by certain movements, such as transitioning from a prone to sitting position, standing up from a chair, or reaching and stretching. Despite these triggers, he can perform the same movements at other times without discomfort.    He has never been diagnosed with diabetes. His last A1c was 5.7.    In November 2024, his cardiologist did an MRI and discovered that he had an enlarged aorta, which was called an aneurysm.    IMMUNIZATIONS  He has received about 6 or 7 COVID-19 vaccines. He is up to date on his influenza, RSV, and shingles vaccines.          Objective   Vital Signs:  /62   Pulse 56   Ht 190.5 cm (75\")   Wt 112 kg (247 lb 3.2 oz)   SpO2 93%   BMI 30.90 kg/m²   Physical Exam  HENT:      Head: Normocephalic and atraumatic.      Nose: Nose normal.      Mouth/Throat:      Mouth: Mucous membranes are moist.   Eyes:      Pupils: Pupils are equal, round, and reactive to light.   Cardiovascular:      Rate and Rhythm: Normal rate and regular rhythm.   Pulmonary:      Effort: Pulmonary effort is normal.      Breath sounds: Normal breath sounds. "   Abdominal:      General: Abdomen is flat. Bowel sounds are normal.      Palpations: Abdomen is soft.      Tenderness: There is no right CVA tenderness or left CVA tenderness.   Neurological:      General: No focal deficit present.      Mental Status: He is alert.         Lungs were auscultated.            Results  Laboratory Studies  Protein detected in urine.           Assessment and Plan   Diagnoses and all orders for this visit:    1. Diabetes mellitus screening (Primary)  -     CBC & Differential  -     Hemoglobin A1c  -     Comprehensive Metabolic Panel    2. Mixed hyperlipidemia  -     Lipid Panel    3. Kidney pain  -     CBC & Differential  -     US Renal Bilateral; Future           1. Right-sided back pain.  The etiology of the pain remains uncertain, with potential causes including an enlarged aorta or a renal issue. The pain is not debilitating but persists over the last three to four weeks. A renal ultrasound will be scheduled at [facility] to investigate any potential differences. Blood work will be conducted today to further evaluate the presence of protein in his urine.    2. Prediabetes.  His last A1c was 5.7, indicating prediabetes. Blood work will be conducted today to monitor his A1c levels and assess for any progression.    3. Enlarged aorta.  An MRI conducted in November revealed an enlarged aorta, described as an aneurysm. This condition is being monitored by his cardiologist, Dr. Parker Haque, at [facility] [place].    Follow-up  The patient will follow up in 6 months.         Follow Up   No follow-ups on file.  Patient was given instructions and counseling regarding his condition or for health maintenance advice. Please see specific information pulled into the AVS if appropriate.  Patient or patient representative verbalized consent for the use of Ambient Listening during the visit with  QIAN Garza for chart documentation. 4/18/2025  09:23 EDT

## 2025-04-19 LAB
ALBUMIN SERPL-MCNC: 3.7 G/DL (ref 3.5–5.2)
ALBUMIN/GLOB SERPL: 1.3 G/DL
ALP SERPL-CCNC: 84 U/L (ref 39–117)
ALT SERPL-CCNC: 29 U/L (ref 1–41)
AST SERPL-CCNC: 24 U/L (ref 1–40)
BASOPHILS # BLD AUTO: 0.05 10*3/MM3 (ref 0–0.2)
BASOPHILS NFR BLD AUTO: 0.9 % (ref 0–1.5)
BILIRUB SERPL-MCNC: 0.5 MG/DL (ref 0–1.2)
BUN SERPL-MCNC: 18 MG/DL (ref 8–23)
BUN/CREAT SERPL: 14.9 (ref 7–25)
CALCIUM SERPL-MCNC: 10.3 MG/DL (ref 8.6–10.5)
CHLORIDE SERPL-SCNC: 105 MMOL/L (ref 98–107)
CHOLEST SERPL-MCNC: 117 MG/DL (ref 0–200)
CO2 SERPL-SCNC: 25.4 MMOL/L (ref 22–29)
CREAT SERPL-MCNC: 1.21 MG/DL (ref 0.76–1.27)
EGFRCR SERPLBLD CKD-EPI 2021: 63.6 ML/MIN/1.73
EOSINOPHIL # BLD AUTO: 0.16 10*3/MM3 (ref 0–0.4)
EOSINOPHIL NFR BLD AUTO: 2.8 % (ref 0.3–6.2)
ERYTHROCYTE [DISTWIDTH] IN BLOOD BY AUTOMATED COUNT: 16.7 % (ref 12.3–15.4)
GLOBULIN SER CALC-MCNC: 2.8 GM/DL
GLUCOSE SERPL-MCNC: 85 MG/DL (ref 65–99)
HBA1C MFR BLD: 5.8 % (ref 4.8–5.6)
HCT VFR BLD AUTO: 48.3 % (ref 37.5–51)
HDLC SERPL-MCNC: 33 MG/DL (ref 40–60)
HGB BLD-MCNC: 15.2 G/DL (ref 13–17.7)
IMM GRANULOCYTES # BLD AUTO: 0.04 10*3/MM3 (ref 0–0.05)
IMM GRANULOCYTES NFR BLD AUTO: 0.7 % (ref 0–0.5)
LDLC SERPL CALC-MCNC: 63 MG/DL (ref 0–100)
LYMPHOCYTES # BLD AUTO: 1.66 10*3/MM3 (ref 0.7–3.1)
LYMPHOCYTES NFR BLD AUTO: 29.4 % (ref 19.6–45.3)
MCH RBC QN AUTO: 26.4 PG (ref 26.6–33)
MCHC RBC AUTO-ENTMCNC: 31.5 G/DL (ref 31.5–35.7)
MCV RBC AUTO: 83.9 FL (ref 79–97)
MONOCYTES # BLD AUTO: 0.59 10*3/MM3 (ref 0.1–0.9)
MONOCYTES NFR BLD AUTO: 10.4 % (ref 5–12)
NEUTROPHILS # BLD AUTO: 3.15 10*3/MM3 (ref 1.7–7)
NEUTROPHILS NFR BLD AUTO: 55.8 % (ref 42.7–76)
NRBC BLD AUTO-RTO: 0 /100 WBC (ref 0–0.2)
PLATELET # BLD AUTO: 267 10*3/MM3 (ref 140–450)
POTASSIUM SERPL-SCNC: 5 MMOL/L (ref 3.5–5.2)
PROT SERPL-MCNC: 6.5 G/DL (ref 6–8.5)
RBC # BLD AUTO: 5.76 10*6/MM3 (ref 4.14–5.8)
SODIUM SERPL-SCNC: 137 MMOL/L (ref 136–145)
TRIGL SERPL-MCNC: 117 MG/DL (ref 0–150)
VLDLC SERPL CALC-MCNC: 21 MG/DL (ref 5–40)
WBC # BLD AUTO: 5.65 10*3/MM3 (ref 3.4–10.8)

## 2025-04-29 ENCOUNTER — HOSPITAL ENCOUNTER (OUTPATIENT)
Dept: ULTRASOUND IMAGING | Facility: HOSPITAL | Age: 75
Discharge: HOME | End: 2025-04-29
Admitting: NURSE PRACTITIONER
Payer: MEDICARE

## 2025-04-29 DIAGNOSIS — N23 KIDNEY PAIN: ICD-10-CM

## 2025-04-29 PROCEDURE — 76775 US EXAM ABDO BACK WALL LIM: CPT

## 2025-04-30 ENCOUNTER — OFFICE VISIT (OUTPATIENT)
Dept: FAMILY MEDICINE CLINIC | Facility: CLINIC | Age: 75
End: 2025-04-30
Payer: MEDICARE

## 2025-04-30 VITALS
HEIGHT: 75 IN | DIASTOLIC BLOOD PRESSURE: 64 MMHG | SYSTOLIC BLOOD PRESSURE: 112 MMHG | HEART RATE: 82 BPM | WEIGHT: 247.4 LBS | OXYGEN SATURATION: 98 % | BODY MASS INDEX: 30.76 KG/M2

## 2025-04-30 DIAGNOSIS — M79.10 MUSCLE PAIN: Primary | ICD-10-CM

## 2025-04-30 PROCEDURE — 1160F RVW MEDS BY RX/DR IN RCRD: CPT | Performed by: NURSE PRACTITIONER

## 2025-04-30 PROCEDURE — 1159F MED LIST DOCD IN RCRD: CPT | Performed by: NURSE PRACTITIONER

## 2025-04-30 PROCEDURE — 3074F SYST BP LT 130 MM HG: CPT | Performed by: NURSE PRACTITIONER

## 2025-04-30 PROCEDURE — 3078F DIAST BP <80 MM HG: CPT | Performed by: NURSE PRACTITIONER

## 2025-04-30 PROCEDURE — 99213 OFFICE O/P EST LOW 20 MIN: CPT | Performed by: NURSE PRACTITIONER

## 2025-04-30 PROCEDURE — 1125F AMNT PAIN NOTED PAIN PRSNT: CPT | Performed by: NURSE PRACTITIONER

## 2025-04-30 RX ORDER — IBUPROFEN 800 MG/1
800 TABLET, FILM COATED ORAL EVERY 6 HOURS PRN
Qty: 90 TABLET | Refills: 3 | Status: SHIPPED | OUTPATIENT
Start: 2025-04-30

## 2025-04-30 RX ORDER — FAMOTIDINE 40 MG/1
TABLET, FILM COATED ORAL
COMMUNITY
Start: 2025-04-19

## 2025-04-30 RX ORDER — CYCLOBENZAPRINE HCL 10 MG
10 TABLET ORAL 3 TIMES DAILY PRN
Qty: 60 TABLET | Refills: 1 | Status: SHIPPED | OUTPATIENT
Start: 2025-04-30

## 2025-04-30 NOTE — PROGRESS NOTES
Subjective   Joaquin Peña is a 74 y.o. male. Presents today for   Chief Complaint   Patient presents with    Back Pain       History Of Present Illness Joaquin Peña is a 74 year old male presenting with back pain lower thoracic  back pain after working in the yard for several days.    History of Present Illness  The patient presents for evaluation of back pain.    He reports engaging in strenuous activities such as lawn mowing, landscaping, and handling 50 bags of mulch, which involved lifting, transporting, unloading, and distributing the mulch around his property. Following these activities, he experienced the onset of back pain that has progressively worsened over the past few days. The pain is localized in the mid-back region, specifically along the spine, and is characterized by a sharp, intense sensation that compels him to cease his current activity. The pain is exacerbated by actions such as sneezing, blowing his nose, or applying any form of pressure. It is not tender to touch. He also notes that the pain is less severe today compared to 2 or 3 days ago. He expresses uncertainty about the cause of the pain, questioning whether it could be due to a slipped disc or age-related changes. He also mentions a known diagnosis of arthritis but is unsure if it affects his spine. The pain is exacerbated by actions such as sneezing, blowing his nose, or applying any form of pressure.    Supplemental Information  He received a tetanus vaccine today at [facility].    IMMUNIZATIONS  He received a tetanus vaccine today at [facility]. He has had 7 COVID-19 vaccines. He has received 2 doses of shingles vaccine. He has received RSV vaccine.    Patient Active Problem List   Diagnosis    Graves disease    Supraventricular tachycardia    PVC (premature ventricular contraction)    Hyperlipidemia    Acute bilateral thoracic back pain    Dizziness    Irregular heart beat    Seasonal allergic rhinitis due to pollen     Hyperglycemia    Erectile dysfunction    Postablative hypothyroidism    Insulin resistance    Acquired spondylolisthesis    Lumbar radiculopathy    Degeneration of intervertebral disc of lumbar region    Essential hypertension    PND (paroxysmal nocturnal dyspnea)    Right foot pain    Pain in both lower extremities    JACQUELINE (obstructive sleep apnea)    Snoring    H/O multiple concussions    Low testosterone    Vitamin D deficiency    Preop cardiovascular exam    Abnormal stress test    Benign prostatic hyperplasia without lower urinary tract symptoms    Coronary artery disease involving native coronary artery of native heart without angina pectoris    Gastroesophageal reflux disease    Acute right ankle pain    Secondary polycythemia    Prediabetes    Benign prostatic hyperplasia with weak urinary stream    Osteopenia of left hip    JACQUELINE (obstructive sleep apnea)       Social History     Socioeconomic History    Marital status:    Tobacco Use    Smoking status: Former     Current packs/day: 0.00     Average packs/day: 0.3 packs/day for 2.1 years (0.5 ttl pk-yrs)     Types: Cigarettes     Start date: 1971     Quit date: 1973     Years since quittin.3    Smokeless tobacco: Never   Vaping Use    Vaping status: Never Used   Substance and Sexual Activity    Alcohol use: Yes     Alcohol/week: 2.0 standard drinks of alcohol     Types: 1 Glasses of wine, 1 Cans of beer per week     Comment: occasional/  occ caffeine use 90z frap daily     Drug use: No    Sexual activity: Yes     Partners: Female     Birth control/protection: None       Allergies   Allergen Reactions    Hydrocodone Anaphylaxis     Patient states that when taking it for pain     Latex Rash       Current Outpatient Medications on File Prior to Visit   Medication Sig Dispense Refill    acetaminophen (TYLENOL) 500 MG tablet Take 1 tablet by mouth Every 6 (Six) Hours As Needed for Mild Pain.      aspirin 81 MG EC tablet Take 1 tablet by  "mouth. Monday and Friday only secondary to ulcer      atorvastatin (LIPITOR) 20 MG tablet Take 1 tablet by mouth Daily. 90 tablet 2    Diclofenac Sodium (VOLTAREN) 1 % gel gel apply bid to affected area      famotidine (PEPCID) 40 MG tablet       Ferrous Gluconate (IRON 27 PO) Take  by mouth Every Other Day.      fluticasone (Flonase) 50 MCG/ACT nasal spray 2 sprays into the nostril(s) as directed by provider Daily. (Patient taking differently: Administer 2 sprays into the nostril(s) as directed by provider As Needed. Seasonal allergies PRN) 18.2 mL 11    irbesartan (AVAPRO) 300 MG tablet Take 1 tablet by mouth Every Night. Please obtain future refills from cardiologist. 90 tablet 1    ketotifen (ZADITOR) 0.025 % ophthalmic solution Apply 1 drop to eye(s) as directed by provider As Needed. PRN for seasonal allergies      levothyroxine (SYNTHROID, LEVOTHROID) 150 MCG tablet Take 1 tablet by mouth Daily. Indications: Underactive Thyroid 90 tablet 2    levothyroxine (SYNTHROID, LEVOTHROID) 150 MCG tablet Take 1 tablet by mouth Daily. Indications: Underactive Thyroid 90 tablet 2    lidocaine (LIDODERM) 5 % Place 1 patch on the skin as directed by provider See Admin Instructions.      metoprolol tartrate (LOPRESSOR) 25 MG tablet Take 0.5 tablets by mouth 2 (Two) Times a Day. 90 tablet 1    Multiple Vitamin (MULTI-VITAMIN DAILY PO) Take 1 tablet by mouth Every Other Day.      Natural Vitamin D-3 125 MCG (5000 UT) tablet TAKE ONE TABLET BY MOUTH DAILY (Patient taking differently: 1 tablet.) 30 tablet 12    tadalafil (CIALIS) 20 MG tablet 1/2 to 1 tablet daily as needed 30 tablet 0    [DISCONTINUED] famotidine (PEPCID) 10 MG tablet Take 1 tablet by mouth 2 (Two) Times a Day.       No current facility-administered medications on file prior to visit.       Objective   Vitals:    04/30/25 1509   BP: 112/64   Pulse: 82   SpO2: 98%   Weight: 112 kg (247 lb 6.4 oz)   Height: 190.5 cm (75\")     Body mass index is 30.92 " kg/m².    Physical Exam  Lungs were auscultated.  Heart was examined.  Physical Exam  HENT:      Head: Normocephalic and atraumatic.   Cardiovascular:      Rate and Rhythm: Normal rate and regular rhythm.      Pulses: Normal pulses.      Heart sounds: Normal heart sounds.   Pulmonary:      Effort: Pulmonary effort is normal.      Breath sounds: Normal breath sounds.   Musculoskeletal:         General: Normal range of motion.      Cervical back: Normal range of motion.   Skin:     General: Skin is warm and dry.      Capillary Refill: Capillary refill takes less than 2 seconds.   Neurological:      General: No focal deficit present.      Mental Status: He is alert.   Psychiatric:         Mood and Affect: Mood normal.       Results         Procedures     Assessment & Plan   Diagnoses and all orders for this visit:    1. Muscle pain (Primary)  -     cyclobenzaprine (FLEXERIL) 10 MG tablet; Take 1 tablet by mouth 3 (Three) Times a Day As Needed for Muscle Spasms.  Dispense: 60 tablet; Refill: 1  -     ibuprofen (ADVIL,MOTRIN) 800 MG tablet; Take 1 tablet by mouth Every 6 (Six) Hours As Needed for Mild Pain.  Dispense: 90 tablet; Refill: 3         Assessment & Plan  1. Back pain.  The etiology of the pain is likely due to inflammation and irritation of the small tendons within the spinal column. A prescription for a muscle relaxant will be sent to [facility] to alleviate the inflammation. Additionally, ibuprofen is recommended to further reduce inflammation. He is advised to refrain from heavy lifting until his condition improves. If there is no improvement with the current treatment regimen, radiographic imaging will be considered.    2. Health maintenance.  He received a tetanus vaccine today at [facility]. He has had 7 COVID-19 vaccines. He has received 2 doses of shingles vaccine. He has received RSV vaccine.           BMI is >= 30 and <35. (Class 1 Obesity). The following options were offered after discussion;:  weight loss educational material (shared in after visit summary), exercise counseling/recommendations, nutrition counseling/recommendations, and pharmacological intervention options     No follow-ups on file.     Discussed Care Gaps, ordered referrals and encouraged vaccination updates.       - Pt agrees with plan of care and denies further questions/concerns today  - This document is intended for medical expert use only. Persons  reading this document without medical staff guidance may result in misinterpretation and unintended morbidity     Go to the ER for any possible life-threatening symptoms such as chest pain or shortness of air.      Please allow 3-5 business days for recommendations based on new results      I personally spent time with this patient, preparing for the visit, reviewing tests, obtaining and/or reviewing a separately obtained history, performing a medically appropriate examination and/or evaluation, counseling and educating the patient/family/caregiver, ordering medications,  documenting information in the medical record and indepentently interpreting results.         Answers submitted by the patient for this visit:  Back Pain Questionnaire (Submitted on 4/30/2025)  Chief Complaint: Back pain  Chronicity: new  Onset: 1 to 4 weeks ago  Frequency: 2 to 4 times per day  Progression since onset: worsening  Pain location: thoracic spine  Pain quality: stabbing  Radiates to: does not radiate  Pain - numeric: 9/10  Pain is: worse during the day  Aggravated by: bending, coughing, position, twisting  Stiffness is present: all day  abdominal pain: No  bladder incontinence: No  bowel incontinence: No  chest pain: No  dysuria: No  fever: No  leg pain: No  numbness: No  paresthesias: No  pelvic pain: No  perianal numbness: No  tingling: No  weakness: No  weight loss: No  Risk factors: history of cancer, recent trauma  Additional Information: In the middle of the back

## 2025-06-10 RX ORDER — IRBESARTAN 300 MG/1
300 TABLET ORAL NIGHTLY
Qty: 90 TABLET | Refills: 1 | Status: SHIPPED | OUTPATIENT
Start: 2025-06-10

## 2025-07-15 ENCOUNTER — HOSPITAL ENCOUNTER (OUTPATIENT)
Dept: CT IMAGING | Facility: HOSPITAL | Age: 75
Discharge: HOME | End: 2025-07-15
Admitting: INTERNAL MEDICINE
Payer: MEDICARE

## 2025-07-15 DIAGNOSIS — R91.1 LUNG NODULE: ICD-10-CM

## 2025-07-15 PROCEDURE — 71250 CT THORAX DX C-: CPT

## 2025-07-22 ENCOUNTER — TRANSCRIBE ORDERS (OUTPATIENT)
Dept: ADMINISTRATIVE | Facility: HOSPITAL | Age: 75
End: 2025-07-22
Payer: MEDICARE

## 2025-07-22 DIAGNOSIS — R91.1 LUNG NODULE: Primary | ICD-10-CM

## (undated) DEVICE — GLIDESHEATH SLENDER STAINLESS STEEL KIT: Brand: GLIDESHEATH SLENDER

## (undated) DEVICE — PK CATH CARD 40

## (undated) DEVICE — KT MANIFLD CARDIAC

## (undated) DEVICE — CATH VENT MIV RADL PIG ST TIP 5F 110CM

## (undated) DEVICE — RADIFOCUS OPTITORQUE ANGIOGRAPHIC CATHETER: Brand: OPTITORQUE

## (undated) DEVICE — GW EMR FIX EXCHG J STD .035 3MM 260CM